# Patient Record
Sex: MALE | Race: WHITE | NOT HISPANIC OR LATINO | Employment: OTHER | URBAN - METROPOLITAN AREA
[De-identification: names, ages, dates, MRNs, and addresses within clinical notes are randomized per-mention and may not be internally consistent; named-entity substitution may affect disease eponyms.]

---

## 2018-05-22 LAB
CREAT ?TM UR-SCNC: 80.3 UMOL/L
HBA1C MFR BLD HPLC: 6.4 %

## 2018-08-15 ENCOUNTER — TRANSCRIBE ORDERS (OUTPATIENT)
Dept: NEUROLOGY | Facility: CLINIC | Age: 68
End: 2018-08-15

## 2018-08-15 DIAGNOSIS — M79.2 NEURALGIA AND NEURITIS, UNSPECIFIED: Primary | ICD-10-CM

## 2018-08-17 RX ORDER — ATORVASTATIN CALCIUM 10 MG/1
10 TABLET, FILM COATED ORAL DAILY
COMMUNITY
Start: 2018-04-17 | End: 2020-08-10 | Stop reason: SDDI

## 2018-08-17 RX ORDER — HYDROCHLOROTHIAZIDE 25 MG/1
1 TABLET ORAL DAILY
COMMUNITY
End: 2022-05-09 | Stop reason: ALTCHOICE

## 2018-08-17 RX ORDER — GABAPENTIN 600 MG/1
600 TABLET ORAL 4 TIMES DAILY
COMMUNITY
Start: 2018-05-23 | End: 2018-10-25 | Stop reason: SDUPTHER

## 2018-08-17 RX ORDER — METOPROLOL SUCCINATE 100 MG/1
100 TABLET, EXTENDED RELEASE ORAL DAILY
COMMUNITY
End: 2022-05-09 | Stop reason: ALTCHOICE

## 2018-08-17 RX ORDER — CLOTRIMAZOLE AND BETAMETHASONE DIPROPIONATE 10; .64 MG/G; MG/G
CREAM TOPICAL DAILY PRN
COMMUNITY
Start: 2015-11-07 | End: 2020-08-10 | Stop reason: ALTCHOICE

## 2018-08-17 RX ORDER — OXCARBAZEPINE 150 MG/1
150 TABLET, FILM COATED ORAL 2 TIMES DAILY
COMMUNITY
Start: 2018-05-23 | End: 2018-10-11 | Stop reason: SDUPTHER

## 2018-08-17 RX ORDER — GLIPIZIDE 10 MG/1
10 TABLET ORAL 2 TIMES DAILY
COMMUNITY

## 2018-08-17 RX ORDER — CYANOCOBALAMIN (VITAMIN B-12) 250 MCG
250 TABLET ORAL DAILY PRN
COMMUNITY
Start: 2018-05-31 | End: 2019-08-19 | Stop reason: ALTCHOICE

## 2018-08-17 RX ORDER — FUROSEMIDE 20 MG/1
1 TABLET ORAL DAILY PRN
COMMUNITY
End: 2022-05-09 | Stop reason: ALTCHOICE

## 2018-08-17 RX ORDER — CELECOXIB 200 MG/1
200 CAPSULE ORAL DAILY
COMMUNITY
End: 2018-08-21

## 2018-08-17 RX ORDER — ALPHA LIPOIC ACID 300 MG
1 CAPSULE ORAL 2 TIMES DAILY
COMMUNITY
Start: 2018-05-31 | End: 2018-08-21

## 2018-08-17 RX ORDER — IRBESARTAN 300 MG/1
300 TABLET ORAL DAILY
COMMUNITY
End: 2022-05-09 | Stop reason: SDUPTHER

## 2018-08-21 ENCOUNTER — OFFICE VISIT (OUTPATIENT)
Dept: NEUROLOGY | Facility: CLINIC | Age: 68
End: 2018-08-21
Payer: COMMERCIAL

## 2018-08-21 ENCOUNTER — APPOINTMENT (OUTPATIENT)
Dept: LAB | Facility: CLINIC | Age: 68
End: 2018-08-21
Payer: COMMERCIAL

## 2018-08-21 VITALS
HEART RATE: 73 BPM | DIASTOLIC BLOOD PRESSURE: 76 MMHG | HEIGHT: 70 IN | SYSTOLIC BLOOD PRESSURE: 136 MMHG | WEIGHT: 240 LBS | BODY MASS INDEX: 34.36 KG/M2

## 2018-08-21 DIAGNOSIS — G56.22 ULNAR NEUROPATHY OF LEFT UPPER EXTREMITY: ICD-10-CM

## 2018-08-21 DIAGNOSIS — E11.44 DIABETIC AMYOTROPHY ASSOCIATED WITH TYPE 2 DIABETES MELLITUS (HCC): ICD-10-CM

## 2018-08-21 DIAGNOSIS — E13.42 POLYNEUROPATHY DUE TO SECONDARY DIABETES (HCC): Primary | ICD-10-CM

## 2018-08-21 LAB
BUN SERPL-MCNC: 14 MG/DL (ref 5–25)
CREAT SERPL-MCNC: 0.89 MG/DL (ref 0.6–1.3)
ERYTHROCYTE [SEDIMENTATION RATE] IN BLOOD: 12 MM/HOUR (ref 0–10)
GFR SERPL CREATININE-BSD FRML MDRD: 88 ML/MIN/1.73SQ M

## 2018-08-21 PROCEDURE — 86618 LYME DISEASE ANTIBODY: CPT

## 2018-08-21 PROCEDURE — 36415 COLL VENOUS BLD VENIPUNCTURE: CPT

## 2018-08-21 PROCEDURE — 82565 ASSAY OF CREATININE: CPT

## 2018-08-21 PROCEDURE — 84520 ASSAY OF UREA NITROGEN: CPT

## 2018-08-21 PROCEDURE — 99204 OFFICE O/P NEW MOD 45 MIN: CPT | Performed by: PSYCHIATRY & NEUROLOGY

## 2018-08-21 PROCEDURE — 85652 RBC SED RATE AUTOMATED: CPT

## 2018-08-21 NOTE — PROGRESS NOTES
Lisandra Manriquez is a 76 y o  male who presents with complaints of numbness and pain in the left leg in bilateral distal lower extremities    Assessment:  1  Polyne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 ([address] )    2  Diabetic amyotrophy associated with type 2 diabetes mellitus ([address] )    3  Ulnar neuropathy of left upper extremity        Plan:  MRI left lumbar plexus  EMG left upper lower extremity  Blood work  Initiate physical therapy  Increase gabapentin up to a 600 mg 1-1/2 t i d  and may further increase to 2 t i d  if needed  Follow-up 6 weeks    Discussion:  Mykel Marino has findings consistent with diabetic peripheral neuropathy as well as suspected diabetic amyotrophy, rule out lumbar plexopathy  In addition has findings consistent with left ulnar neuropathy  Have recommended EMG left upper and lower extremity as well as MRI of the lumbar plexus and additional blood work  Will have him modify his medication taking gabapentin 600 mg 1 and half tablets 3 times daily and if still not enough relief after 4 days may increase to 2 pills 3 times daily  If pain symptoms persist to consider trial of Cymbalta  He will need straight physical therapy and I will see him back in follow-up when these are completed      Subjective:    HPI  Mykel Marino presents today with the above complaints  He states that for the past few years he has noticed numbness and tingling in the distal lower extremities associated with occasional stabbing and burning type pain symptoms  He states that over the years the distribution of the numbness has increased to about the level of the knees and the degree of numbness and dysesthesias has increased  He has also noticed some issues with balance during this time frame  He states he was seen by another neurologist who did blood work and an EMG of his extremities and diagnosed him with diabetic peripheral neuropathy    He was initially started on gabapentin in the dose was titrated up to 600 mg 4 times daily  As he was not getting adequate pain relief Trileptal was added and the dose was increased up to 600 mg 3 times daily and he became very lethargic and confused so this was changed back to 300 mg at bedtime  He states that over the last 3-4 months he has noticed a new type of pain symptoms that seems to began around the buttocks area on the left side and radiates into the anterior lateral left thigh and down into the left for leg associated with numbness and hypersensitivity  He states that there is a lancinating shooting type pain associated with this  In addition he has noticed progressive weakness in the whole left leg  He denied any injury or change in activity which may have precipitated this  He was referred to orthopedics and an MRI of his lumbar spine was performed and he was referred to Pain Management  He states he received if you epidural injections and did not notice significant improvement in his symptoms of pain but did note progressive weakness in the leg  Report of the MRI describes a previous right laminectomy at L4-5 with multilevel disc and facet changes contributing to lateral recess foraminal stenosis at L4-5 and L5-S1 with lesser changes at L2-3 and L3-4  He states that he now has to use a cane to ambulate and has a difficult time getting in and out of the car because of the weakness    He reports that he has had diabetes for 10 years and more recently his control has improved      Past Medical History:   Diagnosis Date    Arthritis     Hyperlipidemia     Hypertension     Poor circulation     Sleep apnea     Type 2 diabetes mellitus ([facility] [address] )        Family History:  Family History   Problem Relation Age of Onset    Diabetes type II Mother     Hypertension Mother     Breast cancer Sister     Lung cancer Sister     Multiple endocrine neoplasia Brother     Breast cancer Sister        Past Surgical History:  Past Surgical History:   Procedure Laterality Date    BACK SURGERY  CORONARY ARTERY BYPASS GRAFT  07/22/2017    HEMORROIDECTOMY      TONSILLECTOMY      WRIST SURGERY         Social History:   reports that he has never smoked  He has never used smokeless tobacco  He reports that he drinks alcohol  He reports that he does not use drugs  Allergies:  Penicillins      Current Outpatient Prescriptions:     aspirin 81 MG tablet, Take 1 tablet by mouth daily, Disp: , Rfl:     atorvastatin (LIPITOR) 10 mg tablet, Take 10 mg by mouth daily, Disp: , Rfl:     Blood Glucose Monitoring Suppl (ACURA BLOOD GLUCOSE METER) w/Device KIT, by Other route   Use as instructed, Disp: , Rfl:     clotrimazole-betamethasone (LOTRISONE) 1-0 05 % cream, Apply topically 2 (two) times a day, Disp: , Rfl:     Empagliflozin 10 MG TABS, Take 10 mg by mouth daily, Disp: , Rfl:     furosemide (LASIX) 20 mg tablet, Take 1 tablet by mouth daily as needed, Disp: , Rfl:     gabapentin (NEURONTIN) 600 MG tablet, Take 600 mg by mouth 4 (four) times a day  , Disp: , Rfl:     glipiZIDE (GLUCOTROL) 10 mg tablet, Take 10 mg by mouth 2 (two) times a day, Disp: , Rfl:     hydrochlorothiazide (HYDRODIURIL) 25 mg tablet, Take 1 tablet by mouth daily, Disp: , Rfl:     insulin glargine (LANTUS SOLOSTAR) 100 units/mL injection pen, Lantus Solostar U-100 Insulin 100 unit/mL (3 mL) subcutaneous pen, Disp: , Rfl:     IRBESARTAN PO, Take 450 mg by mouth daily, Disp: , Rfl:     metoprolol succinate (TOPROL-XL) 100 mg 24 hr tablet, Take 100 mg by mouth daily, Disp: , Rfl:     OXcarbazepine (TRILEPTAL) 150 mg tablet, Take 150 mg by mouth 2 (two) times a day, Disp: , Rfl:     vitamin B-12 (CYANOCOBALAMIN) 250 MCG TABS, Take 250 mcg by mouth daily, Disp: , Rfl:     I have reviewed the past medical, social and family history, current medications, allergies, vitals, review of systems and updated this information as appropriate today     Objective:    Vitals:  Blood pressure 136/76, pulse 73, height 5' 10" (1 778 m), weight 109 kg (240 lb)  Physical Exam    Neurological Exam    GENERAL:  Cooperative in no acute distress  Well-developed and well-nourished    HEAD and NECK   Head is atraumatic normocephalic with no lesions or masses  Neck is supple with full range of motion    CARDIOVASCULAR  Carotid Arteries-no carotid bruits  MUSCULOSKELETAL:  Back is straight and nontender  Straight leg raising is negative  Tenderness is noted over the left trochanteric bursa region    NEUROLOGIC:  Mental Status-the patient is awake alert and oriented without aphasia or apraxia  Cranial Nerves: Visual fields are full to confrontation  Discs are flat  Extraocular movements are full without nystagmus  Pupils are 2-1/2 mm and reactive  Face is symmetrical to light touch  Movements of facial expression reveal a mild left facial asymmetry  Hearing is normal to finger rub bilaterally  Soft palate lifts symmetrically  Shoulder shrug is symmetrical  Tongue is midline without atrophy  Motor: No drift is noted on arm extension  Strength is full in the upper extremities with exception of 4+ strength in the intrinsic hand muscles on the left with atrophy at [facility]  Strength is full in the right lower extremity however in the left lower extremity hip flexor strength is 4/5, knee extensor strength is 4+/5, knee flexor strength is 5-/5, foot dorsiflexion strength is 3+/5 with 3+ inversion and 3+ EHL  He is unable to heel walk on the left but is able to toe-walk bilaterally  Bulk and tone are otherwise normal   Sensory:  Diminished temperature and vibratory sensation in the distal lower extremities bilaterally symmetrically  Cortical function is intact  Coordination: Finger to nose testing is performed accurately  Romberg is positive  Gait reveals an increased base with a steppage gait pattern on the left  Tandem walk not able to be performed    Reflexes:  0/4 in the biceps brachioradialis regions, trace at the triceps bilaterally, 0/4 at the left knee jerk with 1/4 at the right, 0/4 at the right ankle jerk and 1/4 on the left  Toes are downgoing            ROS:    Review of Systems   Constitutional: Negative  HENT: Negative  Eyes: Negative  Respiratory: Negative  Cardiovascular: Positive for leg swelling  Gastrointestinal: Negative  Endocrine: Negative  Genitourinary: Negative  Musculoskeletal: Positive for joint swelling  Skin: Negative  Allergic/Immunologic: Negative  Neurological: Positive for dizziness, tremors and numbness  Hematological: Negative  Psychiatric/Behavioral: Positive for confusion and sleep disturbance

## 2018-08-22 LAB
B BURGDOR IGG SER IA-ACNC: 0.24
B BURGDOR IGM SER IA-ACNC: 0.11

## 2018-09-04 ENCOUNTER — TELEPHONE (OUTPATIENT)
Dept: NEUROLOGY | Facility: CLINIC | Age: 68
End: 2018-09-04

## 2018-09-04 DIAGNOSIS — F40.240 CLAUSTROPHOBIA: Primary | ICD-10-CM

## 2018-09-04 RX ORDER — LORAZEPAM 1 MG/1
TABLET ORAL
Qty: 3 TABLET | Refills: 0 | Status: SHIPPED | OUTPATIENT
Start: 2018-09-04 | End: 2018-10-24 | Stop reason: ALTCHOICE

## 2018-09-04 NOTE — TELEPHONE ENCOUNTER
Script sent to incorrect pharmacy  Cancelled script with [facility] and called in to [address]  Called pt to make aware script was called in

## 2018-09-04 NOTE — TELEPHONE ENCOUNTER
Pt is asking if he could get something to take before his MRI scheduled on 9/6/18  Please send to [place] that is on file

## 2018-09-07 ENCOUNTER — TELEPHONE (OUTPATIENT)
Dept: NEUROLOGY | Facility: CLINIC | Age: 68
End: 2018-09-07

## 2018-09-07 ENCOUNTER — PROCEDURE VISIT (OUTPATIENT)
Dept: NEUROLOGY | Facility: CLINIC | Age: 68
End: 2018-09-07
Payer: COMMERCIAL

## 2018-09-07 DIAGNOSIS — E11.44 DIABETIC AMYOTROPHY ASSOCIATED WITH TYPE 2 DIABETES MELLITUS (HCC): ICD-10-CM

## 2018-09-07 DIAGNOSIS — G56.22 ULNAR NEUROPATHY OF LEFT UPPER EXTREMITY: ICD-10-CM

## 2018-09-07 PROCEDURE — 95911 NRV CNDJ TEST 9-10 STUDIES: CPT | Performed by: PSYCHIATRY & NEUROLOGY

## 2018-09-07 PROCEDURE — 95886 MUSC TEST DONE W/N TEST COMP: CPT | Performed by: PSYCHIATRY & NEUROLOGY

## 2018-09-07 NOTE — PROGRESS NOTES
EMG 1 Upper/1 Lower Neuropathy     Date/Time 9/7/2018 9:21 AM     Performed by  Benchling Rung     Authorized by Benchling Rung             EMG LEFT UPPER/LOWER EXTREMITY    Motor and sensory conduction studies were performed on the left median, ulnar, peroneal, tibial and sural nerves  The distal motor latencies of the median and ulnar nerve were prolonged at 4 8 and 3 7 milliseconds respectively while the tibial and peroneal latencies were normal  The motor action potential amplitudes of the peroneal nerve was reduced at 0 1 mV the tibial nerve reduced at 1 6 mV and the ulnar reduced at 2 mV while the median potentials were normal  Motor conduction velocities of the peroneal nerve were slow below the fibular head at 30 m/sec, tibial nerve slow at 32 m/sec, me nerve slow at 42 m/sec while the ulnar conduction velocities were normal including conduction velocity of the ulnar nerve across the elbow and peroneal nerve across the fibular head  F waves were significantly prolonged with exception of the peroneal nerve which was not obtainable  The left sural distal sensory latency was not obtainable  The median latency was prolonged at 3 8 milliseconds with a delay and palmar stimulation at 2 1 milliseconds with reduced sensory action potential amplitudes  The ulnar distal sensory latency was prolonged with severely low sensory action potential amplitude  H  reflexes were not obtainable l  Concentric needle EMG was performed in the left APB, FDI, EDC, brachial radialis, biceps, triceps, EDB, tibialis anterior, gastrocnemius medius, vastus lateralis, biceps femoralis short head in the  low cervical and lumbar paraspinal regions  There were 3+ positive waves in 3 +fibrillation potentials in the tibialis anterior region and quadriceps regions  No other evidence of spontaneous activities were seen    A discrete interference pattern with large amplitude potentials was noted the EDB with rapid firing large amplitude potentials and reduced interference noted at tibialis anterior and quadriceps regions as well as at the FDI region  The other compound motor unit potentials were of normal configuration and interference patterns were full were full for effort  IMPRESSION: This is an abnormal EMG of the left upper and lower extremity due to changes consistent with a mixed motor sensory polyneuropathy with evidence of both demyelinative as well as acute and chronic axonal change more localized neuropathic process involving the ulnar nerve at the wrist consistent with 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

## 2018-09-13 ENCOUNTER — HOSPITAL ENCOUNTER (OUTPATIENT)
Dept: MRI IMAGING | Facility: HOSPITAL | Age: 68
Discharge: HOME | End: 2018-09-13
Attending: PSYCHIATRY & NEUROLOGY
Payer: COMMERCIAL

## 2018-09-13 DIAGNOSIS — E11.44 DIABETIC AMYOTROPHY ASSOCIATED WITH TYPE 2 DIABETES MELLITUS (HCC): ICD-10-CM

## 2018-09-13 PROCEDURE — 72197 MRI PELVIS W/O & W/DYE: CPT

## 2018-09-13 PROCEDURE — A9585 GADOBUTROL INJECTION: HCPCS | Performed by: RADIOLOGY

## 2018-09-13 RX ADMIN — GADOBUTROL 10 ML: 604.72 INJECTION INTRAVENOUS at 14:12

## 2018-09-20 ENCOUNTER — TELEPHONE (OUTPATIENT)
Dept: NEUROLOGY | Facility: CLINIC | Age: 68
End: 2018-09-20

## 2018-09-20 NOTE — TELEPHONE ENCOUNTER
Pt is requesting that you give him a call regarding his MRI results  Pt also states that he is having increased pain in his legs and hips      [phone]

## 2018-09-20 NOTE — TELEPHONE ENCOUNTER
Discussed MRI results with patient  No significant abnormalities noted other than degenerative changes of the lumbar spine which may be contributing to some of his pain symptoms but would not explain the weakness and pain in the whole leg  He is currently taking Trileptal 300 mg twice daily and gabapentin 600 mg 4 times daily  Have gradually recommended increasing his Trileptal to 450 mg twice daily and if still in pain increase his gabapentin to 1/2 pills at bedtime    Have recommended the start physical therapy and if pain symptoms persist to consider pain management referral

## 2018-10-01 ENCOUNTER — EVALUATION (OUTPATIENT)
Dept: PHYSICAL THERAPY | Facility: CLINIC | Age: 68
End: 2018-10-01
Payer: COMMERCIAL

## 2018-10-01 VITALS — SYSTOLIC BLOOD PRESSURE: 142 MMHG | HEART RATE: 71 BPM | DIASTOLIC BLOOD PRESSURE: 72 MMHG

## 2018-10-01 DIAGNOSIS — M79.605 LEFT LEG PAIN: Primary | ICD-10-CM

## 2018-10-01 PROCEDURE — G8979 MOBILITY GOAL STATUS: HCPCS | Performed by: PHYSICAL THERAPIST

## 2018-10-01 PROCEDURE — 97162 PT EVAL MOD COMPLEX 30 MIN: CPT | Performed by: PHYSICAL THERAPIST

## 2018-10-01 PROCEDURE — G8978 MOBILITY CURRENT STATUS: HCPCS | Performed by: PHYSICAL THERAPIST

## 2018-10-01 PROCEDURE — 97113 AQUATIC THERAPY/EXERCISES: CPT | Performed by: PHYSICAL THERAPIST

## 2018-10-01 NOTE — PROGRESS NOTES
PT Evaluation     Today's date: 10/1/2018  Patient name: Tatiana Tompkins  : 1950  MRN: [id number]  Referring provider: Fátima Culver MD  Dx:   Encounter Diagnosis     ICD-10-CM    1  Left leg pain M79 605 PT plan of care cert/re-cert       Start Time: 915  Stop Time: 1030  Total time in clinic (min): 75 minutes    Assessment  Impairments: abnormal gait, abnormal muscle firing, abnormal or restricted ROM, activity intolerance, impaired balance, impaired physical strength, lacks appropriate home exercise program, pain with function, poor posture  and poor body mechanics    Assessment details: Tatiana Tompkins is a 76 y o  male who presents with pain, decreased strength, decreased ROM, decreased joint mobility, decreased sensation, joint effusion, impaired sensation and ambulatory dysfunction  Due to these impairments, Patient has difficulty performing a/iadls, recreational activities, work-related activities and engaging in social activities  Patient's clinical presentation is consistent with their referring diagnosis of left LE pain and polyneuropathy  Patient would benefit from skilled physical therapy to address their aforementioned impairments, improve their level of function and to improve their overall quality of life  He will begin with aquatic PT and progress to land PT as tolerated  Understanding of Dx/Px/POC: excellent   Prognosis: good    Goals  ST-3 WEEKS  1  Decrease pain by 2 points on VAS in Left LE  2   Increase ROM by > 5 deg in all deficients planes  3   Increase Left LE strength by 1/2 MMT grade  4   Increase Balance for safe gait with sp cane  LT-6 WEEKS  1  Patient to be independent with a/iadls  2  Increase functional activities for leisure and home activities to previous LOF    3  Independent with HEP and/or fitness program     Plan  Patient would benefit from: skilled physical therapy  Planned modality interventions: unattended electrical stimulation  Planned therapy interventions: activity modification, behavior modification, body mechanics training, aquatic therapy, flexibility, functional ROM exercises, home exercise program, IADL retraining, joint mobilization, manual therapy, neuromuscular re-education, patient education, postural training, strengthening, stretching, therapeutic activities and therapeutic exercise  Frequency: 2x week (2-3x week)  Duration in weeks: 12  Treatment plan discussed with: patient  Plan details: Will begin with Aquatic PT to lessen the stress on the spinal stenosis and left sciatica pain  Will continue to increase deep water traction and progress to land PT as he can tolerate  Subjective Evaluation    History of Present Illness  Mechanism of injury: Patient reports symptoms began approx  6 months ago and has become progressively worse  Has had pain management injections without relief and also chiropractic treatments which increased the symptoms  He continues to have severe pain in the posterior left LE (sciatica) and in both feet from the neuropathy      Recurrent probem    Quality of life: poor    Pain  Current pain ratin  At best pain ratin  At worst pain rating: 10  Quality: burning, radiating and knife-like  Relieving factors: change in position, rest and medications  Aggravating factors: standing, sitting, stair climbing, walking and lifting  Progression: worsening    Treatments  Previous treatment: chiropractic and injection treatment  Current treatment: physical therapy  Patient Goals  Patient goals for therapy: decreased pain, improved balance, increased motion, increased strength, independence with ADLs/IADLs, return to sport/leisure activities and return to work          Objective     Neurological Testing     Sensation     Lumbar   Left   Diminished: light touch and pin prick    Right   Intact: light touch and pin prick    Active Range of Motion     Lumbar   Flexion: 80 degrees   Extension: 5 degrees Strength/Myotome Testing     Left Hip   Planes of Motion   Flexion: 3-  Extension: 3-  Abduction: 3-    Left Knee   Flexion: 3-  Extension: 3-    Left Ankle/Foot   Dorsiflexion: 2-  Plantar flexion: 3-      Flowsheet Rows      Most Recent Value   PT/OT G-Codes   Current Score  38   Projected Score  55   Assessment Type  Evaluation   G code set  Mobility: Walking & Moving Around   Mobility: Walking and Moving Around Current Status ()  CL   Mobility: Walking and Moving Around Goal Status ()  CK          Precautions: Neuropathy, DM, Depression    Daily Treatment Diary     Manual  10/1                                                                                 Exercise Diary  10/1            Water walking 15            Postural training 2            Gait training 3            Home exercise pgm/patient education             Wall: t/h raises             Hip abd/add             Marching             squats             Knee flex/ext             Step-ups (fwd/bkwd/ss)             SLS (eyes open/closed)             SLS w UE mvmt  AROM/ball toss             Weight shifting             UE Noodle work x 4              UE AROM             Resistive UE work (paddles, bells, TB)             Core work on noodle (sitting/stdg)             Sit on noodle with movement             Seated on pool bench w proper posture             Ankle df/pf             marching             Hip Ab/add             Knee flex/ext             Deep water mvmt 5            Deep water tx/stretching 10            Specific self - stretches wall/steps                 Modalities  10/1            whirlpool 10

## 2018-10-04 ENCOUNTER — OFFICE VISIT (OUTPATIENT)
Dept: PHYSICAL THERAPY | Facility: CLINIC | Age: 68
End: 2018-10-04
Payer: COMMERCIAL

## 2018-10-04 DIAGNOSIS — M79.605 LEFT LEG PAIN: Primary | ICD-10-CM

## 2018-10-04 PROCEDURE — 97113 AQUATIC THERAPY/EXERCISES: CPT

## 2018-10-04 NOTE — PROGRESS NOTES
Daily Note     Today's date: 10/4/2018  Patient name: Christine Hernández  : 1950  MRN: [id number]  Referring provider: Jazmine Alba MD  Dx:   Encounter Diagnosis     ICD-10-CM    1  Left leg pain M79 605        Start Time: 1045  Stop Time: 1130  Total time in clinic (min): 45 minutes    Subjective:  Patient reports good relief of pain symptoms after first aquatic PT visit, pain in L LE returned during the night and hasn't subsided since then  Patient reports to this PTA that he's also been recently diagnosed with early onset parkinson's disease and Dr  Has recommended a brain MRI  Patient to Dr Nikki Blackmon within the next few weeks  Patient concerned about frequent bouts with loss of balance      Objective: See treatment diary below  Precautions: Neuropathy, DM, Depression     Daily Treatment Diary      Manual  10/1                                                                                                                                                   Exercise Diary  10/1  10/4                   Water walking 15  20'                   Postural training 2                     Gait training 3                     Home exercise pgm/patient education    5'                   Wall: t/h raises                       Hip abd/add                       Marching    1'                   squats                       Knee flex/ext                       Step-ups (fwd/bkwd/ss)                       SLS (eyes open/closed)                       SLS w UE mvmt  AROM/ball toss                       Weight shifting                       UE Noodle work x 4    p/p x2'  Rot x 2'                   UE AROM                       Resistive UE work (paddles, bells, TB)                       Core work on noodle (sitting/stdg)                       Sit on noodle with movement                       Seated on pool bench w proper posture                       Ankle df/pf                       marching                       Hip Ab/add                       Knee flex/ext                       Deep water mvmt 5                     Deep water tx/stretching 10  10'                   Specific self - stretches wall/steps                             Modalities  10/1  10/4                   whirlpool 10  10'                         Assessment: Tolerated treatment well  Patient demonstrated fatigue post treatment  Patient uses SPC and WBOS on land, gait is cautious and slow  Patient pleasant and cooperative  Plan: Continue per plan of care  Progress as tolerated

## 2018-10-05 ENCOUNTER — APPOINTMENT (OUTPATIENT)
Dept: PHYSICAL THERAPY | Facility: CLINIC | Age: 68
End: 2018-10-05
Payer: COMMERCIAL

## 2018-10-08 ENCOUNTER — OFFICE VISIT (OUTPATIENT)
Dept: PHYSICAL THERAPY | Facility: CLINIC | Age: 68
End: 2018-10-08
Payer: COMMERCIAL

## 2018-10-08 DIAGNOSIS — M79.605 LEFT LEG PAIN: Primary | ICD-10-CM

## 2018-10-08 PROCEDURE — 97113 AQUATIC THERAPY/EXERCISES: CPT

## 2018-10-08 NOTE — PROGRESS NOTES
Daily Note     Today's date: 10/8/2018  Patient name: Candance Condon  : 1950  MRN: [id number]  Referring provider: Yuliet Fox MD  Dx:   Encounter Diagnosis     ICD-10-CM    1  Left leg pain M79 605        Start Time: 1015  Stop Time: 1105  Total time in clinic (min): 50 minutes    Subjective:  Patient c/o 6-7/10 L LB and down L LE  Second aquatic visit not as beneficial as first PT visit  Discussed PT options moving forward          Objective: See treatment diary below  Precautions: Neuropathy, DM, Depression     Daily Treatment Diary      Manual  10/1                                                                                                                                                   Exercise Diary  10/1  10/4  10/8                 Water walking 13  20'  20'                 Postural training 2                     Gait training 3                     Home exercise pgm/patient education    5'  5'                 Wall: t/h raises                       Hip abd/add                       Marching    1'                   squats                       Knee flex/ext                       Step-ups (fwd/bkwd/ss)                       SLS (eyes open/closed)                       SLS w UE mvmt  AROM/ball toss                       Weight shifting                       UE Noodle work x 4    p/p x2'  Rot x 2'  p/p w gentle wt shift 5'                 UE AROM                       Resistive UE work (paddles, bells, TB)                       Core work on noodle (sitting/stdg)                       Sit on noodle with movement                       Seated on pool bench w proper posture                       Ankle df/pf                       marching                       Hip Ab/add                       Knee flex/ext                       Deep water mvmt 5                     Deep water tx/stretching 10  10'  15'                 Specific self - stretches wall/steps                             Modalities 10/1  10/4  10/8                 whirkameron 10  10'  5'                       Assessment: Tolerated low level aquatic treatment fair, cut initial walking time short to move to deep end for gentle traction  Attempts at alternating between walking with proper posture with  deep breathing focus and deep end gentle traction and movement  Discussed importance of deep breathing and opening up from painful forward posture  Patient demonstrated fatigue post treatment  Patient uses SPC and WBOS on land, gait is cautious and slow  Plan: Continue per plan of care  Progress as tolerated

## 2018-10-10 ENCOUNTER — APPOINTMENT (OUTPATIENT)
Dept: PHYSICAL THERAPY | Facility: CLINIC | Age: 68
End: 2018-10-10
Payer: COMMERCIAL

## 2018-10-11 DIAGNOSIS — E11.44 DIABETIC AMYOTROPHY ASSOCIATED WITH TYPE 2 DIABETES MELLITUS (HCC): Primary | ICD-10-CM

## 2018-10-11 RX ORDER — OXCARBAZEPINE 150 MG/1
450 TABLET, FILM COATED ORAL 2 TIMES DAILY
Qty: 180 TABLET | Refills: 5 | Status: SHIPPED | OUTPATIENT
Start: 2018-10-11 | End: 2018-11-13

## 2018-10-12 ENCOUNTER — OFFICE VISIT (OUTPATIENT)
Dept: PHYSICAL THERAPY | Facility: CLINIC | Age: 68
End: 2018-10-12
Payer: COMMERCIAL

## 2018-10-12 DIAGNOSIS — M79.605 LEFT LEG PAIN: Primary | ICD-10-CM

## 2018-10-12 PROCEDURE — 97113 AQUATIC THERAPY/EXERCISES: CPT | Performed by: PHYSICAL THERAPIST

## 2018-10-12 NOTE — PROGRESS NOTES
Daily Note     Today's date: 10/12/2018  Patient name: Tatiana Tompkins  : 1950  MRN: [id number]  Referring provider: Fátima Culver MD  Dx:   Encounter Diagnosis     ICD-10-CM    1  Left leg pain M79 605        Start Time: 1045  Stop Time: 1145  Total time in clinic (min): 60 minutes    Subjective: Better today  Pain level is 5/10 in left LE but is more mobile  Deep Water exercises are definitely helping  Objective: See treatment diary below    Lumber ROM is better  Flexibility in hamstrings has improved      Precautions: Neuropathy, DM, Depression     Daily Treatment Diary      Manual  10/1                                                                                                                                                   Exercise Diary  10/1  10/4  10/8 10/12               Water walking 13  20'  20'  20               Postural training 2      2               Gait training 3      1               Home exercise pgm/patient education    5'  5'                 Wall: t/h raises                      Hip abd/add                       Marching    1'    1               squats                       Knee flex/ext                       Step-ups (fwd/bkwd/ss)                       SLS (eyes open/closed)                       SLS w UE mvmt  AROM/ball toss                       Weight shifting                       UE Noodle work x 4    p/p x2'  Rot x 2'  p/p w gentle wt shift 5'  5               UE AROM                       Resistive UE work (paddles, bells, TB)                       Core work on noodle (sitting/stdg)                       Sit on noodle with movement                       Seated on pool bench w proper posture                       Ankle df/pf                       marching                       Hip Ab/add                       Knee flex/ext                       Deep water mvmt 5      5               Deep water tx/stretching 10  10'  15'  15               Specific self - stretches wall/steps                             Modalities  10/1  10/4  10/8  10/12               whirlpool 10  10'  5'  10                     Assessment: Tolerated treatment well  Patient demonstrated fatigue post treatment and would benefit from continued PT      Plan: Continue per plan of care  Progress treatment as tolerated

## 2018-10-15 ENCOUNTER — APPOINTMENT (OUTPATIENT)
Dept: PHYSICAL THERAPY | Facility: CLINIC | Age: 68
End: 2018-10-15
Payer: COMMERCIAL

## 2018-10-17 ENCOUNTER — APPOINTMENT (OUTPATIENT)
Dept: PHYSICAL THERAPY | Facility: CLINIC | Age: 68
End: 2018-10-17
Payer: COMMERCIAL

## 2018-10-19 ENCOUNTER — OFFICE VISIT (OUTPATIENT)
Dept: PHYSICAL THERAPY | Facility: CLINIC | Age: 68
End: 2018-10-19
Payer: COMMERCIAL

## 2018-10-19 DIAGNOSIS — M79.605 LEFT LEG PAIN: Primary | ICD-10-CM

## 2018-10-19 PROCEDURE — 97113 AQUATIC THERAPY/EXERCISES: CPT

## 2018-10-19 NOTE — PROGRESS NOTES
Daily Note     Today's date: 10/19/2018  Patient name: Brandie Dyer  : 1950  MRN: [id number]  Referring provider: Clyde Hull MD  Dx:   Encounter Diagnosis     ICD-10-CM    1  Left leg pain M79 605        Start Time: 1030  Stop Time: 1130  Total time in clinic (min): 60 minutes    Subjective: pt notes increased pain in his LB  Pt states he feels good while in the water but he states he is having more LBP after  Pt notes he saw MD regarding BP and he changed his meds         Objective: See treatment diary below  /77     Precautions: Neuropathy, DM, Depression     Daily Treatment Diary      Manual  10/1                                                                                                                                                   Exercise Diary  10/1  10/4  10/8 10/12  10/19             Water walking 15  20'  20'  20  20             Postural training 2      2  1             Gait training 3      1               Home exercise pgm/patient education    5'  5'                 Wall: t/h raises                      Hip abd/add                       Marching    1'    1               squats                       Knee flex/ext                       Step-ups (fwd/bkwd/ss)                       SLS (eyes open/closed)                       SLS w UE mvmt  AROM/ball toss                       Weight shifting                       UE Noodle work x 4    p/p x2'  Rot x 2'  p/p w gentle wt shift 5'  5  5             UE AROM                       Resistive UE work (paddles, bells, TB)                       Core work on noodle (sitting/stdg)                       Sit on noodle with movement                       Seated on pool bench w proper posture                       Ankle df/pf                       marching                       Hip Ab/add                       Knee flex/ext                       Deep water mvmt 5      5  5             Deep water tx/stretching 10  10'  15'  15  15           Specific self - stretches wall/steps                             Modalities  10/1  10/4  10/8  10/12  10/17             whirlpool 10  10'  5'  10  10                     Assessment: Tolerated treatment fair  Pt is guarded with movements in the water  Slow gait with forward posture  Pain noted with walking in the shallow water today  Pt to try land PT next week for stretching  Patient would benefit from continued PT      Plan: Continue per plan of care

## 2018-10-22 ENCOUNTER — OFFICE VISIT (OUTPATIENT)
Dept: PHYSICAL THERAPY | Facility: CLINIC | Age: 68
End: 2018-10-22
Payer: COMMERCIAL

## 2018-10-22 DIAGNOSIS — M79.605 LEFT LEG PAIN: Primary | ICD-10-CM

## 2018-10-22 PROCEDURE — 97113 AQUATIC THERAPY/EXERCISES: CPT | Performed by: PHYSICAL THERAPIST

## 2018-10-22 PROCEDURE — 97140 MANUAL THERAPY 1/> REGIONS: CPT | Performed by: PHYSICAL THERAPIST

## 2018-10-22 NOTE — PROGRESS NOTES
Daily Note     Today's date: 10/22/2018  Patient name: Otilia Peres  : 1950  MRN: [id number]  Referring provider: Anshu Watson MD  Dx:   Encounter Diagnosis     ICD-10-CM    1  Left leg pain M79 605        Start Time: 1005  Stop Time: 1115  Total time in clinic (min): 70 minutes    Subjective: Still complains of 8/10 pain in the left sciatic notch in left buttock  Reports he can not get comfortable during the day  Aquatic PT helps temporarily          Objective: See treatment diary below    Precautions: Neuropathy, DM, Depression     Daily Treatment Diary      Manual  10/1          10/22            LE/LB stretching            10            Mobs            5                                                                                         Exercise Diary  10/1  10/4  10/8 10/12  10/19  10/22           Water walking 15  20'  20'  20  20  20           Postural training 2      2  1             Gait training 3      1               Home exercise pgm/patient education    5'  5'                 Wall: t/h raises                      Hip abd/add                       Marching    1'    1    1           squats                       Knee flex/ext                       Step-ups (fwd/bkwd/ss)                       SLS (eyes open/closed)                       SLS w UE mvmt  AROM/ball toss                       Weight shifting                       UE Noodle work x 4    p/p x2'  Rot x 2'  p/p w gentle wt shift 5'  5  5  5           UE AROM                       Resistive UE work (paddles, bells, TB)                       Core work on noodle (sitting/stdg)                       Sit on noodle with movement                       Seated on pool bench w proper posture                       Ankle df/pf                       marching                       Hip Ab/add                       Knee flex/ext                       Deep water mvmt 5      5  5  5           Deep water tx/stretching 10  10'  15'  15  15  15           Specific self - stretches wall/steps                             Modalities  10/1  10/4  10/8  10/12  10/19  10/22           whirlpool 10  10'  5'  10  10  10                   Assessment: Tolerated treatment fair  Patient demonstrated fatigue post treatment and would benefit from continued PT      Plan: Continue per plan of care  Progress treatment as tolerated

## 2018-10-24 ENCOUNTER — OFFICE VISIT (OUTPATIENT)
Dept: NEUROLOGY | Facility: CLINIC | Age: 68
End: 2018-10-24
Payer: COMMERCIAL

## 2018-10-24 VITALS
WEIGHT: 244 LBS | HEART RATE: 62 BPM | DIASTOLIC BLOOD PRESSURE: 72 MMHG | BODY MASS INDEX: 36.14 KG/M2 | HEIGHT: 69 IN | SYSTOLIC BLOOD PRESSURE: 138 MMHG

## 2018-10-24 DIAGNOSIS — K11.9 PAROTID DISCOMFORT: ICD-10-CM

## 2018-10-24 DIAGNOSIS — R25.1 TREMOR: ICD-10-CM

## 2018-10-24 DIAGNOSIS — E11.44 DIABETIC AMYOTROPHY ASSOCIATED WITH TYPE 2 DIABETES MELLITUS (HCC): ICD-10-CM

## 2018-10-24 DIAGNOSIS — E13.42 POLYNEUROPATHY DUE TO SECONDARY DIABETES (HCC): Primary | ICD-10-CM

## 2018-10-24 DIAGNOSIS — G56.22 ULNAR NEUROPATHY OF LEFT UPPER EXTREMITY: ICD-10-CM

## 2018-10-24 PROCEDURE — 99215 OFFICE O/P EST HI 40 MIN: CPT | Performed by: PSYCHIATRY & NEUROLOGY

## 2018-10-24 RX ORDER — AMLODIPINE BESYLATE 5 MG/1
10 TABLET ORAL DAILY
COMMUNITY
Start: 2018-10-18 | End: 2019-05-17 | Stop reason: DRUGHIGH

## 2018-10-24 NOTE — PROGRESS NOTES
María Coppola is a 76 y o  male who returns in follow-up today with a history of diabetic neuropathy    Assessment:  1  Polyne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 ([address] )    2  Diabetic amyotrophy associated with type 2 diabetes mellitus ([facility] [address] )    3  Ulnar neuropathy of left upper extremity    4  Tremor    5  Parotid discomfort        Plan:  Increase Trileptal gradually 450 mg twice daily  Continue gabapentin 600 mg 4 times daily  ENT evaluation  Follow-up 2 months    Discussion:  Aleida Salinas has diabetic polyneuropathy with diabetic amyotrophy on the left  He was not able to tolerate higher doses of gabapentin due to side effects but is able to tolerate Trileptal currently at 300 mg at night and 150 mg in the morning  He still has neuropathic pain and have recommended increasing the gabapentin slowly up to 450 mg twice daily  If he continues to have pain he will notify me  He does have findings on EMG consistent with 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   He understands that surgical intervention may help this but he is not interested in pursuing at the present time  He also has findings consistent with Parkinson's disease with a rest tremor  He does not want to take medication for this at this point we did discuss types of medication and rationale  He has what sounds like an issue with his left parotid gland with swelling and pain over the last month  Have recommended ENT evaluation and I will see him back in follow-up in 2 months      Subjective:    HPI  Aleida Salinas returns in follow-up today  He continues to have shooting pain down the left leg  He also continues to have weakness  He has been in physical therapy  He did try increasing the dose of gabapentin but did not tolerate doses higher than 2400 mg due to dizziness  We did initiate Trileptal and he has been tolerating this well but still has pain  His EMG demonstrated severe mixed motor sensory polyneuropathy with axonal and demyelinative change  Chronic lumbar polyradiculopathy versus plexopathy were noted  His MRI of the lumbar plexus demonstrated no abnormalities  His EMG of the left upper extremity demonstrated changes consistent with 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 and possible left carpal tunnel syndrome  He has had symptoms of a rest tremor for some time  He states his primary doctor 1 point gave him Sinemet but he stop taking it because the tremor was not bothering him much  He estimates this was about a year and half ago  Over the last month he has had pain in his left cheek radiating toward his ear especially when he eats  He has noted some swelling there  His primary doctor did not see any abnormalities in his ears and his dentist did not find any abnormalities that would explain this      Past Medical History:   Diagnosis Date    Arthritis     Hyperlipidemia     Hypertension     Poor circulation     Sleep apnea     Type 2 diabetes mellitus ([address] )        Family History:  Family History   Problem Relation Age of Onset    Diabetes type II Mother     Hypertension Mother     Breast cancer Sister     Lung cancer Sister     Multiple endocrine neoplasia Brother     Breast cancer Sister        Past Surgical History:  Past Surgical History:   Procedure Laterality Date    BACK SURGERY      CORONARY ARTERY BYPASS GRAFT  07/22/2017    HEMORROIDECTOMY      TONSILLECTOMY      WRIST SURGERY         Social History:   reports that he has never smoked  He has never used smokeless tobacco  He reports that he drinks alcohol  He reports that he does not use drugs      Allergies:  Penicillins      Current Outpatient Prescriptions:     amLODIPine (NORVASC) 5 mg tablet, Take 5 mg by mouth daily, Disp: , Rfl:     aspirin 81 MG tablet, Take 1 tablet by mouth daily, Disp: , Rfl:     atorvastatin (LIPITOR) 10 mg tablet, Take 10 mg by mouth daily, Disp: , Rfl:     Blood Glucose Monitoring Suppl (ACURA BLOOD GLUCOSE METER) w/Device KIT, by Other route  Use as instructed, Disp: , Rfl:     clotrimazole-betamethasone (LOTRISONE) 1-0 05 % cream, Apply topically daily as needed  , Disp: , Rfl:     Empagliflozin 10 MG TABS, Take 10 mg by mouth daily, Disp: , Rfl:     furosemide (LASIX) 20 mg tablet, Take 1 tablet by mouth daily as needed, Disp: , Rfl:     gabapentin (NEURONTIN) 600 MG tablet, Take 600 mg by mouth 4 (four) times a day  , Disp: , Rfl:     glipiZIDE (GLUCOTROL) 10 mg tablet, Take 10 mg by mouth 2 (two) times a day, Disp: , Rfl:     hydrochlorothiazide (HYDRODIURIL) 25 mg tablet, Take 1 tablet by mouth daily, Disp: , Rfl:     insulin glargine (LANTUS SOLOSTAR) 100 units/mL injection pen, Lantus Solostar U-100 Insulin 30unit/mL at bedtime (3 mL) subcutaneous pen, Disp: , Rfl:     irbesartan (AVAPRO) 300 mg tablet, Take 300 mg by mouth daily  , Disp: , Rfl:     metoprolol succinate (TOPROL-XL) 100 mg 24 hr tablet, Take 100 mg by mouth daily, Disp: , Rfl:     OXcarbazepine (TRILEPTAL) 150 mg tablet, Take 3 tablets (450 mg total) by mouth 2 (two) times a day, Disp: 180 tablet, Rfl: 5    vitamin B-12 (CYANOCOBALAMIN) 250 MCG TABS, Take 250 mcg by mouth daily, Disp: , Rfl:     I have reviewed the past medical, social and family history, current medications, allergies, vitals, review of systems and updated this information as appropriate today     Objective:    Vitals:  Blood pressure 138/72, pulse 62, height 5' 9 25" (1 759 m), weight 111 kg (244 lb)  Physical Exam    Neurological Exam  GENERAL:  Well-developed well-nourished man in no acute distress  HEENT/NECK: Head is atraumatic normocephalic, neck is supple fullness and tenderness is noted over the left parotid gland  NEUROLOGIC:  Mental Status: Awake and alert without aphasia  Cranial Nerves: Extraocular movements are full  Face is symmetrical, a mild masked faces noted  Motor:  No drift is noted on arm extension    A 4-6 hertz rest tremors noted in the right greater than left upper extremity  Mild cogwheeling rigidity is noted  Mild bradykinesia is noted  Coordination:   Finger-to-nose testing is performed accurately  He ambulates with a straight cane and a stable fashion              ROS:    Review of Systems   Constitutional: Positive for chills and fatigue  Negative for appetite change and fever  HENT: Positive for ear pain (left)  Negative for hearing loss, tinnitus, trouble swallowing and voice change  Left jaw pain   Eyes: Negative  Negative for photophobia and pain  Respiratory: Negative  Negative for shortness of breath  Cardiovascular: Negative  Negative for palpitations  Gastrointestinal: Negative  Negative for nausea and vomiting  Endocrine: Positive for cold intolerance  Negative for heat intolerance  Genitourinary: Negative  Negative for dysuria, frequency and urgency  Musculoskeletal: Positive for gait problem  Negative for myalgias and neck pain  Skin: Negative  Negative for rash  Neurological: Positive for tremors, weakness, light-headedness and numbness  Negative for dizziness, seizures, syncope, facial asymmetry, speech difficulty and headaches  Hematological: Negative  Does not bruise/bleed easily  Psychiatric/Behavioral: Positive for confusion and sleep disturbance  Negative for hallucinations  All other systems reviewed and are negative

## 2018-10-25 DIAGNOSIS — E11.42 DIABETIC POLYNEUROPATHY ASSOCIATED WITH TYPE 2 DIABETES MELLITUS (HCC): Primary | ICD-10-CM

## 2018-10-25 RX ORDER — GABAPENTIN 600 MG/1
600 TABLET ORAL 4 TIMES DAILY
Qty: 120 TABLET | Refills: 0 | Status: SHIPPED | OUTPATIENT
Start: 2018-10-25 | End: 2018-11-29 | Stop reason: SDUPTHER

## 2018-10-25 RX ORDER — GABAPENTIN 600 MG/1
600 TABLET ORAL 4 TIMES DAILY
Qty: 360 TABLET | Refills: 1 | Status: SHIPPED | OUTPATIENT
Start: 2018-10-25 | End: 2018-11-29 | Stop reason: SDUPTHER

## 2018-10-25 NOTE — TELEPHONE ENCOUNTER
pt called requesting refill on gabapentin 600mg 1 tab qid  he states that dr owens last prescribed  are you willing to prescribe?  he is requesting a script be sent to local [facility] and [facility] day [facility]   both scripts entered for auth

## 2018-10-26 ENCOUNTER — OFFICE VISIT (OUTPATIENT)
Dept: PHYSICAL THERAPY | Facility: CLINIC | Age: 68
End: 2018-10-26
Payer: COMMERCIAL

## 2018-10-26 DIAGNOSIS — M79.605 LEFT LEG PAIN: Primary | ICD-10-CM

## 2018-10-26 PROCEDURE — 97140 MANUAL THERAPY 1/> REGIONS: CPT | Performed by: PHYSICAL THERAPIST

## 2018-10-26 PROCEDURE — 97113 AQUATIC THERAPY/EXERCISES: CPT | Performed by: PHYSICAL THERAPIST

## 2018-10-26 NOTE — PROGRESS NOTES
Daily Note     Today's date: 10/26/2018  Patient name: Candance Condon  : 1950  MRN: [id number]  Referring provider: Yuliet Fox MD  Dx:   Encounter Diagnosis     ICD-10-CM    1  Left leg pain M79 605        Start Time: 1015  Stop Time: 1130  Total time in clinic (min): 75 minutes    Subjective: Seen by physician on Wednesday and will continue PT with the combo of Aquatic and Land since there has been a slight improvement  Pain level today is 6/10          Objective: See treatment diary below    Precautions: Neuropathy, DM, Depression     Daily Treatment Diary      Manual  10/1          10/22  10/26          LE/LB stretching            10  10          Mobs            5  5                                                                                       Exercise Diary  10/1  10/4  10/8 10/12  10/19  10/22  10/26         Water walking 15  20'  20'  20  20  20  20         Postural training 2      2  1             Gait training 3      1      1         Home exercise pgm/patient education    5'  5'                 Wall: t/h raises                      Hip abd/add                       Marching    1'    1    1  1         squats                       Knee flex/ext                       Step-ups (fwd/bkwd/ss)                       SLS (eyes open/closed)                       SLS w UE mvmt  AROM/ball toss                       Weight shifting                       UE Noodle work x 4    p/p x2'  Rot x 2'  p/p w gentle wt shift 5'  5  5  5  5         UE AROM                       Resistive UE work (paddles, bells, TB)                       Core work on noodle (sitting/stdg)                       Sit on noodle with movement                       Seated on pool bench w proper posture                       Ankle df/pf                       marching                       Hip Ab/add                       Knee flex/ext                       Deep water mvmt 5      5  5  5  5         Deep water tx/stretching 10  10'  15'  15  15  15  15         Specific self - stretches wall/steps                             Modalities  10/1  10/4  10/8  10/12  10/19  10/22  10/26         whirlpool 10  10'  5'  10  10  10  10               Assessment: Tolerated treatment well  Patient demonstrated fatigue post treatment and would benefit from continued PT      Plan: Continue per plan of care  Progress treatment as tolerated  Will continue with the manual stretching/mobs after aquatic program to help decrease radicular symptoms

## 2018-10-29 ENCOUNTER — OFFICE VISIT (OUTPATIENT)
Dept: PHYSICAL THERAPY | Facility: CLINIC | Age: 68
End: 2018-10-29
Payer: COMMERCIAL

## 2018-10-29 DIAGNOSIS — M79.605 LEFT LEG PAIN: Primary | ICD-10-CM

## 2018-10-29 PROCEDURE — 97113 AQUATIC THERAPY/EXERCISES: CPT

## 2018-10-29 NOTE — PROGRESS NOTES
Daily Note     Today's date: 10/29/2018  Patient name: Mckenna Cowan  : 1950  MRN: [id number]  Referring provider: Earle Oneill MD  Dx:   Encounter Diagnosis     ICD-10-CM    1  Left leg pain M79 605        Start Time: 1300  Stop Time: 1410  Total time in clinic (min): 70 minutes    Subjective: Patient reports a slight improvement with current PT program, slight decrease in pain and slight increase in mobility  SPC use on land, uses noodle for support in water with some verbal cueing      BP:  136/78  Pulse:  72    Objective: See treatment diary below    Precautions: Neuropathy, DM, Depression     Daily Treatment Diary      Manual  10/1          10/22  10/26  10/29        LE/LB stretching            10  10  10'        Mobs            5  5  NT                                                                                     Exercise Diary  10/1  10/4  10/8 10/12  10/19  10/22  10/26  10/29       Water walking 15  20'  20'  20  20  20  20  20' w noodle support       Postural training 2      2  1             Gait training 3      1      1  2'       Home exercise pgm/patient education    5'  5'                 Wall: t/h raises                      Hip abd/add                       Marching    1'    1    1  1  2'       squats                       Knee flex/ext                       Step-ups (fwd/bkwd/ss)                       SLS (eyes open/closed)                       SLS w UE mvmt  AROM/ball toss                       Weight shifting                       UE Noodle work x 4    p/p x2'  Rot x 2'  p/p w gentle wt shift 5'  5  5  5  5         UE AROM                       Resistive UE work (paddles, bells, TB)                       Core work on noodle (sitting/stdg)                       Sit on noodle with movement                       Seated on pool bench w proper posture                       Ankle df/pf                       marching                       Hip Ab/add                       Knee flex/ext                       Deep water mvmt 5      5  5  5  5  5'       Deep water tx/stretching 10  10'  15'  15  15  15  15  15'       Specific self - stretches wall/steps                             Modalities  10/1  10/4  10/8  10/12  10/19  10/22  10/26  10/29       whirlpool 10  10'  5'  10  10  10  10  10'             Assessment: Tolerated aquatic treatment well  Patient demonstrated fatigue post treatment and would benefit from continued PT  Movements more fluid but remain slow and verbal cueing for postural awareness  Plan: Continue per plan of care  Progress treatment as tolerated

## 2018-10-30 ENCOUNTER — TELEPHONE (OUTPATIENT)
Dept: NEUROLOGY | Facility: CLINIC | Age: 68
End: 2018-10-30

## 2018-10-30 NOTE — TELEPHONE ENCOUNTER
Patient states he has not heard from Dr Blevins Gamma office (ENT)  Transferred patient to their office to sched

## 2018-11-02 ENCOUNTER — OFFICE VISIT (OUTPATIENT)
Dept: PHYSICAL THERAPY | Facility: CLINIC | Age: 68
End: 2018-11-02
Payer: COMMERCIAL

## 2018-11-02 DIAGNOSIS — E13.42 POLYNEUROPATHY DUE TO SECONDARY DIABETES (HCC): ICD-10-CM

## 2018-11-02 DIAGNOSIS — E11.44 DIABETIC AMYOTROPHY ASSOCIATED WITH TYPE 2 DIABETES MELLITUS (HCC): ICD-10-CM

## 2018-11-02 PROCEDURE — 97113 AQUATIC THERAPY/EXERCISES: CPT

## 2018-11-02 NOTE — PROGRESS NOTES
Daily Note     Today's date: 2018  Patient name: Christine Hernández  : 1950  MRN: [id number]  Referring provider: Jazmine Alba MD  Dx:   Encounter Diagnosis     ICD-10-CM    1  Polyne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 [facility]) E13 42 Ambulatory referral to Physical Therapy   2  Diabetic amyotrophy associated with type 2 diabetes mellitus ([address] ) E11 44 Ambulatory referral to Physical Therapy       Start Time: 1000  Stop Time: 1100  Total time in clinic (min): 60 minutes    Subjective: pt states pain is better today in LB, 7/10  He states B feet are numb still        Objective: See treatment diary below  BP taken prior to session 156/79: BP taken after pool session 144/75  Precautions: Neuropathy, DM, Depression     Daily Treatment Diary      Manual  10/1          10/22  10/26  10/29        LE/LB stretching            10  10  10'        Mobs            5  5  NT                                                                                     Exercise Diary  10/1  10/4  10/8 10/12  10/19  10/22  10/26  10/29  11/2     Water walking 15  20'  20'  20  20  20  20  20' w noodle support  20     Postural training 2      2  1             Gait training 3      1      1  2'  2     Home exercise pgm/patient education    5'  5'                 Wall: t/h raises                      Hip abd/add                       Marching    1'    1    1  1  2'  2     squats                       Knee flex/ext                       Step-ups (fwd/bkwd/ss)                       SLS (eyes open/closed)                       SLS w UE mvmt  AROM/ball toss                       Weight shifting                       UE Noodle work x 4    p/p x2'  Rot x 2'  p/p w gentle wt shift 5'  5  5  5  5    5     UE AROM                       Resistive UE work (paddles, bells, TB)                       Core work on noodle (sitting/stdg)                       Sit on noodle with movement                       Seated on pool bench w proper posture                       Ankle df/pf                       marching                       Hip Ab/add                       Knee flex/ext                       Deep water mvmt 5      5  5  5  5  5'  5     Deep water tx/stretching 10  10'  15'  15  15  15  15 Ca Span'  15     Specific self - stretches wall/steps                             Modalities  10/1  10/4  10/8  10/12  10/19  10/22  10/26  10/29  11/2     whirlpool 10  10'  5'  10  10  10  10  10'  10         Assessment: Tolerated treatment fair  Pt feels better after pool session  Slow guarded movements with initial ex's  Pt ambulating with an increased hip flexion and knee flexion due to numbness in B feet  Patient demonstrated fatigue post treatment and would benefit from continued PT      Plan: Continue per plan of care

## 2018-11-07 ENCOUNTER — APPOINTMENT (OUTPATIENT)
Dept: PHYSICAL THERAPY | Facility: CLINIC | Age: 68
End: 2018-11-07
Payer: COMMERCIAL

## 2018-11-09 ENCOUNTER — EVALUATION (OUTPATIENT)
Dept: PHYSICAL THERAPY | Facility: CLINIC | Age: 68
End: 2018-11-09
Payer: COMMERCIAL

## 2018-11-09 VITALS — DIASTOLIC BLOOD PRESSURE: 74 MMHG | HEART RATE: 76 BPM | SYSTOLIC BLOOD PRESSURE: 151 MMHG

## 2018-11-09 DIAGNOSIS — M79.605 LEFT LEG PAIN: Primary | ICD-10-CM

## 2018-11-09 PROCEDURE — G8979 MOBILITY GOAL STATUS: HCPCS | Performed by: PHYSICAL THERAPIST

## 2018-11-09 PROCEDURE — 97140 MANUAL THERAPY 1/> REGIONS: CPT | Performed by: PHYSICAL THERAPIST

## 2018-11-09 PROCEDURE — 97113 AQUATIC THERAPY/EXERCISES: CPT | Performed by: PHYSICAL THERAPIST

## 2018-11-09 PROCEDURE — 97164 PT RE-EVAL EST PLAN CARE: CPT | Performed by: PHYSICAL THERAPIST

## 2018-11-09 PROCEDURE — G8978 MOBILITY CURRENT STATUS: HCPCS | Performed by: PHYSICAL THERAPIST

## 2018-11-09 NOTE — PROGRESS NOTES
PT Re-Evaluation     Today's date: 2018  Patient name: Tracey Lopez  : 1950  MRN: [id number]  Referring provider: Halie Jason MD  Dx:   Encounter Diagnosis     ICD-10-CM    1  Left leg pain M79 605        Start Time: 101  Stop Time: 113  Total time in clinic (min): 80 minutes    Assessment    Assessment details: Assessment: Tracey Lopez is a 76 y o  male who presents with pain, decreased strength, decreased ROM, decreased joint mobility, decreased sensation, joint effusion, impaired sensation and ambulatory dysfunction  Due to these impairments, Patient has difficulty performing a/iadls, recreational activities, work-related activities and engaging in social activities  Patient's clinical presentation is consistent with their referring diagnosis of left LE pain and polyneuropathy  Patient will continue to benefit from skilled physical therapy to address their aforementioned impairments, improve their level of function and to improve their overall quality of life  He will continue with aquatic PT with manual stretching/mobs afterwards as tolerated  Understanding of Dx/Px/POC: excellent   Prognosis: good      Plan  Planned therapy interventions: aquatic therapy, joint mobilization, manual therapy, stretching, strengthening, therapeutic activities and therapeutic exercise  Frequency: 2x week  Duration in weeks: 12  Plan details:  He will continue with aquatic PT with manual stretching/mobs afterwards as tolerated  Subjective Evaluation    History of Present Illness  Mechanism of injury: Mechanism of injury: Patient reports symptoms began approx  7 5 months ago and had become progressively worse  Has had pain management injections without relief and also chiropractic treatments which increased the symptoms  He continues to have severe pain in the posterior left LE (sciatica) and in both feet from the neuropathy  Aquatic program appears to be decreasing symptoms slowly  Recurrent probem    Pain  Current pain ratin  At worst pain rating: 3  Quality: dull ache, discomfort and radiating  Relieving factors: change in position, rest and medications  Aggravating factors: sitting, stair climbing, walking and lifting  Progression: improved    Treatments  Previous treatment: physical therapy  Current treatment: physical therapy  Patient Goals  Patient goals for therapy: decreased pain, improved balance, increased motion, increased strength, independence with ADLs/IADLs, return to sport/leisure activities and return to work          Objective     Palpation   Left   Tenderness of the piriformis       Neurological Testing     Sensation     Lumbar   Left   Diminished: light touch, pin prick and sharp/dull discrimination    Right   Intact: light touch, pin prick and sharp/dull discrimination    Active Range of Motion     Lumbar   Flexion: 50 degrees   Extension: 5 degrees       Flowsheet Rows      Most Recent Value   PT/OT G-Codes   Current Score  45   Projected Score  55   Assessment Type  Re-evaluation   G code set  Mobility: Walking & Moving Around   Mobility: Walking and Moving Around Current Status ()  CK   Mobility: Walking and Moving Around Goal Status ()  CK          Precautions: Neuropathy, DM, Depression     Daily Treatment Diary      Manual  10/1          10/22  10/26  10/29    11/9    LE/LB stretching            10  10  10'    10    Mobs            5  5  NT    5                                                                                 Exercise Diary  10/1  10/4  10/8 10/12  10/19  10/22  10/26  10/29  11/2  11/9   Water walking 15  20'  20'  20  20  20  20  20' w noodle support  20  20   Postural training 2      2  1             Gait training 3      1      1  2'  2  3   Home exercise pgm/patient education    5'  5'                 Wall: t/h raises                      Hip abd/add                       Marching    1'    1    1  1  2'  2  2   squats                       Knee flex/ext                       Step-ups (fwd/bkwd/ss)                       SLS (eyes open/closed)                       SLS w UE mvmt  AROM/ball toss                       Weight shifting                       UE Noodle work x 4    p/p x2'  Rot x 2'  p/p w gentle wt shift 5'  5  5  5  5    5  5   UE AROM                       Resistive UE work (paddles, bells, TB)                       Core work on noodle (sitting/stdg)                       Sit on noodle with movement                       Seated on pool bench w proper posture                       Ankle df/pf                       marching                       Hip Ab/add                       Knee flex/ext                       Deep water mvmt 5      5  5  5  5  5'  5  5   Deep water tx/stretching 10  10'  15'  15  15  15  15  15'  15  15   Specific self - stretches wall/steps                             Modalities  10/1  10/4  10/8  10/12  10/19  10/22  10/26  10/29  11/2  11/9   whirlpool 10  10'  5'  10  10  10  10  10'  10  10

## 2018-11-13 ENCOUNTER — TELEPHONE (OUTPATIENT)
Dept: NEUROLOGY | Facility: CLINIC | Age: 68
End: 2018-11-13

## 2018-11-13 DIAGNOSIS — E11.42 DIABETIC POLYNEUROPATHY ASSOCIATED WITH TYPE 2 DIABETES MELLITUS (HCC): Primary | ICD-10-CM

## 2018-11-13 DIAGNOSIS — M21.372 LEFT FOOT DROP: ICD-10-CM

## 2018-11-13 RX ORDER — OXCARBAZEPINE 600 MG/1
600 TABLET, FILM COATED ORAL EVERY 12 HOURS SCHEDULED
Qty: 60 TABLET | Refills: 5 | Status: SHIPPED | OUTPATIENT
Start: 2018-11-13 | End: 2019-05-17 | Stop reason: CLARIF

## 2018-11-13 NOTE — TELEPHONE ENCOUNTER
Patient states he's continuing to have shooting pain from his neuropathy in his feet and legs and was told by Dr Kwesi Arias to call if sxs not improved with med dose increases  He states he's been going to therapy and it helps momentarily and then pain is back,  "shooting pain" left side, radiating into hip  Also is questioning a foot brace for his left foot, PT also recommended a brace  Requesting to speak to Dr Kwesi Arias      [phone]

## 2018-11-13 NOTE — TELEPHONE ENCOUNTER
Spoke with Tali Day    He will increase his Trileptal to 600 milligrams twice daily and a prescription for ankle-foot orthotic has been printed

## 2018-11-14 NOTE — TELEPHONE ENCOUNTER
Called patient today, and informed him of an referral order, from Dr Vera Zamora  Patient will be in today to  order,  Thank you

## 2018-11-16 ENCOUNTER — APPOINTMENT (OUTPATIENT)
Dept: PHYSICAL THERAPY | Facility: CLINIC | Age: 68
End: 2018-11-16
Payer: COMMERCIAL

## 2018-11-19 ENCOUNTER — OFFICE VISIT (OUTPATIENT)
Dept: PHYSICAL THERAPY | Facility: CLINIC | Age: 68
End: 2018-11-19
Payer: COMMERCIAL

## 2018-11-19 DIAGNOSIS — E13.42 POLYNEUROPATHY DUE TO SECONDARY DIABETES (HCC): ICD-10-CM

## 2018-11-19 DIAGNOSIS — M79.605 LEFT LEG PAIN: Primary | ICD-10-CM

## 2018-11-19 DIAGNOSIS — E11.44 DIABETIC AMYOTROPHY ASSOCIATED WITH TYPE 2 DIABETES MELLITUS (HCC): ICD-10-CM

## 2018-11-19 PROCEDURE — 97113 AQUATIC THERAPY/EXERCISES: CPT | Performed by: PHYSICAL THERAPIST

## 2018-11-19 PROCEDURE — 97140 MANUAL THERAPY 1/> REGIONS: CPT | Performed by: PHYSICAL THERAPIST

## 2018-11-19 NOTE — PROGRESS NOTES
Daily Note     Today's date: 2018  Patient name: Rudy Wiley  : 1950  MRN: [id number]  Referring provider: Tiffany Lima MD  Dx:   Encounter Diagnosis     ICD-10-CM    1  Left leg pain M79 605    2  Polyne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 (HCC) E13 42    3  Diabetic amyotrophy associated with type 2 diabetes mellitus ([address] ) E11 44        Start Time: 1015  Stop Time: 1130  Total time in clinic (min): 75 minutes    Subjective:  Patient reports a bad few days but better today, decreased pain and increased freedom of movement   - 5/10 pain level      BP:  166/80       Pulse: 66 pre - pool    Objective: See treatment diary below   Precautions: Neuropathy, DM, Depression     Daily Treatment Diary      Manual  11/19          10/22  10/26  10/29    11/9    LE/LB stretching  10'          10  10  10'    10    Mobs  5'          5  5  NT    5                                                                                 Exercise Diary  11/19  10/4  10/8 10/12  10/19  10/22  10/26  10/29  11/2  11/9   Water walking 15  20'  20'  20  20  20  20  20' w noodle support  20  20   Postural training 2      2  1             Gait training 3      1      1  2'  2  3   Home exercise pgm/patient education  5'  5'  5'                 Wall: t/h raises                       Hip abd/add                       Marching    1'    1    1  1  2'  2  2   squats                       Knee flex/ext                       Step-ups (fwd/bkwd/ss)                       SLS (eyes open/closed)                       SLS w UE mvmt  AROM/ball toss                       Weight shifting  5'                     UE Noodle work x 4     p/p x2'  Rot x 2'  p/p w gentle wt shift 5'  5  5  5  5    5  5   UE AROM                       Resistive UE work (paddles, bells, TB)                       Core work on noodle (sitting/stdg)                       Sit on noodle with movement                       Seated on pool bench w proper posture                       Ankle df/pf                       marching                       Hip Ab/add                       Knee flex/ext                       Deep water mvmt 5      5  5  5  5  5'  5  5   Deep water tx/stretching 10  10'  15'  15  15  15  15  15'  15  15   Specific self - stretches wall/steps                             Modalities  11/19  10/4  10/8  10/12  10/19  10/22  10/26  10/29  11/2  11/9   whirlpool 10'  10'  5'  10  10  10  10  10'  10  10             Assessment: Tolerated treatment well  Patient demonstrated fatigue post treatment, guarded and cautious with movements, nervous about flare - ups and pain  Plan: Continue per plan of care

## 2018-11-29 DIAGNOSIS — E11.42 DIABETIC POLYNEUROPATHY ASSOCIATED WITH TYPE 2 DIABETES MELLITUS (HCC): ICD-10-CM

## 2018-11-29 RX ORDER — GABAPENTIN 600 MG/1
600 TABLET ORAL 4 TIMES DAILY
Qty: 360 TABLET | Refills: 3 | Status: SHIPPED | OUTPATIENT
Start: 2018-11-29 | End: 2020-01-31 | Stop reason: SDUPTHER

## 2018-11-29 RX ORDER — GABAPENTIN 600 MG/1
600 TABLET ORAL 4 TIMES DAILY
Qty: 120 TABLET | Refills: 2 | Status: SHIPPED | OUTPATIENT
Start: 2018-11-29 | End: 2019-02-19 | Stop reason: SDUPTHER

## 2018-11-29 NOTE — TELEPHONE ENCOUNTER
Patient is requesting rx sent to [facility] to [facility] because he never received the mail order from [facility] and is now almost out of medication again  Requesting a 30 day supply be sent to [facility] and [facility] day supply to [facility]

## 2018-11-30 ENCOUNTER — OFFICE VISIT (OUTPATIENT)
Dept: PHYSICAL THERAPY | Facility: CLINIC | Age: 68
End: 2018-11-30
Payer: COMMERCIAL

## 2018-11-30 DIAGNOSIS — E11.44 DIABETIC AMYOTROPHY ASSOCIATED WITH TYPE 2 DIABETES MELLITUS (HCC): ICD-10-CM

## 2018-11-30 DIAGNOSIS — M79.605 LEFT LEG PAIN: Primary | ICD-10-CM

## 2018-11-30 DIAGNOSIS — E13.42 POLYNEUROPATHY DUE TO SECONDARY DIABETES (HCC): ICD-10-CM

## 2018-11-30 PROCEDURE — 97113 AQUATIC THERAPY/EXERCISES: CPT

## 2018-11-30 NOTE — PROGRESS NOTES
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) E11 44                   Subjective: pt notes pain 3/10 in L LE  Pt states he fell last night and notes his L thigh is very sore today  Painful to walk today in L LE  Objective: See treatment diary below  /80 after 5' rest 180/88   Taken manually      Assessment: Tolerated treatment fairly  well  BP was high today and pt did note he took BP meds this morning but only 45 min before he came to PT  Limited hot tub use today to just standing in it to loosen up L Leg  DWTX is helpful to pt but water walking was painful to L LE  Pt instructed to call MD regarding high blood pressure today, pt agreed but wants to wait and see if it comes down at all  Patient would benefit from continued PT      Plan: Continue per plan of care       Precautions: Neuropathy, DM, Depression     Daily Treatment Diary      Manual  11/19  11/30        10/22  10/26  10/29    11/9    LE/LB stretching  10'  NT        10  10  10'    10    Mobs  5'          5  5  NT    5                                                                                 Exercise Diary  11/19  11/30   10/12  10/19  10/22  10/26  10/29  11/2  11/9   Water walking 15  15  20'  20  20  20  20  20' w noodle support  20  20   Postural training 2  2    2  1             Gait training 3  3    1      1  2'  2  3   Home exercise pgm/patient education  5'  2  5'                 Wall: t/h raises                       Hip abd/add                       Marching        1    1  1  2'  2  2   squats                       Knee flex/ext                       Step-ups (fwd/bkwd/ss)                       SLS (eyes open/closed)                       SLS w UE mvmt  AROM/ball toss                       Weight shifting  5'  5                   UE Noodle work x 4      p/p w gentle wt shift 5'  5  5  5  5    5  5   UE AROM                       Resistive UE work (paddles, bells, TB)                       Core work on noodle (sitting/stdg)                       Sit on noodle with movement                       Seated on pool bench w proper posture                       Ankle df/pf                       marching                       Hip Ab/add                       Knee flex/ext                       Deep water mvmt 5  5    5  5  5  5  5'  5  5   Deep water tx/stretching 10  15  15'  15  15  15  15  15'  15  15   Specific self - stretches wall/steps   3                         Modalities  11/19  11/30   10/12  10/19  10/22  10/26  10/29  11/2  11/9   whirlpool 10'  5  5'  10  10  10  10  10'  10  10

## 2018-12-03 ENCOUNTER — APPOINTMENT (OUTPATIENT)
Dept: PHYSICAL THERAPY | Facility: CLINIC | Age: 68
End: 2018-12-03
Payer: COMMERCIAL

## 2018-12-07 ENCOUNTER — OFFICE VISIT (OUTPATIENT)
Dept: PHYSICAL THERAPY | Facility: CLINIC | Age: 68
End: 2018-12-07
Payer: COMMERCIAL

## 2018-12-07 DIAGNOSIS — E13.42 POLYNEUROPATHY DUE TO SECONDARY DIABETES (HCC): ICD-10-CM

## 2018-12-07 DIAGNOSIS — M79.605 LEFT LEG PAIN: Primary | ICD-10-CM

## 2018-12-07 DIAGNOSIS — E11.44 DIABETIC AMYOTROPHY ASSOCIATED WITH TYPE 2 DIABETES MELLITUS (HCC): ICD-10-CM

## 2018-12-07 PROCEDURE — 97140 MANUAL THERAPY 1/> REGIONS: CPT

## 2018-12-07 PROCEDURE — 97113 AQUATIC THERAPY/EXERCISES: CPT

## 2018-12-07 NOTE — PROGRESS NOTES
Daily Note     Today's date: 2018  Patient name: Rianna Lundberg  : 1950  MRN: [id number]  Referring provider: Luigi Almanza MD  Dx:   Encounter Diagnosis     ICD-10-CM    1  Left leg pain M79 605    2  Polyne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 (HCC) E13 42    3  Diabetic amyotrophy associated with type 2 diabetes mellitus ([address] ) E11 44        Start Time: 0940  Stop Time: 1050  Total time in clinic (min): 70 minutes    Subjective: pt notes pain 4/10 in L buttock today  He notes decreased pain in the water and after session 3/10  He states the mornings are the worse for him, high pain levels and sig tightness  Objective: See treatment diary below  BP prior to pool 156/81  HR 88 after pool 155/75      Assessment: Tolerated treatment fairly  well  Some relief after pool therapy  Manual stretches to LE's  Performed by PT  Patient would benefit from continued PT      Plan: Continue per plan of care       Precautions: Neuropathy, DM, Depression     Daily Treatment Diary      Manual  11/19  11/30  12/7      10/22  10/26  10/29    11/9    LE/LB stretching  10'  NT  10      10  10  10'    10    Mobs  5'    5      5  5  NT    5                                                                                 Exercise Diary  11/19  11/30  12/7   10/19  10/22  10/26  10/29  11/2  11/9   Water walking 15  15  15  20  20  20  20  20' w noodle support  20  20   Postural training 2  2  2  2  1             Gait training 3  3  3  1      1  2'  2  3   Home exercise pgm/patient education  5'  2                   Wall: t/h raises                       Hip abd/add                       Marching        1    1  1  2'  2  2   squats                       Knee flex/ext                       Step-ups (fwd/bkwd/ss)                       SLS (eyes open/closed)                       SLS w UE mvmt  AROM/ball toss                       Weight shifting  5'  5  5                 UE Noodle work x 4        5  5  5  5    5  5   UE AROM                       Resistive UE work (paddles, bells, TB)                       Core work on noodle (sitting/stdg)                       Sit on noodle with movement                       Seated on pool bench w proper posture                       Ankle df/pf                       marching                       Hip Ab/add                       Knee flex/ext                       Deep water mvmt 5  5  5  5  5  5  5  5'  5  5   Deep water tx/stretching 10  15  15'  15  15  15  15  15'  15  15   Specific self - stretches wall/steps   3  3                       Modalities  11/19  11/30 12/7    10/19  10/22  10/26  10/29  11/2  11/9   whirlpool 10'  5  5'  10  10  10  10  10'  10  10

## 2018-12-14 ENCOUNTER — APPOINTMENT (OUTPATIENT)
Dept: PHYSICAL THERAPY | Facility: CLINIC | Age: 68
End: 2018-12-14
Payer: COMMERCIAL

## 2018-12-19 ENCOUNTER — OFFICE VISIT (OUTPATIENT)
Dept: PHYSICAL THERAPY | Facility: CLINIC | Age: 68
End: 2018-12-19
Payer: COMMERCIAL

## 2018-12-19 VITALS — SYSTOLIC BLOOD PRESSURE: 158 MMHG | DIASTOLIC BLOOD PRESSURE: 82 MMHG | HEART RATE: 75 BPM

## 2018-12-19 DIAGNOSIS — M79.605 LEFT LEG PAIN: Primary | ICD-10-CM

## 2018-12-19 PROCEDURE — G8978 MOBILITY CURRENT STATUS: HCPCS | Performed by: PHYSICAL THERAPIST

## 2018-12-19 PROCEDURE — 97164 PT RE-EVAL EST PLAN CARE: CPT | Performed by: PHYSICAL THERAPIST

## 2018-12-19 PROCEDURE — G8980 MOBILITY D/C STATUS: HCPCS | Performed by: PHYSICAL THERAPIST

## 2018-12-19 PROCEDURE — 97113 AQUATIC THERAPY/EXERCISES: CPT | Performed by: PHYSICAL THERAPIST

## 2018-12-19 PROCEDURE — G8979 MOBILITY GOAL STATUS: HCPCS | Performed by: PHYSICAL THERAPIST

## 2018-12-19 PROCEDURE — 97140 MANUAL THERAPY 1/> REGIONS: CPT | Performed by: PHYSICAL THERAPIST

## 2018-12-19 NOTE — PROGRESS NOTES
Daily Note     Today's date: 2018  Patient name: Lukas Art  : 1950  MRN: [id number]  Referring provider: Jazzy Wallis MD  Dx:   Encounter Diagnosis     ICD-10-CM    1   Left leg pain M79 605

## 2018-12-21 ENCOUNTER — APPOINTMENT (OUTPATIENT)
Dept: PHYSICAL THERAPY | Facility: CLINIC | Age: 68
End: 2018-12-21
Payer: COMMERCIAL

## 2019-01-03 ENCOUNTER — TELEPHONE (OUTPATIENT)
Dept: NEUROLOGY | Facility: CLINIC | Age: 69
End: 2019-01-03

## 2019-01-03 NOTE — TELEPHONE ENCOUNTER
blu from [facility] called requesting a script for AFO be faxed to [phone]    order faxed

## 2019-01-08 ENCOUNTER — TELEPHONE (OUTPATIENT)
Dept: NEUROLOGY | Facility: CLINIC | Age: 69
End: 2019-01-08

## 2019-02-19 ENCOUNTER — OFFICE VISIT (OUTPATIENT)
Dept: NEUROLOGY | Facility: CLINIC | Age: 69
End: 2019-02-19
Payer: COMMERCIAL

## 2019-02-19 VITALS
WEIGHT: 256.2 LBS | HEART RATE: 72 BPM | SYSTOLIC BLOOD PRESSURE: 138 MMHG | BODY MASS INDEX: 36.68 KG/M2 | DIASTOLIC BLOOD PRESSURE: 82 MMHG | HEIGHT: 70 IN

## 2019-02-19 DIAGNOSIS — E11.44 DIABETIC AMYOTROPHY ASSOCIATED WITH TYPE 2 DIABETES MELLITUS (HCC): ICD-10-CM

## 2019-02-19 DIAGNOSIS — E13.42 POLYNEUROPATHY DUE TO SECONDARY DIABETES (HCC): Primary | ICD-10-CM

## 2019-02-19 DIAGNOSIS — R25.1 TREMOR: ICD-10-CM

## 2019-02-19 DIAGNOSIS — G56.22 ULNAR NEUROPATHY OF LEFT UPPER EXTREMITY: ICD-10-CM

## 2019-02-19 DIAGNOSIS — M21.372 LEFT FOOT DROP: ICD-10-CM

## 2019-02-19 PROCEDURE — 99214 OFFICE O/P EST MOD 30 MIN: CPT | Performed by: PSYCHIATRY & NEUROLOGY

## 2019-02-19 RX ORDER — OXCARBAZEPINE 300 MG/1
TABLET, FILM COATED ORAL
Qty: 30 TABLET | Refills: 5 | Status: SHIPPED | OUTPATIENT
Start: 2019-02-19 | End: 2019-08-23 | Stop reason: SDUPTHER

## 2019-02-19 RX ORDER — PEN NEEDLE, DIABETIC 32GX 5/32"
NEEDLE, DISPOSABLE MISCELLANEOUS
COMMUNITY
Start: 2018-12-04

## 2019-02-19 NOTE — PROGRESS NOTES
Will Fitch is a 76 y o  male who returns in follow-up today with history of diabetic peripheral neuropathy and tremor    Assessment:  1  Polyne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 ([facility] [address] )    2  Diabetic amyotrophy associated with type 2 diabetes mellitus ([facility] [address] )    3  Ulnar neuropathy of left upper extremity    4  Tremor        Plan:  Increase Trileptal to 150 mg twice daily  Trial of neuro gin  Physical therapy  Follow-up 3 months    Discussion:  L would is still having symptoms of neuropathic pain  Have recommended increasing his Trileptal by 150 mg twice daily  He had blood work done recently and was not told that there was a problem with his sodium level  He will get me these results  He did find that the splint for his foot drop was helpful with his walking, however states he developed left hip pain when wearing it so he has not been using it as much  He may try neurogen for his neuropathic pain as well  He will start Sinemet for his Parkinson's tremor 1/2 to 1 3 times daily  He will reinitiate physical therapy and I will see him back in follow-up in 3 months      Subjective:    OSWALDO  Juan A Arango reports that his symptoms of neuropathic pain are better but still present  He reports stabbing pains in his feet as well as burning dysesthesia in his knee with soreness in the muscles in the proximal left lower extremity  He continues to notice issues with his balance  He is currently tolerating the gabapentin at present dose as well as his Trileptal   He continues to note intermittent tremors of the left upper extremity and is open to trying medication at this point for it  He did get the splint for his foot drop and found that it was helpful with respect to his gait however he started developed some discomfort in his left hip and thigh region so he stopped using it as much  He found that physical therapy seem to aggravate his pain symptoms and stopped going  He is open to trying again  Floresita Mcrae   He continues to note numbness in the medial 2 fingers of the left hand associated with weakness but is still not open to considering surgery for his cubital tunnel syndrome      Past Medical History:   Diagnosis Date    Arthritis     Hyperlipidemia     Hypertension     Poor circulation     Sleep apnea     Type 2 diabetes mellitus ([address] )        Family History:  Family History   Problem Relation Age of Onset    Diabetes type II Mother     Hypertension Mother     Breast cancer Sister     Lung cancer Sister     Multiple endocrine neoplasia Brother     Breast cancer Sister        Past Surgical History:  Past Surgical History:   Procedure Laterality Date    BACK SURGERY      CORONARY ARTERY BYPASS GRAFT  07/22/2017    HEMORROIDECTOMY      TONSILLECTOMY      WRIST SURGERY         Social History:   reports that he has never smoked  He has never used smokeless tobacco  He reports that he drinks alcohol  He reports that he does not use drugs  Allergies:  Penicillins      Current Outpatient Medications:     amLODIPine (NORVASC) 5 mg tablet, Take 10 mg by mouth daily , Disp: , Rfl:     aspirin 81 MG tablet, Take 1 tablet by mouth daily, Disp: , Rfl:     atorvastatin (LIPITOR) 10 mg tablet, Take 10 mg by mouth daily, Disp: , Rfl:     Blood Glucose Monitoring Suppl (ACURA BLOOD GLUCOSE METER) w/Device KIT, by Other route   Use as instructed, Disp: , Rfl:     clotrimazole-betamethasone (LOTRISONE) 1-0 05 % cream, Apply topically daily as needed  , Disp: , Rfl:     Dulaglutide 0 75 MG/0 5ML SOPN, Inject 0 75 mg under the skin Once a week, Disp: , Rfl:     Empagliflozin 10 MG TABS, Take 10 mg by mouth daily, Disp: , Rfl:     furosemide (LASIX) 20 mg tablet, Take 1 tablet by mouth daily as needed, Disp: , Rfl:     gabapentin (NEURONTIN) 600 MG tablet, Take 1 tablet (600 mg total) by mouth 4 (four) times a day, Disp: 360 tablet, Rfl: 3    glipiZIDE (GLUCOTROL) 10 mg tablet, Take 10 mg by mouth 2 (two) times a day, Disp: , Rfl:     hydrochlorothiazide (HYDRODIURIL) 25 mg tablet, Take 1 tablet by mouth daily, Disp: , Rfl:     insulin glargine (LANTUS SOLOSTAR) 100 units/mL injection pen, Lantus Solostar U-100 Insulin 30unit/mL at bedtime (3 mL) subcutaneous pen, Disp: , Rfl:     irbesartan (AVAPRO) 300 mg tablet, Take 300 mg by mouth daily  , Disp: , Rfl:     metoprolol succinate (TOPROL-XL) 100 mg 24 hr tablet, Take 100 mg by mouth daily, Disp: , Rfl:     OXcarbazepine (TRILEPTAL) 600 mg tablet, Take 1 tablet (600 mg total) by mouth every 12 (twelve) hours, Disp: 60 tablet, Rfl: 5    vitamin B-12 (CYANOCOBALAMIN) 250 MCG TABS, Take 250 mcg by mouth daily, Disp: , Rfl:     TRUEPLUS PEN NEEDLES 32G X 4 MM MISC, , Disp: , Rfl:     I have reviewed the past medical, social and family history, current medications, allergies, vitals, review of systems and updated this information as appropriate today     Objective:    Vitals:  Blood pressure 138/82, pulse 72, height 5' 10" (1 778 m), weight 116 kg (256 lb 3 2 oz)  Physical Exam    Neurological Exam  GENERAL:  Well-developed well-nourished man in no acute distress  HEENT/NECK: Head is atraumatic normocephalic, neck is supple  NEUROLOGIC:  Mental Status: Awake and alert without aphasia  Cranial Nerves: Extraocular movements are full  Face is symmetrical  Motor:  4-6 hertz rest tremors noted on left upper extremity  Mild cogwheeling rigidity is noted on the left  4+ strength with atrophy is noted in the intrinsic hand muscles of the left hand  4+/5 strength is noted in hip flexors on the left with 0/5 strength with dorsiflexion of the left foot  Coordination:  He ambulates with a steppage gait pattern with an increased base            ROS:    Review of Systems   Constitutional: Positive for fatigue  HENT: Negative  Eyes: Positive for visual disturbance (blurry vision)  Respiratory: Negative  Cardiovascular: Negative  Gastrointestinal: Negative  Endocrine: Negative  Genitourinary: Negative  Musculoskeletal: Positive for arthralgias (knees, ankles, fingers), gait problem and myalgias  Skin: Negative  Allergic/Immunologic: Negative  Neurological: Positive for tremors, weakness (hands) and numbness  Hematological: Negative  Psychiatric/Behavioral: Negative

## 2019-03-13 ENCOUNTER — TELEPHONE (OUTPATIENT)
Dept: NEUROLOGY | Facility: CLINIC | Age: 69
End: 2019-03-13

## 2019-03-13 DIAGNOSIS — E11.44 DIABETIC AMYOTROPHY ASSOCIATED WITH TYPE 2 DIABETES MELLITUS (HCC): Primary | ICD-10-CM

## 2019-03-13 DIAGNOSIS — E13.42 POLYNEUROPATHY DUE TO SECONDARY DIABETES (HCC): ICD-10-CM

## 2019-03-13 NOTE — TELEPHONE ENCOUNTER
Pt called and states that he still having some sensitivity of the knee and pain from knee to his hip  Meds not working, it just makes him tired   Requesting PM referral        [phone]

## 2019-04-19 ENCOUNTER — OFFICE VISIT (OUTPATIENT)
Dept: PAIN MEDICINE | Facility: CLINIC | Age: 69
End: 2019-04-19
Payer: COMMERCIAL

## 2019-04-19 VITALS
HEIGHT: 70 IN | WEIGHT: 252 LBS | DIASTOLIC BLOOD PRESSURE: 62 MMHG | HEART RATE: 74 BPM | SYSTOLIC BLOOD PRESSURE: 132 MMHG | BODY MASS INDEX: 36.08 KG/M2

## 2019-04-19 DIAGNOSIS — E13.42 POLYNEUROPATHY DUE TO SECONDARY DIABETES (HCC): ICD-10-CM

## 2019-04-19 DIAGNOSIS — M54.16 LUMBAR RADICULOPATHY: Primary | ICD-10-CM

## 2019-04-19 DIAGNOSIS — E11.44 DIABETIC AMYOTROPHY ASSOCIATED WITH TYPE 2 DIABETES MELLITUS (HCC): ICD-10-CM

## 2019-04-19 PROCEDURE — 99204 OFFICE O/P NEW MOD 45 MIN: CPT | Performed by: ANESTHESIOLOGY

## 2019-04-30 ENCOUNTER — HOSPITAL ENCOUNTER (OUTPATIENT)
Dept: RADIOLOGY | Facility: CLINIC | Age: 69
Discharge: HOME | End: 2019-04-30
Attending: ANESTHESIOLOGY
Payer: COMMERCIAL

## 2019-04-30 VITALS
OXYGEN SATURATION: 96 % | TEMPERATURE: 97 F | DIASTOLIC BLOOD PRESSURE: 79 MMHG | HEART RATE: 74 BPM | SYSTOLIC BLOOD PRESSURE: 156 MMHG | RESPIRATION RATE: 20 BRPM

## 2019-04-30 DIAGNOSIS — M54.16 LUMBAR RADICULOPATHY: ICD-10-CM

## 2019-04-30 PROCEDURE — 64484 NJX AA&/STRD TFRM EPI L/S EA: CPT | Performed by: ANESTHESIOLOGY

## 2019-04-30 PROCEDURE — 64483 NJX AA&/STRD TFRM EPI L/S 1: CPT | Performed by: ANESTHESIOLOGY

## 2019-04-30 RX ORDER — METHYLPREDNISOLONE ACETATE 80 MG/ML
80 INJECTION, SUSPENSION INTRA-ARTICULAR; INTRALESIONAL; INTRAMUSCULAR; PARENTERAL; SOFT TISSUE ONCE
Status: COMPLETED | OUTPATIENT
Start: 2019-04-30 | End: 2019-04-30

## 2019-04-30 RX ORDER — LIDOCAINE HYDROCHLORIDE 10 MG/ML
5 INJECTION, SOLUTION EPIDURAL; INFILTRATION; INTRACAUDAL; PERINEURAL ONCE
Status: COMPLETED | OUTPATIENT
Start: 2019-04-30 | End: 2019-04-30

## 2019-04-30 RX ORDER — 0.9 % SODIUM CHLORIDE 0.9 %
5 VIAL (ML) INJECTION ONCE
Status: COMPLETED | OUTPATIENT
Start: 2019-04-30 | End: 2019-04-30

## 2019-04-30 RX ADMIN — Medication 5 ML: at 09:03

## 2019-04-30 RX ADMIN — LIDOCAINE HYDROCHLORIDE 5 ML: 10 INJECTION, SOLUTION EPIDURAL; INFILTRATION; INTRACAUDAL; PERINEURAL at 09:03

## 2019-04-30 RX ADMIN — IOHEXOL 2 ML: 300 INJECTION, SOLUTION INTRAVENOUS at 09:04

## 2019-04-30 RX ADMIN — METHYLPREDNISOLONE ACETATE 80 MG: 80 INJECTION, SUSPENSION INTRA-ARTICULAR; INTRALESIONAL; INTRAMUSCULAR; PARENTERAL; SOFT TISSUE at 09:04

## 2019-05-07 ENCOUNTER — TELEPHONE (OUTPATIENT)
Dept: PAIN MEDICINE | Facility: CLINIC | Age: 69
End: 2019-05-07

## 2019-05-17 ENCOUNTER — OFFICE VISIT (OUTPATIENT)
Dept: NEUROLOGY | Facility: CLINIC | Age: 69
End: 2019-05-17
Payer: COMMERCIAL

## 2019-05-17 VITALS
SYSTOLIC BLOOD PRESSURE: 142 MMHG | HEART RATE: 72 BPM | WEIGHT: 251.8 LBS | DIASTOLIC BLOOD PRESSURE: 62 MMHG | BODY MASS INDEX: 36.05 KG/M2 | HEIGHT: 70 IN

## 2019-05-17 DIAGNOSIS — E11.44 DIABETIC AMYOTROPHY ASSOCIATED WITH TYPE 2 DIABETES MELLITUS (HCC): ICD-10-CM

## 2019-05-17 DIAGNOSIS — M21.372 LEFT FOOT DROP: ICD-10-CM

## 2019-05-17 DIAGNOSIS — M25.541 ARTHRALGIA OF BOTH HANDS: ICD-10-CM

## 2019-05-17 DIAGNOSIS — G56.22 ULNAR NEUROPATHY OF LEFT UPPER EXTREMITY: ICD-10-CM

## 2019-05-17 DIAGNOSIS — G20 PARKINSON'S DISEASE (HCC): ICD-10-CM

## 2019-05-17 DIAGNOSIS — E13.42 POLYNEUROPATHY DUE TO SECONDARY DIABETES (HCC): Primary | ICD-10-CM

## 2019-05-17 DIAGNOSIS — M25.542 ARTHRALGIA OF BOTH HANDS: ICD-10-CM

## 2019-05-17 PROCEDURE — 99214 OFFICE O/P EST MOD 30 MIN: CPT | Performed by: PSYCHIATRY & NEUROLOGY

## 2019-05-17 RX ORDER — OXCARBAZEPINE 600 MG/1
600 TABLET, FILM COATED ORAL
COMMUNITY
End: 2019-08-09 | Stop reason: SDUPTHER

## 2019-05-17 RX ORDER — AMLODIPINE BESYLATE 10 MG/1
1 TABLET ORAL DAILY
COMMUNITY
Start: 2019-05-10 | End: 2022-05-09 | Stop reason: ALTCHOICE

## 2019-05-29 ENCOUNTER — EVALUATION (OUTPATIENT)
Dept: PHYSICAL THERAPY | Age: 69
End: 2019-05-29
Payer: COMMERCIAL

## 2019-05-29 DIAGNOSIS — R53.81 DEBILITY: ICD-10-CM

## 2019-05-29 DIAGNOSIS — E08.41 DIABETIC MONONEUROPATHY ASSOCIATED WITH DIABETES MELLITUS DUE TO UNDERLYING CONDITION (HCC): Primary | ICD-10-CM

## 2019-05-29 PROCEDURE — 97162 PT EVAL MOD COMPLEX 30 MIN: CPT | Performed by: PHYSICAL THERAPIST

## 2019-05-29 PROCEDURE — G8978 MOBILITY CURRENT STATUS: HCPCS | Performed by: PHYSICAL THERAPIST

## 2019-05-29 PROCEDURE — 97113 AQUATIC THERAPY/EXERCISES: CPT | Performed by: PHYSICAL THERAPIST

## 2019-05-29 PROCEDURE — G8979 MOBILITY GOAL STATUS: HCPCS | Performed by: PHYSICAL THERAPIST

## 2019-07-19 NOTE — PROGRESS NOTES
Assessment and Plan: This is a 71year old male with PMH of CAD status triple bypass and 6 stents placement prior to this, polyneuropathy secondary to DM, Parkinson's disease, and lumbar radiculopathy presenting today for initial rheumatology consultation for arthralgia of both hands referred by his neurologist, Dr Rodriguez Expose  Patient reports a longstanding history of joint pain however notes that he developed pain in his hands about a year and a half ago and has progressively gotten worse  He states that his pain is generally worse with certain activities such as driving and also is exacerbated with weather changes  The patient does describe some features of inflammatory arthritis such as morning stiffness lasting at least an hour with episodes of swelling primarily affecting the knees  Upon further questioning, he does describe a history of psoriasis diagnosed by a dermatologist several years ago however recent evaluation with Dermatology last week did not reveal any active rashes  His exam today does not reveal any obvious synovitis or joint swelling although he does have very thick, bulky  hands therefore assessment for swelling is difficult  He does have tenderness of several joints on exam today with increased warmth of his knees  He has pitting  edema and vascular skin changes of both lower extremities  There is evidence of osteoarthritis of both hands with crepitus of the knees  Patient's history and physical examination today is not fully consistent with an inflammatory arthritis  Although he does have some features consistent with an autoimmune condition, his exam does not reveal any obvious joint swelling  Given his history of psoriasis and pattern of pain, I would like him to complete further work up  I did ask him to complete serologies and imaging to further assess his pain  Given his history of CAD, patient should limit his NSAID use    He is currently on gabapentin for treatment of his neuropathy with improvement  I will not add any medications today however did suggest the patient call me after he has lab work completed for further treatment options  Patient would benefit from further evaluation with pain management and continued treatment with aqua therapy  Patient was advised to return to the office for further evaluation 2 months time with Dr Matthew Pathak but contact the office in the interim if he has any further questions or concerns  Plan:  Diagnoses and all orders for this visit:    Polyarthralgia  -     CBC and differential  -     Chronic Hepatitis Panel  -     Comprehensive metabolic panel  -     C-reactive protein  -     Cyclic citrul peptide antibody, IgG  -     HIV 1/2 Antigen/Antibody,Fourth Generation W/Rfl (Historical)  -     HLA-B27 antigen  -     RHEUMATOID FACTOR (HISTORICAL)  -     Sedimentation rate, automated  -     Sjogren's Antibodies  -     TSH, 3rd generation with Free T4 reflex  -     Vitamin D 25 hydroxy  -     XR hand 3+ vw left; Future  -     XR hand 3+ vw right; Future  -     XR foot 3+ vw right; Future  -     XR foot 3+ vw left; Future  -     XR sacroiliac joints 3+ views; Future    Arthralgia of both hands  -     Ambulatory referral to Rheumatology  -     CBC and differential  -     Chronic Hepatitis Panel  -     Comprehensive metabolic panel  -     C-reactive protein  -     Cyclic citrul peptide antibody, IgG  -     HIV 1/2 Antigen/Antibody,Fourth Generation W/Rfl (Historical)  -     HLA-B27 antigen  -     RHEUMATOID FACTOR (HISTORICAL)  -     Sedimentation rate, automated  -     Sjogren's Antibodies  -     TSH, 3rd generation with Free T4 reflex  -     Vitamin D 25 hydroxy  -     XR hand 3+ vw left; Future  -     XR hand 3+ vw right; Future  -     XR foot 3+ vw right; Future  -     XR foot 3+ vw left; Future  -     XR sacroiliac joints 3+ views;  Future    Malaise and fatigue  -     CBC and differential  -     Chronic Hepatitis Panel  - Comprehensive metabolic panel  -     C-reactive protein  -     Cyclic citrul peptide antibody, IgG  -     HIV 1/2 Antigen/Antibody,Fourth Generation W/Rfl (Historical)  -     HLA-B27 antigen  -     RHEUMATOID FACTOR (HISTORICAL)  -     Sedimentation rate, automated  -     Sjogren's Antibodies  -     TSH, 3rd generation with Free T4 reflex  -     Vitamin D 25 hydroxy  -     XR hand 3+ vw left; Future  -     XR hand 3+ vw right; Future  -     XR foot 3+ vw right; Future  -     XR foot 3+ vw left; Future  -     XR sacroiliac joints 3+ views; Future    History of psoriasis  -     CBC and differential  -     Chronic Hepatitis Panel  -     Comprehensive metabolic panel  -     C-reactive protein  -     Cyclic citrul peptide antibody, IgG  -     HIV 1/2 Antigen/Antibody,Fourth Generation W/Rfl (Historical)  -     HLA-B27 antigen  -     RHEUMATOID FACTOR (HISTORICAL)  -     Sedimentation rate, automated  -     Sjogren's Antibodies  -     TSH, 3rd generation with Free T4 reflex  -     Vitamin D 25 hydroxy  -     XR hand 3+ vw left; Future  -     XR hand 3+ vw right; Future  -     XR foot 3+ vw right; Future  -     XR foot 3+ vw left; Future  -     XR sacroiliac joints 3+ views; Future        Follow-up plan: F/U in 2 months with Dr Guille Yu is a 71 y o   male who presents today for initial rheumatology consultation for arthralgia of both hands  Sent for evaluation of hand pain x 1 5 years and inability to close right hand with locking, right side more than left  Progressing getting worse  Pain is not constant, certain activities make things worse  Worse with weather changes  Pain in the low back(long standing), wrists, elbows, and the knees  Some foot pain, not sure if it is related to the neuropathy  Paresthesias of the hands since cardiac cath performed at the left UE   +Some swelling in the knees, left side, completing aqua therapy with some improvement      +Am stiffness x 1 hour   +Difficulty with sleep due to joint pain, sleeping in a chair  Improvement with gabapentin and motrin  +Fatigue  Improvement with prednisone, but not on it for years due to elevated BS  Following with neurology for these pains  No ortho or PM    Saw Dr Sachin Rodrigues for injection Marlendana Gissell with [facility]- without improvement  Saw dermatologist last week and sees routinely, Dr Tulio Rivera? Passed history of PsO per patient, derm did not see active lesions now  No IBD  No family hx of autoimmune disease  Review of Systems  Review of Systems   Constitutional: Positive for fatigue  Negative for appetite change, chills, fever and unexpected weight change  HENT: Negative for congestion, mouth sores and sore throat  No recent infxn    Eyes: Negative for pain, redness and visual disturbance  No sicca sx   Respiratory: Negative for cough, chest tightness and shortness of breath  Cardiovascular: Negative for chest pain and leg swelling  Gastrointestinal: Negative for abdominal pain, blood in stool, constipation, diarrhea, nausea and vomiting  Endocrine: Negative for polydipsia and polyuria  Genitourinary: Negative for frequency and hematuria  Skin: Negative for color change and rash  No rashes, no nail changes, no hair loss  No raynauds  Neurological: Negative for weakness, light-headedness, numbness and headaches  Hematological: Negative for adenopathy  Psychiatric/Behavioral: Negative for behavioral problems  The patient is not nervous/anxious          Allergies  Allergies   Allergen Reactions    Penicillins        Home Medications    Current Outpatient Medications:     amLODIPine (NORVASC) 10 mg tablet, Take 1 tablet by mouth daily, Disp: , Rfl:     aspirin 81 MG tablet, Take 1 tablet by mouth daily, Disp: , Rfl:     atorvastatin (LIPITOR) 10 mg tablet, Take 10 mg by mouth daily, Disp: , Rfl:     Blood Glucose Monitoring Suppl (ACURA BLOOD GLUCOSE METER) w/Device KIT, by Other route   Use as instructed, Disp: , Rfl:     carbidopa-levodopa (SINEMET)  mg per tablet, 1/2 to 1 3 times daily (Patient taking differently: as needed 1/2 to 1 3 times daily), Disp: 90 tablet, Rfl: 5    clotrimazole-betamethasone (LOTRISONE) 1-0 05 % cream, Apply topically daily as needed  , Disp: , Rfl:     Dulaglutide 0 75 MG/0 5ML SOPN, Inject 0 75 mg under the skin Once a week , Disp: , Rfl:     furosemide (LASIX) 20 mg tablet, Take 1 tablet by mouth daily as needed, Disp: , Rfl:     gabapentin (NEURONTIN) 600 MG tablet, Take 1 tablet (600 mg total) by mouth 4 (four) times a day (Patient taking differently: Take 600 mg by mouth 3 (three) times a day ), Disp: 360 tablet, Rfl: 3    glipiZIDE (GLUCOTROL) 10 mg tablet, Take 10 mg by mouth 2 (two) times a day, Disp: , Rfl:     hydrochlorothiazide (HYDRODIURIL) 25 mg tablet, Take 1 tablet by mouth daily, Disp: , Rfl:     insulin glargine (LANTUS SOLOSTAR) 100 units/mL injection pen, Lantus Solostar U-100 Insulin 30unit/mL at bedtime (3 mL) subcutaneous pen, Disp: , Rfl:     irbesartan (AVAPRO) 300 mg tablet, Take 300 mg by mouth daily  , Disp: , Rfl:     metoprolol succinate (TOPROL-XL) 100 mg 24 hr tablet, Take 100 mg by mouth daily, Disp: , Rfl:     OXcarbazepine (TRILEPTAL) 300 mg tablet, 1/2 p o  Q 12 hours with 600 mg dose (Patient taking differently: Take 150 mg by mouth as needed 1/2 p o  Q 12 hours with 600 mg dose), Disp: 30 tablet, Rfl: 5    OXcarbazepine (TRILEPTAL) 600 mg tablet, Take 600 mg by mouth daily at bedtime, Disp: , Rfl:     TRUEPLUS PEN NEEDLES 32G X 4 MM MISC, , Disp: , Rfl:     vitamin B-12 (CYANOCOBALAMIN) 250 MCG TABS, Take 250 mcg by mouth daily as needed , Disp: , Rfl:     Past Medical History  Past Medical History:   Diagnosis Date    Arthritis     Hyperlipidemia     Hypertension     Poor circulation     Sleep apnea     Type 2 diabetes mellitus ([address] )        Past Surgical History   Past Surgical History:   Procedure Laterality Date    BACK SURGERY      CORONARY ARTERY BYPASS GRAFT  07/22/2017    HEMORROIDECTOMY      TONSILLECTOMY      WRIST SURGERY         Family History  No known family history of autoimmune or inflammatory diseases  Family History   Problem Relation Age of Onset    Diabetes type II Mother     Hypertension Mother     Breast cancer Sister     Lung cancer Sister     Multiple endocrine neoplasia Brother     Breast cancer Sister        Social History  Occupation: Retired   Social History     Substance and Sexual Activity   Alcohol Use Yes     Social History     Substance and Sexual Activity   Drug Use No     Social History     Tobacco Use   Smoking Status Never Smoker   Smokeless Tobacco Never Used       Objective:    Vitals:    07/23/19 1329   Pulse: 80   Weight: 114 kg (251 lb 5 2 oz)   Height: 5' 10" (1 778 m)       Physical Exam   Constitutional: He is oriented to person, place, and time  He appears well-developed and well-nourished  HENT:   Head: Normocephalic  Mouth/Throat: Oropharynx is clear and moist    Eyes: Pupils are equal, round, and reactive to light  Conjunctivae are normal    Neck: Normal range of motion  Neck supple  Cardiovascular: Normal rate, regular rhythm and normal heart sounds  Pulmonary/Chest: Effort normal and breath sounds normal    Musculoskeletal:   No synovitis or joint swelling  +Thick, bulky hands with OA changes  Tenderness of several joints including the elbows and right shoulder with restricted ROM  Tenderness of the lumbar spine with restricted ROM  +Warmth of both knee and crepitus  +Strength 5/5 BUE and BLE   +Pitting Edema, BLE, left > right  No evidence of PsO or rash  Neurological: He is alert and oriented to person, place, and time  Skin: Skin is warm and dry  Psychiatric: He has a normal mood and affect   His behavior is normal        Imaging:   MRI L/S 9/13/2018  IMPRESSION:  Unremarkable MRI of the lumbosacral plexus  L4-5 degenerative disc disease with loss of disc height, diffuse annular bulging and mild endplate hypertrophic change resulting in mild canal stenosis and foraminal narrowing

## 2019-07-23 ENCOUNTER — OFFICE VISIT (OUTPATIENT)
Dept: RHEUMATOLOGY | Facility: CLINIC | Age: 69
End: 2019-07-23
Payer: COMMERCIAL

## 2019-07-23 VITALS — BODY MASS INDEX: 35.98 KG/M2 | WEIGHT: 251.32 LBS | HEIGHT: 70 IN | HEART RATE: 80 BPM

## 2019-07-23 DIAGNOSIS — R53.83 MALAISE AND FATIGUE: ICD-10-CM

## 2019-07-23 DIAGNOSIS — M25.50 POLYARTHRALGIA: Primary | ICD-10-CM

## 2019-07-23 DIAGNOSIS — Z87.2 HISTORY OF PSORIASIS: ICD-10-CM

## 2019-07-23 DIAGNOSIS — R53.81 MALAISE AND FATIGUE: ICD-10-CM

## 2019-07-23 DIAGNOSIS — M25.541 ARTHRALGIA OF BOTH HANDS: ICD-10-CM

## 2019-07-23 DIAGNOSIS — M25.542 ARTHRALGIA OF BOTH HANDS: ICD-10-CM

## 2019-07-23 PROCEDURE — 99204 OFFICE O/P NEW MOD 45 MIN: CPT | Performed by: PHYSICIAN ASSISTANT

## 2019-08-09 DIAGNOSIS — E13.42 POLYNEUROPATHY DUE TO SECONDARY DIABETES (HCC): Primary | ICD-10-CM

## 2019-08-09 NOTE — TELEPHONE ENCOUNTER
Medication refill check list    Correct patient? yes   Correct medication name, dose, and pill size? yes   Correct provider? yes   Last and Next appt  scheduled? Yes, last date 5/17/19 & next date 8/19/19   Right pharmacy listed? yes   Correct quantity for 30 or 90 days? yes   Is the patient out of refills? When was it last prescribed? Yes, last date 5/17/19   Directions match what the patient says they are taking?  yes   Enough refills? (none for controlled substances, 1 year for routine medications) yes

## 2019-08-12 RX ORDER — OXCARBAZEPINE 600 MG/1
600 TABLET, FILM COATED ORAL
Qty: 90 TABLET | Refills: 5 | Status: SHIPPED | OUTPATIENT
Start: 2019-08-12 | End: 2020-09-30

## 2019-08-15 ENCOUNTER — HOSPITAL ENCOUNTER (OUTPATIENT)
Dept: RADIOLOGY | Facility: HOSPITAL | Age: 69
Discharge: HOME | End: 2019-08-15
Payer: COMMERCIAL

## 2019-08-15 ENCOUNTER — APPOINTMENT (OUTPATIENT)
Dept: LAB | Facility: HOSPITAL | Age: 69
End: 2019-08-15
Payer: COMMERCIAL

## 2019-08-15 ENCOUNTER — TELEPHONE (OUTPATIENT)
Dept: OBGYN CLINIC | Facility: HOSPITAL | Age: 69
End: 2019-08-15

## 2019-08-15 DIAGNOSIS — M25.50 POLYARTHRALGIA: ICD-10-CM

## 2019-08-15 DIAGNOSIS — M25.50 PAIN IN JOINT, MULTIPLE SITES: Primary | ICD-10-CM

## 2019-08-15 DIAGNOSIS — M25.542 ARTHRALGIA OF BOTH HANDS: ICD-10-CM

## 2019-08-15 DIAGNOSIS — Z87.2 HISTORY OF PSORIASIS: ICD-10-CM

## 2019-08-15 DIAGNOSIS — R53.83 MALAISE AND FATIGUE: ICD-10-CM

## 2019-08-15 DIAGNOSIS — R53.81 MALAISE AND FATIGUE: ICD-10-CM

## 2019-08-15 DIAGNOSIS — M25.541 ARTHRALGIA OF BOTH HANDS: ICD-10-CM

## 2019-08-15 LAB
25(OH)D3 SERPL-MCNC: 27.8 NG/ML (ref 30–100)
ALBUMIN SERPL BCP-MCNC: 3.9 G/DL (ref 3.5–5)
ALP SERPL-CCNC: 79 U/L (ref 46–116)
ALT SERPL W P-5'-P-CCNC: 30 U/L (ref 12–78)
ANION GAP SERPL CALCULATED.3IONS-SCNC: 7 MMOL/L (ref 4–13)
AST SERPL W P-5'-P-CCNC: 19 U/L (ref 5–45)
BASOPHILS # BLD AUTO: 0.02 THOUSANDS/ΜL (ref 0–0.1)
BASOPHILS NFR BLD AUTO: 0 % (ref 0–1)
BILIRUB SERPL-MCNC: 0.8 MG/DL (ref 0.2–1)
BUN SERPL-MCNC: 17 MG/DL (ref 5–25)
CALCIUM SERPL-MCNC: 9.7 MG/DL (ref 8.3–10.1)
CHLORIDE SERPL-SCNC: 105 MMOL/L (ref 100–108)
CO2 SERPL-SCNC: 29 MMOL/L (ref 21–32)
CREAT SERPL-MCNC: 0.88 MG/DL (ref 0.6–1.3)
CRP SERPL QL: <3 MG/L
EOSINOPHIL # BLD AUTO: 0.06 THOUSAND/ΜL (ref 0–0.61)
EOSINOPHIL NFR BLD AUTO: 1 % (ref 0–6)
ERYTHROCYTE [DISTWIDTH] IN BLOOD BY AUTOMATED COUNT: 12.8 % (ref 11.6–15.1)
ERYTHROCYTE [SEDIMENTATION RATE] IN BLOOD: 5 MM/HOUR (ref 0–10)
GFR SERPL CREATININE-BSD FRML MDRD: 88 ML/MIN/1.73SQ M
GLUCOSE P FAST SERPL-MCNC: 155 MG/DL (ref 65–99)
HCT VFR BLD AUTO: 36.2 % (ref 36.5–49.3)
HGB BLD-MCNC: 12.8 G/DL (ref 12–17)
IMM GRANULOCYTES # BLD AUTO: 0.02 THOUSAND/UL (ref 0–0.2)
IMM GRANULOCYTES NFR BLD AUTO: 0 % (ref 0–2)
LYMPHOCYTES # BLD AUTO: 1.58 THOUSANDS/ΜL (ref 0.6–4.47)
LYMPHOCYTES NFR BLD AUTO: 33 % (ref 14–44)
MCH RBC QN AUTO: 35.6 PG (ref 26.8–34.3)
MCHC RBC AUTO-ENTMCNC: 35.4 G/DL (ref 31.4–37.4)
MCV RBC AUTO: 101 FL (ref 82–98)
MONOCYTES # BLD AUTO: 0.31 THOUSAND/ΜL (ref 0.17–1.22)
MONOCYTES NFR BLD AUTO: 7 % (ref 4–12)
NEUTROPHILS # BLD AUTO: 2.8 THOUSANDS/ΜL (ref 1.85–7.62)
NEUTS SEG NFR BLD AUTO: 59 % (ref 43–75)
NRBC BLD AUTO-RTO: 0 /100 WBCS
PLATELET # BLD AUTO: 117 THOUSANDS/UL (ref 149–390)
PMV BLD AUTO: 9.7 FL (ref 8.9–12.7)
POTASSIUM SERPL-SCNC: 3.9 MMOL/L (ref 3.5–5.3)
PROT SERPL-MCNC: 7.1 G/DL (ref 6.4–8.2)
RBC # BLD AUTO: 3.6 MILLION/UL (ref 3.88–5.62)
SODIUM SERPL-SCNC: 141 MMOL/L (ref 136–145)
TSH SERPL DL<=0.05 MIU/L-ACNC: 2.43 UIU/ML (ref 0.36–3.74)
WBC # BLD AUTO: 4.79 THOUSAND/UL (ref 4.31–10.16)

## 2019-08-15 PROCEDURE — 86704 HEP B CORE ANTIBODY TOTAL: CPT | Performed by: PHYSICIAN ASSISTANT

## 2019-08-15 PROCEDURE — 87389 HIV-1 AG W/HIV-1&-2 AB AG IA: CPT

## 2019-08-15 PROCEDURE — 87340 HEPATITIS B SURFACE AG IA: CPT | Performed by: PHYSICIAN ASSISTANT

## 2019-08-15 PROCEDURE — 85025 COMPLETE CBC W/AUTO DIFF WBC: CPT | Performed by: PHYSICIAN ASSISTANT

## 2019-08-15 PROCEDURE — 86803 HEPATITIS C AB TEST: CPT | Performed by: PHYSICIAN ASSISTANT

## 2019-08-15 PROCEDURE — 86235 NUCLEAR ANTIGEN ANTIBODY: CPT | Performed by: PHYSICIAN ASSISTANT

## 2019-08-15 PROCEDURE — 72202 X-RAY EXAM SI JOINTS 3/> VWS: CPT

## 2019-08-15 PROCEDURE — 73130 X-RAY EXAM OF HAND: CPT

## 2019-08-15 PROCEDURE — 86200 CCP ANTIBODY: CPT | Performed by: PHYSICIAN ASSISTANT

## 2019-08-15 PROCEDURE — 85652 RBC SED RATE AUTOMATED: CPT | Performed by: PHYSICIAN ASSISTANT

## 2019-08-15 PROCEDURE — 86140 C-REACTIVE PROTEIN: CPT | Performed by: PHYSICIAN ASSISTANT

## 2019-08-15 PROCEDURE — 86705 HEP B CORE ANTIBODY IGM: CPT | Performed by: PHYSICIAN ASSISTANT

## 2019-08-15 PROCEDURE — 80053 COMPREHEN METABOLIC PANEL: CPT | Performed by: PHYSICIAN ASSISTANT

## 2019-08-15 PROCEDURE — 84443 ASSAY THYROID STIM HORMONE: CPT | Performed by: PHYSICIAN ASSISTANT

## 2019-08-15 PROCEDURE — 73630 X-RAY EXAM OF FOOT: CPT

## 2019-08-15 PROCEDURE — 82306 VITAMIN D 25 HYDROXY: CPT | Performed by: PHYSICIAN ASSISTANT

## 2019-08-15 PROCEDURE — 86430 RHEUMATOID FACTOR TEST QUAL: CPT

## 2019-08-15 PROCEDURE — 36415 COLL VENOUS BLD VENIPUNCTURE: CPT | Performed by: PHYSICIAN ASSISTANT

## 2019-08-15 PROCEDURE — 81374 HLA I TYPING 1 ANTIGEN LR: CPT | Performed by: PHYSICIAN ASSISTANT

## 2019-08-15 NOTE — TELEPHONE ENCOUNTER
Patient called stating he had x-rays done this morning  He just wanted you to know so you can watch for it

## 2019-08-16 LAB
ENA SS-A AB SER-ACNC: <0.2 AI (ref 0–0.9)
ENA SS-B AB SER-ACNC: <0.2 AI (ref 0–0.9)
HBV CORE AB SER QL: NORMAL
HBV CORE IGM SER QL: NORMAL
HBV SURFACE AG SER QL: NORMAL
HCV AB SER QL: NORMAL
HIV 1+2 AB+HIV1 P24 AG SERPL QL IA: NORMAL
RHEUMATOID FACT SER QL LA: NEGATIVE

## 2019-08-17 LAB — CCP IGA+IGG SERPL IA-ACNC: 6 UNITS (ref 0–19)

## 2019-08-19 ENCOUNTER — OFFICE VISIT (OUTPATIENT)
Dept: NEUROLOGY | Facility: CLINIC | Age: 69
End: 2019-08-19
Payer: COMMERCIAL

## 2019-08-19 VITALS
HEIGHT: 70 IN | DIASTOLIC BLOOD PRESSURE: 76 MMHG | SYSTOLIC BLOOD PRESSURE: 130 MMHG | WEIGHT: 251 LBS | HEART RATE: 68 BPM | BODY MASS INDEX: 35.93 KG/M2

## 2019-08-19 DIAGNOSIS — G56.22 ULNAR NEUROPATHY OF LEFT UPPER EXTREMITY: ICD-10-CM

## 2019-08-19 DIAGNOSIS — M21.372 LEFT FOOT DROP: ICD-10-CM

## 2019-08-19 DIAGNOSIS — E11.44 DIABETIC AMYOTROPHY ASSOCIATED WITH TYPE 2 DIABETES MELLITUS (HCC): ICD-10-CM

## 2019-08-19 DIAGNOSIS — G20 PARKINSON'S DISEASE (HCC): ICD-10-CM

## 2019-08-19 DIAGNOSIS — E13.42 POLYNEUROPATHY DUE TO SECONDARY DIABETES (HCC): Primary | ICD-10-CM

## 2019-08-19 PROCEDURE — 99213 OFFICE O/P EST LOW 20 MIN: CPT | Performed by: PSYCHIATRY & NEUROLOGY

## 2019-08-19 NOTE — PROGRESS NOTES
Tor Joseph is a 71 y o  male who returns in follow-up today with history of peripheral neuropathy and Parkinson's disease    Assessment:  1  Polyne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 ([facility] [address] )    2  Diabetic amyotrophy associated with type 2 diabetes mellitus ([facility] [address] )    3  Parkinson's disease ([facility] [address] )    4  Left foot drop    5  Ulnar neuropathy of left upper extremity        Plan:  Continue physical therapy  Continue gabapentin and Trileptal  Follow-up 6 months    Discussion:  Beatrice Mills reports that his neuropathic pain symptoms remain under reasonable control with current doses of gabapentin and Trileptal   He feels that his legs are stronger with therapy that he does 3 times a week independently  He will continue with this and anticipates follow up with rheumatologist regarding various joint pains  I will see him back in follow-up in 6 months      Subjective:    HPI  Beatrice Mills reports that his symptoms of neuropathic pain remain under reasonable control with current dosages of gabapentin and Trileptal   He tolerates these well  He is also using CBD oil at night and finds this relaxes him  He did see the rheumatologist regarding his arthralgias and recently had x-rays and blood work done  He anticipates follow-up next month  He continues to report numbness and tingling in the left ulnar distribution but is not interested in any type of surgical intervention at present time  He is aware of his rest tremor but states it does not bother him  He did not find that Sinemet was helpful and did not want to take another pill    Continues to complain of pain in his coccyx region      Past Medical History:   Diagnosis Date    Arthritis     Hyperlipidemia     Hypertension     Poor circulation     Sleep apnea     Type 2 diabetes mellitus ([facility] [address] )        Family History:  Family History   Problem Relation Age of Onset    Diabetes type II Mother     Hypertension Mother     Breast cancer Sister     Lung cancer Sister    Soumya Curran Multiple endocrine neoplasia Brother     Breast cancer Sister        Past Surgical History:  Past Surgical History:   Procedure Laterality Date    BACK SURGERY      CORONARY ARTERY BYPASS GRAFT  07/22/2017    HEMORROIDECTOMY      TONSILLECTOMY      WRIST SURGERY         Social History:   reports that he has never smoked  He has never used smokeless tobacco  He reports that he drinks alcohol  He reports that he does not use drugs  Allergies:  Penicillins      Current Outpatient Medications:     amLODIPine (NORVASC) 10 mg tablet, Take 1 tablet by mouth daily, Disp: , Rfl:     aspirin 81 MG tablet, Take 1 tablet by mouth daily, Disp: , Rfl:     atorvastatin (LIPITOR) 10 mg tablet, Take 10 mg by mouth daily, Disp: , Rfl:     Blood Glucose Monitoring Suppl (ACURA BLOOD GLUCOSE METER) w/Device KIT, by Other route   Use as instructed, Disp: , Rfl:     clotrimazole-betamethasone (LOTRISONE) 1-0 05 % cream, Apply topically daily as needed  , Disp: , Rfl:     Dulaglutide 0 75 MG/0 5ML SOPN, Inject 0 75 mg under the skin Once a week , Disp: , Rfl:     furosemide (LASIX) 20 mg tablet, Take 1 tablet by mouth daily as needed, Disp: , Rfl:     gabapentin (NEURONTIN) 600 MG tablet, Take 1 tablet (600 mg total) by mouth 4 (four) times a day (Patient taking differently: Take 600 mg by mouth 3 (three) times a day ), Disp: 360 tablet, Rfl: 3    glipiZIDE (GLUCOTROL) 10 mg tablet, Take 10 mg by mouth 2 (two) times a day, Disp: , Rfl:     hydrochlorothiazide (HYDRODIURIL) 25 mg tablet, Take 1 tablet by mouth daily, Disp: , Rfl:     insulin glargine (LANTUS SOLOSTAR) 100 units/mL injection pen, Lantus Solostar U-100 Insulin 30unit/mL at bedtime (3 mL) subcutaneous pen, Disp: , Rfl:     irbesartan (AVAPRO) 300 mg tablet, Take 300 mg by mouth daily  , Disp: , Rfl:     metoprolol succinate (TOPROL-XL) 100 mg 24 hr tablet, Take 100 mg by mouth daily, Disp: , Rfl:     OXcarbazepine (TRILEPTAL) 300 mg tablet, 1/2 p o  Q 12 hours with 600 mg dose (Patient taking differently: Take 150 mg by mouth as needed 1/2 p o  Q 12 hours with 600 mg dose), Disp: 30 tablet, Rfl: 5    OXcarbazepine (TRILEPTAL) 600 mg tablet, Take 1 tablet (600 mg total) by mouth daily at bedtime for 90 days, Disp: 90 tablet, Rfl: 5    TRUEPLUS PEN NEEDLES 32G X 4 MM MISC, , Disp: , Rfl:     carbidopa-levodopa (SINEMET)  mg per tablet, 1/2 to 1 3 times daily (Patient taking differently: Take 1 tablet by mouth as needed 1/2 to 1 3 times daily), Disp: 90 tablet, Rfl: 5    I have reviewed the past medical, social and family history, current medications, allergies, vitals, review of systems and updated this information as appropriate today     Objective:    Vitals:  Blood pressure 130/76, pulse 68, height 5' 10" (1 778 m), weight 114 kg (251 lb)  Physical Exam    Neurological Exam  GENERAL:  Well-developed well-nourished man in no acute distress  HEENT/NECK: Head is atraumatic normocephalic, neck is supple  CARDIOVASCULAR:  No  Carotid bruit  NEUROLOGIC:  Mental Status: Awake and alert without aphasia  Cranial Nerves: Extraocular movements are full  Face is symmetrical  Motor:  No drift is noted on arm extension  Left foot drop is noted  Weakness is also noted in the intrinsic hand muscles the left hand  No cogwheeling rigidity is noted  Coordination:  Finger-to-nose testing is performed accurately  He ambulates with a steppage gait pattern on the left, utilizing a straight cane            ROS:    Review of Systems   Constitutional: Positive for fatigue  Negative for appetite change and fever  HENT: Negative  Negative for hearing loss, tinnitus, trouble swallowing and voice change  Eyes: Negative  Negative for photophobia and pain  Respiratory: Negative  Negative for shortness of breath and wheezing  Cardiovascular: Negative  Negative for chest pain and palpitations  Gastrointestinal: Positive for abdominal pain   Negative for nausea and vomiting  Endocrine: Negative  Negative for cold intolerance and heat intolerance  Genitourinary: Negative  Negative for dysuria, frequency and urgency  Musculoskeletal: Positive for arthralgias, back pain, gait problem and joint swelling (ankles)  Negative for myalgias, neck pain and neck stiffness  Skin: Negative  Negative for rash  Allergic/Immunologic: Negative  Neurological: Positive for tremors, weakness and numbness (legs, feet)  Negative for dizziness, seizures, syncope, facial asymmetry, speech difficulty, light-headedness and headaches  Hematological: Negative  Does not bruise/bleed easily  Psychiatric/Behavioral: Positive for sleep disturbance  Negative for confusion and hallucinations

## 2019-08-20 LAB — HLA-B27 QL NAA+PROBE: NEGATIVE

## 2019-08-23 DIAGNOSIS — E13.42 POLYNEUROPATHY DUE TO SECONDARY DIABETES (HCC): ICD-10-CM

## 2019-08-23 RX ORDER — OXCARBAZEPINE 300 MG/1
TABLET, FILM COATED ORAL
Qty: 30 TABLET | Refills: 5 | Status: SHIPPED | OUTPATIENT
Start: 2019-08-23 | End: 2022-05-09 | Stop reason: DRUGHIGH

## 2019-08-30 ENCOUNTER — TELEPHONE (OUTPATIENT)
Dept: NEUROLOGY | Facility: CLINIC | Age: 69
End: 2019-08-30

## 2019-08-30 NOTE — TELEPHONE ENCOUNTER
Spoke to [facility] [employer] pharmacist SIG has been clarified via fax medications will be shipped to pt home address on 9/9/19

## 2019-08-30 NOTE — TELEPHONE ENCOUNTER
Spoke with patient reports he is taking oxcarbazepine 600 mg at bedtime and uses the 300 mg dose half a pill in the morning and sometimes a full pill in the morning depending upon his pain level  He reports some issues with memory which will be addressed when he returns back in [id number] sound more like mild cognitive impairment

## 2019-08-30 NOTE — TELEPHONE ENCOUNTER
Patient is taking 600 mg of oxcarbazepine at HS and uses the 300 mg dose 1/2 b i d   Any further questions please notify me

## 2019-08-30 NOTE — TELEPHONE ENCOUNTER
Pt mail order pharmacy [facility] called RX line requesting SIG clarification for pt Oxcarbazepine 300 mg tab  Please contact [facility] at [phone]   Please advise

## 2019-09-25 ENCOUNTER — OFFICE VISIT (OUTPATIENT)
Dept: RHEUMATOLOGY | Facility: CLINIC | Age: 69
End: 2019-09-25
Payer: COMMERCIAL

## 2019-09-25 VITALS
WEIGHT: 251 LBS | HEIGHT: 70 IN | BODY MASS INDEX: 35.93 KG/M2 | DIASTOLIC BLOOD PRESSURE: 76 MMHG | SYSTOLIC BLOOD PRESSURE: 130 MMHG

## 2019-09-25 DIAGNOSIS — M25.541 ARTHRALGIA OF BOTH HANDS: Primary | ICD-10-CM

## 2019-09-25 DIAGNOSIS — M25.50 POLYARTHRALGIA: ICD-10-CM

## 2019-09-25 DIAGNOSIS — E11.44 DIABETIC AMYOTROPHY ASSOCIATED WITH TYPE 2 DIABETES MELLITUS (HCC): ICD-10-CM

## 2019-09-25 DIAGNOSIS — M15.0 PRIMARY GENERALIZED (OSTEO)ARTHRITIS: ICD-10-CM

## 2019-09-25 DIAGNOSIS — E13.42 POLYNEUROPATHY DUE TO SECONDARY DIABETES (HCC): ICD-10-CM

## 2019-09-25 DIAGNOSIS — M25.542 ARTHRALGIA OF BOTH HANDS: Primary | ICD-10-CM

## 2019-09-25 PROCEDURE — 99214 OFFICE O/P EST MOD 30 MIN: CPT | Performed by: INTERNAL MEDICINE

## 2019-09-25 NOTE — PROGRESS NOTES
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, who presents for rheumatology follow-up regarding workup for his hand arthralgias and stiffness  So far his workup does not show definitive evidence of an inflammatory arthritis as he is seronegative in the blood work, has normal markers of inflammation, and on his x-rays has signs of osteoarthritis but no definite changes of inflammatory arthropathy  His exam shows bulky hands with waxy skin on his fingers and generalized puffiness, however there is no overt evidence of synovitis  On his exam he was unable to fully flatten his fingers and hands against each other ("prayer sign") and I discussed with him that if we definitively rule out rheumatoid arthritis, I have suspicion he may be suffering from additional complications related to his diabetes such as diabetic stiff hand syndrome (diabetic cheiroarthropathy)  This is a musculoskeletal complication in diabetics that we can see in longstanding disease and the patient's also have typical complications from the diabetes already, including the neuropathy and this patient particularly has the amyotrophy  I also discussed with him the diabetes can also cause additional hand issues such as Dupuytren's contractures, flexor tenosynovitis, and trigger fingers, and some of these issues may also be contributing to his hand symptoms, which can be reversible/treatable  To more definitively rule out RA which I do think is appropriate just based off the appearance of his hands and his complaints, we will do an ultrasound of his hands to rule out joint synovitis  If we rule this out then I do feel his hand issues may be a result of complications from diabetes    On ultrasound we can see tendon thickening in this setting suggestive of these diabetic complications but it is not always appreciated  For treatment of the [facility] issues related to his diabetes, the mainstay is to keep the diabetes under control, occupational therapy, and also an evaluation by Hand Orthopedics may be beneficial as other issues including the flexor tenosynovitis and trigger fingers are treatable with injections and he may benefit from an evaluation for this  He was understanding and agreeable with this plan and will have the ultrasound done  He will follow-up in about 4-6 weeks with Audrey to review these results and determine next steps in treatment/diagnostics  Plan:  Diagnoses and all orders for this visit:    Arthralgia of both hands    Polyarthralgia        Follow-up plan: 6 weeks with audrey        Rheumatic Disease Summary  1  Hand arthralgias   -Rheum eval 7/23/19 for progressive hand arthralgias x1 year and reported B/L knee swelling, prior h/o PsO (none currently), no obvious IA on exam  -XR hands/feet/SI joints: OA in DIPs/PIPs, no inflammatory erosive changes   -Labs: negative HLA-B27, SSA, SSB, RF, CCP, hep panel, HIV, normal ESR/CRP, low platelets 362,887, normal TSH  2  Comorbidities: CAD s/p triple bypass (6 stents placement prior to this), HTN, HLD, polyneuropathy secondary to DM type 2, Parkinson's disease, lumbar radiculopathy     HPI  Abby Erickson is a 71 y o   male who presents for rheumatology follow-up regarding his workup for bilateral hand arthralgias and stiffness  He reports he is doing about the same, but is concerned as he feels his hands progressively become more swollen and puffy  He feels that there range of motion is limited and reduced and he cannot fully make a fist   He reports that 1 of his fingers is triggering  He also reports chronic numbness in his 4th and 5th fingers on the left hand which he relates to a prior cardiac catheterization  He states he is stiff in the morning in his hands for several hours but reports it really never goes away throughout the day    His hands are his main concern but also complains of left knee pain and also has his chronic lower back pain with radiculopathy  We discussed his workup which was grossly unremarkable to suggest obvious evidence of an inflammatory arthritis like RA  We also discussed his longstanding insulin-dependent diabetes and he has had other complications from this including polyneuropathy and amyotrophy  The following portions of the patient's history were reviewed and updated as appropriate: allergies, current medications, past family history, past medical history, past social history, past surgical history and problem list     Review of Systems:   Review of Systems   Constitutional: Negative for chills, fatigue, fever and unexpected weight change  HENT: Negative for mouth sores and trouble swallowing  Eyes: Negative for pain and visual disturbance  Respiratory: Negative for cough and shortness of breath  Cardiovascular: Negative for chest pain and leg swelling  Gastrointestinal: Negative for abdominal pain, blood in stool, constipation, diarrhea and nausea  Genitourinary: Negative for hematuria  Musculoskeletal: Positive for arthralgias and joint swelling (hands)  Negative for back pain and myalgias  Skin: Negative for rash  Neurological: Negative for weakness and numbness  Hematological: Negative for adenopathy  Psychiatric/Behavioral: Negative for sleep disturbance  Home Medications:    Current Outpatient Medications:     amLODIPine (NORVASC) 10 mg tablet, Take 1 tablet by mouth daily, Disp: , Rfl:     aspirin 81 MG tablet, Take 1 tablet by mouth daily, Disp: , Rfl:     Blood Glucose Monitoring Suppl (ACURA BLOOD GLUCOSE METER) w/Device KIT, by Other route   Use as instructed, Disp: , Rfl:     carbidopa-levodopa (SINEMET)  mg per tablet, 1/2 to 1 3 times daily (Patient taking differently: Take 1 tablet by mouth as needed 1/2 to 1 3 times daily), Disp: 90 tablet, Rfl: 5   clotrimazole-betamethasone (LOTRISONE) 1-0 05 % cream, Apply topically daily as needed  , Disp: , Rfl:     Dulaglutide 0 75 MG/0 5ML SOPN, Inject 0 75 mg under the skin Once a week , Disp: , Rfl:     furosemide (LASIX) 20 mg tablet, Take 1 tablet by mouth daily as needed, Disp: , Rfl:     gabapentin (NEURONTIN) 600 MG tablet, Take 1 tablet (600 mg total) by mouth 4 (four) times a day (Patient taking differently: Take 600 mg by mouth 3 (three) times a day ), Disp: 360 tablet, Rfl: 3    glipiZIDE (GLUCOTROL) 10 mg tablet, Take 10 mg by mouth 2 (two) times a day, Disp: , Rfl:     hydrochlorothiazide (HYDRODIURIL) 25 mg tablet, Take 1 tablet by mouth daily, Disp: , Rfl:     insulin glargine (LANTUS SOLOSTAR) 100 units/mL injection pen, Lantus Solostar U-100 Insulin 30unit/mL at bedtime (3 mL) subcutaneous pen, Disp: , Rfl:     irbesartan (AVAPRO) 300 mg tablet, Take 300 mg by mouth daily  , Disp: , Rfl:     metoprolol succinate (TOPROL-XL) 100 mg 24 hr tablet, Take 100 mg by mouth daily, Disp: , Rfl:     OXcarbazepine (TRILEPTAL) 300 mg tablet, 1/2 p o  Q 12 hours with 600 mg dose, Disp: 30 tablet, Rfl: 5    OXcarbazepine (TRILEPTAL) 600 mg tablet, Take 1 tablet (600 mg total) by mouth daily at bedtime for 90 days, Disp: 90 tablet, Rfl: 5    TRUEPLUS PEN NEEDLES 32G X 4 MM MISC, , Disp: , Rfl:     atorvastatin (LIPITOR) 10 mg tablet, Take 10 mg by mouth daily, Disp: , Rfl:     Objective:    Vitals:    09/25/19 1115   BP: 130/76   Weight: 114 kg (251 lb)   Height: 5' 10" (1 778 m)       Physical Exam   Constitutional: He appears well-developed and well-nourished  He is cooperative  HENT:   Head: Normocephalic and atraumatic  Eyes: Conjunctivae and EOM are normal  No scleral icterus  Neck: No spinous process tenderness and no muscular tenderness present  No thyromegaly present     Musculoskeletal:   Large bulky hands with no overt synovitis in MCPs or PIPs, but with general puffiness in the fingers and waxy appearance to the skin  Mild "prayer sign" with inability to fully flatten the hands against each other  Flexion contractures at PIPs 3-4  Other joints without abnormality, swelling or tenderness  Bilateral lower extremity edema and periankle edema symmetrically  Lymphadenopathy:     He has no cervical adenopathy  Neurological: He is alert  No sensory deficit  Skin: Skin is warm and dry  No rash noted  Nails show no clubbing  Psychiatric: He has a normal mood and affect  Imaging:   XR hands/feet Images personally reviewed in PACS and my impression is:  Osteoarthritis in the DIP and PIP joints, no erosive changes seen suggestive of rheumatoid arthritis or other inflammatory arthritis      Labs:   Component      Latest Ref Rng & Units 8/15/2019   WBC      4 31 - 10 16 Thousand/uL 4 79   Red Blood Cell Count      3 88 - 5 62 Million/uL 3 60 (L)   Hemoglobin      12 0 - 17 0 g/dL 12 8   HCT      36 5 - 49 3 % 36 2 (L)   MCV      82 - 98 fL 101 (H)   MCH      26 8 - 34 3 pg 35 6 (H)   MCHC      31 4 - 37 4 g/dL 35 4   RDW      11 6 - 15 1 % 12 8   MPV      8 9 - 12 7 fL 9 7   Platelet Count      055 - 390 Thousands/uL 117 (L)   nRBC      /100 WBCs 0   Neutrophils %      43 - 75 % 59   Immat GRANS %      0 - 2 % 0   Lymphocytes Relative      14 - 44 % 33   Monocytes Relative      4 - 12 % 7   Eosinophils      0 - 6 % 1   Basophils Relative      0 - 1 % 0   Absolute Neutrophils      1 85 - 7 62 Thousands/µL 2 80   Immature Grans Absolute      0 00 - 0 20 Thousand/uL 0 02   Lymphocytes Absolute      0 60 - 4 47 Thousands/µL 1 58   Absolute Monocytes      0 17 - 1 22 Thousand/µL 0 31   Absolute Eosinophils      0 00 - 0 61 Thousand/µL 0 06   Basophils Absolute      0 00 - 0 10 Thousands/µL 0 02   Sodium      136 - 145 mmol/L 141   Potassium      3 5 - 5 3 mmol/L 3 9   Chloride      100 - 108 mmol/L 105   CO2      21 - 32 mmol/L 29   Anion Gap      4 - 13 mmol/L 7   BUN      5 - 25 mg/dL 17 Creatinine      0 60 - 1 30 mg/dL 0 88   GLUCOSE FASTING      65 - 99 mg/dL 155 (H)   Calcium      8 3 - 10 1 mg/dL 9 7   AST      5 - 45 U/L 19   ALT      12 - 78 U/L 30   Alkaline Phosphatase      46 - 116 U/L 79   Total Protein      6 4 - 8 2 g/dL 7 1   Albumin      3 5 - 5 0 g/dL 3 9   TOTAL BILIRUBIN      0 20 - 1 00 mg/dL 0 80   eGFR      ml/min/1 73sq m 88   HEPATITIS B SURFACE ANTIGEN      Non-reactive, NonReactive - Confirmed Non-reactive   HEPATITIS C ANTIBODY      Non-reactive Non-reactive   HEPATITIS B CORE IGM ANTIBODY      Non-reactive Non-reactive   HEPATITIS B CORE TOTAL ANTIBODY      Non-reactive Non-reactive   SSA (RO) ANTIBODY      0 0 - 0 9 AI <0 2   SSB (LA) ANTIBODY      0 0 - 0 9 AI <0 2   C-REACTIVE PROTEIN      <2 8 mg/L <4 1   CYCLIC CITRULLINATED PEPTIDE ANTIBODY      0 - 19 units 6   HLA B27       Negative   Sed Rate      0 - 10 mm/hour 5   TSH 3RD GENERATON      0 358 - 3 740 uIU/mL 2 429   Vit D, 25-Hydroxy      30 0 - 100 0 ng/mL 27 8 (L)   HIV-1/2 AB-AG      Non-Reactive Non-Reactive   RHEUMATOID FACTOR      Negative Negative

## 2019-10-11 ENCOUNTER — HOSPITAL ENCOUNTER (OUTPATIENT)
Dept: RADIOLOGY | Facility: HOSPITAL | Age: 69
Discharge: HOME | End: 2019-10-11
Payer: COMMERCIAL

## 2019-10-11 DIAGNOSIS — M25.542 ARTHRALGIA OF BOTH HANDS: ICD-10-CM

## 2019-10-11 DIAGNOSIS — M25.541 ARTHRALGIA OF BOTH HANDS: ICD-10-CM

## 2019-10-11 PROCEDURE — 76881 US COMPL JOINT R-T W/IMG: CPT

## 2019-10-17 NOTE — PROGRESS NOTES
Assessment and Plan: This is a 63-year-old gentleman presenting today for follow-up for polyarthralgia primarily affecting his hands and knees  He does note occasional swelling of his hands intermittently with increased warmth of both knees  He also notes occasional foot pain with increased activity  He has minimal morning stiffness  Since his last appointment, he did proceed with imaging studies including [facility] ultrasound of both hands, which did reveal evidence of active inflammatory arthritis in 2 MCP joints  His exam today continues to reveal generalized puffiness along several MCPs bilaterally with tenderness of the 2nd and 3rd MCP bilaterally  The patient does have underlying osteoarthritis however given the result of his recent ultrasound, there is a component of inflammatory arthritis present  Given this new finding, I do believe further treatment would be warranted  I did discuss hydroxychloroquine with patient at length and provided him with an ACR handout regarding the medication  The goal with this medication is a control some of the joint pain and swelling he is experiencing in the hands  I did discuss with the patient that this will not alleviate all of his joint pain as there is a large component of osteoarthritis contributing  I did make him aware that he will need an annual eye exam for drug toxicity monitoring and he does have an eye exam scheduled in the coming weeks  He will return to the office for re-evaluation in 3 months time however contact the office in the interim if he has any further questions or concerns  I did make this patient aware that it will take least 3-6 months for this medication to become effective           Plan:  Diagnoses and all orders for this visit:    Rheumatoid arthritis involving multiple sites, unspecified rheumatoid factor presence (HCC)  -     hydroxychloroquine (PLAQUENIL) 200 mg tablet; 2 tablets po daily    Inflammatory arthritis    Primary generalized (osteo)arthritis        Follow-up plan: F/U in 3 months with Dr Nabila Peres       Rheumatic Disease Summary:  1  Hand arthralgias   -Rheum eval 7/23/19 for progressive hand arthralgias x1 year and reported B/L knee swelling, prior h/o PsO (none currently), no obvious IA on exam  -XR hands/feet/SI joints: OA in DIPs/PIPs, no inflammatory erosive changes   -Labs: negative HLA-B27, SSA, SSB, RF, CCP, hep panel, HIV, normal ESR/CRP, low platelets 101,474, normal TSH  2  Comorbidities: CAD s/p triple bypass (6 stents placement prior to this), HTN, HLD, polyneuropathy secondary to DM type 2, Parkinson's disease, lumbar radiculopathy          HPI  Lizz Nguyen is a 71 y o   male who presents today for follow up for polyarthritis  Bilateral hand pain with puffiness/tightness  He notes intermittent swelling of his hands  Some foot pain intermittently with shooting pain due to neuropathy  No obvious joint swelling  +AM stiffness x 1 hour  Longer with prolonged sitting or inactivity  +Difficulty with sleep due to pain/unknown reasons  +Fatigue  Currently using CBD oil for pain control with improvement  The following portions of the patient's history were reviewed and updated as appropriate: allergies, current medications, past family history, past medical history, past social history, past surgical history and problem list     Review of Systems:   Review of Systems   Constitutional: Positive for fatigue  Negative for appetite change, chills, fever and unexpected weight change  HENT: Negative for congestion, mouth sores and sore throat  No recent infxn   Eyes: Negative for pain, redness and visual disturbance  Respiratory: Negative for cough, chest tightness and shortness of breath  Cardiovascular: Negative for chest pain and leg swelling  Gastrointestinal: Negative for abdominal pain, blood in stool, constipation, diarrhea, nausea and vomiting     Endocrine: Negative for polydipsia and polyuria  Genitourinary: Negative for frequency and hematuria  Skin: Negative for color change and rash  No hair loss, nail changes, or rashes  No raynauds  Neurological: Negative for weakness, light-headedness, numbness and headaches  Hematological: Negative for adenopathy  Psychiatric/Behavioral: Negative for behavioral problems  The patient is not nervous/anxious  Home Medications:     Current Outpatient Medications:     amLODIPine (NORVASC) 10 mg tablet, Take 1 tablet by mouth daily, Disp: , Rfl:     aspirin 81 MG tablet, Take 1 tablet by mouth daily, Disp: , Rfl:     atorvastatin (LIPITOR) 10 mg tablet, Take 10 mg by mouth daily, Disp: , Rfl:     Blood Glucose Monitoring Suppl (ACURA BLOOD GLUCOSE METER) w/Device KIT, by Other route   Use as instructed, Disp: , Rfl:     carbidopa-levodopa (SINEMET)  mg per tablet, 1/2 to 1 3 times daily (Patient taking differently: Take 1 tablet by mouth as needed 1/2 to 1 3 times daily), Disp: 90 tablet, Rfl: 5    clotrimazole-betamethasone (LOTRISONE) 1-0 05 % cream, Apply topically daily as needed  , Disp: , Rfl:     Dulaglutide 0 75 MG/0 5ML SOPN, Inject 0 75 mg under the skin Once a week , Disp: , Rfl:     furosemide (LASIX) 20 mg tablet, Take 1 tablet by mouth daily as needed, Disp: , Rfl:     gabapentin (NEURONTIN) 600 MG tablet, Take 1 tablet (600 mg total) by mouth 4 (four) times a day (Patient taking differently: Take 600 mg by mouth 3 (three) times a day ), Disp: 360 tablet, Rfl: 3    glipiZIDE (GLUCOTROL) 10 mg tablet, Take 10 mg by mouth 2 (two) times a day, Disp: , Rfl:     hydrochlorothiazide (HYDRODIURIL) 25 mg tablet, Take 1 tablet by mouth daily, Disp: , Rfl:     insulin glargine (LANTUS SOLOSTAR) 100 units/mL injection pen, Lantus Solostar U-100 Insulin 30unit/mL at bedtime (3 mL) subcutaneous pen, Disp: , Rfl:     irbesartan (AVAPRO) 300 mg tablet, Take 300 mg by mouth daily  , Disp: , Rfl:    metoprolol succinate (TOPROL-XL) 100 mg 24 hr tablet, Take 100 mg by mouth daily, Disp: , Rfl:     OXcarbazepine (TRILEPTAL) 300 mg tablet, 1/2 p o  Q 12 hours with 600 mg dose, Disp: 30 tablet, Rfl: 5    OXcarbazepine (TRILEPTAL) 600 mg tablet, Take 1 tablet (600 mg total) by mouth daily at bedtime for 90 days, Disp: 90 tablet, Rfl: 5    TRUEPLUS PEN NEEDLES 32G X 4 MM MISC, , Disp: , Rfl:     Objective:    Vitals:    10/21/19 1056   BP: 162/80   BP Location: Left arm   Patient Position: Sitting   Cuff Size: Adult   Pulse: 89   Weight: 114 kg (251 lb)   Height: 5' 10" (1 778 m)       Physical Exam   Constitutional: He is oriented to person, place, and time  He appears well-developed and well-nourished  HENT:   Head: Normocephalic  Mouth/Throat: Oropharynx is clear and moist    Eyes: Pupils are equal, round, and reactive to light  Conjunctivae are normal    Neck: Normal range of motion  Neck supple  Cardiovascular: Normal rate, regular rhythm and normal heart sounds  Pulmonary/Chest: Effort normal and breath sounds normal    Musculoskeletal:   +Generalized puffiness across several MCPs B/L with tenderness of 2nd and 3rd MCPs B/L and right 3rd PIP  No other evidence of joint swelling or synovitis  +Increased warmth of both knees with crepitus  Neurological: He is alert and oriented to person, place, and time  Skin: Skin is warm and dry  Psychiatric: He has a normal mood and affect   His behavior is normal      Musculoskeletal--Peripheral Joint Exam:  Physical Exam        Imaging:   [facility] US 10/11/2019  IMPRESSION:  Evidence of active inflammatory arthropathy at the right 3rd MCP and the left 2nd MCP    Labs:   Component      Latest Ref Rng & Units 8/15/2019   WBC      4 31 - 10 16 Thousand/uL 4 79   Red Blood Cell Count      3 88 - 5 62 Million/uL 3 60 (L)   Hemoglobin      12 0 - 17 0 g/dL 12 8   HCT      36 5 - 49 3 % 36 2 (L)   MCV      82 - 98 fL 101 (H)   MCH      26 8 - 34 3 pg 35 6 (H) MCHC      31 4 - 37 4 g/dL 35 4   RDW      11 6 - 15 1 % 12 8   MPV      8 9 - 12 7 fL 9 7   Platelet Count      607 - 390 Thousands/uL 117 (L)   nRBC      /100 WBCs 0   Neutrophils %      43 - 75 % 59   Immat GRANS %      0 - 2 % 0   Lymphocytes Relative      14 - 44 % 33   Monocytes Relative      4 - 12 % 7   Eosinophils      0 - 6 % 1   Basophils Relative      0 - 1 % 0   Absolute Neutrophils      1 85 - 7 62 Thousands/µL 2 80   Immature Grans Absolute      0 00 - 0 20 Thousand/uL 0 02   Lymphocytes Absolute      0 60 - 4 47 Thousands/µL 1 58   Absolute Monocytes      0 17 - 1 22 Thousand/µL 0 31   Absolute Eosinophils      0 00 - 0 61 Thousand/µL 0 06   Basophils Absolute      0 00 - 0 10 Thousands/µL 0 02   Sodium      136 - 145 mmol/L 141   Potassium      3 5 - 5 3 mmol/L 3 9   Chloride      100 - 108 mmol/L 105   CO2      21 - 32 mmol/L 29   Anion Gap      4 - 13 mmol/L 7   BUN      5 - 25 mg/dL 17   Creatinine      0 60 - 1 30 mg/dL 0 88   GLUCOSE FASTING      65 - 99 mg/dL 155 (H)   Calcium      8 3 - 10 1 mg/dL 9 7   AST      5 - 45 U/L 19   ALT      12 - 78 U/L 30   Alkaline Phosphatase      46 - 116 U/L 79   Total Protein      6 4 - 8 2 g/dL 7 1   Albumin      3 5 - 5 0 g/dL 3 9   TOTAL BILIRUBIN      0 20 - 1 00 mg/dL 0 80   eGFR      ml/min/1 73sq m 88   HEPATITIS B SURFACE ANTIGEN      Non-reactive, NonReactive - Confirmed Non-reactive   HEPATITIS C ANTIBODY      Non-reactive Non-reactive   HEPATITIS B CORE IGM ANTIBODY      Non-reactive Non-reactive   HEPATITIS B CORE TOTAL ANTIBODY      Non-reactive Non-reactive   SSA (RO) ANTIBODY      0 0 - 0 9 AI <0 2   SSB (LA) ANTIBODY      0 0 - 0 9 AI <0 2   C-REACTIVE PROTEIN      <6 8 mg/L <1 3   CYCLIC CITRULLINATED PEPTIDE ANTIBODY      0 - 19 units 6   HLA B27       Negative   Sed Rate      0 - 10 mm/hour 5   TSH 3RD GENERATON      0 358 - 3 740 uIU/mL 2 429   Vit D, 25-Hydroxy      30 0 - 100 0 ng/mL 27 8 (L)   HIV-1/2 AB-AG      Non-Reactive Non-Reactive   RHEUMATOID FACTOR      Negative Negative

## 2019-10-21 ENCOUNTER — OFFICE VISIT (OUTPATIENT)
Dept: RHEUMATOLOGY | Facility: CLINIC | Age: 69
End: 2019-10-21
Payer: COMMERCIAL

## 2019-10-21 VITALS
WEIGHT: 251 LBS | HEART RATE: 89 BPM | SYSTOLIC BLOOD PRESSURE: 162 MMHG | BODY MASS INDEX: 35.93 KG/M2 | DIASTOLIC BLOOD PRESSURE: 80 MMHG | HEIGHT: 70 IN

## 2019-10-21 DIAGNOSIS — M19.90 INFLAMMATORY ARTHRITIS: ICD-10-CM

## 2019-10-21 DIAGNOSIS — M06.9 RHEUMATOID ARTHRITIS INVOLVING MULTIPLE SITES, UNSPECIFIED RHEUMATOID FACTOR PRESENCE: Primary | ICD-10-CM

## 2019-10-21 DIAGNOSIS — M15.0 PRIMARY GENERALIZED (OSTEO)ARTHRITIS: ICD-10-CM

## 2019-10-21 PROCEDURE — 99214 OFFICE O/P EST MOD 30 MIN: CPT | Performed by: PHYSICIAN ASSISTANT

## 2019-10-21 RX ORDER — HYDROXYCHLOROQUINE SULFATE 200 MG/1
TABLET, FILM COATED ORAL
Qty: 180 TABLET | Refills: 1 | Status: SHIPPED | OUTPATIENT
Start: 2019-10-21 | End: 2020-01-21 | Stop reason: SDUPTHER

## 2020-01-21 ENCOUNTER — OFFICE VISIT (OUTPATIENT)
Dept: RHEUMATOLOGY | Facility: CLINIC | Age: 70
End: 2020-01-21
Payer: COMMERCIAL

## 2020-01-21 VITALS
BODY MASS INDEX: 37.31 KG/M2 | HEIGHT: 70 IN | DIASTOLIC BLOOD PRESSURE: 78 MMHG | HEART RATE: 84 BPM | SYSTOLIC BLOOD PRESSURE: 134 MMHG | WEIGHT: 260.6 LBS

## 2020-01-21 DIAGNOSIS — M06.9 RHEUMATOID ARTHRITIS INVOLVING MULTIPLE SITES, UNSPECIFIED RHEUMATOID FACTOR PRESENCE: Primary | ICD-10-CM

## 2020-01-21 DIAGNOSIS — M25.542 ARTHRALGIA OF BOTH HANDS: ICD-10-CM

## 2020-01-21 DIAGNOSIS — E13.42 POLYNEUROPATHY DUE TO SECONDARY DIABETES (HCC): ICD-10-CM

## 2020-01-21 DIAGNOSIS — M25.541 ARTHRALGIA OF BOTH HANDS: ICD-10-CM

## 2020-01-21 PROCEDURE — 99214 OFFICE O/P EST MOD 30 MIN: CPT | Performed by: INTERNAL MEDICINE

## 2020-01-21 RX ORDER — HYDROXYCHLOROQUINE SULFATE 200 MG/1
TABLET, FILM COATED ORAL
Qty: 180 TABLET | Refills: 1 | Status: SHIPPED | OUTPATIENT
Start: 2020-01-21 | End: 2020-05-12 | Stop reason: SDUPTHER

## 2020-01-21 NOTE — PROGRESS NOTES
Assessment and Plan:   Patient is a 14-year-old male with type 2 diabetes with polyneuropathy who presents for rheumatology follow-up regarding arthralgias and findings of mild inflammatory arthropathy on his hand ultrasound  We initially evaluated him for his hand arthralgias and workup was grossly negative  Has very low large bulky hands and so we pursued ultrasound to look for synovitis  There was only 2 joints with mild hyperemia on the ultrasound noted, the 3rd right MCP and the left 2nd MCP  The ultrasound otherwise did not identify significant synovitis and did identify generalized osteoarthritis  I discussed with him that although he has the mild inflammation on the ultrasound and I do think Plaquenil is a good option, I do not think that the level of pain he is describing in his hands is explained by these ultrasound findings  The findings were very minimal and so based off these along with his exam today which does not show obvious increased swelling or synovitis, I would not make changes and I would continue with Plaquenil  We discussed that his hand pain does seem multifactorial given the mild inflammatory arthropathy but also his significant polyneuropathy from his diabetes and he did have prior EMG study on the upper extremities confirming this  He also has osteoarthritis, and I also wonder if he could have soft tissue complications from his diabetes that contribute to the stiffness in his hands  We discussed that while Plaquenil has helped him to some degree with the joint pain it likely will not help all of his pain issues since a lot of issues are related to his neuropathy  He will follow-up with his neurologist regarding this and any other medications he may be able to try since this seems to be his most debilitating issue and symptom  I otherwise did encourage him to get his baseline eye exam   We will follow-up in about 4 months      Plan:  Diagnoses and all orders for this visit:    Rheumatoid arthritis involving multiple sites, unspecified rheumatoid factor presence (HCC)  -     hydroxychloroquine (PLAQUENIL) 200 mg tablet; 2 tablets po daily    Arthralgia of both hands    Polyne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 (HCC)        Follow-up plan: 4 months       Rheumatic Disease Summary:  1  Seronegative RA   -Rheum eval 7/23/19 for progressive hand arthralgias x1 year and reported B/L knee swelling, prior h/o PsO (none currently), no obvious IA on exam but generally puffy fingers  -XR hands/feet/SI joints: OA in DIPs/PIPs, no inflammatory erosive changes   -Labs: negative HLA-B27, SSA, SSB, RF, CCP, hep panel, HIV, normal ESR/CRP, low platelets 503,297, normal TSH  -US hands 10/11/19: active inflammatory arthropathy with mild synovial hypertrophy and hyperemia in right 3rd MCP and left 2nd MCP  -Visit 10/21/19: started plaquenil for mild IA noted on hand US   -Visit 1/21/20: some improvement in hand stiffness on plaquenil, residual pain related to neuropathy and OA, cont plaquenil   2  Comorbidities: CAD s/p triple bypass (6 stents placement prior to this), HTN, HLD, polyneuropathy secondary to DM type 2, Parkinson's disease, lumbar radiculopathy     HPI  Kiki Lowe is a 71 y o   male who presents for rheumatology follow-up regarding workup for hand arthralgias  Additional workup with a hand ultrasound in October showed mild evidence of hyperemia at his right 3rd MCP joint and left 2nd MCP joint suggestive of a mild inflammatory arthropathy  Because of this Plaquenil was started for possible seronegative RA  He reports he has been taking the plaquenil and has noted some improvement in his hand pain and stiffness, but not 100% better  Still having pain in the hands on a daily basis and at night  He states as he uses his hands in the daytime and causes pain and then at night he gets burning pain in his hands    He does think the Plaquenil helped somewhat but again still has residual pain  He has morning stiffness in the hands and is unable to determine how long it is lasting now since he continues to have pain throughout the day  He is tolerating the Plaquenil without issues  He admits he was unable to get a baseline eye exam and he has to do this along with his regular diabetic eye exam   No new joint issues or swelling  He continues to go to aqua therapy 3 times per week which he likes and it does help him  He does currently have increased lower extremity edema  He continues to have numbness in his distal legs from his toes up to his knees  He also has numbness at times throughout his fingers  He still follows with Neurology for his polyneuropathy related to his diabetes and has an upcoming follow-up to discuss other treatment options  He is still on the gabapentin and Trileptal but is wondering if there are additional options since he still has severe pain on a daily basis  He is hesitant to increase gabapentin since it already makes him drowsy at the current dose  The following portions of the patient's history were reviewed and updated as appropriate: allergies, current medications, past family history, past medical history, past social history, past surgical history and problem list     Review of Systems:   Review of Systems   Constitutional: Negative for fatigue and unexpected weight change  HENT: Negative for mouth sores  Respiratory: Negative for cough and shortness of breath  Cardiovascular: Positive for leg swelling (bilateral)  Gastrointestinal: Negative for constipation and diarrhea  Musculoskeletal: Positive for arthralgias and joint swelling (hands)  Negative for back pain and myalgias  Skin: Negative for color change and rash  Neurological: Positive for numbness (distal legs)  Negative for weakness  Psychiatric/Behavioral: Negative for sleep disturbance         Home Medications:     Current Outpatient Medications:     amLODIPine (NORVASC) 10 mg tablet, Take 1 tablet by mouth daily, Disp: , Rfl:     aspirin 81 MG tablet, Take 1 tablet by mouth daily, Disp: , Rfl:     Blood Glucose Monitoring Suppl (ACURA BLOOD GLUCOSE METER) w/Device KIT, by Other route   Use as instructed, Disp: , Rfl:     carbidopa-levodopa (SINEMET)  mg per tablet, 1/2 to 1 3 times daily (Patient taking differently: Take 1 tablet by mouth as needed 1/2 to 1 3 times daily), Disp: 90 tablet, Rfl: 5    clotrimazole-betamethasone (LOTRISONE) 1-0 05 % cream, Apply topically daily as needed  , Disp: , Rfl:     Dulaglutide 0 75 MG/0 5ML SOPN, Inject 0 75 mg under the skin Once a week , Disp: , Rfl:     furosemide (LASIX) 20 mg tablet, Take 1 tablet by mouth daily as needed, Disp: , Rfl:     gabapentin (NEURONTIN) 600 MG tablet, Take 1 tablet (600 mg total) by mouth 4 (four) times a day (Patient taking differently: Take 600 mg by mouth 3 (three) times a day ), Disp: 360 tablet, Rfl: 3    glipiZIDE (GLUCOTROL) 10 mg tablet, Take 10 mg by mouth 2 (two) times a day, Disp: , Rfl:     hydrochlorothiazide (HYDRODIURIL) 25 mg tablet, Take 1 tablet by mouth daily, Disp: , Rfl:     hydroxychloroquine (PLAQUENIL) 200 mg tablet, 2 tablets po daily, Disp: 180 tablet, Rfl: 1    insulin glargine (LANTUS SOLOSTAR) 100 units/mL injection pen, Lantus Solostar U-100 Insulin 30unit/mL at bedtime (3 mL) subcutaneous pen, Disp: , Rfl:     irbesartan (AVAPRO) 300 mg tablet, Take 300 mg by mouth daily  , Disp: , Rfl:     metoprolol succinate (TOPROL-XL) 100 mg 24 hr tablet, Take 100 mg by mouth daily, Disp: , Rfl:     OXcarbazepine (TRILEPTAL) 300 mg tablet, 1/2 p o  Q 12 hours with 600 mg dose, Disp: 30 tablet, Rfl: 5    TRUEPLUS PEN NEEDLES 32G X 4 MM MISC, , Disp: , Rfl:     atorvastatin (LIPITOR) 10 mg tablet, Take 10 mg by mouth daily, Disp: , Rfl:     OXcarbazepine (TRILEPTAL) 600 mg tablet, Take 1 tablet (600 mg total) by mouth daily at bedtime for 90 days, Disp: 90 tablet, Rfl: 5    Objective:    Vitals:    01/21/20 1127   BP: 134/78   BP Location: Right arm   Patient Position: Sitting   Cuff Size: Standard   Pulse: 84   Weight: 118 kg (260 lb 9 6 oz)   Height: 5' 10" (1 778 m)       Physical Exam   Constitutional: He appears well-developed and well-nourished  He is cooperative  No distress  HENT:   Head: Normocephalic and atraumatic  Eyes: Conjunctivae, EOM and lids are normal  No scleral icterus  Neck: Neck supple  Pulmonary/Chest: Effort normal  No accessory muscle usage  No tachypnea  No respiratory distress  Musculoskeletal:   He exam again reveals thick bulky fingers with prominent PIP joints with osteoarthritic changes  Puffiness in the MCP joint looks improved  No significant reproducible tenderness  No other joint swelling on exam    Neurological: He is alert  Skin: Skin is dry and intact  No rash noted  Psychiatric: He has a normal mood and affect  His behavior is normal      Musculoskeletal--Peripheral Joint Exam:  Physical Exam     BEAR-28 tender joint count: 0  BEAR-28 swollen joint count: 0      Imaging:   US hands 10/11/19:   IMPRESSION:   Evidence of active inflammatory arthropathy at the right 3rd MCP and the left 2nd MCP    Labs:   No new labs for review

## 2020-01-31 DIAGNOSIS — E11.42 DIABETIC POLYNEUROPATHY ASSOCIATED WITH TYPE 2 DIABETES MELLITUS (HCC): ICD-10-CM

## 2020-01-31 RX ORDER — GABAPENTIN 600 MG/1
600 TABLET ORAL 4 TIMES DAILY
Qty: 360 TABLET | Refills: 3 | Status: SHIPPED | OUTPATIENT
Start: 2020-01-31

## 2020-01-31 NOTE — TELEPHONE ENCOUNTER
Medication refill check list    Correct patient? yes   Correct medication name, dose, and pill size? Yes    gabapentin (NEURONTIN) 600 MG tablet        Correct provider? Yes    Dr Mitzy Gustafson     Last and Next appt  scheduled? Last seen on 8-19-19    F/U scheduled for 2-     Right pharmacy listed? Yes   [facility]     Correct quantity for 30 or 90 days? yes   Is the patient out of refills? When was it last prescribed? Yes, date last written 11-29-18     Directions match what the patient says they are taking?  No - unable to reach patient Left message for call back   Enough refills? (none for controlled substances, 1 year for routine medications) n/a

## 2020-01-31 NOTE — TELEPHONE ENCOUNTER
Patient called returning [address] call  Best call back number for the patient is [phone]

## 2020-02-21 ENCOUNTER — OFFICE VISIT (OUTPATIENT)
Dept: NEUROLOGY | Facility: CLINIC | Age: 70
End: 2020-02-21
Payer: COMMERCIAL

## 2020-02-21 VITALS
BODY MASS INDEX: 36.36 KG/M2 | SYSTOLIC BLOOD PRESSURE: 146 MMHG | DIASTOLIC BLOOD PRESSURE: 70 MMHG | HEIGHT: 70 IN | HEART RATE: 62 BPM | WEIGHT: 254 LBS

## 2020-02-21 DIAGNOSIS — R41.3 MEMORY DIFFICULTY: ICD-10-CM

## 2020-02-21 DIAGNOSIS — E11.44 DIABETIC AMYOTROPHY ASSOCIATED WITH TYPE 2 DIABETES MELLITUS (HCC): ICD-10-CM

## 2020-02-21 DIAGNOSIS — E13.42 POLYNEUROPATHY DUE TO SECONDARY DIABETES (HCC): Primary | ICD-10-CM

## 2020-02-21 DIAGNOSIS — G56.22 ULNAR NEUROPATHY OF LEFT UPPER EXTREMITY: ICD-10-CM

## 2020-02-21 DIAGNOSIS — F40.240 CLAUSTROPHOBIA: ICD-10-CM

## 2020-02-21 DIAGNOSIS — G20 PARKINSON'S DISEASE (HCC): ICD-10-CM

## 2020-02-21 PROCEDURE — 99215 OFFICE O/P EST HI 40 MIN: CPT | Performed by: PSYCHIATRY & NEUROLOGY

## 2020-02-21 RX ORDER — OXCARBAZEPINE 600 MG/1
1 TABLET, FILM COATED ORAL
COMMUNITY
Start: 2020-01-26 | End: 2021-03-18 | Stop reason: HOSPADM

## 2020-02-21 RX ORDER — LORAZEPAM 1 MG/1
TABLET ORAL
Qty: 3 TABLET | Refills: 0 | Status: SHIPPED | OUTPATIENT
Start: 2020-02-21 | End: 2022-05-09 | Stop reason: ALTCHOICE

## 2020-02-21 NOTE — PROGRESS NOTES
Mauricio Nyhan is a 71 y o  male returns in follow-up today with history of diabetic peripheral neuropathy Parkinson's disease and memory difficulty    Assessment:  1  Polyne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 ([address] )    2  Ulnar neuropathy of left upper extremity    3  Parkinson's disease ([facility] [address] )    4  Diabetic amyotrophy associated with type 2 diabetes mellitus ([facility] [address] )    5  Memory difficulty    6  Claustrophobia        Plan:  MRI brain neuroquant  Blood work  Follow-up 2 months    Discussion:  Alex's symptoms of neuropathic pain remain under good control with current management  He will continue with the Trileptal and gabapentin  He does have a resting tremor consistent with Parkinson's disease but at present is not interested in treating it  His new complaint is problems with memory  He scored in the lower normal range on [place] testing  Have recommended MRI brain neuroquant as well as blood work and I will re-evaluate him when these are completed      Subjective:    HPI  I would returns in follow-up today  He reports that his symptoms of neuropathic pain remain under reasonable control with present management  He is going to physical therapy 3 times a week exercising and stretching but remains tight  He notes some increased numbness in the right leg  He continues to note a rest tremor but states it does not bother him enough that he wants to take medication for it  He does report numbness and tingling in the medial 2 fingers of the left hand secondary to his compression of the ulnar nerve at the elbow  He remains not interested in considering surgical intervention and understands the longer he goes the less likely surgery will benefit him  His main complaint today is memory difficulty  He states that whenever he tries to go somewhere he has to sit down and think about how he is going to get there  He states words or names do not come to him as quickly as they used to    He continues to handle his finances as well as his siblings finances who has dementia  He does admit that there is a lot of stress that he is under right now  Past Medical History:   Diagnosis Date    Arthritis     Hyperlipidemia     Hypertension     Poor circulation     Sleep apnea     Type 2 diabetes mellitus ([address] )        Family History:  Family History   Problem Relation Age of Onset    Diabetes type II Mother     Hypertension Mother     Breast cancer Sister     Lung cancer Sister     Multiple endocrine neoplasia Brother     Breast cancer Sister        Past Surgical History:  Past Surgical History:   Procedure Laterality Date    BACK SURGERY      CORONARY ARTERY BYPASS GRAFT  07/22/2017    HEMORROIDECTOMY      TONSILLECTOMY      WRIST SURGERY         Social History:   reports that he has never smoked  He has never used smokeless tobacco  He reports that he drinks alcohol  He reports that he does not use drugs  Allergies:  Penicillins      Current Outpatient Medications:     amLODIPine (NORVASC) 10 mg tablet, Take 1 tablet by mouth daily, Disp: , Rfl:     aspirin 81 MG tablet, Take 1 tablet by mouth daily, Disp: , Rfl:     Blood Glucose Monitoring Suppl (ACURA BLOOD GLUCOSE METER) w/Device KIT, by Other route   Use as instructed, Disp: , Rfl:     clotrimazole-betamethasone (LOTRISONE) 1-0 05 % cream, Apply topically daily as needed  , Disp: , Rfl:     furosemide (LASIX) 20 mg tablet, Take 1 tablet by mouth daily as needed, Disp: , Rfl:     glipiZIDE (GLUCOTROL) 10 mg tablet, Take 10 mg by mouth 2 (two) times a day, Disp: , Rfl:     hydrochlorothiazide (HYDRODIURIL) 25 mg tablet, Take 1 tablet by mouth daily, Disp: , Rfl:     hydroxychloroquine (PLAQUENIL) 200 mg tablet, 2 tablets po daily, Disp: 180 tablet, Rfl: 1    insulin glargine (LANTUS SOLOSTAR) 100 units/mL injection pen, Lantus Solostar U-100 Insulin 30unit/mL at bedtime (3 mL) subcutaneous pen, Disp: , Rfl:     irbesartan (AVAPRO) 300 mg tablet, Take 300 mg by mouth daily  , Disp: , Rfl:     metoprolol succinate (TOPROL-XL) 100 mg 24 hr tablet, Take 100 mg by mouth daily, Disp: , Rfl:     OXcarbazepine (TRILEPTAL) 300 mg tablet, 1/2 p o  Q 12 hours with 600 mg dose, Disp: 30 tablet, Rfl: 5    OXcarbazepine (TRILEPTAL) 600 mg tablet, Take 1 tablet by mouth daily at bedtime, Disp: , Rfl:     TRUEPLUS PEN NEEDLES 32G X 4 MM MISC, , Disp: , Rfl:     atorvastatin (LIPITOR) 10 mg tablet, Take 10 mg by mouth daily, Disp: , Rfl:     carbidopa-levodopa (SINEMET)  mg per tablet, 1/2 to 1 3 times daily (Patient not taking: Reported on 2/21/2020), Disp: 90 tablet, Rfl: 5    gabapentin (NEURONTIN) 600 MG tablet, Take 1 tablet (600 mg total) by mouth 4 (four) times a day (Patient taking differently: Take 600 mg by mouth 3 (three) times a day ), Disp: 360 tablet, Rfl: 3    LORazepam (ATIVAN) 1 mg tablet, Two p o  1 hour before MRI, may repeat 1 after 1 hour p r n , Disp: 3 tablet, Rfl: 0    OXcarbazepine (TRILEPTAL) 600 mg tablet, Take 1 tablet (600 mg total) by mouth daily at bedtime for 90 days, Disp: 90 tablet, Rfl: 5    I have reviewed the past medical, social and family history, current medications, allergies, vitals, review of systems and updated this information as appropriate today     Objective:    Vitals:  Blood pressure 146/70, pulse 62, height 5' 10" (1 778 m), weight 115 kg (254 lb)  Physical Exam    Neurological Exam  GENERAL:  Well-developed well-nourished man in no acute distress  HEENT/NECK: Head is atraumatic normocephalic, neck is supple  NEUROLOGIC:  Mental Status: Awake and alert without aphasia  He scored a 25+ 1/30 on [place]  Cranial Nerves: Extraocular movements are full  Face is symmetrical  Coordination:  Ambulates with a slight steppage pattern on the left            ROS:    Review of Systems   Constitutional: Positive for fatigue  Negative for appetite change and fever  HENT: Positive for hearing loss and tinnitus   Negative for drooling, trouble swallowing and voice change  Eyes: Negative  Negative for photophobia, pain and visual disturbance  Respiratory: Negative  Negative for shortness of breath  Cardiovascular: Positive for leg swelling  Negative for palpitations  Gastrointestinal: Positive for constipation  Negative for diarrhea, nausea and vomiting  Endocrine: Negative  Negative for cold intolerance and heat intolerance  Genitourinary: Positive for urgency  Negative for dysuria and frequency  Musculoskeletal: Positive for back pain, gait problem and myalgias  Negative for neck pain  Skin: Negative  Negative for rash  Neurological: Positive for tremors, weakness and numbness  Negative for dizziness, seizures, syncope, facial asymmetry, speech difficulty, light-headedness and headaches  Hematological: Negative  Does not bruise/bleed easily  Psychiatric/Behavioral: Positive for confusion and decreased concentration  Negative for hallucinations and sleep disturbance  The patient is not nervous/anxious

## 2020-05-06 ENCOUNTER — TELEPHONE (OUTPATIENT)
Dept: RHEUMATOLOGY | Facility: CLINIC | Age: 70
End: 2020-05-06

## 2020-05-06 ENCOUNTER — TELEPHONE (OUTPATIENT)
Dept: OBGYN CLINIC | Facility: HOSPITAL | Age: 70
End: 2020-05-06

## 2020-05-12 ENCOUNTER — TELEMEDICINE (OUTPATIENT)
Dept: RHEUMATOLOGY | Facility: CLINIC | Age: 70
End: 2020-05-12
Payer: COMMERCIAL

## 2020-05-12 DIAGNOSIS — M25.541 ARTHRALGIA OF BOTH HANDS: ICD-10-CM

## 2020-05-12 DIAGNOSIS — M15.0 PRIMARY GENERALIZED (OSTEO)ARTHRITIS: ICD-10-CM

## 2020-05-12 DIAGNOSIS — M06.9 RHEUMATOID ARTHRITIS INVOLVING MULTIPLE SITES, UNSPECIFIED RHEUMATOID FACTOR PRESENCE: Primary | ICD-10-CM

## 2020-05-12 DIAGNOSIS — E13.42 POLYNEUROPATHY DUE TO SECONDARY DIABETES (HCC): ICD-10-CM

## 2020-05-12 DIAGNOSIS — M25.542 ARTHRALGIA OF BOTH HANDS: ICD-10-CM

## 2020-05-12 PROCEDURE — 99214 OFFICE O/P EST MOD 30 MIN: CPT | Performed by: INTERNAL MEDICINE

## 2020-05-12 RX ORDER — HYDROXYCHLOROQUINE SULFATE 200 MG/1
TABLET, FILM COATED ORAL
Qty: 180 TABLET | Refills: 1 | Status: SHIPPED | OUTPATIENT
Start: 2020-05-12 | End: 2020-05-30

## 2020-05-14 ENCOUNTER — TELEPHONE (OUTPATIENT)
Dept: NEUROLOGY | Facility: CLINIC | Age: 70
End: 2020-05-14

## 2020-05-15 DIAGNOSIS — E13.42 POLYNEUROPATHY DUE TO SECONDARY DIABETES (HCC): Primary | ICD-10-CM

## 2020-05-22 ENCOUNTER — EVALUATION (OUTPATIENT)
Dept: PHYSICAL THERAPY | Age: 70
End: 2020-05-22
Payer: COMMERCIAL

## 2020-05-22 DIAGNOSIS — E13.42 POLYNEUROPATHY DUE TO SECONDARY DIABETES (HCC): ICD-10-CM

## 2020-05-22 PROCEDURE — 97113 AQUATIC THERAPY/EXERCISES: CPT | Performed by: PHYSICAL THERAPIST

## 2020-05-22 PROCEDURE — 97162 PT EVAL MOD COMPLEX 30 MIN: CPT | Performed by: PHYSICAL THERAPIST

## 2020-05-28 ENCOUNTER — APPOINTMENT (OUTPATIENT)
Dept: PHYSICAL THERAPY | Age: 70
End: 2020-05-28
Payer: COMMERCIAL

## 2020-05-29 ENCOUNTER — OFFICE VISIT (OUTPATIENT)
Dept: PHYSICAL THERAPY | Age: 70
End: 2020-05-29
Payer: COMMERCIAL

## 2020-05-29 DIAGNOSIS — E13.42 POLYNEUROPATHY DUE TO SECONDARY DIABETES (HCC): Primary | ICD-10-CM

## 2020-05-29 PROCEDURE — 97113 AQUATIC THERAPY/EXERCISES: CPT

## 2020-05-30 DIAGNOSIS — M06.9 RHEUMATOID ARTHRITIS INVOLVING MULTIPLE SITES, UNSPECIFIED RHEUMATOID FACTOR PRESENCE: ICD-10-CM

## 2020-05-30 RX ORDER — HYDROXYCHLOROQUINE SULFATE 200 MG/1
TABLET, FILM COATED ORAL
Qty: 180 TABLET | Refills: 1 | Status: SHIPPED | OUTPATIENT
Start: 2020-05-30 | End: 2020-10-20

## 2020-06-03 ENCOUNTER — OFFICE VISIT (OUTPATIENT)
Dept: PHYSICAL THERAPY | Age: 70
End: 2020-06-03
Payer: COMMERCIAL

## 2020-06-03 DIAGNOSIS — E13.42 POLYNEUROPATHY DUE TO SECONDARY DIABETES (HCC): Primary | ICD-10-CM

## 2020-06-03 PROCEDURE — 97113 AQUATIC THERAPY/EXERCISES: CPT | Performed by: PHYSICAL THERAPIST

## 2020-06-05 ENCOUNTER — OFFICE VISIT (OUTPATIENT)
Dept: PHYSICAL THERAPY | Age: 70
End: 2020-06-05
Payer: COMMERCIAL

## 2020-06-05 DIAGNOSIS — E13.42 POLYNEUROPATHY DUE TO SECONDARY DIABETES (HCC): Primary | ICD-10-CM

## 2020-06-05 PROCEDURE — 97113 AQUATIC THERAPY/EXERCISES: CPT

## 2020-06-09 ENCOUNTER — OFFICE VISIT (OUTPATIENT)
Dept: PHYSICAL THERAPY | Age: 70
End: 2020-06-09
Payer: COMMERCIAL

## 2020-06-09 DIAGNOSIS — E13.42 POLYNEUROPATHY DUE TO SECONDARY DIABETES (HCC): Primary | ICD-10-CM

## 2020-06-09 PROCEDURE — 97113 AQUATIC THERAPY/EXERCISES: CPT | Performed by: PHYSICAL THERAPIST

## 2020-06-12 ENCOUNTER — APPOINTMENT (OUTPATIENT)
Dept: PHYSICAL THERAPY | Age: 70
End: 2020-06-12
Payer: COMMERCIAL

## 2020-06-17 ENCOUNTER — OFFICE VISIT (OUTPATIENT)
Dept: PHYSICAL THERAPY | Age: 70
End: 2020-06-17
Payer: COMMERCIAL

## 2020-06-17 DIAGNOSIS — E13.42 POLYNEUROPATHY DUE TO SECONDARY DIABETES (HCC): Primary | ICD-10-CM

## 2020-06-17 PROCEDURE — 97113 AQUATIC THERAPY/EXERCISES: CPT

## 2020-06-19 ENCOUNTER — APPOINTMENT (OUTPATIENT)
Dept: PHYSICAL THERAPY | Age: 70
End: 2020-06-19
Payer: COMMERCIAL

## 2020-06-24 ENCOUNTER — OFFICE VISIT (OUTPATIENT)
Dept: PHYSICAL THERAPY | Age: 70
End: 2020-06-24
Payer: COMMERCIAL

## 2020-06-24 DIAGNOSIS — E13.42 POLYNEUROPATHY DUE TO SECONDARY DIABETES (HCC): Primary | ICD-10-CM

## 2020-06-24 PROCEDURE — 97113 AQUATIC THERAPY/EXERCISES: CPT

## 2020-06-26 ENCOUNTER — OFFICE VISIT (OUTPATIENT)
Dept: PHYSICAL THERAPY | Age: 70
End: 2020-06-26
Payer: COMMERCIAL

## 2020-06-26 DIAGNOSIS — E13.42 POLYNEUROPATHY DUE TO SECONDARY DIABETES (HCC): Primary | ICD-10-CM

## 2020-06-26 PROCEDURE — 97113 AQUATIC THERAPY/EXERCISES: CPT | Performed by: PHYSICAL THERAPIST

## 2020-06-30 ENCOUNTER — APPOINTMENT (OUTPATIENT)
Dept: PHYSICAL THERAPY | Age: 70
End: 2020-06-30
Payer: COMMERCIAL

## 2020-07-28 ENCOUNTER — TELEPHONE (OUTPATIENT)
Dept: NEUROLOGY | Facility: CLINIC | Age: 70
End: 2020-07-28

## 2020-07-30 ENCOUNTER — APPOINTMENT (OUTPATIENT)
Dept: LAB | Facility: CLINIC | Age: 70
End: 2020-07-30
Payer: COMMERCIAL

## 2020-07-30 DIAGNOSIS — R41.3 MEMORY DIFFICULTY: ICD-10-CM

## 2020-07-30 LAB
FOLATE SERPL-MCNC: 19.8 NG/ML (ref 3.1–17.5)
VIT B12 SERPL-MCNC: 489 PG/ML (ref 100–900)

## 2020-07-30 PROCEDURE — 86592 SYPHILIS TEST NON-TREP QUAL: CPT

## 2020-07-30 PROCEDURE — 86618 LYME DISEASE ANTIBODY: CPT

## 2020-07-30 PROCEDURE — 82746 ASSAY OF FOLIC ACID SERUM: CPT

## 2020-07-30 PROCEDURE — 36415 COLL VENOUS BLD VENIPUNCTURE: CPT

## 2020-07-30 PROCEDURE — 82607 VITAMIN B-12: CPT

## 2020-07-31 LAB
B BURGDOR IGG+IGM SER-ACNC: <0.91 ISR (ref 0–0.9)
RPR SER QL: NORMAL

## 2020-08-10 ENCOUNTER — OFFICE VISIT (OUTPATIENT)
Dept: NEUROLOGY | Facility: CLINIC | Age: 70
End: 2020-08-10
Payer: COMMERCIAL

## 2020-08-10 VITALS
BODY MASS INDEX: 36.79 KG/M2 | DIASTOLIC BLOOD PRESSURE: 80 MMHG | SYSTOLIC BLOOD PRESSURE: 178 MMHG | HEART RATE: 62 BPM | WEIGHT: 257 LBS | HEIGHT: 70 IN

## 2020-08-10 DIAGNOSIS — E11.44 DIABETIC AMYOTROPHY ASSOCIATED WITH TYPE 2 DIABETES MELLITUS (HCC): ICD-10-CM

## 2020-08-10 DIAGNOSIS — G20 PARKINSON'S DISEASE (HCC): ICD-10-CM

## 2020-08-10 DIAGNOSIS — R25.1 TREMOR: ICD-10-CM

## 2020-08-10 DIAGNOSIS — R41.3 MEMORY DIFFICULTY: ICD-10-CM

## 2020-08-10 DIAGNOSIS — E13.42 POLYNEUROPATHY DUE TO SECONDARY DIABETES (HCC): Primary | ICD-10-CM

## 2020-08-10 PROCEDURE — 99214 OFFICE O/P EST MOD 30 MIN: CPT | Performed by: PSYCHIATRY & NEUROLOGY

## 2020-08-10 RX ORDER — DONEPEZIL HYDROCHLORIDE 10 MG/1
TABLET, FILM COATED ORAL
Qty: 30 TABLET | Refills: 5 | Status: SHIPPED | OUTPATIENT
Start: 2020-08-10 | End: 2021-09-27 | Stop reason: SDUPTHER

## 2020-08-10 RX ORDER — DONEPEZIL HYDROCHLORIDE 5 MG/1
1 TABLET, FILM COATED ORAL DAILY
COMMUNITY
Start: 2020-07-06 | End: 2020-08-10 | Stop reason: SDUPTHER

## 2020-08-10 NOTE — PROGRESS NOTES
Cachorro Valencia is a 79 y o  male returns in follow-up today with history of neuropathy, Parkinson's disease and memory difficulty    Assessment:  1  Polyne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 ([facility] [address] )    2  Parkinson's disease ([facility] )    3  Diabetic amyotrophy associated with type 2 diabetes mellitus ([facility] )    4  Memory difficulty        Plan:  Continue gabapentin and Trileptal  Increase Sinemet to q i d  Increase Aricept to 10 mg at bedtime  Follow-up 6 months    Discussion:  Alfredo Began reports that his symptoms of neuropathic pain remain under good control with combination of gabapentin and Trileptal   He notes no adverse effects from medication  He has been taking the Sinemet and continues to note tremor  He will try increasing the dose to 4 times daily  He continues to complain of memory difficulty  His blood work was normal   He does not want to get the MRI done  His primary physician started him on Aricept at 5 mg daily and he has been tolerating this well  Will increase to 10 mg and I will see him back in follow-up in 6 months      Subjective:    OSWALDO Fuentes Began returns in follow-up today  He reports that since here last his neuropathic pain symptoms remain under good control  He is taking the gabapentin 3 times daily and typically takes the Trileptal 600 mg at bedtime  On occasion he takes 300 mg in the morning  He has been taking his Sinemet 3 times daily but still notes tremor  He continues to report issues with memory  His blood work was normal   He did not have the MRI done and does not want to do it  His primary physician is started him on Aricept 5 mg daily any tolerates this well    He recently had cellulitis in his left leg      Past Medical History:   Diagnosis Date    Arthritis     Hyperlipidemia     Hypertension     Poor circulation     Sleep apnea     Type 2 diabetes mellitus ([facility] )        Family History:  Family History   Problem Relation Age of Onset    Diabetes type II Mother    Cara Hypertension Mother     Breast cancer Sister     Lung cancer Sister     Multiple endocrine neoplasia Brother     Breast cancer Sister        Past Surgical History:  Past Surgical History:   Procedure Laterality Date    BACK SURGERY      CORONARY ARTERY BYPASS GRAFT  07/22/2017    HEMORROIDECTOMY      TONSILLECTOMY      WRIST SURGERY         Social History:   reports that he has never smoked  He has never used smokeless tobacco  He reports current alcohol use  He reports that he does not use drugs  Allergies:  Penicillins      Current Outpatient Medications:     amLODIPine (NORVASC) 10 mg tablet, Take 1 tablet by mouth daily, Disp: , Rfl:     aspirin 81 MG tablet, Take 1 tablet by mouth daily, Disp: , Rfl:     Blood Glucose Monitoring Suppl (ACURA BLOOD GLUCOSE METER) w/Device KIT, by Other route   Use as instructed, Disp: , Rfl:     carbidopa-levodopa (SINEMET)  mg per tablet, 1/2 to 1 3 times daily, Disp: 90 tablet, Rfl: 5    donepezil (ARICEPT) 5 mg tablet, Take 1 tablet by mouth daily, Disp: , Rfl:     furosemide (LASIX) 20 mg tablet, Take 1 tablet by mouth daily as needed, Disp: , Rfl:     gabapentin (NEURONTIN) 600 MG tablet, Take 1 tablet (600 mg total) by mouth 4 (four) times a day (Patient taking differently: Take 600 mg by mouth 3 (three) times a day ), Disp: 360 tablet, Rfl: 3    glipiZIDE (GLUCOTROL) 10 mg tablet, Take 10 mg by mouth 2 (two) times a day, Disp: , Rfl:     hydrochlorothiazide (HYDRODIURIL) 25 mg tablet, Take 1 tablet by mouth daily, Disp: , Rfl:     hydroxychloroquine (PLAQUENIL) 200 mg tablet, TWO TABLETS BY MOUTH DAILY, Disp: 180 tablet, Rfl: 1    insulin glargine (LANTUS SOLOSTAR) 100 units/mL injection pen, Inject 40 Units under the skin daily at bedtime , Disp: , Rfl:     irbesartan (AVAPRO) 300 mg tablet, Take 300 mg by mouth daily  , Disp: , Rfl:     metoprolol succinate (TOPROL-XL) 100 mg 24 hr tablet, Take 100 mg by mouth daily, Disp: , Rfl:    OXcarbazepine (TRILEPTAL) 300 mg tablet, 1/2 p o  Q 12 hours with 600 mg dose (Patient taking differently: Take 300 mg by mouth daily as needed ), Disp: 30 tablet, Rfl: 5    OXcarbazepine (TRILEPTAL) 600 mg tablet, Take 1 tablet by mouth daily at bedtime, Disp: , Rfl:     TRUEPLUS PEN NEEDLES 32G X 4 MM MISC, , Disp: , Rfl:     LORazepam (ATIVAN) 1 mg tablet, Two p o  1 hour before MRI, may repeat 1 after 1 hour p r n  (Patient not taking: Reported on 8/10/2020), Disp: 3 tablet, Rfl: 0    OXcarbazepine (TRILEPTAL) 600 mg tablet, Take 1 tablet (600 mg total) by mouth daily at bedtime for 90 days, Disp: 90 tablet, Rfl: 5    [  ]     Objective:    Vitals:  Blood pressure (!) 178/80, pulse 62, height 5' 10" (1 778 m), weight 117 kg (257 lb)  Physical Exam    Neurological Exam  GENERAL:  Well-developed well-nourished man in no acute distress  HEENT/NECK: Head is atraumatic normocephalic, neck is supple  NEUROLOGIC:  Mental Status: Awake and alert without aphasia  He scored a 24+ 1/30 on [place]  Cranial Nerves: Extraocular movements are full  Face is symmetrical  Motor:  4-6 hertz rest tremors noted  Coordination:  Gait is stable with a straight cane            ROS:    Review of Systems   Constitutional: Positive for fatigue  Negative for appetite change and fever  HENT: Positive for hearing loss and tinnitus  Negative for trouble swallowing and voice change  Eyes: Negative  Negative for photophobia and pain  Respiratory: Negative  Negative for shortness of breath  Cardiovascular: Negative  Negative for palpitations  Gastrointestinal: Negative  Negative for nausea and vomiting  Endocrine: Negative  Negative for cold intolerance  Genitourinary: Positive for urgency  Negative for dysuria and frequency  Musculoskeletal: Positive for arthralgias, back pain and gait problem  Negative for myalgias and neck pain  Skin: Negative  Negative for rash     Neurological: Positive for tremors, weakness and numbness (fingers, lower legs and feet)  Negative for dizziness, seizures, syncope, facial asymmetry, speech difficulty, light-headedness and headaches  Hematological: Negative  Does not bruise/bleed easily  Psychiatric/Behavioral: Positive for confusion  Negative for hallucinations and sleep disturbance

## 2020-09-30 DIAGNOSIS — E13.42 POLYNEUROPATHY DUE TO SECONDARY DIABETES (HCC): ICD-10-CM

## 2020-09-30 RX ORDER — OXCARBAZEPINE 600 MG/1
TABLET, FILM COATED ORAL
Qty: 90 TABLET | Refills: 3 | Status: SHIPPED | OUTPATIENT
Start: 2020-09-30 | End: 2021-03-18 | Stop reason: HOSPADM

## 2020-10-20 DIAGNOSIS — M06.9 RHEUMATOID ARTHRITIS INVOLVING MULTIPLE SITES (HCC): ICD-10-CM

## 2020-10-20 RX ORDER — HYDROXYCHLOROQUINE SULFATE 200 MG/1
TABLET, FILM COATED ORAL
Qty: 180 TABLET | Refills: 4 | Status: SHIPPED | OUTPATIENT
Start: 2020-10-20 | End: 2020-12-20

## 2020-12-20 DIAGNOSIS — M06.9 RHEUMATOID ARTHRITIS INVOLVING MULTIPLE SITES (HCC): ICD-10-CM

## 2020-12-20 RX ORDER — HYDROXYCHLOROQUINE SULFATE 200 MG/1
TABLET, FILM COATED ORAL
Qty: 180 TABLET | Refills: 1 | Status: SHIPPED | OUTPATIENT
Start: 2020-12-20 | End: 2022-08-03

## 2021-01-27 ENCOUNTER — IMMUNIZATIONS (OUTPATIENT)
Dept: FAMILY MEDICINE CLINIC | Facility: HOSPITAL | Age: 71
End: 2021-01-27

## 2021-01-27 DIAGNOSIS — Z23 ENCOUNTER FOR IMMUNIZATION: Primary | ICD-10-CM

## 2021-01-27 PROCEDURE — 91301 SARS-COV-2 / COVID-19 MRNA VACCINE (MODERNA) 100 MCG: CPT

## 2021-01-27 PROCEDURE — 0011A SARS-COV-2 / COVID-19 MRNA VACCINE (MODERNA) 100 MCG: CPT

## 2021-02-24 ENCOUNTER — IMMUNIZATIONS (OUTPATIENT)
Dept: FAMILY MEDICINE CLINIC | Facility: HOSPITAL | Age: 71
End: 2021-02-24

## 2021-02-24 DIAGNOSIS — Z23 ENCOUNTER FOR IMMUNIZATION: Primary | ICD-10-CM

## 2021-02-24 PROCEDURE — 0012A SARS-COV-2 / COVID-19 MRNA VACCINE (MODERNA) 100 MCG: CPT

## 2021-02-24 PROCEDURE — 91301 SARS-COV-2 / COVID-19 MRNA VACCINE (MODERNA) 100 MCG: CPT

## 2021-03-15 ENCOUNTER — APPOINTMENT (EMERGENCY)
Dept: CT IMAGING | Facility: HOSPITAL | Age: 71
DRG: 872 | End: 2021-03-15
Payer: COMMERCIAL

## 2021-03-15 ENCOUNTER — APPOINTMENT (EMERGENCY)
Dept: RADIOLOGY | Facility: HOSPITAL | Age: 71
DRG: 872 | End: 2021-03-15
Payer: COMMERCIAL

## 2021-03-15 ENCOUNTER — HOSPITAL ENCOUNTER (INPATIENT)
Facility: HOSPITAL | Age: 71
LOS: 3 days | Discharge: HOME | DRG: 872 | End: 2021-03-18
Attending: EMERGENCY MEDICINE | Admitting: STUDENT IN AN ORGANIZED HEALTH CARE EDUCATION/TRAINING PROGRAM
Payer: COMMERCIAL

## 2021-03-15 DIAGNOSIS — N30.90 CYSTITIS: ICD-10-CM

## 2021-03-15 DIAGNOSIS — A41.9 SEPSIS (HCC): Primary | ICD-10-CM

## 2021-03-15 PROBLEM — I50.9 CHF (CONGESTIVE HEART FAILURE) (HCC): Status: ACTIVE | Noted: 2021-03-15

## 2021-03-15 PROBLEM — N17.9 ACUTE KIDNEY INJURY SUPERIMPOSED ON CHRONIC KIDNEY DISEASE (HCC): Status: ACTIVE | Noted: 2021-03-15

## 2021-03-15 PROBLEM — I10 HYPERTENSION: Status: ACTIVE | Noted: 2021-03-15

## 2021-03-15 PROBLEM — N18.9 ACUTE KIDNEY INJURY SUPERIMPOSED ON CHRONIC KIDNEY DISEASE (HCC): Status: ACTIVE | Noted: 2021-03-15

## 2021-03-15 PROBLEM — R65.20 SEVERE SEPSIS (HCC): Status: ACTIVE | Noted: 2021-03-15

## 2021-03-15 PROBLEM — R10.9 ABDOMINAL PAIN: Status: ACTIVE | Noted: 2021-03-15

## 2021-03-15 PROBLEM — E11.9 DIABETES (HCC): Status: ACTIVE | Noted: 2021-03-15

## 2021-03-15 PROBLEM — R93.5 ABNORMAL CT OF THE ABDOMEN: Status: ACTIVE | Noted: 2021-03-15

## 2021-03-15 LAB
ALBUMIN SERPL BCP-MCNC: 4 G/DL (ref 3.5–5)
ALP SERPL-CCNC: 93 U/L (ref 46–116)
ALT SERPL W P-5'-P-CCNC: 42 U/L (ref 12–78)
ANION GAP SERPL CALCULATED.3IONS-SCNC: 11 MMOL/L (ref 4–13)
APTT PPP: 23 SECONDS (ref 23–37)
AST SERPL W P-5'-P-CCNC: 24 U/L (ref 5–45)
ATRIAL RATE: 100 BPM
BACTERIA UR QL AUTO: NORMAL /HPF
BASE EX.OXY STD BLDV CALC-SCNC: 76.3 % (ref 60–80)
BASE EXCESS BLDV CALC-SCNC: -2.5 MMOL/L
BASOPHILS # BLD AUTO: 0.03 THOUSANDS/ΜL (ref 0–0.1)
BASOPHILS NFR BLD AUTO: 0 % (ref 0–1)
BILIRUB SERPL-MCNC: 0.94 MG/DL (ref 0.2–1)
BILIRUB UR QL STRIP: NEGATIVE
BUN SERPL-MCNC: 23 MG/DL (ref 5–25)
CALCIUM SERPL-MCNC: 9.7 MG/DL (ref 8.3–10.1)
CHLORIDE SERPL-SCNC: 102 MMOL/L (ref 100–108)
CLARITY UR: CLEAR
CO2 SERPL-SCNC: 26 MMOL/L (ref 21–32)
COLOR UR: YELLOW
CREAT SERPL-MCNC: 1.3 MG/DL (ref 0.6–1.3)
EOSINOPHIL # BLD AUTO: 0.01 THOUSAND/ΜL (ref 0–0.61)
EOSINOPHIL NFR BLD AUTO: 0 % (ref 0–6)
ERYTHROCYTE [DISTWIDTH] IN BLOOD BY AUTOMATED COUNT: 13.2 % (ref 11.6–15.1)
EST. AVERAGE GLUCOSE BLD GHB EST-MCNC: 134 MG/DL
FLUAV RNA RESP QL NAA+PROBE: NEGATIVE
FLUBV RNA RESP QL NAA+PROBE: NEGATIVE
GFR SERPL CREATININE-BSD FRML MDRD: 55 ML/MIN/1.73SQ M
GLUCOSE SERPL-MCNC: 105 MG/DL (ref 65–140)
GLUCOSE SERPL-MCNC: 118 MG/DL (ref 65–140)
GLUCOSE SERPL-MCNC: 214 MG/DL (ref 65–140)
GLUCOSE SERPL-MCNC: 232 MG/DL (ref 65–140)
GLUCOSE UR STRIP-MCNC: ABNORMAL MG/DL
HBA1C MFR BLD: 6.3 %
HCO3 BLDV-SCNC: 22 MMOL/L (ref 24–30)
HCT VFR BLD AUTO: 37.2 % (ref 36.5–49.3)
HGB BLD-MCNC: 13.3 G/DL (ref 12–17)
HGB UR QL STRIP.AUTO: ABNORMAL
IMM GRANULOCYTES # BLD AUTO: 0.15 THOUSAND/UL (ref 0–0.2)
IMM GRANULOCYTES NFR BLD AUTO: 1 % (ref 0–2)
INR PPP: 1.09 (ref 0.84–1.19)
KETONES UR STRIP-MCNC: ABNORMAL MG/DL
LACTATE SERPL-SCNC: 2 MMOL/L (ref 0.5–2)
LACTATE SERPL-SCNC: 2 MMOL/L (ref 0.5–2)
LACTATE SERPL-SCNC: 2.3 MMOL/L (ref 0.5–2)
LEUKOCYTE ESTERASE UR QL STRIP: ABNORMAL
LIPASE SERPL-CCNC: 187 U/L (ref 73–393)
LYMPHOCYTES # BLD AUTO: 0.51 THOUSANDS/ΜL (ref 0.6–4.47)
LYMPHOCYTES NFR BLD AUTO: 4 % (ref 14–44)
MCH RBC QN AUTO: 35 PG (ref 26.8–34.3)
MCHC RBC AUTO-ENTMCNC: 35.8 G/DL (ref 31.4–37.4)
MCV RBC AUTO: 98 FL (ref 82–98)
MONOCYTES # BLD AUTO: 0.97 THOUSAND/ΜL (ref 0.17–1.22)
MONOCYTES NFR BLD AUTO: 7 % (ref 4–12)
NEUTROPHILS # BLD AUTO: 12.09 THOUSANDS/ΜL (ref 1.85–7.62)
NEUTS SEG NFR BLD AUTO: 88 % (ref 43–75)
NITRITE UR QL STRIP: NEGATIVE
NON-SQ EPI CELLS URNS QL MICRO: NORMAL /HPF
NRBC BLD AUTO-RTO: 0 /100 WBCS
O2 CT BLDV-SCNC: 15.3 ML/DL
P AXIS: 51 DEGREES
PCO2 BLDV: 37.5 MM HG (ref 42–50)
PH BLDV: 7.39 [PH] (ref 7.3–7.4)
PH UR STRIP.AUTO: 5.5 [PH]
PLATELET # BLD AUTO: 105 THOUSANDS/UL (ref 149–390)
PMV BLD AUTO: 9.4 FL (ref 8.9–12.7)
PO2 BLDV: 41.7 MM HG (ref 35–45)
POTASSIUM SERPL-SCNC: 4 MMOL/L (ref 3.5–5.3)
PR INTERVAL: 186 MS
PROCALCITONIN SERPL-MCNC: 0.12 NG/ML
PROT SERPL-MCNC: 7.4 G/DL (ref 6.4–8.2)
PROT UR STRIP-MCNC: ABNORMAL MG/DL
PROTHROMBIN TIME: 13.6 SECONDS (ref 11.6–14.5)
QRS AXIS: 237 DEGREES
QRSD INTERVAL: 142 MS
QT INTERVAL: 396 MS
QTC INTERVAL: 510 MS
RBC # BLD AUTO: 3.8 MILLION/UL (ref 3.88–5.62)
RBC #/AREA URNS AUTO: NORMAL /HPF
RSV RNA RESP QL NAA+PROBE: NEGATIVE
SARS-COV-2 RNA RESP QL NAA+PROBE: NEGATIVE
SODIUM SERPL-SCNC: 139 MMOL/L (ref 136–145)
SP GR UR STRIP.AUTO: 1.02 (ref 1–1.03)
T WAVE AXIS: 71 DEGREES
TROPONIN I SERPL-MCNC: <0.02 NG/ML
UROBILINOGEN UR QL STRIP.AUTO: 0.2 E.U./DL
VENTRICULAR RATE: 100 BPM
WBC # BLD AUTO: 13.76 THOUSAND/UL (ref 4.31–10.16)
WBC #/AREA URNS AUTO: NORMAL /HPF

## 2021-03-15 PROCEDURE — 71045 X-RAY EXAM CHEST 1 VIEW: CPT

## 2021-03-15 PROCEDURE — 87086 URINE CULTURE/COLONY COUNT: CPT | Performed by: EMERGENCY MEDICINE

## 2021-03-15 PROCEDURE — 85610 PROTHROMBIN TIME: CPT | Performed by: EMERGENCY MEDICINE

## 2021-03-15 PROCEDURE — 99285 EMERGENCY DEPT VISIT HI MDM: CPT | Performed by: EMERGENCY MEDICINE

## 2021-03-15 PROCEDURE — 80053 COMPREHEN METABOLIC PANEL: CPT | Performed by: EMERGENCY MEDICINE

## 2021-03-15 PROCEDURE — 81001 URINALYSIS AUTO W/SCOPE: CPT | Performed by: EMERGENCY MEDICINE

## 2021-03-15 PROCEDURE — 84484 ASSAY OF TROPONIN QUANT: CPT | Performed by: EMERGENCY MEDICINE

## 2021-03-15 PROCEDURE — 96361 HYDRATE IV INFUSION ADD-ON: CPT

## 2021-03-15 PROCEDURE — 84145 PROCALCITONIN (PCT): CPT | Performed by: EMERGENCY MEDICINE

## 2021-03-15 PROCEDURE — 93010 ELECTROCARDIOGRAM REPORT: CPT | Performed by: INTERNAL MEDICINE

## 2021-03-15 PROCEDURE — 82805 BLOOD GASES W/O2 SATURATION: CPT | Performed by: EMERGENCY MEDICINE

## 2021-03-15 PROCEDURE — 0241U HB NFCT DS VIR RESP RNA 4 TRGT: CPT | Performed by: EMERGENCY MEDICINE

## 2021-03-15 PROCEDURE — 74177 CT ABD & PELVIS W/CONTRAST: CPT

## 2021-03-15 PROCEDURE — 71260 CT THORAX DX C+: CPT

## 2021-03-15 PROCEDURE — 83036 HEMOGLOBIN GLYCOSYLATED A1C: CPT | Performed by: STUDENT IN AN ORGANIZED HEALTH CARE EDUCATION/TRAINING PROGRAM

## 2021-03-15 PROCEDURE — 96365 THER/PROPH/DIAG IV INF INIT: CPT

## 2021-03-15 PROCEDURE — 36415 COLL VENOUS BLD VENIPUNCTURE: CPT | Performed by: EMERGENCY MEDICINE

## 2021-03-15 PROCEDURE — 99223 1ST HOSP IP/OBS HIGH 75: CPT | Performed by: STUDENT IN AN ORGANIZED HEALTH CARE EDUCATION/TRAINING PROGRAM

## 2021-03-15 PROCEDURE — 85025 COMPLETE CBC W/AUTO DIFF WBC: CPT | Performed by: EMERGENCY MEDICINE

## 2021-03-15 PROCEDURE — 82948 REAGENT STRIP/BLOOD GLUCOSE: CPT

## 2021-03-15 PROCEDURE — 99285 EMERGENCY DEPT VISIT HI MDM: CPT

## 2021-03-15 PROCEDURE — 83605 ASSAY OF LACTIC ACID: CPT | Performed by: EMERGENCY MEDICINE

## 2021-03-15 PROCEDURE — 83605 ASSAY OF LACTIC ACID: CPT | Performed by: PHYSICIAN ASSISTANT

## 2021-03-15 PROCEDURE — 87040 BLOOD CULTURE FOR BACTERIA: CPT | Performed by: EMERGENCY MEDICINE

## 2021-03-15 PROCEDURE — 93005 ELECTROCARDIOGRAM TRACING: CPT

## 2021-03-15 PROCEDURE — 85730 THROMBOPLASTIN TIME PARTIAL: CPT | Performed by: EMERGENCY MEDICINE

## 2021-03-15 PROCEDURE — 83690 ASSAY OF LIPASE: CPT | Performed by: STUDENT IN AN ORGANIZED HEALTH CARE EDUCATION/TRAINING PROGRAM

## 2021-03-15 RX ORDER — ACETAMINOPHEN 325 MG/1
975 TABLET ORAL ONCE
Status: COMPLETED | OUTPATIENT
Start: 2021-03-15 | End: 2021-03-15

## 2021-03-15 RX ORDER — ONDANSETRON 2 MG/ML
4 INJECTION INTRAMUSCULAR; INTRAVENOUS EVERY 6 HOURS PRN
Status: DISCONTINUED | OUTPATIENT
Start: 2021-03-15 | End: 2021-03-18 | Stop reason: HOSPADM

## 2021-03-15 RX ORDER — METOPROLOL SUCCINATE 100 MG/1
100 TABLET, EXTENDED RELEASE ORAL DAILY
Status: DISCONTINUED | OUTPATIENT
Start: 2021-03-15 | End: 2021-03-18 | Stop reason: HOSPADM

## 2021-03-15 RX ORDER — CALCIUM CARBONATE 200(500)MG
1000 TABLET,CHEWABLE ORAL DAILY PRN
Status: DISCONTINUED | OUTPATIENT
Start: 2021-03-15 | End: 2021-03-18 | Stop reason: HOSPADM

## 2021-03-15 RX ORDER — DONEPEZIL HYDROCHLORIDE 5 MG/1
10 TABLET, FILM COATED ORAL
Status: DISCONTINUED | OUTPATIENT
Start: 2021-03-15 | End: 2021-03-18 | Stop reason: HOSPADM

## 2021-03-15 RX ORDER — SENNOSIDES 8.6 MG
1 TABLET ORAL DAILY
Status: DISCONTINUED | OUTPATIENT
Start: 2021-03-15 | End: 2021-03-18 | Stop reason: HOSPADM

## 2021-03-15 RX ORDER — GABAPENTIN 600 MG/1
600 TABLET ORAL 3 TIMES DAILY
COMMUNITY
End: 2021-08-24 | Stop reason: SDUPTHER

## 2021-03-15 RX ORDER — AMLODIPINE BESYLATE 5 MG/1
10 TABLET ORAL DAILY
Status: DISCONTINUED | OUTPATIENT
Start: 2021-03-15 | End: 2021-03-15

## 2021-03-15 RX ORDER — GABAPENTIN 300 MG/1
600 CAPSULE ORAL 3 TIMES DAILY
Status: DISCONTINUED | OUTPATIENT
Start: 2021-03-15 | End: 2021-03-18 | Stop reason: HOSPADM

## 2021-03-15 RX ORDER — SODIUM CHLORIDE 9 MG/ML
50 INJECTION, SOLUTION INTRAVENOUS CONTINUOUS
Status: DISCONTINUED | OUTPATIENT
Start: 2021-03-15 | End: 2021-03-15

## 2021-03-15 RX ORDER — INSULIN GLARGINE 100 [IU]/ML
15 INJECTION, SOLUTION SUBCUTANEOUS
Status: DISCONTINUED | OUTPATIENT
Start: 2021-03-15 | End: 2021-03-17

## 2021-03-15 RX ORDER — ASPIRIN 81 MG/1
81 TABLET, CHEWABLE ORAL DAILY
Status: DISCONTINUED | OUTPATIENT
Start: 2021-03-15 | End: 2021-03-18 | Stop reason: HOSPADM

## 2021-03-15 RX ORDER — DOCUSATE SODIUM 100 MG/1
100 CAPSULE, LIQUID FILLED ORAL 2 TIMES DAILY
Status: DISCONTINUED | OUTPATIENT
Start: 2021-03-15 | End: 2021-03-18 | Stop reason: HOSPADM

## 2021-03-15 RX ORDER — HEPARIN SODIUM 5000 [USP'U]/ML
5000 INJECTION, SOLUTION INTRAVENOUS; SUBCUTANEOUS EVERY 8 HOURS SCHEDULED
Status: DISCONTINUED | OUTPATIENT
Start: 2021-03-15 | End: 2021-03-18 | Stop reason: HOSPADM

## 2021-03-15 RX ORDER — ACETAMINOPHEN 325 MG/1
650 TABLET ORAL EVERY 6 HOURS PRN
Status: DISCONTINUED | OUTPATIENT
Start: 2021-03-15 | End: 2021-03-18 | Stop reason: HOSPADM

## 2021-03-15 RX ORDER — ACETAMINOPHEN 325 MG/1
325 TABLET ORAL ONCE
Status: COMPLETED | OUTPATIENT
Start: 2021-03-15 | End: 2021-03-15

## 2021-03-15 RX ADMIN — DOCUSATE SODIUM 100 MG: 100 CAPSULE, LIQUID FILLED ORAL at 08:04

## 2021-03-15 RX ADMIN — CEFEPIME HYDROCHLORIDE 2000 MG: 2 INJECTION, POWDER, FOR SOLUTION INTRAVENOUS at 10:50

## 2021-03-15 RX ADMIN — GABAPENTIN 600 MG: 300 CAPSULE ORAL at 15:09

## 2021-03-15 RX ADMIN — DOCUSATE SODIUM 100 MG: 100 CAPSULE, LIQUID FILLED ORAL at 17:18

## 2021-03-15 RX ADMIN — SENNOSIDES 8.6 MG: 8.6 TABLET, FILM COATED ORAL at 08:04

## 2021-03-15 RX ADMIN — CEFTRIAXONE 2000 MG: 1 INJECTION, POWDER, FOR SOLUTION INTRAMUSCULAR; INTRAVENOUS at 00:43

## 2021-03-15 RX ADMIN — DONEPEZIL HYDROCHLORIDE 10 MG: 5 TABLET, FILM COATED ORAL at 22:09

## 2021-03-15 RX ADMIN — METOPROLOL SUCCINATE 100 MG: 100 TABLET, EXTENDED RELEASE ORAL at 08:04

## 2021-03-15 RX ADMIN — SODIUM CHLORIDE 1000 ML: 0.9 INJECTION, SOLUTION INTRAVENOUS at 01:10

## 2021-03-15 RX ADMIN — CEFEPIME HYDROCHLORIDE 2000 MG: 2 INJECTION, POWDER, FOR SOLUTION INTRAVENOUS at 22:10

## 2021-03-15 RX ADMIN — ACETAMINOPHEN 975 MG: 325 TABLET, COATED ORAL at 00:55

## 2021-03-15 RX ADMIN — GABAPENTIN 600 MG: 300 CAPSULE ORAL at 22:09

## 2021-03-15 RX ADMIN — ASPIRIN 81 MG: 81 TABLET, CHEWABLE ORAL at 08:04

## 2021-03-15 RX ADMIN — INSULIN LISPRO 1 UNITS: 100 INJECTION, SOLUTION INTRAVENOUS; SUBCUTANEOUS at 12:30

## 2021-03-15 RX ADMIN — HEPARIN SODIUM 5000 UNITS: 5000 INJECTION INTRAVENOUS; SUBCUTANEOUS at 22:09

## 2021-03-15 RX ADMIN — IOHEXOL 100 ML: 350 INJECTION, SOLUTION INTRAVENOUS at 01:35

## 2021-03-15 RX ADMIN — GABAPENTIN 600 MG: 300 CAPSULE ORAL at 08:04

## 2021-03-15 RX ADMIN — ACETAMINOPHEN 325 MG: 325 TABLET, COATED ORAL at 06:11

## 2021-03-15 RX ADMIN — INSULIN GLARGINE 15 UNITS: 100 INJECTION, SOLUTION SUBCUTANEOUS at 22:10

## 2021-03-15 RX ADMIN — SODIUM CHLORIDE 50 ML/HR: 0.9 INJECTION, SOLUTION INTRAVENOUS at 06:12

## 2021-03-15 RX ADMIN — HEPARIN SODIUM 5000 UNITS: 5000 INJECTION INTRAVENOUS; SUBCUTANEOUS at 08:04

## 2021-03-15 RX ADMIN — HEPARIN SODIUM 5000 UNITS: 5000 INJECTION INTRAVENOUS; SUBCUTANEOUS at 14:45

## 2021-03-15 NOTE — ASSESSMENT & PLAN NOTE
Lab Results   Component Value Date    HGBA1C 7 3 (H) 05/20/2020       Recent Labs     03/15/21  0809   POCGLU 105       Blood Sugar Average: Last 72 hrs:  (P) 105   · Start insulin sliding scale, monitor glucose with meals and bedtime  · Recheck A1c, Patient is on 40 units lantus nightly, glucose levels are not severely elevated, start at 15 and titrate up as needed

## 2021-03-15 NOTE — ASSESSMENT & PLAN NOTE
· Thyroid nodule noted  · Pancreatic atrophy with calcifications which could suggest chronic pancreatitis  · Fatty liver    · Follow up with PCP for further management

## 2021-03-15 NOTE — ASSESSMENT & PLAN NOTE
· Lactic acidosis, leukocytosis, febrile with suspected source of infection being cystitis  · Continue with plan outlined in cystitis

## 2021-03-15 NOTE — ED NOTES
Pieter edwards md aware of pt headache  Pt medicated as ordered  Pt also assisted with urinal again and another 200ml urine output  Pt stretcher replaced with hospital bed for comfort until pt has room assignment upstairs        Naomie Shannon RN  03/15/21 6706

## 2021-03-15 NOTE — ASSESSMENT & PLAN NOTE
· CT A/P showing bladder wall thickening concerning for cystitis  · No other active source of infection noted, does have findings listed in abnormal CT A/P plan, but unlikely sources of sepsis  · Continue with IV antibiotics, adjust ceftriaxone to cefepime  · Follow up infectious work up

## 2021-03-15 NOTE — ASSESSMENT & PLAN NOTE
Wt Readings from Last 3 Encounters:   03/15/21 113 kg (250 lb)   08/10/20 117 kg (257 lb)   02/21/20 115 kg (254 lb)     · Does have lower extremity edema which is chronic  · No recent echo on file on epic or care everywhere  · Holding lasix in setting of JANIS suspected to be pre renal  · Monitor weights and intake/output

## 2021-03-15 NOTE — ED NOTES
Pt assisted with urinal  200ml output for a total of 600ml output at this time   Pending dispo     Flex Wilson, TOAN  03/15/21 [id number]

## 2021-03-15 NOTE — ASSESSMENT & PLAN NOTE
Lab Results   Component Value Date    HGBA1C 7 3 (H) 05/20/2020       Recent Labs     03/15/21  0809   POCGLU 105       Blood Sugar Average: Last 72 hrs:  (P) 105   · Follows with neurology as an outpatient

## 2021-03-15 NOTE — ASSESSMENT & PLAN NOTE
· Likely from dyspepsia, patient reports bloating and constipation  · Does drink alcohol 2-3 mixed drinks daily   But does not have epigastric pain or tenderness  · Nausea and vomiting could be in setting of cystitis but given chronic abdominal pain, pancreatitis is still a possibility   · Check lipase  · Continue with pain control   · Consider GI outpatient evaluation

## 2021-03-15 NOTE — ED PROVIDER NOTES
History  Chief Complaint   Patient presents with    Shaking     pt states he started feeling shaky today  also c/o diffuse abd pain and nausea  72-year-old male presents with multiple symptoms  Patient notes a chief complaint feeling shaky today this started a few hours ago  Patient notes progressive fatigue throughout the day to the point that he has had difficulty ambulating  Patient states he has had waxing and waning diffuse abdominal pain for the past few months without etiology identified though it abruptly worsened today  Patient affirms nausea without vomiting  Patient notes frequent urination without hematuria or dysuria  Patient denies any known exposure to others with coronavirus  Patient denies any cough, rhinorrhea, or congestion  Patient denies any rashes  Impression and plan: multiple symptoms with a broad differential   Patient's tremors likely secondary to rigors as he is febrile, highly concerning for sepsis  Patient is on donepezil for early Parkinson's but denies typical tremor  Concerns for mainly intra-abdominal infection the patient does note occasional cough, this is chronic in nature and is not changed today  Patient with urinary frequency and small volume of urination, possibly secondary to prostatomegaly and potential urinary source  Patient does have his coronavirus vaccination, no known exposures  Will obtain septic evaluation, evaluate for potential sources  Treat empirically with broad-spectrum antibiotics  No recent hospitalizations that would warrant extended coverage  Will monitor, reassess, and re-evaluate  Admission for continued monitoring and treatment        History provided by:  Patient  Fatigue  Severity:  Severe  Onset quality:  Gradual  Timing:  Constant  Progression:  Worsening  Relieved by:  Nothing  Worsened by:  Nothing  Associated symptoms: abdominal pain, cough, frequency, nausea and shortness of breath    Associated symptoms: no aphasia, no arthralgias, no chest pain, no diarrhea, no difficulty walking, no drooling, no dysphagia, no dysuria, no falls, no fever, no foul-smelling urine, no headaches, no hematochezia, no loss of consciousness, no melena, no myalgias, no near-syncope, no seizures, no syncope, no urgency, no vision change and no vomiting        Prior to Admission Medications   Prescriptions Last Dose Informant Patient Reported? Taking? Blood Glucose Monitoring Suppl ([employer] BLOOD GLUCOSE METER) w/Device KIT   Yes No   Sig: by Other route  Use as instructed   LORazepam (ATIVAN) 1 mg tablet   No No   Sig: Two p o  1 hour before MRI, may repeat 1 after 1 hour p r n     Patient not taking: Reported on 8/10/2020   OXcarbazepine (TRILEPTAL) 300 mg tablet Not Taking at Unknown time Self No No   Si/2 p o  Q 12 hours with 600 mg dose   Patient not taking: Reported on 3/15/2021   OXcarbazepine (TRILEPTAL) 600 mg tablet 3/14/2021 at Unknown time  Yes Yes   Sig: Take 1 tablet by mouth daily at bedtime   OXcarbazepine (TRILEPTAL) 600 mg tablet Not Taking at Unknown time  No No   Sig: TAKE 1 TABLET DAILY AT BEDTIME   Patient not taking: Reported on 3/15/2021   TRUEPLUS PEN NEEDLES 32G X 4 MM MISC   Yes No   amLODIPine (NORVASC) 10 mg tablet 3/15/2021 at Unknown time  Yes Yes   Sig: Take 1 tablet by mouth daily   aspirin 81 MG tablet 3/14/2021 at Unknown time  Yes Yes   Sig: Take 1 tablet by mouth daily   carbidopa-levodopa (SINEMET)  mg per tablet 3/14/2021 at Unknown time  No Yes   Sig: One p o  Q i d    donepezil (ARICEPT) 10 mg tablet   No No   Sig: One p o  Q h s    furosemide (LASIX) 20 mg tablet Unknown at Unknown time Self Yes No   Sig: Take 1 tablet by mouth daily as needed   gabapentin (NEURONTIN) 600 MG tablet Not Taking at Unknown time  No No   Sig: Take 1 tablet (600 mg total) by mouth 4 (four) times a day   Patient not taking: Reported on 3/15/2021   gabapentin (NEURONTIN) 600 MG tablet 3/14/2021 at Unknown time  Yes Yes   Sig: Take 600 mg by mouth 3 (three) times a day   glipiZIDE (GLUCOTROL) 10 mg tablet 3/14/2021 at Unknown time  Yes Yes   Sig: Take 10 mg by mouth 2 (two) times a day   hydrochlorothiazide (HYDRODIURIL) 25 mg tablet 3/14/2021 at Unknown time  Yes Yes   Sig: Take 1 tablet by mouth daily   hydroxychloroquine (PLAQUENIL) 200 mg tablet 3/14/2021 at Unknown time  No Yes   Sig: TAKE 2 TABLETS DAILY   insulin glargine (LANTUS SOLOSTAR) 100 units/mL injection pen  Self Yes No   Sig: Inject 40 Units under the skin daily at bedtime    irbesartan (AVAPRO) 300 mg tablet 3/14/2021 at Unknown time  Yes Yes   Sig: Take 300 mg by mouth daily     metoprolol succinate (TOPROL-XL) 100 mg 24 hr tablet 3/14/2021 at Unknown time  Yes Yes   Sig: Take 100 mg by mouth daily      Facility-Administered Medications: None       Past Medical History:   Diagnosis Date    Arthritis     Hyperlipidemia     Hypertension     Poor circulation     Sleep apnea     Type 2 diabetes mellitus ([facility] [address] )        Past Surgical History:   Procedure Laterality Date    BACK SURGERY      CORONARY ARTERY BYPASS GRAFT  07/22/2017    HEMORROIDECTOMY      TONSILLECTOMY      WRIST SURGERY         Family History   Problem Relation Age of Onset    Diabetes type II Mother     Hypertension Mother     Breast cancer Sister     Lung cancer Sister     Multiple endocrine neoplasia Brother     Breast cancer Sister      I have reviewed and agree with the history as documented  E-Cigarette/Vaping    E-Cigarette Use Never User      E-Cigarette/Vaping Substances     Social History     Tobacco Use    Smoking status: Never Smoker    Smokeless tobacco: Never Used   Substance Use Topics    Alcohol use: Yes     Frequency: 4 or more times a week     Drinks per session: 1 or 2     Comment: 2 drinks daily    Drug use: No       Review of Systems   Constitutional: Positive for fatigue  Negative for fever  HENT: Negative for drooling      Respiratory: Positive for cough and shortness of breath  Cardiovascular: Negative for chest pain, syncope and near-syncope  Gastrointestinal: Positive for abdominal pain and nausea  Negative for diarrhea, dysphagia, hematochezia, melena and vomiting  Genitourinary: Positive for frequency  Negative for dysuria and urgency  Musculoskeletal: Negative for arthralgias, falls and myalgias  Neurological: Negative for seizures, loss of consciousness and headaches  All other systems reviewed and are negative  Physical Exam  Physical Exam  Vitals signs reviewed  Constitutional:       Appearance: He is obese  HENT:      Head: Normocephalic and atraumatic  Mouth/Throat:      Mouth: Mucous membranes are moist    Eyes:      General: No scleral icterus  Pupils: Pupils are equal, round, and reactive to light  Neck:      Musculoskeletal: Neck supple  Cardiovascular:      Rate and Rhythm: Regular rhythm  Tachycardia present  Pulses: Normal pulses  Pulmonary:      Effort: Pulmonary effort is normal       Breath sounds: Normal breath sounds  Abdominal:      General: There is no distension  Palpations: Abdomen is soft  Tenderness: There is abdominal tenderness  There is no guarding or rebound  Comments: Mainly right sided abdominal pain but patient notes diffuse pain   Musculoskeletal:         General: No deformity  Right lower leg: Edema present  Left lower leg: Edema present  Comments: Trace     Skin:     General: Skin is warm and dry  Neurological:      General: No focal deficit present  Mental Status: He is alert     Psychiatric:         Mood and Affect: Mood normal          Vital Signs  ED Triage Vitals   Temperature Pulse Respirations Blood Pressure SpO2   03/15/21 0018 03/15/21 0018 03/15/21 0018 03/15/21 0018 03/15/21 0018   (!) 101 7 °F (38 7 °C) (!) 108 22 (!) 201/84 96 %      Temp Source Heart Rate Source Patient Position - Orthostatic VS BP Location FiO2 (%)   03/15/21 0018 -- 03/15/21 0018 03/15/21 0018 --   Oral  Lying Right arm       Pain Score       03/15/21 1030       No Pain           Vitals:    03/16/21 1113 03/16/21 1115 03/16/21 1625 03/16/21 2335   BP: 162/87 162/87 164/85 (!) 174/84   Pulse:   (!) 52    Patient Position - Orthostatic VS:             Visual Acuity      ED Medications  Medications   acetaminophen (TYLENOL) tablet 650 mg (650 mg Oral Given 3/16/21 0218)   calcium carbonate (TUMS) chewable tablet 1,000 mg (has no administration in time range)   docusate sodium (COLACE) capsule 100 mg (100 mg Oral Refused 3/16/21 1810)   senna (SENOKOT) tablet 8 6 mg (8 6 mg Oral Given 3/16/21 0903)   ondansetron (ZOFRAN) injection 4 mg (has no administration in time range)   heparin (porcine) subcutaneous injection 5,000 Units (5,000 Units Subcutaneous Given 3/16/21 2203)   aspirin chewable tablet 81 mg (81 mg Oral Given 3/16/21 0904)   donepezil (ARICEPT) tablet 10 mg (10 mg Oral Given 3/16/21 2202)   metoprolol succinate (TOPROL-XL) 24 hr tablet 100 mg (100 mg Oral Given 3/16/21 0904)   gabapentin (NEURONTIN) capsule 600 mg (600 mg Oral Given 3/16/21 2202)   insulin lispro (HumaLOG) 100 units/mL subcutaneous injection 1-5 Units (1 Units Subcutaneous Given 3/16/21 1800)   insulin lispro (HumaLOG) 100 units/mL subcutaneous injection 1-5 Units (1 Units Subcutaneous Given 3/16/21 2201)   cefepime (MAXIPIME) 2,000 mg in dextrose 5 % 50 mL IVPB (2,000 mg Intravenous New Bag 3/16/21 2213)   insulin glargine (LANTUS) subcutaneous injection 15 Units 0 15 mL (15 Units Subcutaneous Given 3/16/21 2201)   amLODIPine (NORVASC) tablet 10 mg (10 mg Oral Given 3/16/21 1113)   losartan (COZAAR) tablet 50 mg (50 mg Oral Given 3/16/21 1114)   cefTRIAXone (ROCEPHIN) 2,000 mg in dextrose 5 % 50 mL IVPB (0 mg Intravenous Stopped 3/15/21 0110)   acetaminophen (TYLENOL) tablet 975 mg (975 mg Oral Given 3/15/21 0055)   sodium chloride 0 9 % bolus 1,000 mL (0 mL Intravenous Stopped 3/15/21 0452) iohexol (OMNIPAQUE) 350 MG/ML injection (MULTI-DOSE) 100 mL (100 mL Intravenous Given 3/15/21 0135)   acetaminophen (TYLENOL) tablet 325 mg (325 mg Oral Given 3/15/21 0611)       Diagnostic Studies  Results Reviewed     Procedure Component Value Units Date/Time    Procalcitonin Reflex [[id number]]  (Abnormal) Collected: 03/16/21 1023    Lab Status: Final result Specimen: Blood from Arm, Right Updated: 03/16/21 2028     Procalcitonin 1 99 ng/ml     CBC and differential [[id number]]  (Abnormal) Collected: 03/16/21 1022    Lab Status: Final result Specimen: Blood from Arm, Right Updated: 03/16/21 1325     WBC 5 12 Thousand/uL      RBC 3 60 Million/uL      Hemoglobin 12 5 g/dL      Hematocrit 35 5 %      MCV 99 fL      MCH 34 7 pg      MCHC 35 2 g/dL      RDW 13 2 %      MPV 9 7 fL      Platelets 92 Thousands/uL      nRBC 0 /100 WBCs      Neutrophils Relative 69 %      Immat GRANS % 0 %      Lymphocytes Relative 19 %      Monocytes Relative 12 %      Eosinophils Relative 0 %      Basophils Relative 0 %      Neutrophils Absolute 3 49 Thousands/µL      Immature Grans Absolute 0 02 Thousand/uL      Lymphocytes Absolute 0 95 Thousands/µL      Monocytes Absolute 0 62 Thousand/µL      Eosinophils Absolute 0 02 Thousand/µL      Basophils Absolute 0 02 Thousands/µL     Blood culture #2 [[id number]]  (Abnormal) Collected: 03/15/21 0032    Lab Status: Preliminary result Specimen: Blood from Arm, Left Updated: 03/16/21 1131     Gram Stain Result Gram positive cocci in clusters    Comprehensive metabolic panel [[id number]]  (Abnormal) Collected: 03/16/21 1022    Lab Status: Final result Specimen: Blood from Arm, Right Updated: 03/16/21 1045     Sodium 138 mmol/L      Potassium 3 8 mmol/L      Chloride 103 mmol/L      CO2 27 mmol/L      ANION GAP 8 mmol/L      BUN 14 mg/dL      Creatinine 0 94 mg/dL      Glucose 215 mg/dL      Calcium 9 3 mg/dL      Corrected Calcium 9 9 mg/dL      AST 23 U/L      ALT 39 U/L      Alkaline Phosphatase 78 U/L      Total Protein 6 7 g/dL      Albumin 3 2 g/dL      Total Bilirubin 0 75 mg/dL      eGFR 82 ml/min/1 73sq m     Narrative:      [place] guidelines for Chronic Kidney Disease (CKD):     Stage 1 with normal or high GFR (GFR > 90 mL/min/1 73 square meters)    Stage 2 Mild CKD (GFR = 60-89 mL/min/1 73 square meters)    Stage 3A Moderate CKD (GFR = 45-59 mL/min/1 73 square meters)    Stage 3B Moderate CKD (GFR = 30-44 mL/min/1 73 square meters)    Stage 4 Severe CKD (GFR = 15-29 mL/min/1 73 square meters)    Stage 5 End Stage CKD (GFR <15 mL/min/1 73 square meters)  Note: GFR calculation is accurate only with a steady state creatinine    Magnesium [[id number]]  (Normal) Collected: 03/16/21 1022    Lab Status: Final result Specimen: Blood from Arm, Right Updated: 03/16/21 1045     Magnesium 1 7 mg/dL     Urine culture [[id number]]  (Abnormal) Collected: 03/15/21 0042    Lab Status: Final result Specimen: Urine, Clean Catch Updated: 03/16/21 0824     Urine Culture <10,000 cfu/ml Streptococcus species    Blood culture #1 [[id number]] Collected: 03/15/21 0042    Lab Status: Preliminary result Specimen: Blood from Hand, Left Updated: 03/16/21 0802     Blood Culture No Growth at 24 hrs      Hemoglobin A1C [[id number]]  (Abnormal) Collected: 03/15/21 0032    Lab Status: Final result Specimen: Blood from Arm, Left Updated: 03/15/21 1238     Hemoglobin A1C 6 3 %       mg/dl     Lipase [[id number]]  (Normal) Collected: 03/15/21 0032    Lab Status: Final result Specimen: Blood from Arm, Left Updated: 03/15/21 0946     Lipase 187 u/L     Procalcitonin with AM Reflex [[id number]]  (Normal) Collected: 03/15/21 0032    Lab Status: Final result Specimen: Blood from Arm, Left Updated: 03/15/21 0914     Procalcitonin 0 12 ng/ml     Fingerstick Glucose (POCT) [[id number]]  (Normal) Collected: 03/15/21 0809    Lab Status: Final result Updated: 03/15/21 0815     POC Glucose 105 mg/dl     Lactic acid 2 Hours [[id number]]  (Normal) Collected: 03/15/21 0752    Lab Status: Final result Specimen: Blood Updated: 03/15/21 0809     LACTIC ACID 2 0 mmol/L     Narrative:      Result may be elevated if tourniquet was used during collection  Lactic acid, plasma [[id number]]  (Abnormal) Collected: 03/15/21 0451    Lab Status: Final result Specimen: Blood from Arm, Right Updated: 03/15/21 0523     LACTIC ACID 2 3 mmol/L     Narrative:      Result may be elevated if tourniquet was used during collection  COVID19, Influenza A/B, RSV PCR, [facility] [[id number]]  (Normal) Collected: 03/15/21 0043    Lab Status: Final result Specimen: Nares from Nasopharyngeal Swab Updated: 03/15/21 0128     SARS-CoV-2 Negative     INFLUENZA A PCR Negative     INFLUENZA B PCR Negative     RSV PCR Negative    Narrative: This test has been authorized by FDA under an EUA (Emergency Use Assay) for use by authorized laboratories  Clinical caution and judgement should be used with the interpretation of these results with consideration of the clinical impression and other laboratory testing  Testing reported as "Positive" or "Negative" has been proven to be accurate according to standard laboratory validation requirements  All testing is performed with control materials showing appropriate reactivity at standard intervals  Lactic acid [[id number]]  (Normal) Collected: 03/15/21 0032    Lab Status: Final result Specimen: Blood from Arm, Left Updated: 03/15/21 0117     LACTIC ACID 2 0 mmol/L     Narrative:      Result may be elevated if tourniquet was used during collection      Troponin I [[id number]]  (Normal) Collected: 03/15/21 0032    Lab Status: Final result Specimen: Blood from Arm, Left Updated: 03/15/21 0058     Troponin I <0 02 ng/mL     Urine Microscopic [[id number]]  (Normal) Collected: 03/15/21 0042    Lab Status: Final result Specimen: Urine, Clean Catch Updated: 03/15/21 0057     RBC, UA 0-1 /hpf      WBC, UA None Seen /hpf Epithelial Cells Occasional /hpf      Bacteria, UA None Seen /hpf     Comprehensive metabolic panel [[id number]]  (Abnormal) Collected: 03/15/21 0032    Lab Status: Final result Specimen: Blood from Arm, Left Updated: 03/15/21 0056     Sodium 139 mmol/L      Potassium 4 0 mmol/L      Chloride 102 mmol/L      CO2 26 mmol/L      ANION GAP 11 mmol/L      BUN 23 mg/dL      Creatinine 1 30 mg/dL      Glucose 232 mg/dL      Calcium 9 7 mg/dL      AST 24 U/L      ALT 42 U/L      Alkaline Phosphatase 93 U/L      Total Protein 7 4 g/dL      Albumin 4 0 g/dL      Total Bilirubin 0 94 mg/dL      eGFR 55 ml/min/1 73sq m     Narrative:      [place] guidelines for Chronic Kidney Disease (CKD):     Stage 1 with normal or high GFR (GFR > 90 mL/min/1 73 square meters)    Stage 2 Mild CKD (GFR = 60-89 mL/min/1 73 square meters)    Stage 3A Moderate CKD (GFR = 45-59 mL/min/1 73 square meters)    Stage 3B Moderate CKD (GFR = 30-44 mL/min/1 73 square meters)    Stage 4 Severe CKD (GFR = 15-29 mL/min/1 73 square meters)    Stage 5 End Stage CKD (GFR <15 mL/min/1 73 square meters)  Note: GFR calculation is accurate only with a steady state creatinine    Protime-INR [[id number]]  (Normal) Collected: 03/15/21 0032    Lab Status: Final result Specimen: Blood from Arm, Left Updated: 03/15/21 0051     Protime 13 6 seconds      INR 1 09    APTT [[id number]]  (Normal) Collected: 03/15/21 0032    Lab Status: Final result Specimen: Blood from Arm, Left Updated: 03/15/21 0051     PTT 23 seconds     UA w Reflex to Microscopic w Reflex to Culture [[id number]]  (Abnormal) Collected: 03/15/21 0042    Lab Status: Final result Specimen: Urine, Clean Catch Updated: 03/15/21 0050     Color, UA Yellow     Clarity, UA Clear     Specific Gravity, UA 1 025     pH, UA 5 5     Leukocytes, UA Elevated glucose may cause decreased leukocyte values   See urine microscopic for [facility] result/     Nitrite, UA Negative     Protein, UA Trace mg/dl      Glucose, UA >=1000 (1%) mg/dl      Ketones, UA Trace mg/dl      Urobilinogen, UA 0 2 E U /dl      Bilirubin, UA Negative     Blood, UA Trace-Intact    CBC and differential [[id number]]  (Abnormal) Collected: 03/15/21 0032    Lab Status: Final result Specimen: Blood from Arm, Left Updated: 03/15/21 0050     WBC 13 76 Thousand/uL      RBC 3 80 Million/uL      Hemoglobin 13 3 g/dL      Hematocrit 37 2 %      MCV 98 fL      MCH 35 0 pg      MCHC 35 8 g/dL      RDW 13 2 %      MPV 9 4 fL      Platelets 802 Thousands/uL      nRBC 0 /100 WBCs      Neutrophils Relative 88 %      Immat GRANS % 1 %      Lymphocytes Relative 4 %      Monocytes Relative 7 %      Eosinophils Relative 0 %      Basophils Relative 0 %      Neutrophils Absolute 12 09 Thousands/µL      Immature Grans Absolute 0 15 Thousand/uL      Lymphocytes Absolute 0 51 Thousands/µL      Monocytes Absolute 0 97 Thousand/µL      Eosinophils Absolute 0 01 Thousand/µL      Basophils Absolute 0 03 Thousands/µL     Blood gas, Venous [[id number]]  (Abnormal) Collected: 03/15/21 0031    Lab Status: Final result Specimen: Blood from Arm, Left Updated: 03/15/21 0044     pH, [place] 7 387     pCO2, [place] 37 5 mm Hg      pO2, [place] 41 7 mm Hg      HCO3, [place] 22 0 mmol/L      Base Excess, [place] -2 5 mmol/L      O2 Content, [place] 15 3 ml/dL      O2 HGB, VENOUS 76 3 %                  CT chest abdomen pelvis w contrast   Final Result by Irma Castillo DO (03/15 7613)   No acute process seen in the chest    Fatty infiltration of the liver is suspected  In the setting of abdominal pain and/or elevated liver function tests, consider steatohepatitis  Partially distended bladder  Mild circumferential bladder wall thickening noted, probably exaggerated by underdistention  A cystitis is considered in the appropriate clinical setting  Status post CABG  Other findings as above     Workstation performed: [id number]      XR chest portable - 1 view   ED Interpretation by Adarsh Chavez Charlee Stahl MD (03/15 0033)   LLL effusion, ? PNA      Final Result by Ankita Leblanc MD (03/15 4826)   Minimal blunting left costophrenic angle which could be some pleural thickening or minimal effusion  or artifact of positioning   Workstation performed: [id number]                 Procedures  Procedures         ED Course  ED Course as of Mar 17 0333   Mon Mar 15, 2021   0034 EKG demonstrates normal sinus rhythm with a right bundle branch block  There is ST depression consistent with this, no ST elevation  No prior EKG to compare  Patient does not have any chest pain  4853 Patient notes improvement with symptomatic management  Patient denies any abdominal pain at present  Awaiting CT imaging results  0430 Patient's CT without clear acute findings that would explain his symptoms  Patient's symptoms most concerning for intra-abdominal etiology though explained cannot rule out other etiologies with the patient have covered empirically with antibiotics  Will send urine culture despite negative microscopy considering patient's higher risk and associated urinary symptoms  Patient's initial lactate is negative, patient's vital signs improved with treatment  Will admit for continued monitoring and evaluation, sepsis of unclear etiology, possibly intra-abdominal though no clear source on initial evaluation  Initial Sepsis Screening     Row Name 03/15/21 0327                Is the patient's history suggestive of a new or worsening infection?  --        Suspected source of infection  suspect infection, source unknown  -ML        Are two or more of the following signs & symptoms of infection both present and new to the patient?  --        Indicate SIRS criteria  Tachypnea > 20 resp per min; Hyperthemia > 38 3C (100 9F)  -ML        If the answer is yes to both questions, suspicion of sepsis is present  --        If severe sepsis is present AND tissue hypoperfusion perists in the hour after fluid resuscitation or lactate > 4, the patient meets criteria for SEPTIC SHOCK  --        Are any of the following organ dysfunction criteria present within 6 hours of suspected infection and SIRS criteria that are NOT considered to be chronic conditions? No  -ML        Organ dysfunction  --        Date of presentation of severe sepsis  --        Time of presentation of severe sepsis  --        Tissue hypoperfusion persists in the hour after crystalloid fluid administration, evidenced, by either:  --        Was hypotension present within one hour of the conclusion of crystalloid fluid administration?  --        Date of presentation of septic shock  --        Time of presentation of septic shock  --          User Key  (r) = Recorded By, (t) = Taken By, (c) = Cosigned By    [address] Name Provider Type    Lizet Vieyra MD Physician                        MDM    Disposition  Final diagnoses:   Sepsis [facility])     Time reflects when diagnosis was documented in both MDM as applicable and the Disposition within this note     Time User Action Codes Description Comment    3/15/2021  4:45 AM Christ Forman [[id number]] Sepsis [facility])       ED Disposition     ED Disposition Condition Date/Time Comment    Admit Stable Mon Mar 15, 2021  4:45 AM Case was discussed with JERRY and the patient's admission status was agreed to be Admission Status: observation status to the service of Dr Carmelo Oliveira           Follow-up Information     Follow up With Specialties Details Why R [facility] Internal Medicine Call in 1 day(s)  [address] [address]  [phone]            Current Discharge Medication List      CONTINUE these medications which have NOT CHANGED    Details   amLODIPine (NORVASC) 10 mg tablet Take 1 tablet by mouth daily      aspirin 81 MG tablet Take 1 tablet by mouth daily      carbidopa-levodopa (SINEMET)  mg per tablet One p o  Q i d   Trygve Bud: 120 tablet, Refills: 5    Associated Diagnoses: Tremor      !! gabapentin (NEURONTIN) 600 MG tablet Take 600 mg by mouth 3 (three) times a day      glipiZIDE (GLUCOTROL) 10 mg tablet Take 10 mg by mouth 2 (two) times a day      hydrochlorothiazide (HYDRODIURIL) 25 mg tablet Take 1 tablet by mouth daily      hydroxychloroquine (PLAQUENIL) 200 mg tablet TAKE 2 TABLETS DAILY  Qty: 180 tablet, Refills: 1    Associated Diagnoses: Rheumatoid arthritis involving multiple sites (HCC)      irbesartan (AVAPRO) 300 mg tablet Take 300 mg by mouth daily        metoprolol succinate (TOPROL-XL) 100 mg 24 hr tablet Take 100 mg by mouth daily      !! OXcarbazepine (TRILEPTAL) 600 mg tablet Take 1 tablet by mouth daily at bedtime      Blood Glucose Monitoring Suppl ([facility] BLOOD GLUCOSE METER) w/Device KIT by Other route  Use as instructed      donepezil (ARICEPT) 10 mg tablet One p o  Q h s   Qty: 30 tablet, Refills: 5    Associated Diagnoses: Memory difficulty      furosemide (LASIX) 20 mg tablet Take 1 tablet by mouth daily as needed      !! gabapentin (NEURONTIN) 600 MG tablet Take 1 tablet (600 mg total) by mouth 4 (four) times a day  Qty: 360 tablet, Refills: 3    Associated Diagnoses: Diabetic polyneuropathy associated with type 2 diabetes mellitus (HCC)      insulin glargine (LANTUS SOLOSTAR) 100 units/mL injection pen Inject 40 Units under the skin daily at bedtime       LORazepam (ATIVAN) 1 mg tablet Two p o  1 hour before MRI, may repeat 1 after 1 hour p r n  Qty: 3 tablet, Refills: 0    Associated Diagnoses: Claustrophobia      !!  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 - Potential duplicate medications found  Please discuss with provider  No discharge procedures on file      PDMP Review     None          ED Provider  Electronically Signed by           Aime López MD  03/17/21 9631

## 2021-03-15 NOTE — PLAN OF CARE
Problem: PAIN - ADULT  Goal: Verbalizes/displays adequate comfort level or baseline comfort level  Description: Interventions:  - Encourage patient to monitor pain and request assistance  - Assess pain using appropriate pain scale  - Administer analgesics based on type and severity of pain and evaluate response  - Implement non-pharmacological measures as appropriate and evaluate response  - Consider cultural and social influences on pain and pain management  - Notify physician/advanced practitioner if interventions unsuccessful or patient reports new pain  Outcome: Progressing     Problem: INFECTION - ADULT  Goal: Absence or prevention of progression during hospitalization  Description: INTERVENTIONS:  - Assess and monitor for signs and symptoms of infection  - Monitor lab/diagnostic results  - Monitor all insertion sites, i e  indwelling lines, tubes, and drains  - Monitor endotracheal if appropriate and nasal secretions for changes in amount and color  - [place] appropriate cooling/warming therapies per order  - Administer medications as ordered  - Instruct and encourage patient and family to use good hand hygiene technique  - Identify and instruct in appropriate isolation precautions for identified infection/condition  Outcome: Progressing     Problem: SAFETY ADULT  Goal: Patient will remain free of falls  Description: INTERVENTIONS:  - Assess patient frequently for physical needs  -  Identify cognitive and physical deficits and behaviors that affect risk of falls    -  [place] fall precautions as indicated by assessment   - Educate patient/family on patient safety including physical limitations  - Instruct patient to call for assistance with activity based on assessment  - Modify environment to reduce risk of injury  - Consider OT/PT consult to assist with strengthening/mobility  Outcome: Progressing  Goal: Maintain or return to baseline ADL function  Description: INTERVENTIONS:  -  Assess patient's ability to carry out ADLs; assess patient's baseline for ADL function and identify physical deficits which impact ability to perform ADLs (bathing, care of mouth/teeth, toileting, grooming, dressing, etc )  - Assess/evaluate cause of self-care deficits   - Assess range of motion  - Assess patient's mobility; develop plan if impaired  - Assess patient's need for assistive devices and provide as appropriate  - Encourage maximum independence but intervene and supervise when necessary  - Involve family in performance of ADLs  - Assess for home care needs following discharge   - Consider OT consult to assist with ADL evaluation and planning for discharge  - Provide patient education as appropriate  Outcome: Progressing  Goal: Maintain or return mobility status to optimal level  Description: INTERVENTIONS:  - Assess patient's baseline mobility status (ambulation, transfers, stairs, etc )    - Identify cognitive and physical deficits and behaviors that affect mobility  - Identify mobility aids required to assist with transfers and/or ambulation (gait belt, sit-to-stand, lift, walker, cane, etc )  - Gideon fall precautions as indicated by assessment  - Record patient progress and toleration of activity level on Mobility SBAR; progress patient to next Phase/Stage  - Instruct patient to call for assistance with activity based on assessment  - Consider rehabilitation consult to assist with strengthening/weightbearing, etc   Outcome: Progressing     Problem: DISCHARGE PLANNING  Goal: Discharge to home or other facility with appropriate resources  Description: INTERVENTIONS:  - Identify barriers to discharge w/patient and caregiver  - Arrange for needed discharge resources and transportation as appropriate  - Identify discharge learning needs (meds, wound care, etc )  - Arrange for interpretive services to assist at discharge as needed  - Refer to Case Management Department for coordinating discharge planning if the patient needs post-hospital services based on physician/advanced practitioner order or complex needs related to functional status, cognitive ability, or social support system  Outcome: Progressing     Problem: Knowledge Deficit  Goal: Patient/family/caregiver demonstrates understanding of disease process, treatment plan, medications, and discharge instructions  Description: Complete learning assessment and assess knowledge base    Interventions:  - Provide teaching at level of understanding  - Provide teaching via preferred learning methods  Outcome: Progressing

## 2021-03-15 NOTE — H&P
[address] [facility]  H&P- Staci Smith 1950, 79 y o  male MRN: [id number]  Unit/Bed#: -01 Encounter: [id number]  Primary Care Provider: Edwin Mendez   Date and time admitted to hospital: 3/15/2021 12:13 AM    Severe sepsis [facility])  Assessment & Plan  · Lactic acidosis, leukocytosis, febrile with suspected source of infection being cystitis  · Continue with plan outlined in cystitis     Acute kidney injury superimposed on chronic kidney disease [facility])  Assessment & Plan  Lab Results   Component Value Date    EGFR 55 03/15/2021    EGFR 88 08/15/2019    EGFR 88 08/21/2018    CREATININE 1 30 03/15/2021    CREATININE 0 88 08/15/2019    CREATININE 0 89 08/21/2018   · Likely prerenal in setting of sepsis  · Given IV fluids in the ED  · Monitor creatinine daily     * Cystitis  Assessment & Plan  · CT A/P showing bladder wall thickening concerning for cystitis  · No other active source of infection noted, does have findings listed in abnormal CT A/P plan, but unlikely sources of sepsis  · Continue with IV antibiotics, adjust ceftriaxone to cefepime  · Follow up infectious work up    Abnormal CT of the abdomen  Assessment & Plan  · Thyroid nodule noted  · Pancreatic atrophy with calcifications which could suggest chronic pancreatitis  · Fatty liver    · Follow up with PCP for further management    Diabetes [facility])  Assessment & Plan  Lab Results   Component Value Date    HGBA1C 7 3 (H) 05/20/2020       Recent Labs     03/15/21  0809   POCGLU 105       Blood Sugar Average: Last 72 hrs:  (P) 105   · Start insulin sliding scale, monitor glucose with meals and bedtime  · Recheck A1c, Patient is on 40 units lantus nightly, glucose levels are not severely elevated, start at 15 and titrate up as needed    Inflammatory arthritis  Assessment & Plan  · On plaquenil at home, hold for now in setting of sepsis    CHF (congestive heart failure) ([facility])  Assessment & Plan  Wt Readings from Last 3 Encounters: 03/15/21 113 kg (250 lb)   08/10/20 117 kg (257 lb)   02/21/20 115 kg (254 lb)     · Does have lower extremity edema which is chronic  · No recent echo on file on epic or care everywhere  · Holding lasix in setting of JANIS suspected to be pre renal  · Monitor weights and intake/output    Hypertension  Assessment & Plan  · Hold home hypertensive meds in setting of sepsis   · Restart as needed  · Hypertensive on initial ED arrival, now adequately controlled off meds    Abdominal pain  Assessment & Plan  · Likely from dyspepsia, patient reports bloating and constipation  · Does drink alcohol 2-3 mixed drinks daily  But does not have epigastric pain or tenderness  · Nausea and vomiting could be in setting of cystitis but given chronic abdominal pain, pancreatitis is still a possibility   · Check lipase  · Continue with pain control   · Consider GI outpatient evaluation    Tremor  Assessment & Plan  · Has suspected parkinson's disease  · Follows with neurology as an outpatient     Polyne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 [facility])  Assessment & Plan  Lab Results   Component Value Date    HGBA1C 7 3 (H) 05/20/2020       Recent Labs     03/15/21  0809   POCGLU 105       Blood Sugar Average: Last 72 hrs:  (P) 105   · Follows with neurology as an outpatient    VTE Prophylaxis: Heparin  / sequential compression device   Code Status: Full code   POLST: POLST form is not discussed and not completed at this time  Anticipated Length of Stay:  Patient will be admitted on an Inpatient basis with an anticipated length of stay of   2 midnights  Justification for Hospital Stay: sepsis     Total Time for Visit, including Counseling / Coordination of Care: 30 minutes  Greater than 50% of this total time spent on direct patient counseling and coordination of care      Chief Complaint:   Rigors     History of Present Illness:    Garret Carrel is a 79 y o  male who presents with rigors, abdominal pain, nausea that was first noted several hours prior to ED presentation  He reports abdominal pain has been on going for months  Abdominal pain was described as generalized lower quadrant abdominal pain, mainly distension with constipation  No epigastric tenderness  No nausea or vomiting  Not related to PO intake  Patient states that he has been experiencing increased frequency of urination but no dysuria or hematuria  No hesitancy  No fevers at home  Appetite is good  In the ED, patient was febrile with leukocytosis and concern for cystitis and admitted to medicine for further management  Review of Systems:    Review of Systems   Constitutional: Positive for chills  Negative for fever  HENT: Negative for ear pain and sore throat  Eyes: Negative for pain and visual disturbance  Respiratory: Negative for cough and shortness of breath  Cardiovascular: Negative for chest pain and palpitations  Gastrointestinal: Positive for abdominal pain and constipation  Negative for vomiting  Genitourinary: Positive for frequency  Negative for dysuria and hematuria  Musculoskeletal: Negative for arthralgias and back pain  Skin: Negative for color change and rash  Neurological: Negative for seizures and syncope  Psychiatric/Behavioral: Negative for agitation and behavioral problems  All other systems reviewed and are negative  Past Medical and Surgical History:     Past Medical History:   Diagnosis Date    Arthritis     Hyperlipidemia     Hypertension     Poor circulation     Sleep apnea     Type 2 diabetes mellitus (HCC)        Past Surgical History:   Procedure Laterality Date    BACK SURGERY      CORONARY ARTERY BYPASS GRAFT  07/22/2017    HEMORROIDECTOMY      TONSILLECTOMY      WRIST SURGERY         Meds/Allergies:    Prior to Admission medications    Medication Sig Start Date End Date Taking?  Authorizing Provider   amLODIPine (NORVASC) 10 mg tablet Take 1 tablet by mouth daily 5/10/19  Yes Historical Provider, MD aspirin 81 MG tablet Take 1 tablet by mouth daily   Yes Historical Provider, MD   carbidopa-levodopa (SINEMET)  mg per tablet One p o  Q i d  8/10/20  Yes Piter Marcelino MD   gabapentin (NEURONTIN) 600 MG tablet Take 600 mg by mouth 3 (three) times a day   Yes Historical Provider, MD   glipiZIDE (GLUCOTROL) 10 mg tablet Take 10 mg by mouth 2 (two) times a day   Yes Historical Provider, MD   hydrochlorothiazide (HYDRODIURIL) 25 mg tablet Take 1 tablet by mouth daily   Yes Historical Provider, MD   hydroxychloroquine (PLAQUENIL) 200 mg tablet TAKE 2 TABLETS DAILY 12/20/20 12/20/21 Yes Tez Beverly DO   irbesartan (AVAPRO) 300 mg tablet Take 300 mg by mouth daily     Yes Historical Provider, MD   metoprolol succinate (TOPROL-XL) 100 mg 24 hr tablet Take 100 mg by mouth daily   Yes Historical Provider, MD   OXcarbazepine (TRILEPTAL) 600 mg tablet Take 1 tablet by mouth daily at bedtime 1/26/20  Yes Historical Provider, MD   Blood Glucose Monitoring Suppl ([facility] BLOOD GLUCOSE METER) w/Device KIT by Other route  Use as instructed    Historical Provider, MD   donepezil (ARICEPT) 10 mg tablet One p o  Q h s  8/10/20   Piter Marcelino MD   furosemide (LASIX) 20 mg tablet Take 1 tablet by mouth daily as needed    Historical Provider, MD   gabapentin (NEURONTIN) 600 MG tablet Take 1 tablet (600 mg total) by mouth 4 (four) times a day  Patient not taking: Reported on 3/15/2021 1/31/20   Piter Marcelino MD   insulin glargine (LANTUS SOLOSTAR) 100 units/mL injection pen Inject 40 Units under the skin daily at bedtime     Historical Provider, MD   LORazepam (ATIVAN) 1 mg tablet Two p o  1 hour before MRI, may repeat 1 after 1 hour p r n    Patient not taking: Reported on 8/10/2020 2/21/20   Piter Marcelino MD   OXcarbazepine (TRILEPTAL) 300 mg tablet 1/2 p o  Q 12 hours with 600 mg dose  Patient not taking: Reported on 3/15/2021 8/23/19   Piter Marcelino MD   OXcarbazepine (TRILEPTAL) 600 mg tablet TAKE 1 TABLET DAILY AT BEDTIME  Patient not taking: Reported on 3/15/2021 9/30/20   Jesús Srinivasan MD   TRUEPLUS PEN NEEDLES 32G X 4 MM MISC  12/4/18   Historical Provider, MD     I have reviewed home medications with patient personally  Allergies: Allergies   Allergen Reactions    Penicillins      Other reaction(s): Other, Unknown       Social History:     Marital Status: Single   Occupation: na  Patient Pre-hospital Living Situation: home  Patient Pre-hospital Level of Mobility: fully ambulatory without assistance  Uses cane  Patient Pre-hospital Diet Restrictions: diabetic diet   Substance Use History:   Social History     Substance and Sexual Activity   Alcohol Use Yes    Frequency: 4 or more times a week    Drinks per session: 1 or 2    Comment: 2 drinks daily     Social History     Tobacco Use   Smoking Status Never Smoker   Smokeless Tobacco Never Used     Social History     Substance and Sexual Activity   Drug Use No       Family History:    Family History   Problem Relation Age of Onset    Diabetes type II Mother     Hypertension Mother     Breast cancer Sister     Lung cancer Sister     Multiple endocrine neoplasia Brother     Breast cancer Sister        Physical Exam:     Vitals:   Blood Pressure: 145/70 (03/15/21 0929)  Pulse: 79 (03/15/21 0700)  Temperature: 99 3 °F (37 4 °C) (03/15/21 0929)  Temp Source: Oral (03/15/21 0300)  Respirations: 17 (03/15/21 0929)  Height: 5' 10" (177 8 cm) (03/15/21 0018)  Weight - Scale: 113 kg (250 lb) (03/15/21 0018)  SpO2: 93 % (03/15/21 0700)    Physical Exam  Vitals signs and nursing note reviewed  Constitutional:       Appearance: Normal appearance  HENT:      Head: Normocephalic and atraumatic  Nose: Nose normal  No congestion  Mouth/Throat:      Mouth: Mucous membranes are dry  Pharynx: Oropharynx is clear  Eyes:      General:         Right eye: No discharge  Left eye: No discharge     Neck:      Musculoskeletal: Normal range of motion and neck supple  Cardiovascular:      Rate and Rhythm: Normal rate and regular rhythm  Pulmonary:      Effort: Pulmonary effort is normal  No respiratory distress  Abdominal:      General: Abdomen is flat  There is distension  Palpations: Abdomen is soft  Musculoskeletal:      Right lower leg: Edema present  Left lower leg: Edema present  Skin:     General: Skin is warm and dry  Neurological:      General: No focal deficit present  Mental Status: He is alert  Mental status is at baseline  Psychiatric:         Mood and Affect: Mood normal          Behavior: Behavior normal          Additional Data:     Lab Results: I have personally reviewed pertinent reports  Results from last 7 days   Lab Units 03/15/21  0032   WBC Thousand/uL 13 76*   HEMOGLOBIN g/dL 13 3   HEMATOCRIT % 37 2   PLATELETS Thousands/uL 105*   NEUTROS PCT % 88*   LYMPHS PCT % 4*   MONOS PCT % 7   EOS PCT % 0     Results from last 7 days   Lab Units 03/15/21  0032   SODIUM mmol/L 139   POTASSIUM mmol/L 4 0   CHLORIDE mmol/L 102   CO2 mmol/L 26   BUN mg/dL 23   CREATININE mg/dL 1 30   ANION GAP mmol/L 11   CALCIUM mg/dL 9 7   ALBUMIN g/dL 4 0   TOTAL BILIRUBIN mg/dL 0 94   ALK PHOS U/L 93   ALT U/L 42   AST U/L 24   GLUCOSE RANDOM mg/dL 232*     Results from last 7 days   Lab Units 03/15/21  0032   INR  1 09     Results from last 7 days   Lab Units 03/15/21  1130 03/15/21  0809   POC GLUCOSE mg/dl 214* 105     Results from last 7 days   Lab Units 03/15/21  0032   HEMOGLOBIN A1C % 6 3*     Results from last 7 days   Lab Units 03/15/21  0752 03/15/21  0451 03/15/21  0032   LACTIC ACID mmol/L 2 0 2 3* 2 0   PROCALCITONIN ng/ml  --   --  0 12       Imaging: I have personally reviewed pertinent reports  CT chest abdomen pelvis w contrast   Final Result by Marvin Trevino DO (03/15 9664)   No acute process seen in the chest    Fatty infiltration of the liver is suspected    In the setting of abdominal pain and/or elevated liver function tests, consider steatohepatitis  Partially distended bladder  Mild circumferential bladder wall thickening noted, probably exaggerated by underdistention  A cystitis is considered in the appropriate clinical setting  Status post CABG  Other findings as above  Workstation performed: [id number]      XR chest portable - 1 view   ED Interpretation by Berny Engle MD (03/15 0051)   LLL effusion, ? PNA      Final Result by Jonna Hull MD (03/15 5968)   Minimal blunting left costophrenic angle which could be some pleural thickening or minimal effusion  or artifact of positioning   Workstation performed: [id number]          EKG, Pathology, and Other Studies Reviewed on Admission:   · EKG: na    Allscripts / Epic Records Reviewed: Yes     ** Please Note: This note has been constructed using a voice recognition system   **

## 2021-03-15 NOTE — PLAN OF CARE
Problem: HEMATOLOGIC - ADULT  Goal: Maintains hematologic stability  Description: INTERVENTIONS  - Assess for signs and symptoms of bleeding or hemorrhage  - Monitor labs  - Administer supportive blood products/factors as ordered and appropriate  Outcome: Progressing     Problem: METABOLIC, FLUID AND ELECTROLYTES - ADULT  Goal: Electrolytes maintained within normal limits  Description: INTERVENTIONS:  - Monitor labs and assess patient for signs and symptoms of electrolyte imbalances  - Administer electrolyte replacement as ordered  - Monitor response to electrolyte replacements, including repeat lab results as appropriate  - Instruct patient on fluid and nutrition as appropriate  Outcome: Progressing  Goal: Fluid balance maintained  Description: INTERVENTIONS:  - Monitor labs   - Monitor I/O and WT  - Instruct patient on fluid and nutrition as appropriate  - Assess for signs & symptoms of volume excess or deficit  Outcome: Progressing  Goal: Glucose maintained within target range  Description: INTERVENTIONS:  - Monitor Blood Glucose as ordered  - Assess for signs and symptoms of hyperglycemia and hypoglycemia  - Administer ordered medications to maintain glucose within target range  - Assess nutritional intake and initiate nutrition service referral as needed  Outcome: Progressing     Problem: GENITOURINARY - ADULT  Goal: Maintains or returns to baseline urinary function  Description: INTERVENTIONS:  - Assess urinary function  - Encourage oral fluids to ensure adequate hydration if ordered  - Administer IV fluids as ordered to ensure adequate hydration  - Administer ordered medications as needed  - Offer frequent toileting  - Follow urinary retention protocol if ordered  Outcome: Progressing  Goal: Absence of urinary retention  Description: INTERVENTIONS:  - Assess patients ability to void and empty bladder  - Monitor I/O  - Bladder scan as needed  - Discuss with physician/AP medications to alleviate retention as needed  - Discuss catheterization for long term situations as appropriate  Outcome: Progressing  Goal: Urinary catheter remains patent  Description: INTERVENTIONS:  - Assess patency of urinary catheter  - If patient has a chronic devries, consider changing catheter if non-functioning  - Follow guidelines for intermittent irrigation of non-functioning urinary catheter  Outcome: Progressing     Problem: CARDIOVASCULAR - ADULT  Goal: Maintains optimal cardiac output and hemodynamic stability  Description: INTERVENTIONS:  - Monitor I/O, vital signs and rhythm  - Monitor for S/S and trends of decreased cardiac output  - Administer and titrate ordered vasoactive medications to optimize hemodynamic stability  - Assess quality of pulses, skin color and temperature  - Assess for signs of decreased coronary artery perfusion  - Instruct patient to report change in severity of symptoms  Outcome: Progressing  Goal: Absence of cardiac dysrhythmias or at baseline rhythm  Description: INTERVENTIONS:  - Continuous cardiac monitoring, vital signs, obtain 12 lead EKG if ordered  - Administer antiarrhythmic and heart rate control medications as ordered  - Monitor electrolytes and administer replacement therapy as ordered  Outcome: Progressing

## 2021-03-15 NOTE — ASSESSMENT & PLAN NOTE
Telemetry admit from ER

JEOVANY FELIX admitted to Telemetry unit after SBAR received.  Patient oriented to Dneise Layton, primary RN, unit, room, bed, and unit policies regarding patient care and visiting 
hours. Patient now on continuous telemetry monitoring, tele box # 11 and telemetry reading 
on arrival to unit is sinus rhythm 64. Patient placed on bedside oxygen, weighed by bedscale 
and encouraged to call if they need something. All questions and concerns addressed, patient 
verbalized understanding. Rt chest tube site draining blood, reinforce pressure dressing, 
chest tube chamber connected to low continuos suction, no leak noticed, bloody drainage at 
chest tube to 410 ml. · On plaquenil at home, hold for now in setting of sepsis

## 2021-03-15 NOTE — CASE MANAGEMENT
LOS 6 HOURS  PATIENT CLASS IS OBV  PATIENT IS NOT A READMISSION  PATIENT IS NOT A  BUNDLE  CHART REVIEWED  CM met with patient at bedside to complete CM open and discuss discharge plan  Patient states that he lives with his partner of 28 years Wells Hair  in [facility] in a 1 floor home with 2-3 steps to enter  Patient states that he is independent for ADLs and Latonya for ambulation using a SPC  They have a standard walker and rolling walker at home that belongs to his partner  She has back problems, but is also independent  Patient does not have to help with her care giving  Patient goes to [facility] 2xs a week for aqua therapy to help with joint pain and flexablity and strengthing  He uses the [facility] in [facility] ([address]) for rx  He also uses [facility] for meds  His PCP is Dr Stevie Rapp  He uses alcohol daily and does not smoke or use drugs  He denies MH dx or treatment  His POA is his son Jason Morris [phone]  He is retired, he drives and his partner Wells Hair can provide transportation home  CM reviewed discharge planning process including the following: identifying caregivers at home, preference for d/c planning needs, availability of treatment team to discuss questions or concerns patient and/or family may have regarding diagnosis, plan of care, old or new medications and discharge planning   CM will continue to follow for care coordination and update assessment as appropriate  Patient is a 79 you male admitted with sepsis IVABx x2 , pain control, labs, PLOF: independent with spc  DCP: home with OP followup TRANSPORTATION: family will transport home

## 2021-03-15 NOTE — ASSESSMENT & PLAN NOTE
· Hold home hypertensive meds in setting of sepsis   · Restart as needed  · Hypertensive on initial ED arrival, now adequately controlled off meds

## 2021-03-15 NOTE — ASSESSMENT & PLAN NOTE
Lab Results   Component Value Date    EGFR 55 03/15/2021    EGFR 88 08/15/2019    EGFR 88 08/21/2018    CREATININE 1 30 03/15/2021    CREATININE 0 88 08/15/2019    CREATININE 0 89 08/21/2018   · Likely prerenal in setting of sepsis  · Given IV fluids in the ED  · Monitor creatinine daily

## 2021-03-15 NOTE — SEPSIS NOTE
Sepsis Note   Particia Jordan 79 y o  male MRN: [id number]  Unit/Bed#: ED 09 Encounter: [id number]      qSOFA     Row Name 03/15/21 0115 03/15/21 0018             Altered mental status GCS < 15  --  --       Respiratory Rate > / =22  1  1       Systolic BP < / =682  0  0       Q Sofa Score  1  1           Initial Sepsis Screening     Row Name 03/15/21 0327                Is the patient's history suggestive of a new or worsening infection?  --        Suspected source of infection  suspect infection, source unknown  -ML        Are two or more of the following signs & symptoms of infection both present and new to the patient?  --        Indicate SIRS criteria  Tachypnea > 20 resp per min; Hyperthemia > 38 3C (100 9F)  -ML        If the answer is yes to both questions, suspicion of sepsis is present  --        If severe sepsis is present AND tissue hypoperfusion perists in the hour after fluid resuscitation or lactate > 4, the patient meets criteria for SEPTIC SHOCK  --        Are any of the following organ dysfunction criteria present within 6 hours of suspected infection and SIRS criteria that are NOT considered to be chronic conditions?   No  -ML        Organ dysfunction  --        Date of presentation of severe sepsis  --        Time of presentation of severe sepsis  --        Tissue hypoperfusion persists in the hour after crystalloid fluid administration, evidenced, by either:  --        Was hypotension present within one hour of the conclusion of crystalloid fluid administration?  --        Date of presentation of septic shock  --        Time of presentation of septic shock  --          User Key  (r) = Recorded By, (t) = Taken By, (c) = Cosigned By    [address] Name Provider Sapna Muñoz MD Physician

## 2021-03-16 LAB
ALBUMIN SERPL BCP-MCNC: 3.2 G/DL (ref 3.5–5)
ALP SERPL-CCNC: 78 U/L (ref 46–116)
ALT SERPL W P-5'-P-CCNC: 39 U/L (ref 12–78)
ANION GAP SERPL CALCULATED.3IONS-SCNC: 8 MMOL/L (ref 4–13)
AST SERPL W P-5'-P-CCNC: 23 U/L (ref 5–45)
BACTERIA UR CULT: ABNORMAL
BASOPHILS # BLD AUTO: 0.02 THOUSANDS/ΜL (ref 0–0.1)
BASOPHILS NFR BLD AUTO: 0 % (ref 0–1)
BILIRUB SERPL-MCNC: 0.75 MG/DL (ref 0.2–1)
BUN SERPL-MCNC: 14 MG/DL (ref 5–25)
CALCIUM ALBUM COR SERPL-MCNC: 9.9 MG/DL (ref 8.3–10.1)
CALCIUM SERPL-MCNC: 9.3 MG/DL (ref 8.3–10.1)
CHLORIDE SERPL-SCNC: 103 MMOL/L (ref 100–108)
CO2 SERPL-SCNC: 27 MMOL/L (ref 21–32)
CREAT SERPL-MCNC: 0.94 MG/DL (ref 0.6–1.3)
EOSINOPHIL # BLD AUTO: 0.02 THOUSAND/ΜL (ref 0–0.61)
EOSINOPHIL NFR BLD AUTO: 0 % (ref 0–6)
ERYTHROCYTE [DISTWIDTH] IN BLOOD BY AUTOMATED COUNT: 13.2 % (ref 11.6–15.1)
GFR SERPL CREATININE-BSD FRML MDRD: 82 ML/MIN/1.73SQ M
GLUCOSE SERPL-MCNC: 131 MG/DL (ref 65–140)
GLUCOSE SERPL-MCNC: 181 MG/DL (ref 65–140)
GLUCOSE SERPL-MCNC: 215 MG/DL (ref 65–140)
GLUCOSE SERPL-MCNC: 218 MG/DL (ref 65–140)
HCT VFR BLD AUTO: 35.5 % (ref 36.5–49.3)
HGB BLD-MCNC: 12.5 G/DL (ref 12–17)
IMM GRANULOCYTES # BLD AUTO: 0.02 THOUSAND/UL (ref 0–0.2)
IMM GRANULOCYTES NFR BLD AUTO: 0 % (ref 0–2)
LYMPHOCYTES # BLD AUTO: 0.95 THOUSANDS/ΜL (ref 0.6–4.47)
LYMPHOCYTES NFR BLD AUTO: 19 % (ref 14–44)
MAGNESIUM SERPL-MCNC: 1.7 MG/DL (ref 1.6–2.6)
MCH RBC QN AUTO: 34.7 PG (ref 26.8–34.3)
MCHC RBC AUTO-ENTMCNC: 35.2 G/DL (ref 31.4–37.4)
MCV RBC AUTO: 99 FL (ref 82–98)
MONOCYTES # BLD AUTO: 0.62 THOUSAND/ΜL (ref 0.17–1.22)
MONOCYTES NFR BLD AUTO: 12 % (ref 4–12)
NEUTROPHILS # BLD AUTO: 3.49 THOUSANDS/ΜL (ref 1.85–7.62)
NEUTS SEG NFR BLD AUTO: 69 % (ref 43–75)
NRBC BLD AUTO-RTO: 0 /100 WBCS
PLATELET # BLD AUTO: 92 THOUSANDS/UL (ref 149–390)
PMV BLD AUTO: 9.7 FL (ref 8.9–12.7)
POTASSIUM SERPL-SCNC: 3.8 MMOL/L (ref 3.5–5.3)
PROCALCITONIN SERPL-MCNC: 1.99 NG/ML
PROT SERPL-MCNC: 6.7 G/DL (ref 6.4–8.2)
RBC # BLD AUTO: 3.6 MILLION/UL (ref 3.88–5.62)
SODIUM SERPL-SCNC: 138 MMOL/L (ref 136–145)
WBC # BLD AUTO: 5.12 THOUSAND/UL (ref 4.31–10.16)

## 2021-03-16 PROCEDURE — 83735 ASSAY OF MAGNESIUM: CPT | Performed by: STUDENT IN AN ORGANIZED HEALTH CARE EDUCATION/TRAINING PROGRAM

## 2021-03-16 PROCEDURE — 82948 REAGENT STRIP/BLOOD GLUCOSE: CPT

## 2021-03-16 PROCEDURE — 80053 COMPREHEN METABOLIC PANEL: CPT | Performed by: STUDENT IN AN ORGANIZED HEALTH CARE EDUCATION/TRAINING PROGRAM

## 2021-03-16 PROCEDURE — 85025 COMPLETE CBC W/AUTO DIFF WBC: CPT | Performed by: STUDENT IN AN ORGANIZED HEALTH CARE EDUCATION/TRAINING PROGRAM

## 2021-03-16 PROCEDURE — 99232 SBSQ HOSP IP/OBS MODERATE 35: CPT | Performed by: PHYSICIAN ASSISTANT

## 2021-03-16 PROCEDURE — 84145 PROCALCITONIN (PCT): CPT | Performed by: EMERGENCY MEDICINE

## 2021-03-16 RX ORDER — LOSARTAN POTASSIUM 50 MG/1
50 TABLET ORAL DAILY
Status: DISCONTINUED | OUTPATIENT
Start: 2021-03-16 | End: 2021-03-17

## 2021-03-16 RX ORDER — AMLODIPINE BESYLATE 10 MG/1
10 TABLET ORAL DAILY
Status: DISCONTINUED | OUTPATIENT
Start: 2021-03-16 | End: 2021-03-18 | Stop reason: HOSPADM

## 2021-03-16 RX ADMIN — DOCUSATE SODIUM 100 MG: 100 CAPSULE, LIQUID FILLED ORAL at 09:04

## 2021-03-16 RX ADMIN — HEPARIN SODIUM 5000 UNITS: 5000 INJECTION INTRAVENOUS; SUBCUTANEOUS at 05:41

## 2021-03-16 RX ADMIN — AMLODIPINE BESYLATE 10 MG: 10 TABLET ORAL at 11:13

## 2021-03-16 RX ADMIN — CEFEPIME HYDROCHLORIDE 2000 MG: 2 INJECTION, POWDER, FOR SOLUTION INTRAVENOUS at 09:03

## 2021-03-16 RX ADMIN — SENNOSIDES 8.6 MG: 8.6 TABLET, FILM COATED ORAL at 09:03

## 2021-03-16 RX ADMIN — GABAPENTIN 600 MG: 300 CAPSULE ORAL at 09:04

## 2021-03-16 RX ADMIN — INSULIN LISPRO 1 UNITS: 100 INJECTION, SOLUTION INTRAVENOUS; SUBCUTANEOUS at 22:01

## 2021-03-16 RX ADMIN — LOSARTAN POTASSIUM 50 MG: 50 TABLET, FILM COATED ORAL at 11:14

## 2021-03-16 RX ADMIN — INSULIN LISPRO 1 UNITS: 100 INJECTION, SOLUTION INTRAVENOUS; SUBCUTANEOUS at 12:00

## 2021-03-16 RX ADMIN — CEFEPIME HYDROCHLORIDE 2000 MG: 2 INJECTION, POWDER, FOR SOLUTION INTRAVENOUS at 22:13

## 2021-03-16 RX ADMIN — DONEPEZIL HYDROCHLORIDE 10 MG: 5 TABLET, FILM COATED ORAL at 22:02

## 2021-03-16 RX ADMIN — ACETAMINOPHEN 650 MG: 325 TABLET, FILM COATED ORAL at 02:18

## 2021-03-16 RX ADMIN — ASPIRIN 81 MG: 81 TABLET, CHEWABLE ORAL at 09:04

## 2021-03-16 RX ADMIN — HEPARIN SODIUM 5000 UNITS: 5000 INJECTION INTRAVENOUS; SUBCUTANEOUS at 15:33

## 2021-03-16 RX ADMIN — INSULIN LISPRO 1 UNITS: 100 INJECTION, SOLUTION INTRAVENOUS; SUBCUTANEOUS at 18:00

## 2021-03-16 RX ADMIN — INSULIN GLARGINE 15 UNITS: 100 INJECTION, SOLUTION SUBCUTANEOUS at 22:01

## 2021-03-16 RX ADMIN — METOPROLOL SUCCINATE 100 MG: 100 TABLET, EXTENDED RELEASE ORAL at 09:04

## 2021-03-16 RX ADMIN — GABAPENTIN 600 MG: 300 CAPSULE ORAL at 15:34

## 2021-03-16 RX ADMIN — GABAPENTIN 600 MG: 300 CAPSULE ORAL at 22:02

## 2021-03-16 RX ADMIN — HEPARIN SODIUM 5000 UNITS: 5000 INJECTION INTRAVENOUS; SUBCUTANEOUS at 22:03

## 2021-03-16 NOTE — ASSESSMENT & PLAN NOTE
Lab Results   Component Value Date    EGFR 55 03/15/2021    EGFR 88 08/15/2019    EGFR 88 08/21/2018    CREATININE 1 30 03/15/2021    CREATININE 0 88 08/15/2019    CREATININE 0 89 08/21/2018     · Likely prerenal in setting of sepsis - repeat labs pending   · Given IV fluids in the ED

## 2021-03-16 NOTE — ASSESSMENT & PLAN NOTE
Lab Results   Component Value Date    HGBA1C 6 3 (H) 03/15/2021       Recent Labs     03/15/21  0809 03/15/21  1130 03/15/21  1717 03/16/21  0719   POCGLU 105 214* 118 131       Blood Sugar Average: Last 72 hrs:  (P) 142   · Controlled based on A1c  · Patient is on 40 units lantus nightly, glucose levels are not severely elevated, started on 15 and titrate up as needed  · Continue CCO diet, SSI #1  · ACHS blood glucose checks

## 2021-03-16 NOTE — UTILIZATION REVIEW
Initial Clinical Review  OBSERVATION 3/15/21 @0446 CONVERTED TO INPATIENT ADMISSION 3/15/21 @0734 DUE TO TREATMENT REQUIRED FOR SEVERE SEPSIS WITH JANIS AND CYSTITIS  Admission: Date/Time/Statement:   Admission Orders (From admission, onward)     Ordered        03/15/21 0734  Inpatient Admission  Once         03/15/21 0446  Place in Observation  Once                   Orders Placed This Encounter   Procedures    Inpatient Admission     Standing Status:   Standing     Number of Occurrences:   1     Order Specific Question:   Level of Care     Answer:   Med Surg [16]     Order Specific Question:   Estimated length of stay     Answer:   More than 2 Midnights     Order Specific Question:   Certification     Answer:   I certify that inpatient services are medically necessary for this patient for a duration of greater than two midnights  See H&P and MD Progress Notes for additional information about the patient's course of treatment  ED Arrival Information     Expected Arrival Acuity Means of Arrival Escorted By Service Admission Type    - 3/15/2021 00:13 Urgent Ambulance [facility]) Hospitalist Urgent    Arrival Complaint    shaking        Chief Complaint   Patient presents with    Shaking     pt states he started feeling shaky today  also c/o diffuse abd pain and nausea  Assessment/Plan: 80 yo obese m w/hx htn, dm, ckd, chf to ED by EMS from home admitted initially under observation, then converted to inpatient admission due to severe sepsis with janis and cystitis  Presented with several hours of rigors, nausea, and generalized lower abdominal pain with distension and constipation  Previously had increased urinary frequency  Good appetite  Exam reveals febrile, dry mm, distended abd, BLE edema  Workup shows fatty liver, LLL effusion, possible pneumonia, and cystitis  Holding nephrotoxins, IV antbx and infectious workup in progress with IVF and serial labs       3/16: afebrile, HR james, hypertensive, dyspneic w/exertion  Has fine hand tremor  GCS 15  BLE edema has improved from yesterday, abd feels crampy and bloated w/hyperactive bowel sounds  Passing clear yellow urine w/increased frequency  urine c&s growing strep;  1 of 2 blood cultures gm positive cocci in clusters so far  Checking cbc, mg, cmp in am  JANIS is likely prerenal in setting of sepsis  Continue IV antbx       Intake/Output Summary (Last 24 hours) at 3/16/2021 0931  Last data filed at 3/16/2021 0601      Gross per 24 hour   Intake 327 5 ml   Output 1920 ml   Net -1592 5 ml         ED Triage Vitals   Temperature Pulse Respirations Blood Pressure SpO2   03/15/21 0018 03/15/21 0018 03/15/21 0018 03/15/21 0018 03/15/21 0018   (!) 101 7 °F (38 7 °C) (!) 108 22 (!) 201/84 96 %      Temp Source Heart Rate Source Patient Position - Orthostatic VS BP Location FiO2 (%)   03/15/21 0018 -- 03/15/21 0018 03/15/21 0018 --   Oral  Lying Right arm       Pain Score       03/15/21 1030       No Pain          Wt Readings from Last 1 Encounters:   03/15/21 113 kg (250 lb)     Additional Vital Signs:   Date/Time  Temp  Pulse  Resp  BP  MAP (mmHg)  SpO2  O2 Device    03/16/21 11:15:39  --  --  --  162/87  112  --  --    03/16/21 1113  --  --  --  162/87  --  --  --    03/16/21 07:22:50  97 6 °F (36 4 °C)  53Abnormal   18  171/78Abnormal   109  96 %  --    03/16/21 06:09:01  98 1 °F (36 7 °C)  52Abnormal   --  --  --  95 %  --    03/16/21 02:15:41  99 3 °F (37 4 °C)  63  --  168/74  105  92 %  --    03/16/21 02:11:58  99 3 °F (37 4 °C)  60  --  --  --  95 %  --    03/16/21 00:15:14  99 °F (37 2 °C)  68  17  175/72Abnormal   106  95 %  --    03/15/21 15:48:26  98 6 °F (37 °C)  62  --  174/73Abnormal   107  96 %  --    03/15/21 09:29:34  99 3 °F (37 4 °C)  --  17  145/70  95  --  --    03/15/21 0700  98 7 °F (37 1 °C)  79  16  164/72  --  93 %  None (Room air)    03/15/21 0630  --  84  21  152/67  96  96 %  --    03/15/21 0430  98 7 °F (37 1 °C)  92  24Abnormal   166/76 109  97 %  --    03/15/21 0300  99 7 °F (37 6 °C)  88  21  134/60  86  92 %  --    03/15/21 0115  --  95  23Abnormal   151/67  96  93 %  --        Pertinent Labs/Diagnostic Test Results:   3/15 PCXR: Minimal blunting left costophrenic angle which could be some pleural thickening or minimal effusion  or artifact of positioning    3/15 CT chest a&p: No acute process seen in the chest   Fatty infiltration of the liver is suspected  In the setting of abdominal pain and/or elevated liver function tests, consider steatohepatitis  Partially distended bladder  Mild circumferential bladder wall thickening noted, probably exaggerated by underdistention  A cystitis is considered in the appropriate clinical setting  Status post CABG      3/15 EKG Normal sinus rhythm  Right bundle branch block    Results from last 7 days   Lab Units 03/15/21  0043   SARS-COV-2  Negative     Results from last 7 days   Lab Units 03/15/21  0032   WBC Thousand/uL 13 76*   HEMOGLOBIN g/dL 13 3   HEMATOCRIT % 37 2   PLATELETS Thousands/uL 105*   NEUTROS ABS Thousands/µL 12 09*         Results from last 7 days   Lab Units 03/16/21  1022 03/15/21  0032   SODIUM mmol/L 138 139   POTASSIUM mmol/L 3 8 4 0   CHLORIDE mmol/L 103 102   CO2 mmol/L 27 26   ANION GAP mmol/L 8 11   BUN mg/dL 14 23   CREATININE mg/dL 0 94 1 30   EGFR ml/min/1 73sq m 82 55   CALCIUM mg/dL 9 3 9 7   MAGNESIUM mg/dL 1 7  --      Results from last 7 days   Lab Units 03/16/21  1022 03/15/21  0032   AST U/L 23 24   ALT U/L 39 42   ALK PHOS U/L 78 93   TOTAL PROTEIN g/dL 6 7 7 4   ALBUMIN g/dL 3 2* 4 0   TOTAL BILIRUBIN mg/dL 0 75 0 94     Results from last 7 days   Lab Units 03/16/21  1051 03/16/21  0719 03/15/21  1717 03/15/21  1130 03/15/21  0809   POC GLUCOSE mg/dl 181* 131 118 214* 105     Results from last 7 days   Lab Units 03/16/21  1022 03/15/21  0032   GLUCOSE RANDOM mg/dL 215* 232*         Results from last 7 days   Lab Units 03/15/21  0032   HEMOGLOBIN A1C % 6 3*   EAG mg/dl 134     Results from last 7 days   Lab Units 03/15/21  0031   PH TAWNYA  7 387   PCO2 TAWNYA mm Hg 37 5*   PO2 TAWNYA mm Hg 41 7   HCO3 TAWNYA mmol/L 22 0*   BASE EXC TAWNYA mmol/L -2 5   O2 CONTENT TAWNYA ml/dL 15 3   O2 HGB, VENOUS % 76 3     Results from last 7 days   Lab Units 03/15/21  0032   TROPONIN I ng/mL <0 02         Results from last 7 days   Lab Units 03/15/21  0032   PROTIME seconds 13 6   INR  1 09   PTT seconds 23         Results from last 7 days   Lab Units 03/15/21  0032   PROCALCITONIN ng/ml 0 12     Results from last 7 days   Lab Units 03/15/21  0752 03/15/21  0451 03/15/21  0032   LACTIC ACID mmol/L 2 0 2 3* 2 0     Results from last 7 days   Lab Units 03/15/21  0032   LIPASE u/L 187     Results from last 7 days   Lab Units 03/15/21  0042   CLARITY UA  Clear   COLOR UA  Yellow   SPEC GRAV UA  1 025   PH UA  5 5   GLUCOSE UA mg/dl >=1000 (1%)*   KETONES UA mg/dl Trace*   BLOOD UA  Trace-Intact*   PROTEIN UA mg/dl Trace*   NITRITE UA  Negative   BILIRUBIN UA  Negative   UROBILINOGEN UA E U /dl 0 2   LEUKOCYTES UA  Elevated glucose may cause decreased leukocyte values   See urine microscopic for [facility] result/*   WBC UA /hpf None Seen   RBC UA /hpf 0-1   BACTERIA UA /hpf None Seen   EPITHELIAL CELLS WET PREP /hpf Occasional     Results from last 7 days   Lab Units 03/15/21  0043   INFLUENZA A PCR  Negative   INFLUENZA B PCR  Negative   RSV PCR  Negative         Results from last 7 days   Lab Units 03/15/21  0042 03/15/21  0032   BLOOD CULTURE  No Growth at 24 hrs   --    GRAM STAIN RESULT   --  Gram positive cocci in clusters*   URINE CULTURE  <10,000 cfu/ml Streptococcus species*  --      ED Treatment:   Medication Administration from 03/15/2021 0013 to 03/15/2021 0610       Date/Time Order Dose Route Action     03/15/2021 0043 cefTRIAXone (ROCEPHIN) 2,000 mg in dextrose 5 % 50 mL IVPB 2,000 mg Intravenous New Bag     03/15/2021 0055 acetaminophen (TYLENOL) tablet 975 mg 975 mg Oral Given     03/15/2021 0110 sodium chloride 0 9 % bolus 1,000 mL 1,000 mL Intravenous New Bag     03/15/2021 0135 iohexol (OMNIPAQUE) 350 MG/ML injection (MULTI-DOSE) 100 mL 100 mL Intravenous Given     03/15/2021 0611 acetaminophen (TYLENOL) tablet 325 mg 325 mg Oral Given     03/15/2021 0612 sodium chloride 0 9 % infusion 50 mL/hr Intravenous New Bag     03/15/2021 0804 docusate sodium (COLACE) capsule 100 mg 100 mg Oral Given     03/15/2021 0804 senna (SENOKOT) tablet 8 6 mg 8 6 mg Oral Given     03/15/2021 0804 heparin (porcine) subcutaneous injection 5,000 Units 5,000 Units Subcutaneous Given     03/15/2021 0804 aspirin chewable tablet 81 mg 81 mg Oral Given     03/15/2021 0804 metoprolol succinate (TOPROL-XL) 24 hr tablet 100 mg 100 mg Oral Given     03/15/2021 0804 gabapentin (NEURONTIN) capsule 600 mg 600 mg Oral Given        Past Medical History:   Diagnosis Date    Arthritis     Hyperlipidemia     Hypertension     Poor circulation     Sleep apnea     Type 2 diabetes mellitus ([place] [address] )      Present on Admission:   Inflammatory arthritis   Acute kidney injury superimposed on chronic kidney disease ([facility])   Severe sepsis ([facility])   Diabetes ([place] [address] )   Cystitis   Abnormal CT of the abdomen   Polyne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 ([facility])   Abdominal pain   Tremor   Hypertension   CHF (congestive heart failure) ([facility])      Admitting Diagnosis: Shaking [R25 1]  Sepsis ([place] [address] ) [A41 9]  Age/Sex: 79 y o  male  Admission Orders:  Scheduled Medications:  amLODIPine, 10 mg, Oral, Daily  aspirin, 81 mg, Oral, Daily  cefepime, 2,000 mg, Intravenous, Q12H  docusate sodium, 100 mg, Oral, BID  donepezil, 10 mg, Oral, HS  gabapentin, 600 mg, Oral, TID  heparin (porcine), 5,000 Units, Subcutaneous, Q8H WILLARD  insulin glargine, 15 Units, Subcutaneous, HS  insulin lispro, 1-5 Units, Subcutaneous, TID AC  insulin lispro, 1-5 Units, Subcutaneous, HS  losartan, 50 mg, Oral, Daily  metoprolol succinate, 100 mg, Oral, Daily  senna, 1 tablet, Oral, Daily      Continuous IV Infusions:sodium chloride 0 9 % infusion   Rate: 50 mL/hr Dose: 50 mL/hr  Freq: Continuous Route: IV  Indications of Use: IV Hydration  Last Dose: Stopped (03/15/21 1455)  Start: 03/15/21 0600 End: 03/15/21 1111     PRN Meds:  acetaminophen, 650 mg, Oral, Q6H PRN X 1 3/16  calcium carbonate, 1,000 mg, Oral, Daily PRN  ondansetron, 4 mg, Intravenous, Q6H PRN      SCD's  Diabetic diet      Network Utilization Review Department  ATTENTION: Please call with any questions or concerns to [phone] and carefully listen to the prompts so that you are directed to the right person  All voicemails are confidential   Jarrod Castorena all requests for admission clinical reviews, approved or denied determinations and any other requests to dedicated fax number below belonging to the campus where the patient is receiving treatment   List of dedicated fax numbers for the Facilities:  [address] DENIALS (Administrative/Medical Necessity) [phone]   [address] (Maternity/NICU/Pediatrics) [phone]   [address] [place] [address] [address] [facility] [facility] [address] Bonita Foy [address] (Sandie Marina "Chelle" 103) [address] [place] [address] [address]uia [address] [place]) [address] [place] [address] [phone]

## 2021-03-16 NOTE — ASSESSMENT & PLAN NOTE
· CT A/P showing bladder wall thickening concerning for cystitis  · Urine culture prelim strap, 1 of 2 blood cultures on admission gram-(+)  · Continue with cefepime as initiated  · Follow-up blood culture, urine culture  · COVID/flu/RSV negative on admit

## 2021-03-16 NOTE — PROGRESS NOTES
[address] [facility]  SL Progress Note Clayton Fernandez 1950, 79 y o  male MRN: [id number]  Unit/Bed#: -Paty Encounter: [id number]  Primary Care Provider: Darlene Leung   Date and time admitted to hospital: 3/15/2021 12:13 AM    * Cystitis  Assessment & Plan  · CT A/P showing bladder wall thickening concerning for cystitis  · Urine culture prelim strap, 1 of 2 blood cultures on admission gram-(+)  · Continue with cefepime as initiated  · Follow-up blood culture, urine culture  · COVID/flu/RSV negative on admit    Severe sepsis [facility])  Assessment & Plan  · Lactic acidosis, leukocytosis, hyperthermia on admit, with suspected source of infection being cystitis/possibly bacteremia  · Continue with plan outlined in cystitis  · Will follow blood cultures and repeat in AM - consider ID consultation     Acute kidney injury superimposed on chronic kidney disease [facility])  Assessment & Plan  Lab Results   Component Value Date    EGFR 55 03/15/2021    EGFR 88 08/15/2019    EGFR 88 08/21/2018    CREATININE 1 30 03/15/2021    CREATININE 0 88 08/15/2019    CREATININE 0 89 08/21/2018     · Likely prerenal in setting of sepsis - repeat labs pending   · Given IV fluids in the ED    Diabetes [facility])  Assessment & Plan  Lab Results   Component Value Date    HGBA1C 6 3 (H) 03/15/2021       Recent Labs     03/15/21  0809 03/15/21  1130 03/15/21  1717 03/16/21  0719   POCGLU 105 214* 118 131       Blood Sugar Average: Last 72 hrs:  (P) 142   · Controlled based on A1c  · Patient is on 40 units lantus nightly, glucose levels are not severely elevated, started on 15 and titrate up as needed  · Continue CCO diet, SSI #1  · ACHS blood glucose checks    Polyne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 [facility])  Assessment & Plan  · Follows with neurology as an outpatient    Abnormal CT of the abdomen  Assessment & Plan  · Thyroid nodule noted, non-emergent thyroid ultrasound to follow up outpatient   · Pancreatic atrophy with calcifications which could suggest chronic pancreatitis; fatty liver  · Follow up with PCP for further management    CHF (congestive heart failure) ([facility] [address] )  Assessment & Plan  Wt Readings from Last 3 Encounters:   03/15/21 113 kg (250 lb)   08/10/20 117 kg (257 lb)   02/21/20 115 kg (254 lb)     · Does have lower extremity edema which is chronic  · No recent echo on file on epic or care everywhere  · Holding lasix in setting of JANIS suspected to be pre renal  · Monitor weights and intake/output    Hypertension  Assessment & Plan  · Resume home regime, BPs are elevated    Tremor  Assessment & Plan  · Has suspected parkinson's disease  · Follows with neurology as an outpatient     Inflammatory arthritis  Assessment & Plan  · On plaquenil at home, hold for now in setting of sepsis    Abdominal pain  Assessment & Plan  · Likely from dyspepsia as patient reports bloating and constipation but CT also showing cystitis, does drink alcohol 2-3 mixed drinks daily  · Nausea and vomiting could be in setting of cystitis but given chronic abdominal pain, chronic pancreatitis is still a possibility   · Lipase normal  · Continue with pain control   · Recommend outpatient GI evaluation        VTE Pharmacologic Prophylaxis:   Pharmacologic: Heparin  Mechanical VTE Prophylaxis in Place: No    Patient Centered Rounds: I have performed bedside rounds with nursing staff today  Discussions with Specialists or Other Care Team Provider: CM     Education and Discussions with Family / Patient: patient declined call    Time Spent for Care: 30 minutes  More than 50% of total time spent on counseling and coordination of care as described above      Current Length of Stay: 1 day(s)    Current Patient Status: Inpatient   Certification Statement: The patient will continue to require additional inpatient hospital stay due to pending clinical stability, treatment of above conditions    Discharge Plan: anticipate 48+ hrs     Code Status: Level 1 - Full Code      Subjective:   Patient seen this morning, we reviewed his current diagnosis and workup thus far  He denies CP, SOB, fever  He denies abdominal pain, dysuria, or flank pain  We discussed his possible parkinson disease diagnosis as he does not feel the sinemet helps  Objective:     Vitals:   Temp (24hrs), Av 7 °F (37 1 °C), Min:97 6 °F (36 4 °C), Max:99 3 °F (37 4 °C)    Temp:  [97 6 °F (36 4 °C)-99 3 °F (37 4 °C)] 97 6 °F (36 4 °C)  HR:  [52-68] 53  Resp:  [17-18] 18  BP: (168-175)/(72-78) 171/78  SpO2:  [92 %-96 %] 96 %  Body mass index is 35 87 kg/m²  Input and Output Summary (last 24 hours): Intake/Output Summary (Last 24 hours) at 3/16/2021 0931  Last data filed at 3/16/2021 0601  Gross per 24 hour   Intake 327 5 ml   Output 1920 ml   Net -1592 5 ml       Physical Exam:     Physical Exam  Vitals signs and nursing note reviewed  Constitutional:       General: He is not in acute distress  Appearance: Normal appearance  He is obese  He is not toxic-appearing  Cardiovascular:      Rate and Rhythm: Normal rate and regular rhythm  Heart sounds: No murmur  Pulmonary:      Effort: Pulmonary effort is normal  No respiratory distress  Breath sounds: Normal breath sounds  Abdominal:      General: Bowel sounds are normal  There is no distension  Palpations: Abdomen is soft  Tenderness: There is no abdominal tenderness  Musculoskeletal:      Right lower leg: No edema  Left lower leg: No edema  Skin:     Coloration: Skin is not pale  Neurological:      Mental Status: He is alert and oriented to person, place, and time     Psychiatric:         Behavior: Behavior normal          Additional Data:     Labs:    Results from last 7 days   Lab Units 03/15/21  0032   WBC Thousand/uL 13 76*   HEMOGLOBIN g/dL 13 3   HEMATOCRIT % 37 2   PLATELETS Thousands/uL 105*   NEUTROS PCT % 88*   LYMPHS PCT % 4*   MONOS PCT % 7   EOS PCT % 0     Results from last 7 days Lab Units 03/15/21  0032   SODIUM mmol/L 139   POTASSIUM mmol/L 4 0   CHLORIDE mmol/L 102   CO2 mmol/L 26   BUN mg/dL 23   CREATININE mg/dL 1 30   ANION GAP mmol/L 11   CALCIUM mg/dL 9 7   ALBUMIN g/dL 4 0   TOTAL BILIRUBIN mg/dL 0 94   ALK PHOS U/L 93   ALT U/L 42   AST U/L 24   GLUCOSE RANDOM mg/dL 232*     Results from last 7 days   Lab Units 03/15/21  0032   INR  1 09     Results from last 7 days   Lab Units 03/16/21  0719 03/15/21  1717 03/15/21  1130 03/15/21  0809   POC GLUCOSE mg/dl 131 118 214* 105     Results from last 7 days   Lab Units 03/15/21  0032   HEMOGLOBIN A1C % 6 3*     Results from last 7 days   Lab Units 03/15/21  0752 03/15/21  0451 03/15/21  0032   LACTIC ACID mmol/L 2 0 2 3* 2 0   PROCALCITONIN ng/ml  --   --  0 12           * I Have Reviewed All Lab Data Listed Above  * Additional Pertinent Lab Tests Reviewed:  [address] [facility] Admission Reviewed    Imaging:    Imaging Reports Reviewed Today Include: CT a/p       Recent Cultures (last 7 days):     Results from last 7 days   Lab Units 03/15/21  0042 03/15/21  0032   BLOOD CULTURE  No Growth at 24 hrs   --    GRAM STAIN RESULT   --  Gram positive cocci in clusters*   URINE CULTURE  <10,000 cfu/ml Streptococcus species*  --        Last 24 Hours Medication List:   Current Facility-Administered Medications   Medication Dose Route Frequency Provider Last Rate    acetaminophen  650 mg Oral Q6H PRN Joshua Veliz MD      amLODIPine  10 mg Oral Daily Kamala Espitia PA-C      aspirin  81 mg Oral Daily Joshua Veliz MD      calcium carbonate  1,000 mg Oral Daily PRN Joshua Veliz MD      cefepime  2,000 mg Intravenous Q12H Joshua Veliz MD 2,000 mg (03/16/21 7237)    docusate sodium  100 mg Oral BID Joshua Veliz MD      donepezil  10 mg Oral HS Joshua Veliz MD      gabapentin  600 mg Oral TID Joshua Veliz MD      heparin (porcine)  5,000 Units Subcutaneous [facility] Joshua Veliz MD      insulin glargine  15 Units Subcutaneous HS Giovani Chester MD      insulin lispro  1-5 Units Subcutaneous [facility] Giovani Chester MD      insulin lispro  1-5 Units Subcutaneous HS Giovani Chester MD      metoprolol succinate  100 mg Oral Daily Giovani Chester MD      ondansetron  4 mg Intravenous Q6H PRN Giovani Chester MD      senna  1 tablet Oral Daily Giovani Chester MD          Today, Patient Was Seen By: Edilma Martinez PA-C    ** Please Note: Dictation voice to text software may have been used in the creation of this document   **

## 2021-03-16 NOTE — ASSESSMENT & PLAN NOTE
· Lactic acidosis, leukocytosis, hyperthermia on admit, with suspected source of infection being cystitis/possibly bacteremia  · Continue with plan outlined in cystitis  · Will follow blood cultures and repeat in AM - consider ID consultation

## 2021-03-16 NOTE — ASSESSMENT & PLAN NOTE
· Likely from dyspepsia as patient reports bloating and constipation but CT also showing cystitis, does drink alcohol 2-3 mixed drinks daily    · Nausea and vomiting could be in setting of cystitis but given chronic abdominal pain, chronic pancreatitis is still a possibility   · Lipase normal  · Continue with pain control   · Recommend outpatient GI evaluation

## 2021-03-16 NOTE — ASSESSMENT & PLAN NOTE
· Thyroid nodule noted, non-emergent thyroid ultrasound to follow up outpatient   · Pancreatic atrophy with calcifications which could suggest chronic pancreatitis; fatty liver  · Follow up with PCP for further management

## 2021-03-17 LAB
ALBUMIN SERPL BCP-MCNC: 3.1 G/DL (ref 3.5–5)
ALP SERPL-CCNC: 68 U/L (ref 46–116)
ALT SERPL W P-5'-P-CCNC: 36 U/L (ref 12–78)
ANION GAP SERPL CALCULATED.3IONS-SCNC: 5 MMOL/L (ref 4–13)
AST SERPL W P-5'-P-CCNC: 22 U/L (ref 5–45)
BASOPHILS # BLD AUTO: 0.01 THOUSANDS/ΜL (ref 0–0.1)
BASOPHILS NFR BLD AUTO: 0 % (ref 0–1)
BILIRUB SERPL-MCNC: 0.62 MG/DL (ref 0.2–1)
BUN SERPL-MCNC: 13 MG/DL (ref 5–25)
CALCIUM ALBUM COR SERPL-MCNC: 10.1 MG/DL (ref 8.3–10.1)
CALCIUM SERPL-MCNC: 9.4 MG/DL (ref 8.3–10.1)
CHLORIDE SERPL-SCNC: 106 MMOL/L (ref 100–108)
CO2 SERPL-SCNC: 28 MMOL/L (ref 21–32)
CREAT SERPL-MCNC: 0.88 MG/DL (ref 0.6–1.3)
EOSINOPHIL # BLD AUTO: 0.03 THOUSAND/ΜL (ref 0–0.61)
EOSINOPHIL NFR BLD AUTO: 1 % (ref 0–6)
ERYTHROCYTE [DISTWIDTH] IN BLOOD BY AUTOMATED COUNT: 13.1 % (ref 11.6–15.1)
GFR SERPL CREATININE-BSD FRML MDRD: 87 ML/MIN/1.73SQ M
GLUCOSE SERPL-MCNC: 168 MG/DL (ref 65–140)
GLUCOSE SERPL-MCNC: 168 MG/DL (ref 65–140)
GLUCOSE SERPL-MCNC: 202 MG/DL (ref 65–140)
GLUCOSE SERPL-MCNC: 217 MG/DL (ref 65–140)
GLUCOSE SERPL-MCNC: 259 MG/DL (ref 65–140)
GLUCOSE SERPL-MCNC: 270 MG/DL (ref 65–140)
HCT VFR BLD AUTO: 34.9 % (ref 36.5–49.3)
HGB BLD-MCNC: 12.4 G/DL (ref 12–17)
IMM GRANULOCYTES # BLD AUTO: 0.01 THOUSAND/UL (ref 0–0.2)
IMM GRANULOCYTES NFR BLD AUTO: 0 % (ref 0–2)
LYMPHOCYTES # BLD AUTO: 1.36 THOUSANDS/ΜL (ref 0.6–4.47)
LYMPHOCYTES NFR BLD AUTO: 36 % (ref 14–44)
MAGNESIUM SERPL-MCNC: 1.9 MG/DL (ref 1.6–2.6)
MCH RBC QN AUTO: 34.9 PG (ref 26.8–34.3)
MCHC RBC AUTO-ENTMCNC: 35.5 G/DL (ref 31.4–37.4)
MCV RBC AUTO: 98 FL (ref 82–98)
MONOCYTES # BLD AUTO: 0.6 THOUSAND/ΜL (ref 0.17–1.22)
MONOCYTES NFR BLD AUTO: 16 % (ref 4–12)
NEUTROPHILS # BLD AUTO: 1.82 THOUSANDS/ΜL (ref 1.85–7.62)
NEUTS SEG NFR BLD AUTO: 47 % (ref 43–75)
NRBC BLD AUTO-RTO: 0 /100 WBCS
PLATELET # BLD AUTO: 89 THOUSANDS/UL (ref 149–390)
PMV BLD AUTO: 10.1 FL (ref 8.9–12.7)
POTASSIUM SERPL-SCNC: 3.9 MMOL/L (ref 3.5–5.3)
PROT SERPL-MCNC: 6.5 G/DL (ref 6.4–8.2)
RBC # BLD AUTO: 3.55 MILLION/UL (ref 3.88–5.62)
SODIUM SERPL-SCNC: 139 MMOL/L (ref 136–145)
WBC # BLD AUTO: 3.83 THOUSAND/UL (ref 4.31–10.16)

## 2021-03-17 PROCEDURE — 83735 ASSAY OF MAGNESIUM: CPT | Performed by: STUDENT IN AN ORGANIZED HEALTH CARE EDUCATION/TRAINING PROGRAM

## 2021-03-17 PROCEDURE — 99232 SBSQ HOSP IP/OBS MODERATE 35: CPT | Performed by: PHYSICIAN ASSISTANT

## 2021-03-17 PROCEDURE — 85025 COMPLETE CBC W/AUTO DIFF WBC: CPT | Performed by: STUDENT IN AN ORGANIZED HEALTH CARE EDUCATION/TRAINING PROGRAM

## 2021-03-17 PROCEDURE — 80053 COMPREHEN METABOLIC PANEL: CPT | Performed by: STUDENT IN AN ORGANIZED HEALTH CARE EDUCATION/TRAINING PROGRAM

## 2021-03-17 PROCEDURE — 82948 REAGENT STRIP/BLOOD GLUCOSE: CPT

## 2021-03-17 RX ORDER — LANOLIN ALCOHOL/MO/W.PET/CERES
6 CREAM (GRAM) TOPICAL
Status: DISCONTINUED | OUTPATIENT
Start: 2021-03-17 | End: 2021-03-18 | Stop reason: HOSPADM

## 2021-03-17 RX ORDER — LOSARTAN POTASSIUM 50 MG/1
100 TABLET ORAL DAILY
Status: DISCONTINUED | OUTPATIENT
Start: 2021-03-17 | End: 2021-03-18 | Stop reason: HOSPADM

## 2021-03-17 RX ORDER — INSULIN GLARGINE 100 [IU]/ML
25 INJECTION, SOLUTION SUBCUTANEOUS
Status: DISCONTINUED | OUTPATIENT
Start: 2021-03-17 | End: 2021-03-17

## 2021-03-17 RX ORDER — INSULIN GLARGINE 100 [IU]/ML
35 INJECTION, SOLUTION SUBCUTANEOUS
Status: DISCONTINUED | OUTPATIENT
Start: 2021-03-17 | End: 2021-03-18 | Stop reason: HOSPADM

## 2021-03-17 RX ADMIN — INSULIN LISPRO 1 UNITS: 100 INJECTION, SOLUTION INTRAVENOUS; SUBCUTANEOUS at 21:47

## 2021-03-17 RX ADMIN — MELATONIN 6 MG: 3 TAB ORAL at 21:45

## 2021-03-17 RX ADMIN — AMLODIPINE BESYLATE 10 MG: 10 TABLET ORAL at 10:00

## 2021-03-17 RX ADMIN — CEFEPIME HYDROCHLORIDE 2000 MG: 2 INJECTION, POWDER, FOR SOLUTION INTRAVENOUS at 21:49

## 2021-03-17 RX ADMIN — GABAPENTIN 600 MG: 300 CAPSULE ORAL at 17:45

## 2021-03-17 RX ADMIN — INSULIN LISPRO 1 UNITS: 100 INJECTION, SOLUTION INTRAVENOUS; SUBCUTANEOUS at 10:08

## 2021-03-17 RX ADMIN — ASPIRIN 81 MG: 81 TABLET, CHEWABLE ORAL at 10:00

## 2021-03-17 RX ADMIN — DOCUSATE SODIUM 100 MG: 100 CAPSULE, LIQUID FILLED ORAL at 17:45

## 2021-03-17 RX ADMIN — HEPARIN SODIUM 5000 UNITS: 5000 INJECTION INTRAVENOUS; SUBCUTANEOUS at 21:49

## 2021-03-17 RX ADMIN — CEFEPIME HYDROCHLORIDE 2000 MG: 2 INJECTION, POWDER, FOR SOLUTION INTRAVENOUS at 10:01

## 2021-03-17 RX ADMIN — LOSARTAN POTASSIUM 100 MG: 50 TABLET, FILM COATED ORAL at 10:00

## 2021-03-17 RX ADMIN — METOPROLOL SUCCINATE 100 MG: 100 TABLET, EXTENDED RELEASE ORAL at 10:00

## 2021-03-17 RX ADMIN — HEPARIN SODIUM 5000 UNITS: 5000 INJECTION INTRAVENOUS; SUBCUTANEOUS at 06:11

## 2021-03-17 RX ADMIN — DOCUSATE SODIUM 100 MG: 100 CAPSULE, LIQUID FILLED ORAL at 10:00

## 2021-03-17 RX ADMIN — GABAPENTIN 600 MG: 300 CAPSULE ORAL at 10:00

## 2021-03-17 RX ADMIN — INSULIN LISPRO 2 UNITS: 100 INJECTION, SOLUTION INTRAVENOUS; SUBCUTANEOUS at 17:44

## 2021-03-17 RX ADMIN — HEPARIN SODIUM 5000 UNITS: 5000 INJECTION INTRAVENOUS; SUBCUTANEOUS at 17:47

## 2021-03-17 RX ADMIN — GABAPENTIN 600 MG: 300 CAPSULE ORAL at 21:45

## 2021-03-17 RX ADMIN — DONEPEZIL HYDROCHLORIDE 10 MG: 5 TABLET, FILM COATED ORAL at 21:46

## 2021-03-17 RX ADMIN — INSULIN GLARGINE 35 UNITS: 100 INJECTION, SOLUTION SUBCUTANEOUS at 21:47

## 2021-03-17 NOTE — PROGRESS NOTES
[address] [facility]  SL Progress Note Esperanza Vera 1950, 79 y o  male MRN: [id number]  Unit/Bed#: -01 Encounter: [id number]  Primary Care Provider: Mattie Funk   Date and time admitted to hospital: 3/15/2021 12:13 AM    * Cystitis  Assessment & Plan  · CT A/P showing bladder wall thickening concerning for cystitis  · Urine culture prelim strep, 1 of 2 blood cultures on admission gram-(+) cocci in clusters (suspect contaminant)  · Continue with cefepime as initiated  · Follow-up blood culture, urine culture results/susceptibilities   · COVID/flu/RSV negative on admit    Severe sepsis [facility])  Assessment & Plan  · Lactic acidosis, leukocytosis, hyperthermia on admit, with suspected source of infection being cystitis  · Continue with plan outlined in cystitis  · Will follow blood cultures, consider repeat today    Acute kidney injury superimposed on chronic kidney disease [facility])  Assessment & Plan  Lab Results   Component Value Date    EGFR 87 03/17/2021    EGFR 82 03/16/2021    EGFR 55 03/15/2021    CREATININE 0 88 03/17/2021    CREATININE 0 94 03/16/2021    CREATININE 1 30 03/15/2021     · Likely prerenal in setting of sepsis - repeat labs improved to baseline  · Given IV fluids in the ED, no need to continue    Diabetes [facility])  Assessment & Plan  Lab Results   Component Value Date    HGBA1C 6 3 (H) 03/15/2021       Recent Labs     03/15/21  1717 03/16/21  0719 03/16/21  1051 03/16/21  1627   POCGLU 118 131 181* 218*       Blood Sugar Average: Last 72 hrs:  (P) 107 [id number]   · Controlled based on A1c  · Patient is on 40 units lantus nightly, glucose levels are not severely elevated, started on 15 and will titrate as intake good  · Continue CCO diet, SSI #1  · ACHS blood glucose checks    Polyne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 [facility])  Assessment & Plan  · Follows with neurology as an outpatient    Abnormal CT of the abdomen  Assessment & Plan  · Thyroid nodule noted, non-emergent thyroid ultrasound to follow up outpatient   · Pancreatic atrophy with calcifications which could suggest chronic pancreatitis; fatty liver  · Follow up with PCP for further management    CHF (congestive heart failure) ([facility] [address] )  Assessment & Plan  Wt Readings from Last 3 Encounters:   03/17/21 113 kg (249 lb 1 9 oz)   08/10/20 117 kg (257 lb)   02/21/20 115 kg (254 lb)     · Does have lower extremity edema which is chronic  · No recent echo on file on [facility] or care everywhere  · Monitor weights and intake/output  · Will consider restarting lasix today    Hypertension  Assessment & Plan  · Resumed home medications 3/16, BPs are elevated and improving   · Continue to monitor per unit  · Amlodipine 10 mg daily, losartan 100 mg daily (sub for irbesartan 300 mg)  · Will consider restarting HCTZ +/- lasix today    Tremor  Assessment & Plan  · Has suspected parkinson's disease  · Follows with neurology as an outpatient     Inflammatory arthritis  Assessment & Plan  · On plaquenil at home, hold for now in setting of sepsis    Abdominal pain  Assessment & Plan  · Likely from dyspepsia as patient reports bloating and constipation but CT also showing cystitis, does drink alcohol 2-3 mixed drinks daily  · Nausea and vomiting could be in setting of cystitis but given chronic abdominal pain, chronic pancreatitis is still a possibility   · Lipase normal  · Continue with pain control   · Recommend outpatient GI evaluation        VTE Pharmacologic Prophylaxis:   Pharmacologic: Heparin  Mechanical VTE Prophylaxis in Place: No    Patient Centered Rounds: I have performed bedside rounds with nursing staff today  Discussions with Specialists or Other Care Team Provider: SUKHJINDER     Education and Discussions with Family / Patient: patient declines call to s/o     Time Spent for Care: 20 minutes  More than 50% of total time spent on counseling and coordination of care as described above      Current Length of Stay: 2 day(s)    Current Patient Status: Inpatient   Certification Statement: The patient will continue to require additional inpatient hospital stay due to BP management, IV abx    Discharge Plan: anticipate d/c home tomorrow if remains stable     Code Status: Level 1 - Full Code      Subjective:   Significant other at the bedside, patient feeling better today  He is concerned about the blood pressure this morning which is better on a manual check  He is also concerned about his blood glucose values which are becoming more more elevated  We discussed his home regimen, will increased back to 35 units of Lantus  Patient feels better than when he came in, denies any subjective fevers overnight  No chest pain or shortness of breath  No further dysuria  No back or flank pain  Did have a bowel movement this morning  Objective:     Vitals:   Temp (24hrs), Av °F (36 7 °C), Min:97 8 °F (36 6 °C), Max:98 2 °F (36 8 °C)    Temp:  [97 8 °F (36 6 °C)-98 2 °F (36 8 °C)] 97 8 °F (36 6 °C)  HR:  [52] 52  Resp:  [19] 19  BP: (162-174)/(73-87) 171/73  SpO2:  [94 %] 94 %  Body mass index is 35 74 kg/m²  Input and Output Summary (last 24 hours): Intake/Output Summary (Last 24 hours) at 3/17/2021 0820  Last data filed at 3/16/2021 1300  Gross per 24 hour   Intake 720 ml   Output --   Net 720 ml       Physical Exam:     Physical Exam  Vitals signs and nursing note reviewed  Constitutional:       General: He is not in acute distress  Appearance: Normal appearance  He is obese  He is not toxic-appearing  Cardiovascular:      Rate and Rhythm: Normal rate and regular rhythm  Heart sounds: No murmur  Pulmonary:      Effort: Pulmonary effort is normal  No respiratory distress  Breath sounds: Normal breath sounds  Abdominal:      General: Bowel sounds are normal  There is no distension  Palpations: Abdomen is soft  Tenderness: There is no abdominal tenderness     Musculoskeletal:      Right lower leg: No edema  Left lower leg: No edema  Comments: Compression stockings in place   Skin:     Coloration: Skin is not pale  Neurological:      Mental Status: He is alert and oriented to person, place, and time  Psychiatric:         Behavior: Behavior normal          Additional Data:     Labs:    Results from last 7 days   Lab Units 03/17/21  0445   WBC Thousand/uL 3 83*   HEMOGLOBIN g/dL 12 4   HEMATOCRIT % 34 9*   PLATELETS Thousands/uL 89*   NEUTROS PCT % 47   LYMPHS PCT % 36   MONOS PCT % 16*   EOS PCT % 1     Results from last 7 days   Lab Units 03/17/21  0445   SODIUM mmol/L 139   POTASSIUM mmol/L 3 9   CHLORIDE mmol/L 106   CO2 mmol/L 28   BUN mg/dL 13   CREATININE mg/dL 0 88   ANION GAP mmol/L 5   CALCIUM mg/dL 9 4   ALBUMIN g/dL 3 1*   TOTAL BILIRUBIN mg/dL 0 62   ALK PHOS U/L 68   ALT U/L 36   AST U/L 22   GLUCOSE RANDOM mg/dL 168*     Results from last 7 days   Lab Units 03/15/21  0032   INR  1 09     Results from last 7 days   Lab Units 03/16/21  1627 03/16/21  1051 03/16/21  0719 03/15/21  1717 03/15/21  1130 03/15/21  0809   POC GLUCOSE mg/dl 218* 181* 131 118 214* 105     Results from last 7 days   Lab Units 03/15/21  0032   HEMOGLOBIN A1C % 6 3*     Results from last 7 days   Lab Units 03/16/21  1023 03/15/21  0752 03/15/21  0451 03/15/21  0032   LACTIC ACID mmol/L  --  2 0 2 3* 2 0   PROCALCITONIN ng/ml 1 99*  --   --  0 12           * I Have Reviewed All Lab Data Listed Above  * Additional Pertinent Lab Tests Reviewed:  All Labs Within Last 24 Hours Reviewed      Recent Cultures (last 7 days):     Results from last 7 days   Lab Units 03/15/21  0042 03/15/21  0032   BLOOD CULTURE  No Growth at 48 hrs   --    GRAM STAIN RESULT   --  Gram positive cocci in clusters*   URINE CULTURE  <10,000 cfu/ml Streptococcus species*  --        Last 24 Hours Medication List:   Current Facility-Administered Medications   Medication Dose Route Frequency Provider Last Rate    acetaminophen  650 mg Oral Q6H PRN Patricia Mcguire MD      amLODIPine  10 mg Oral Daily Rukhsana Conrad PA-C      aspirin  81 mg Oral Daily Patricia Mcguire MD      calcium carbonate  1,000 mg Oral Daily PRN Patricia Mcguire MD      cefepime  2,000 mg Intravenous Q12H Patricia Mcguire MD 2,000 mg (03/16/21 3128)    docusate sodium  100 mg Oral BID Patricia Mcguire MD      donepezil  10 mg Oral HS Patricia Mcguire MD      gabapentin  600 mg Oral TID Patricia Mcguire MD      heparin (porcine)  5,000 Units Subcutaneous [facility] Patricia Mcguire MD      insulin glargine  25 Units Subcutaneous HS Kamala Espitia PA-C      insulin lispro  1-5 Units Subcutaneous TID [facility] Patricia Mcguire MD      insulin lispro  1-5 Units Subcutaneous HS Patricia Mcguire MD      losartan  100 mg Oral Daily Kamala Espitia PA-C      metoprolol succinate  100 mg Oral Daily Patricia Mcguire MD      ondansetron  4 mg Intravenous Q6H PRN Patricia Mcguire MD      senna  1 tablet Oral Daily Patricia Mcguire MD          Today, Patient Was Seen By: Rukhsana Conrad PA-C    ** Please Note: Dictation voice to text software may have been used in the creation of this document   **

## 2021-03-17 NOTE — ASSESSMENT & PLAN NOTE
· Resumed home medications 3/16, BPs are elevated and improving   · Continue to monitor per unit  · Amlodipine 10 mg daily, losartan 100 mg daily (sub for irbesartan 300 mg)  · Will consider restarting HCTZ +/- lasix today

## 2021-03-17 NOTE — ASSESSMENT & PLAN NOTE
Wt Readings from Last 3 Encounters:   03/17/21 113 kg (249 lb 1 9 oz)   08/10/20 117 kg (257 lb)   02/21/20 115 kg (254 lb)     · Does have lower extremity edema which is chronic  · No recent echo on file on [facility] or care everywhere  · Monitor weights and intake/output  · Will consider restarting lasix today

## 2021-03-17 NOTE — ASSESSMENT & PLAN NOTE
Lab Results   Component Value Date    EGFR 87 03/17/2021    EGFR 82 03/16/2021    EGFR 55 03/15/2021    CREATININE 0 88 03/17/2021    CREATININE 0 94 03/16/2021    CREATININE 1 30 03/15/2021     · Likely prerenal in setting of sepsis - repeat labs improved to baseline  · Given IV fluids in the ED, no need to continue

## 2021-03-17 NOTE — ASSESSMENT & PLAN NOTE
· Lactic acidosis, leukocytosis, hyperthermia on admit, with suspected source of infection being cystitis  · Continue with plan outlined in cystitis  · Will follow blood cultures, consider repeat today

## 2021-03-17 NOTE — ASSESSMENT & PLAN NOTE
· CT A/P showing bladder wall thickening concerning for cystitis  · Urine culture prelim strep, 1 of 2 blood cultures on admission gram-(+) cocci in clusters (suspect contaminant)  · Continue with cefepime as initiated  · Follow-up blood culture, urine culture results/susceptibilities   · COVID/flu/RSV negative on admit

## 2021-03-18 VITALS
BODY MASS INDEX: 35.66 KG/M2 | HEIGHT: 70 IN | RESPIRATION RATE: 18 BRPM | TEMPERATURE: 97.8 F | HEART RATE: 56 BPM | OXYGEN SATURATION: 99 % | SYSTOLIC BLOOD PRESSURE: 185 MMHG | DIASTOLIC BLOOD PRESSURE: 80 MMHG | WEIGHT: 249.12 LBS

## 2021-03-18 LAB
ALBUMIN SERPL BCP-MCNC: 3.1 G/DL (ref 3.5–5)
ALP SERPL-CCNC: 72 U/L (ref 46–116)
ALT SERPL W P-5'-P-CCNC: 40 U/L (ref 12–78)
ANION GAP SERPL CALCULATED.3IONS-SCNC: 7 MMOL/L (ref 4–13)
AST SERPL W P-5'-P-CCNC: 27 U/L (ref 5–45)
BACTERIA BLD CULT: ABNORMAL
BASOPHILS # BLD AUTO: 0.01 THOUSANDS/ΜL (ref 0–0.1)
BASOPHILS NFR BLD AUTO: 0 % (ref 0–1)
BILIRUB SERPL-MCNC: 0.71 MG/DL (ref 0.2–1)
BUN SERPL-MCNC: 14 MG/DL (ref 5–25)
CALCIUM ALBUM COR SERPL-MCNC: 10 MG/DL (ref 8.3–10.1)
CALCIUM SERPL-MCNC: 9.3 MG/DL (ref 8.3–10.1)
CHLORIDE SERPL-SCNC: 105 MMOL/L (ref 100–108)
CO2 SERPL-SCNC: 27 MMOL/L (ref 21–32)
CREAT SERPL-MCNC: 0.85 MG/DL (ref 0.6–1.3)
EOSINOPHIL # BLD AUTO: 0.02 THOUSAND/ΜL (ref 0–0.61)
EOSINOPHIL NFR BLD AUTO: 1 % (ref 0–6)
ERYTHROCYTE [DISTWIDTH] IN BLOOD BY AUTOMATED COUNT: 13 % (ref 11.6–15.1)
GFR SERPL CREATININE-BSD FRML MDRD: 88 ML/MIN/1.73SQ M
GLUCOSE SERPL-MCNC: 149 MG/DL (ref 65–140)
GLUCOSE SERPL-MCNC: 151 MG/DL (ref 65–140)
GLUCOSE SERPL-MCNC: 224 MG/DL (ref 65–140)
GRAM STN SPEC: ABNORMAL
HCT VFR BLD AUTO: 33.8 % (ref 36.5–49.3)
HGB BLD-MCNC: 11.9 G/DL (ref 12–17)
IMM GRANULOCYTES # BLD AUTO: 0.03 THOUSAND/UL (ref 0–0.2)
IMM GRANULOCYTES NFR BLD AUTO: 1 % (ref 0–2)
LYMPHOCYTES # BLD AUTO: 1.33 THOUSANDS/ΜL (ref 0.6–4.47)
LYMPHOCYTES NFR BLD AUTO: 39 % (ref 14–44)
MAGNESIUM SERPL-MCNC: 1.9 MG/DL (ref 1.6–2.6)
MCH RBC QN AUTO: 34.5 PG (ref 26.8–34.3)
MCHC RBC AUTO-ENTMCNC: 35.2 G/DL (ref 31.4–37.4)
MCV RBC AUTO: 98 FL (ref 82–98)
MONOCYTES # BLD AUTO: 0.38 THOUSAND/ΜL (ref 0.17–1.22)
MONOCYTES NFR BLD AUTO: 11 % (ref 4–12)
NEUTROPHILS # BLD AUTO: 1.64 THOUSANDS/ΜL (ref 1.85–7.62)
NEUTS SEG NFR BLD AUTO: 48 % (ref 43–75)
NRBC BLD AUTO-RTO: 0 /100 WBCS
PLATELET # BLD AUTO: 99 THOUSANDS/UL (ref 149–390)
PMV BLD AUTO: 10.2 FL (ref 8.9–12.7)
POTASSIUM SERPL-SCNC: 3.8 MMOL/L (ref 3.5–5.3)
PROCALCITONIN SERPL-MCNC: 0.87 NG/ML
PROT SERPL-MCNC: 6.5 G/DL (ref 6.4–8.2)
RBC # BLD AUTO: 3.45 MILLION/UL (ref 3.88–5.62)
SODIUM SERPL-SCNC: 139 MMOL/L (ref 136–145)
WBC # BLD AUTO: 3.41 THOUSAND/UL (ref 4.31–10.16)

## 2021-03-18 PROCEDURE — 99239 HOSP IP/OBS DSCHRG MGMT >30: CPT | Performed by: PHYSICIAN ASSISTANT

## 2021-03-18 PROCEDURE — 83735 ASSAY OF MAGNESIUM: CPT | Performed by: STUDENT IN AN ORGANIZED HEALTH CARE EDUCATION/TRAINING PROGRAM

## 2021-03-18 PROCEDURE — 82948 REAGENT STRIP/BLOOD GLUCOSE: CPT

## 2021-03-18 PROCEDURE — 84145 PROCALCITONIN (PCT): CPT | Performed by: PHYSICIAN ASSISTANT

## 2021-03-18 PROCEDURE — 80053 COMPREHEN METABOLIC PANEL: CPT | Performed by: STUDENT IN AN ORGANIZED HEALTH CARE EDUCATION/TRAINING PROGRAM

## 2021-03-18 PROCEDURE — 85025 COMPLETE CBC W/AUTO DIFF WBC: CPT | Performed by: STUDENT IN AN ORGANIZED HEALTH CARE EDUCATION/TRAINING PROGRAM

## 2021-03-18 RX ORDER — CEFDINIR 300 MG/1
300 CAPSULE ORAL EVERY 12 HOURS SCHEDULED
Qty: 6 CAPSULE | Refills: 0 | Status: SHIPPED | OUTPATIENT
Start: 2021-03-18 | End: 2021-03-21

## 2021-03-18 RX ORDER — CEFDINIR 300 MG/1
300 CAPSULE ORAL EVERY 12 HOURS SCHEDULED
Qty: 6 CAPSULE | Refills: 0 | Status: SHIPPED | OUTPATIENT
Start: 2021-03-18 | End: 2021-03-18 | Stop reason: SDUPTHER

## 2021-03-18 RX ADMIN — INSULIN LISPRO 1 UNITS: 100 INJECTION, SOLUTION INTRAVENOUS; SUBCUTANEOUS at 08:34

## 2021-03-18 RX ADMIN — HEPARIN SODIUM 5000 UNITS: 5000 INJECTION INTRAVENOUS; SUBCUTANEOUS at 05:50

## 2021-03-18 RX ADMIN — CEFEPIME HYDROCHLORIDE 2000 MG: 2 INJECTION, POWDER, FOR SOLUTION INTRAVENOUS at 08:33

## 2021-03-18 RX ADMIN — ASPIRIN 81 MG: 81 TABLET, CHEWABLE ORAL at 09:39

## 2021-03-18 RX ADMIN — DOCUSATE SODIUM 100 MG: 100 CAPSULE, LIQUID FILLED ORAL at 09:39

## 2021-03-18 RX ADMIN — AMLODIPINE BESYLATE 10 MG: 10 TABLET ORAL at 09:38

## 2021-03-18 RX ADMIN — INSULIN LISPRO 1 UNITS: 100 INJECTION, SOLUTION INTRAVENOUS; SUBCUTANEOUS at 13:07

## 2021-03-18 RX ADMIN — GABAPENTIN 600 MG: 300 CAPSULE ORAL at 09:47

## 2021-03-18 RX ADMIN — METOPROLOL SUCCINATE 100 MG: 100 TABLET, EXTENDED RELEASE ORAL at 09:39

## 2021-03-18 RX ADMIN — LOSARTAN POTASSIUM 100 MG: 50 TABLET, FILM COATED ORAL at 10:04

## 2021-03-18 NOTE — DISCHARGE SUMMARY
[address]  SL[facility] Discharge- Diana Fox 1950, 79 y o  male MRN: [id number]  Unit/Bed#: [facility]-01 Encounter: [id number]  Primary Care Provider: Richardson Ball   Date and time admitted to hospital: 3/15/2021 12:13 AM    * Cystitis  Assessment & Plan  · CT A/P showing bladder wall thickening concerning for cystitis  · Urine culture strep <10k, however given symptoms at presentation and CT findings will complete treatment course for UTI with transition to cefdinir  · 1 of 2 blood cultures on admission coag negative staph (suspect contaminant)  · COVID/flu/RSV negative on admit  · Procalcitonin down-trending    Severe sepsis (HCC)  Assessment & Plan  · Lactic acidosis, leukocytosis, hyperthermia on admit, with suspected source of infection being cystitis  · Continue with plan outlined in cystitis    Acute kidney injury superimposed on chronic kidney disease [facility])  Assessment & Plan  Lab Results   Component Value Date    EGFR 88 03/18/2021    EGFR 87 03/17/2021    EGFR 82 03/16/2021    CREATININE 0 85 03/18/2021    CREATININE 0 88 03/17/2021    CREATININE 0 94 03/16/2021     · Likely prerenal in setting of sepsis - repeat labs improved to baseline  · Given IV fluids in the ED, no need to continue    Diabetes [facility])  Assessment & Plan  Lab Results   Component Value Date    HGBA1C 6 3 (H) 03/15/2021       Recent Labs     03/17/21  1048 03/17/21  1623 03/17/21  2133 03/18/21  0732   POCGLU 259* 270* 202* 151*       Blood Sugar Average: Last 72 hrs:  (P) 287 [id number]   · Controlled based on A1c  · Patient is on 40 units lantus nightly, titrate up to previous dose  · Continue CCO diet    Polyne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 [facility])  Assessment & Plan  · Follows with neurology as an outpatient    Abnormal CT of the abdomen  Assessment & Plan  · Thyroid nodule noted, non-emergent thyroid ultrasound to follow up outpatient   · Pancreatic atrophy with calcifications which could suggest chronic pancreatitis; fatty liver  · Follow up with PCP for further management    CHF (congestive heart failure) ([facility] [address] )  Assessment & Plan  Wt Readings from Last 3 Encounters:   03/17/21 113 kg (249 lb 1 9 oz)   08/10/20 117 kg (257 lb)   02/21/20 115 kg (254 lb)     · Does have lower extremity edema which is chronic - better with compression, will encourage at discharge  · No recent echo on file on epic or care everywhere  · Monitor weights at home    Hypertension  Assessment & Plan  · BPs read higher with machine, more reasonably controlled with manual checks  · Resume home regimen at discharge     Tremor  Assessment & Plan  · Has suspected parkinson's disease  · Follows with neurology as an outpatient     Inflammatory arthritis  Assessment & Plan  · On plaquenil at home, resume on discharge    Abdominal pain  Assessment & Plan  · Likely from dyspepsia as patient reports bloating and constipation but CT also showing cystitis, does drink alcohol 2-3 mixed drinks daily  · Chronic pancreatitis is a possibility, though lipase normal  · Recommend outpatient GI evaluation        Discharging Physician / Practitioner: Tulio Lamb PA-C  PCP: Rafia Oconnor  Admission Date:   Admission Orders (From admission, onward)     Ordered        03/15/21 0734  Inpatient Admission  Once         03/15/21 0446  Place in Observation  Once                   Discharge Date: 03/18/21    Resolved Problems  Date Reviewed: 3/18/2021    None          Consultations During Hospital Stay:  · None     Procedures Performed:   · None     Significant Findings / Test Results:   CT chest abdomen pelvis w contrast   Final Result by Yeimy Antoine DO (03/15 7352)   No acute process seen in the chest    Fatty infiltration of the liver is suspected  In the setting of abdominal pain and/or elevated liver function tests, consider steatohepatitis  Partially distended bladder    Mild circumferential bladder wall thickening noted, probably exaggerated by underdistention  A cystitis is considered in the appropriate clinical setting  Status post CABG  Other findings as above  Workstation performed: [id number]      XR chest portable - 1 view   ED Interpretation by Belen Avalos MD (03/15 0051)   LLL effusion, ? PNA      Final Result by Blas Alicia MD (03/15 3025)   Minimal blunting left costophrenic angle which could be some pleural thickening or minimal effusion  or artifact of positioning   Workstation performed: [id number]        Results from last 7 days   Lab Units 03/18/21  0526  03/16/21  1023  03/15/21  0042 03/15/21  0032   BUN mg/dL 14   < >  --    < >  --  23   CREATININE mg/dL 0 85   < >  --    < >  --  1 30   PROCALCITONIN ng/ml  --   --  1 99*  --   --  0 12   BLOOD CULTURE   --   --   --   --  No Growth at 72 hrs  Staphylococcus coagulase negative*    < > = values in this interval not displayed  Incidental Findings:   · See above      Test Results Pending at Discharge (will require follow up):   ·      Outpatient Tests Requested:  · GI referral for CT findings, abdominal pain   · Thyroid ultrasound for CT findings   · PCP for hospital discharge     Complications:  None     Reason for Admission: sepsis, UTI     Hospital Course:     Joceline Matthew is a 79 y o  male patient who originally presented to the hospital on 3/15/2021 due to rigors, abdominal pina, nausea  Symptoms ongoing for a few hours before coming to ER  PMHx obesity, HTN, HLD, CAD s/p CABG, CKD, T2DM, reported MONICA  Treated for acute UTI as evidenced by CT findings and symptoms  Resolved with treatment for sepsis  He is now hemodynamically stable for discharge, transition to cefdinir to complete course of treatment as UCx grew strep  Please see above list of diagnoses and related plan for additional information  Condition at Discharge: good     Discharge Day Visit / Exam:     Subjective:  Patient seen this morning, feeling well    No fevers or chills overnight  No chest pain or shortness of breath  No abdominal pain  Last bowel movement was yesterday  No difficulty with urinating, except for his baseline incomplete emptying which causes him to return to the bathroom shortly after urinating  Vitals: Blood Pressure: 132/70 (03/18/21 0739)  Pulse: (!) 50 (03/18/21 0736)  Temperature: 97 8 °F (36 6 °C) (03/18/21 0736)  Temp Source: Oral (03/15/21 0300)  Respirations: 18 (03/18/21 0736)  Height: 5' 10" (177 8 cm) (03/15/21 0018)  Weight - Scale: 113 kg (249 lb 1 9 oz) (03/17/21 0818)  SpO2: 97 % (03/18/21 0736)  Exam:   Physical Exam  Vitals signs and nursing note reviewed  Constitutional:       General: He is not in acute distress  Appearance: Normal appearance  He is obese  He is not ill-appearing or toxic-appearing  Cardiovascular:      Rate and Rhythm: Normal rate and regular rhythm  Heart sounds: No murmur  Pulmonary:      Effort: Pulmonary effort is normal  No respiratory distress  Breath sounds: Normal breath sounds  No wheezing  Abdominal:      General: Bowel sounds are normal       Palpations: Abdomen is soft  Comments: Slightly quiet bowel, however benign   Musculoskeletal:      Right lower leg: No edema  Left lower leg: No edema  Comments: Compression stockings in place   Skin:     Coloration: Skin is not pale  Neurological:      Mental Status: He is alert and oriented to person, place, and time  Psychiatric:         Mood and Affect: Mood normal          Behavior: Behavior normal        Discussion with Family: s/o at bedside yesterday regarding d/c plans today     Discharge instructions/Information to patient and family:   See after visit summary for information provided to patient and family  Provisions for Follow-Up Care:  See after visit summary for information related to follow-up care and any pertinent home health orders        Disposition:     Home    Planned Readmission: none     Discharge Statement:  I spent 40 minutes discharging the patient  This time was spent on the day of discharge  I had direct contact with the patient on the day of discharge  Greater than 50% of the total time was spent examining patient, answering all patient questions, arranging and discussing plan of care with patient as well as directly providing post-discharge instructions  Additional time then spent on discharge activities  Discharge Medications:  See after visit summary for reconciled discharge medications provided to patient and family        ** Please Note: This note has been constructed using a voice recognition system **

## 2021-03-18 NOTE — ASSESSMENT & PLAN NOTE
Lab Results   Component Value Date    HGBA1C 6 3 (H) 03/15/2021       Recent Labs     03/17/21  1048 03/17/21  1623 03/17/21  2133 03/18/21  0732   POCGLU 259* 270* 202* 151*       Blood Sugar Average: Last 72 hrs:  (P) 720 [id number]   · Controlled based on A1c  · Patient is on 40 units lantus nightly, titrate up to previous dose  · Continue CCO diet

## 2021-03-18 NOTE — ASSESSMENT & PLAN NOTE
Wt Readings from Last 3 Encounters:   03/17/21 113 kg (249 lb 1 9 oz)   08/10/20 117 kg (257 lb)   02/21/20 115 kg (254 lb)     · Does have lower extremity edema which is chronic - better with compression, will encourage at discharge  · No recent echo on file on epic or care everywhere  · Monitor weights at home

## 2021-03-18 NOTE — DISCHARGE INSTRUCTIONS
Urinary Tract Infection in Men   WHAT YOU NEED TO KNOW:   A urinary tract infection (UTI) is caused by bacteria that get inside your urinary tract  Most bacteria that enter your urinary tract come out when you urinate  If the bacteria stay in your urinary tract, you may get an infection  Your urinary tract includes your kidneys, ureters, bladder, and urethra  Urine is made in your kidneys, and it flows from the ureters to the bladder  Urine leaves the bladder through the urethra  A UTI is more common in your lower urinary tract, which includes your bladder and urethra  DISCHARGE INSTRUCTIONS:   Seek care immediately if:   · You are urinating very little or not at all  · You have a high fever with shaking chills  · You have side or back pain that gets worse  Contact your healthcare provider if:   · You have a mild fever  · You do not feel better after 2 days of taking antibiotics  · You are vomiting  · You have new symptoms, such as blood or pus in your urine  · You have questions or concerns about your condition or care  Medicines:   · Antibiotics  help fight a bacterial infection  · Medicines  may be given to decrease pain and burning when you urinate  They will also help decrease the feeling that you need to urinate often  These medicines will make your urine orange or red  · Take your medicine as directed  Contact your healthcare provider if you think your medicine is not helping or if you have side effects  Tell him or her if you are allergic to any medicine  Keep a list of the medicines, vitamins, and herbs you take  Include the amounts, and when and why you take them  Bring the list or the pill bottles to follow-up visits  Carry your medicine list with you in case of an emergency  Prevent another UTI:   · Empty your bladder often  Urinate and empty your bladder as soon as you feel the need  Do not hold your urine for long periods of time      · Drink liquids as directed  Ask how much liquid to drink each day and which liquids are best for you  You may need to drink more liquids than usual to help flush out the bacteria  Do not drink alcohol, caffeine, or citrus juices  These can irritate your bladder and increase your symptoms  Your healthcare provider may recommend cranberry juice to help prevent a UTI  · Urinate after you have sex  This can help flush out bacteria passed during sex  · Do pelvic muscle exercises often  Pelvic muscle exercises may help you start and stop urinating  Strong pelvic muscles may help you empty your bladder easier  Squeeze these muscles tightly for 5 seconds like you are trying to hold back urine  Then relax for 5 seconds  Gradually work up to squeezing for 10 seconds  Do 3 sets of 15 repetitions a day, or as directed  Follow up with your healthcare provider as directed:  Write down your questions so you remember to ask them during your visits  © Copyright [employer] Information is for End User's use only and may not be sold, redistributed or otherwise used for commercial purposes  All illustrations and images included in CareNotes® are the copyrighted property of A D A M , Inc  or [address] [place] [address]  The above information is an  only  It is not intended as medical advice for individual conditions or treatments  Talk to your doctor, nurse or pharmacist before following any medical regimen to see if it is safe and effective for you  Cefdinir (Omnicef) - (By mouth)   Why this medicine is used:   Treats bacterial infections  Contact a nurse or doctor right away if you have:  · Blistering, peeling, red skin rash  · Severe or bloody diarrhea     Common side effects:  · Mild diarrhea, nausea  © Copyright [employer] Information is for End User's use only and may not be sold, redistributed or otherwise used for commercial purposes  Sepsis   WHAT YOU NEED TO KNOW:   What is sepsis? Sepsis happens when an infection spreads and causes your body to react strongly to germs  Your body's defense system normally releases chemicals to fight off infection at the infected area  When infection spreads, chemicals are released throughout your body  The chemicals cause inflammation and clotting in small blood vessels that is difficult to control  Inflammation and clotting decrease blood flow and oxygen to your organs  This may cause your organs to stop working correctly  Sepsis is a life-threatening emergency  What increases my risk for sepsis? · An infection anywhere in your body, especially your blood or urinary tract    · Treatment in a hospital for a serious illness, or having an implanted catheter    · Age older than 72    · Chronic conditions such as COPD, heart failure, or diabetes    · A weak immune system caused by medicines or a condition such as cancer    · Severe injuries, such as large burns    · A recent surgery    What are the signs and symptoms of sepsis? · Fast breathing    · Confusion, loss of consciousness, or a seizure    · Fever or very low body temperature    · Low blood pressure    · Severe pain    · Chills or severe shaking    · Cold, pale, or clammy skin    · Extreme weakness    · Fast or irregular heartbeat    · Urinating very little or not at all    How is sepsis diagnosed? · Sequential/Sepsis-related Organ Failure Assessment (SOFA)  is used by healthcare providers to check respiratory, liver, and kidney function, blood clotting, blood levels, and consciousness  The SOFA os given as a score from 0 to 4     · Quick SOFA (qSOFA)  is used to check 3 items in an emergency  Sepsis treatment will begin if you have at least 2 of the following:    ? A systolic blood pressure of 100 mmHg or less    ? 22 breaths a minute or more    ? A change in the level of consciousness    How is sepsis treated?   Several treatments may be needed if sepsis causes one or more organs to stop working correctly  Treatments are often started in the emergency room and continued in an intensive care or critical care unit of a hospital  You may need any of the following:  · Medicines  will be given to treat the infection  Medicines may be given to increase your blood pressure and blood flow to your organs  Medicines may also be given to control your blood sugar level, or to prevent stomach ulcers or blood clots  · Oxygen  may be needed if your blood oxygen level is lower than it should be  · A ventilator  is a machine that gives you oxygen and breathes for you when you cannot breathe well on your own  An endotracheal (ET) tube is put into your mouth or nose and attached to the ventilator  · A blood transfusion  may be needed if bleeding occurs or platelet levels drop  This can happen in severe sepsis  · Removal or change of a catheter or drain  may be needed to get rid of the infection  · Dialysis  may be needed if your kidneys stop working correctly or are damaged during sepsis  Dialysis is a procedure to remove chemicals, wastes, and extra fluid from your blood  · Surgery or other procedures  may be needed to treat problems causing sepsis  This may include draining an abscess or removing infected tissue  What can I do to prevent sepsis? · Care for wounds and incisions as directed  Keep wounds and incisions clean and dry  Change your bandages when they get wet or dirty  Tell your healthcare provider immediately if you have signs of a wound infection  Signs include redness, warmth, swelling, or pus  · Care for your drain or IV catheter as directed  Ask your healthcare provider how to care for your tube or IV catheter  Correct care of these devices can help prevent infection  · Wash your hands often  This can help decrease your risk for infections  Wash your hands before you eat or prepare a meal  Also wash your hands after you use the bathroom   Use soap and water or an alcohol-based hand rub  · Ask your healthcare provider about vaccines  Vaccines can help prevent some infections that may lead to sepsis  Get a flu vaccine every year  CARE AGREEMENT:   You have the right to help plan your care  Learn about your health condition and how it may be treated  Discuss treatment options with your healthcare providers to decide what care you want to receive  You always have the right to refuse treatment  The above information is an  only  It is not intended as medical advice for individual conditions or treatments  Talk to your doctor, nurse or pharmacist before following any medical regimen to see if it is safe and effective for you  © Copyright [address] Information is for End User's use only and may not be sold, redistributed or otherwise used for commercial purposes   All illustrations and images included in CareNotes® are the copyrighted property of A D A M , Inc  or [address] [place] [address]

## 2021-03-18 NOTE — ASSESSMENT & PLAN NOTE
· Likely from dyspepsia as patient reports bloating and constipation but CT also showing cystitis, does drink alcohol 2-3 mixed drinks daily    · Chronic pancreatitis is a possibility, though lipase normal  · Recommend outpatient GI evaluation

## 2021-03-18 NOTE — ASSESSMENT & PLAN NOTE
· BPs read higher with machine, more reasonably controlled with manual checks  · Resume home regimen at discharge

## 2021-03-18 NOTE — ASSESSMENT & PLAN NOTE
· Lactic acidosis, leukocytosis, hyperthermia on admit, with suspected source of infection being cystitis  · Continue with plan outlined in cystitis

## 2021-03-18 NOTE — ASSESSMENT & PLAN NOTE
Lab Results   Component Value Date    EGFR 88 03/18/2021    EGFR 87 03/17/2021    EGFR 82 03/16/2021    CREATININE 0 85 03/18/2021    CREATININE 0 88 03/17/2021    CREATININE 0 94 03/16/2021     · Likely prerenal in setting of sepsis - repeat labs improved to baseline  · Given IV fluids in the ED, no need to continue

## 2021-03-18 NOTE — ASSESSMENT & PLAN NOTE
· CT A/P showing bladder wall thickening concerning for cystitis  · Urine culture strep <10k, however given symptoms at presentation and CT findings will complete treatment course for UTI with transition to cefdinir  · 1 of 2 blood cultures on admission coag negative staph (suspect contaminant)  · COVID/flu/RSV negative on admit  · Procalcitonin down-trending

## 2021-03-19 NOTE — UTILIZATION REVIEW
Notification of Discharge  This is a Notification of Discharge from our facility [address]  Please be advised that this patient has been discharge from our facility  Below you will find the admission and discharge date and time including the patients disposition  PRESENTATION DATE: 3/15/2021 12:13 AM  OBS ADMISSION DATE: 03/15/2021  IP ADMISSION DATE: 3/15/21 0734   DISCHARGE DATE: 3/18/2021  1:29 PM  DISPOSITION: Home/Self Care Home/Self Care   Admission Orders listed below:  Admission Orders (From admission, onward)     Ordered        03/15/21 0734  Inpatient Admission  Once         03/15/21 0446  Place in Observation  Once                   Please contact the UR Department if additional information is required to close this patient's authorization/case  [address] [facility] Utilization Review Department  Main: [phone] x carefully listen to the prompts  All voicemails are confidential   [email or web address]  Send all requests for admission clinical reviews, approved or denied determinations and any other requests to dedicated fax number below belonging to the campus where the patient is receiving treatment   List of dedicated fax numbers:  [address] DENIALS (Administrative/Medical Necessity) [phone]   [address] (Maternity/NICU/Pediatrics) [phone]   Shruti Comas [phone]   Lorena Davila [phone]   [facility] [phone]   [address] [phone]   [facility]  [phone]   [address] [facility] [address] [facility]  [address] [facility] 1000 [facility] [phone]

## 2021-03-20 LAB — BACTERIA BLD CULT: NORMAL

## 2021-03-30 ENCOUNTER — TRANSCRIBE ORDERS (OUTPATIENT)
Dept: ADMINISTRATIVE | Facility: HOSPITAL | Age: 71
End: 2021-03-30

## 2021-03-30 ENCOUNTER — APPOINTMENT (OUTPATIENT)
Dept: LAB | Facility: CLINIC | Age: 71
End: 2021-03-30
Payer: COMMERCIAL

## 2021-03-30 DIAGNOSIS — N39.0 URINARY TRACT INFECTION WITHOUT HEMATURIA, SITE UNSPECIFIED: ICD-10-CM

## 2021-03-30 DIAGNOSIS — N39.0 URINARY TRACT INFECTION WITHOUT HEMATURIA, SITE UNSPECIFIED: Primary | ICD-10-CM

## 2021-03-30 DIAGNOSIS — A41.9 SEPSIS (HCC): ICD-10-CM

## 2021-03-30 LAB
ERYTHROCYTE [DISTWIDTH] IN BLOOD BY AUTOMATED COUNT: 13.3 % (ref 11.6–15.1)
HCT VFR BLD AUTO: 37 % (ref 36.5–49.3)
HGB BLD-MCNC: 12.8 G/DL (ref 12–17)
MCH RBC QN AUTO: 35.6 PG (ref 26.8–34.3)
MCHC RBC AUTO-ENTMCNC: 34.6 G/DL (ref 31.4–37.4)
MCV RBC AUTO: 103 FL (ref 82–98)
PLATELET # BLD AUTO: 145 THOUSANDS/UL (ref 149–390)
PMV BLD AUTO: 10.6 FL (ref 8.9–12.7)
RBC # BLD AUTO: 3.6 MILLION/UL (ref 3.88–5.62)
WBC # BLD AUTO: 4.12 THOUSAND/UL (ref 4.31–10.16)

## 2021-03-30 PROCEDURE — 85027 COMPLETE CBC AUTOMATED: CPT

## 2021-03-30 PROCEDURE — 87086 URINE CULTURE/COLONY COUNT: CPT

## 2021-03-30 PROCEDURE — 36415 COLL VENOUS BLD VENIPUNCTURE: CPT

## 2021-03-31 LAB — BACTERIA UR CULT: NORMAL

## 2021-04-07 NOTE — PROGRESS NOTES
UROLOGY CONSULTATION NOTE     Patient Identifiers: Tammy Tran (MRN [id number])  Service Requesting Consultation: Claudell Quinones, PA-C  Service Providing Consultation:  Urology, Misael Monk MD    Date of Service: 4/8/2021  Consults    Reason for Consultation: Urinary frequency and urgency    History of Present Illness:     Tammy Tran is a 79 y o  old with  a history of DM2, HTN, MONICA who presents with a chief complaint of urinary frequency and urgency for the past approximately 1 year  In addition, he has intermittency, hesitancy, and weak stream, and will occasionally wear pads due to rare episodes of incontinence associated with urgency  He denies pain  Alcohol (especially beer) makes his symptoms worse worse  He eric history of kidney stones  He eric prior visit  He denies previously taking BPH meds  Nocturia 2 times per night  No results found for: PSA  He denies a history of abnormal DESMOND or prostate biopsy  He denies family history of  malignancies or stones  He has not seen a urologist before for this condition, and has not had medication for BPH  Past Medical, Past Surgical History:     Past Medical History:   Diagnosis Date    Arthritis     Hyperlipidemia     Hypertension     Poor circulation     Sleep apnea     Type 2 diabetes mellitus ([facility] [address] )    :    Past Surgical History:   Procedure Laterality Date    BACK SURGERY      CORONARY ARTERY BYPASS GRAFT  07/22/2017    HEMORROIDECTOMY      TONSILLECTOMY      WRIST SURGERY     :    Medications, Allergies:     No current facility-administered medications for this visit  Current Outpatient Medications:     amLODIPine (NORVASC) 10 mg tablet, Take 1 tablet by mouth daily, Disp: , Rfl:     aspirin 81 MG tablet, Take 1 tablet by mouth daily, Disp: , Rfl:     Blood Glucose Monitoring Suppl (ACURA BLOOD GLUCOSE METER) w/Device KIT, by Other route   Use as instructed, Disp: , Rfl:     carbidopa-levodopa (SINEMET)  mg per tablet, One p o  Q i d , Disp: 120 tablet, Rfl: 5    donepezil (ARICEPT) 10 mg tablet, One p o  Q h s , Disp: 30 tablet, Rfl: 5    furosemide (LASIX) 20 mg tablet, Take 1 tablet by mouth daily as needed, Disp: , Rfl:     gabapentin (NEURONTIN) 600 MG tablet, Take 1 tablet (600 mg total) by mouth 4 (four) times a day, Disp: 360 tablet, Rfl: 3    gabapentin (NEURONTIN) 600 MG tablet, Take 600 mg by mouth 3 (three) times a day, Disp: , Rfl:     glipiZIDE (GLUCOTROL) 10 mg tablet, Take 10 mg by mouth 2 (two) times a day, Disp: , Rfl:     hydrochlorothiazide (HYDRODIURIL) 25 mg tablet, Take 1 tablet by mouth daily, Disp: , Rfl:     hydroxychloroquine (PLAQUENIL) 200 mg tablet, TAKE 2 TABLETS DAILY, Disp: 180 tablet, Rfl: 1    insulin glargine (LANTUS SOLOSTAR) 100 units/mL injection pen, Inject 40 Units under the skin daily at bedtime , Disp: , Rfl:     irbesartan (AVAPRO) 300 mg tablet, Take 300 mg by mouth daily  , Disp: , Rfl:     LORazepam (ATIVAN) 1 mg tablet, Two p o  1 hour before MRI, may repeat 1 after 1 hour p r n , Disp: 3 tablet, Rfl: 0    metoprolol succinate (TOPROL-XL) 100 mg 24 hr tablet, Take 100 mg by mouth daily, Disp: , Rfl:     OXcarbazepine (TRILEPTAL) 300 mg tablet, 1/2 p o  Q 12 hours with 600 mg dose, Disp: 30 tablet, Rfl: 5    TRUEPLUS PEN NEEDLES 32G X 4 MM MISC, , Disp: , Rfl:     tamsulosin (FLOMAX) 0 4 mg, Take 1 capsule (0 4 mg total) by mouth daily with dinner, Disp: 30 capsule, Rfl: 3      Allergies: Allergies   Allergen Reactions    Penicillins      Other reaction(s):  Other, Unknown   :    Social and Family History:   Social History:   Social History     Tobacco Use    Smoking status: Never Smoker    Smokeless tobacco: Never Used   Substance Use Topics    Alcohol use: Yes     Frequency: 4 or more times a week     Drinks per session: 1 or 2     Comment: 2 drinks daily    Drug use: No        Social History     Tobacco Use   Smoking Status Never Smoker Smokeless Tobacco Never Used       Family History:  Family History   Problem Relation Age of Onset    Diabetes type II Mother     Hypertension Mother     Breast cancer Sister     Lung cancer Sister     Multiple endocrine neoplasia Brother     Breast cancer Sister    :     Review of Systems:     General: Fever, chills, or night sweats: negative  Cardiac: Negative for chest pain  Pulmonary: Negative for shortness of breath  Gastrointestinal: Abdominal pain negative  Nausea, vomiting, or diarrhea negative,  Genitourinary: See HPI above  Patient does not have hematuria  All other systems were queried and were negative except as listed above in HPI and here in the ROS  Physical Exam:     Vitals:    04/08/21 0937   BP: 162/92   Pulse: 71       General: Patient is pleasant and in NAD  Awake and alert    Cardiac: Peripheral edema: positive    Pulmonary: Non-labored breathing, speaking in full sentences, no wheezing, no coughing    Abdomen: Soft, non-tender, non-distended  No surgical scars  No masses, tenderness, hernias noted  Genitourinary: Negative CVA tenderness, negative suprapubic tenderness  (Male): Penis circumcised, phallus normal, meatus patent  Testicles descended into scrotum bilaterally without masses nor tenderness  No inguinal hernias bilaterally  DESMOND: Prostate is enlarged at 50 grams (examination limited by body habitus)  The prostate is not boggy  The prostate is not tender  No nodules noted  Neurological: Cranial nerves II-XII are intact, no sensory deficits, no neurological deficits    Psychiatric: The patient's train of thought is linear and logical, mood and affect are normal, the patient denies suicidal and homicidal ideations  Lymphatic: There is NOT adenopathy in the inguinal region      Skin: warm, dry      Labs:     Lab Results   Component Value Date    HGB 12 8 03/30/2021    HCT 37 0 03/30/2021    WBC 4 12 (L) 03/30/2021     (L) 03/30/2021   ]    Lab Results   Component Value Date    K 3 8 03/18/2021     03/18/2021    CO2 27 03/18/2021    BUN 14 03/18/2021    CREATININE 0 85 03/18/2021    CALCIUM 9 3 03/18/2021   ]  Component Name  4/18/2019     0 44   PSA, Total       Imaging:   I personally reviewed the images and report of the following studies, and reviewed them with the patient:      XR chest portable - 1 view  Narrative: CHEST   INDICATION:   sepsis eval   COMPARISON:  12/23/2014  EXAM PERFORMED/VIEWS:  XR CHEST PORTABLE  FINDINGS:  Evidence of prior CABG  Cardiomediastinal silhouette appears unremarkable  There is slight blunting of the left costophrenic angle which may indicate some minimal pleural thickening or pleural fluid  Patient is also somewhat rotated and some of the density in the area might be superimposed rib shadows and prominent soft   tissues of the chest wall  No pulmonary infiltrates are appreciated  No pneumothorax  Right lung clear  Osseous structures appear within normal limits for patient age  Impression: Minimal blunting left costophrenic angle which could be some pleural thickening or minimal effusion  or artifact of positioning  Workstation performed: [id number]  CT chest abdomen pelvis w contrast  Narrative: CT CHEST, ABDOMEN AND PELVIS WITH IV CONTRAST  INDICATION:   sepsis  Abdominal pain and nausea  COMPARISON:  CT chest dated 9/27/2011  TECHNIQUE: CT examination of the chest, abdomen and pelvis was performed  Axial, sagittal, and coronal 2D reformatted images were created from the source data and submitted for interpretation  Radiation dose length product (DLP) for this visit:  [id number] mGy-cm   This examination, like all CT scans performed in the [facility], was performed utilizing techniques to minimize radiation dose exposure, including the use of iterative   reconstruction and automated exposure control    IV Contrast:  100 mL of iohexol (OMNIPAQUE)  Enteric Contrast: Enteric contrast was not administered  FINDINGS:  CHEST  LUNGS:  Lungs are grossly clear  PLEURA:  Unremarkable  HEART/GREAT VESSELS:  Mild aortic atherosclerosis  No aortic aneurysm  Moderate to severe coronary atherosclerosis  Status post CABG  The heart appears unremarkable otherwise  MEDIASTINUM AND MARCIO:  Status post CABG  Otherwise grossly unremarkable  CHEST WALL AND LOWER NECK: Incidental discovery of one or more thyroid nodule(s) measuring less than 1 5 cm and without suspicious features is noted in this patient who is above 28years old; according to guidelines published in the February 2015 white   paper on incidental thyroid nodules in the Journal of the [employer] of Radiology [facility]), no further evaluation is recommended  Otherwise grossly unremarkable  ABDOMEN  LIVER/BILIARY TREE:  Liver is diffusely decreased in density consistent with fatty change  No CT evidence of suspicious hepatic mass  Normal hepatic contours  No biliary dilatation  GALLBLADDER:  No calcified gallstones  No pericholecystic inflammatory change  SPLEEN:  Unremarkable  PANCREAS:  Some focal calcifications are seen in the pancreatic head, possibly sequela of chronic inflammation  Pancreatic atrophy  Otherwise grossly unremarkable  ADRENAL GLANDS:  2 cm myelolipoma in the right adrenal gland is incidentally noted  Otherwise grossly unremarkable  KIDNEYS/URETERS:  Unremarkable  No hydronephrosis  STOMACH AND BOWEL:  Grossly unremarkable  APPENDIX:  No findings to suggest appendicitis  ABDOMINOPELVIC CAVITY:  No ascites  No pneumoperitoneum  No lymphadenopathy  VESSELS:  Mild atherosclerosis; no aortic aneurysm  PELVIS  REPRODUCTIVE ORGANS:  Unremarkable for patient's age  URINARY BLADDER:  Partially distended bladder  Mild circumferential bladder wall thickening noted, probably exaggerated by underdistention    ABDOMINAL WALL/INGUINAL REGIONS:  Body wall edema  OSSEOUS STRUCTURES:  Degenerative changes of the spine, most prominent at L4/L5 with intervertebral disc space narrowing and vacuum disc phenomenon  No acute fracture or destructive osseous lesion  Impression: No acute process seen in the chest   Fatty infiltration of the liver is suspected  In the setting of abdominal pain and/or elevated liver function tests, consider steatohepatitis  Partially distended bladder  Mild circumferential bladder wall thickening noted, probably exaggerated by underdistention  A cystitis is considered in the appropriate clinical setting  Status post CABG  Other findings as above  Workstation performed: [id number]          ASSESSMENT and Plan:     79 y o  old male with    1  Lower urinary tract symptoms: This is most likely due to bladder outlet obstruction from BPH  Differential diagnosis also includes diabetic cystopathy  I recommend being treatment for bladder outlet obstruction due to BPH with tamsulosin 0 4 mg daily    Side effects of tamsulosin were discussed  Will also check a PSA  Overactive bladder can also be attributed to his diabetes  We will assess his response to Flomax in the next several weeks  2   Nocturia: This is most likely due to symptoms of BPH  This may also be attributed to nocturnal polyuria (he does have a history of obstructive sleep apnea), overactive bladder, diabetics biopsy  We will see how he response to tamsulosin and reassess his nocturia  I also recommend strict glucose control  PLAN:           Thank you for allowing me to participate in this patients care  Please do not hesitate to call with any additional questions    Lena Hampton MD

## 2021-04-08 ENCOUNTER — OFFICE VISIT (OUTPATIENT)
Dept: UROLOGY | Facility: CLINIC | Age: 71
End: 2021-04-08
Payer: COMMERCIAL

## 2021-04-08 VITALS
BODY MASS INDEX: 38.64 KG/M2 | HEART RATE: 71 BPM | HEIGHT: 70 IN | WEIGHT: 269.9 LBS | SYSTOLIC BLOOD PRESSURE: 162 MMHG | DIASTOLIC BLOOD PRESSURE: 92 MMHG

## 2021-04-08 DIAGNOSIS — R35.0 BENIGN PROSTATIC HYPERPLASIA WITH URINARY FREQUENCY: Primary | ICD-10-CM

## 2021-04-08 DIAGNOSIS — N40.1 BENIGN PROSTATIC HYPERPLASIA WITH URINARY FREQUENCY: Primary | ICD-10-CM

## 2021-04-08 DIAGNOSIS — N30.90 CYSTITIS: ICD-10-CM

## 2021-04-08 LAB
POST-VOID RESIDUAL VOLUME, ML POC: 0 ML
SL AMB  POCT GLUCOSE, UA: 2000
SL AMB LEUKOCYTE ESTERASE,UA: NORMAL
SL AMB POCT BILIRUBIN,UA: NORMAL
SL AMB POCT BLOOD,UA: NORMAL
SL AMB POCT CLARITY,UA: CLEAR
SL AMB POCT COLOR,UA: YELLOW
SL AMB POCT KETONES,UA: NORMAL
SL AMB POCT NITRITE,UA: NORMAL
SL AMB POCT PH,UA: 6
SL AMB POCT SPECIFIC GRAVITY,UA: 1.02
SL AMB POCT URINE PROTEIN: NORMAL
SL AMB POCT UROBILINOGEN: 0.2

## 2021-04-08 PROCEDURE — 1036F TOBACCO NON-USER: CPT | Performed by: STUDENT IN AN ORGANIZED HEALTH CARE EDUCATION/TRAINING PROGRAM

## 2021-04-08 PROCEDURE — 1160F RVW MEDS BY RX/DR IN RCRD: CPT | Performed by: STUDENT IN AN ORGANIZED HEALTH CARE EDUCATION/TRAINING PROGRAM

## 2021-04-08 PROCEDURE — 99203 OFFICE O/P NEW LOW 30 MIN: CPT | Performed by: STUDENT IN AN ORGANIZED HEALTH CARE EDUCATION/TRAINING PROGRAM

## 2021-04-08 PROCEDURE — 3008F BODY MASS INDEX DOCD: CPT | Performed by: STUDENT IN AN ORGANIZED HEALTH CARE EDUCATION/TRAINING PROGRAM

## 2021-04-08 PROCEDURE — 51798 US URINE CAPACITY MEASURE: CPT | Performed by: STUDENT IN AN ORGANIZED HEALTH CARE EDUCATION/TRAINING PROGRAM

## 2021-04-08 PROCEDURE — 81002 URINALYSIS NONAUTO W/O SCOPE: CPT | Performed by: STUDENT IN AN ORGANIZED HEALTH CARE EDUCATION/TRAINING PROGRAM

## 2021-04-08 PROCEDURE — 3061F NEG MICROALBUMINURIA REV: CPT | Performed by: STUDENT IN AN ORGANIZED HEALTH CARE EDUCATION/TRAINING PROGRAM

## 2021-04-08 RX ORDER — TAMSULOSIN HYDROCHLORIDE 0.4 MG/1
0.4 CAPSULE ORAL
Qty: 30 CAPSULE | Refills: 3 | Status: SHIPPED | OUTPATIENT
Start: 2021-04-08 | End: 2021-06-07 | Stop reason: ALTCHOICE

## 2021-04-20 ENCOUNTER — TELEPHONE (OUTPATIENT)
Dept: UROLOGY | Facility: MEDICAL CENTER | Age: 71
End: 2021-04-20

## 2021-04-20 NOTE — TELEPHONE ENCOUNTER
Sounds like a good plan  Will see if the nasal symptoms and back pain improve with discontinuing Flomax  Recommend proceeding with f/u as scheduled with Dr Keturah Brittle to further discuss treatment and alternative medications if warranted

## 2021-04-20 NOTE — TELEPHONE ENCOUNTER
Patient of Dr Elías Knox  Patient is having reaction to medication tamsulosin 0 4 mg and would like to speak to nurse  Patient is having back pain and joint pain along with congested/runny nose  Please advise

## 2021-04-20 NOTE — TELEPHONE ENCOUNTER
Patient seen recently for urinary frequency, urgency and hesitancy with weak stream   Started on tamsulosin for likely bph at last visit  Noted symptoms could also be related to diabetes  Called and spoke with patient  He reports since starting the flomax he has noted nasal congestion and generalized joint pain  He also notes lower back discomfort across the whole lower back about 3/10  Furthermore he has not noticed drastic improvement to urinary symptoms  He noted mild improvement to flow but still has significant frequency and urgency  I directed him to stop flomax for now  Will FU with provider and be in touch regarding any alternative recommendations  FU is currently scheduled 5/5/21 with Dr Jeremias Majano

## 2021-04-20 NOTE — TELEPHONE ENCOUNTER
Called and left a detailed message for patient regarding provider's recommendation  Stop tamsulosin and monitor for resolution of symptoms  FU as scheduled 5/5 to discuss alternative options as appropriate

## 2021-04-26 DIAGNOSIS — E13.42 POLYNEUROPATHY DUE TO SECONDARY DIABETES (HCC): ICD-10-CM

## 2021-04-26 RX ORDER — OXCARBAZEPINE 600 MG/1
600 TABLET, FILM COATED ORAL
Qty: 90 TABLET | Refills: 3 | Status: SHIPPED | OUTPATIENT
Start: 2021-04-26 | End: 2022-03-23

## 2021-04-26 NOTE — TELEPHONE ENCOUNTER
Patient left a  requesting oxcarbazepine refill  Called patient back to confirm his dose  600 mg at bed to [facility]  90 day supply

## 2021-05-04 NOTE — TELEPHONE ENCOUNTER
Patient od Dr  Jeremias Madison calling to report he received a call an hour ago from someone in [facility] office instructing him to go for PSA that was entered into his chart for OV tomorrow @ [address]   He attempted to have PSA but lab near him was closed  Please call about OV status for tomorrow without PSA    [phone]

## 2021-05-04 NOTE — TELEPHONE ENCOUNTER
Patient returned call stating that he is unable to have his PSA done due to lab closing at 3:00pm  Patient is asking why we waited so long to call him to tell him to have the bloodwork done  Explained to him that we had patient's in the clinic today and I was reviewing the schedule with the time that I had available and called as soon as I seen that he needed it done  Asked patient if he was able to go to another [facility] facility to have it done so I did not have to re-schedule him  Looked up labs in his area on [employer] that were open until 8:00  Patient stated ' Schedule me a new appointment, I am not driving around '     Patient can be scheduled for next available appointment       Thanks

## 2021-05-05 ENCOUNTER — APPOINTMENT (OUTPATIENT)
Dept: LAB | Facility: CLINIC | Age: 71
End: 2021-05-05
Payer: COMMERCIAL

## 2021-05-05 DIAGNOSIS — N40.1 BENIGN PROSTATIC HYPERPLASIA WITH URINARY FREQUENCY: ICD-10-CM

## 2021-05-05 DIAGNOSIS — R35.0 BENIGN PROSTATIC HYPERPLASIA WITH URINARY FREQUENCY: ICD-10-CM

## 2021-05-05 LAB — PSA SERPL-MCNC: 0.3 NG/ML (ref 0–4)

## 2021-05-05 PROCEDURE — 36415 COLL VENOUS BLD VENIPUNCTURE: CPT

## 2021-05-05 PROCEDURE — G0103 PSA SCREENING: HCPCS

## 2021-05-05 NOTE — TELEPHONE ENCOUNTER
Patient called in to be rescheduled from missed appt  Offered patient 6/3 with Dr Jonathan Fuller but he stated he needs something sooner  Patient stated he is going to get his psa done this morning  Patient did mention someone was suppose to call him back from the office to get him back in sooner   Patient can be reached at [phone]

## 2021-05-05 NOTE — TELEPHONE ENCOUNTER
There are multiple openings tomorrow - called patient and offered - he accepted - PSA being done today

## 2021-05-05 NOTE — PROGRESS NOTES
Assessment and plan:       1  BPH with LUTS and nocturia  - Will switch Tamsulosin to Cardura  - If side effects continue, may need to consider TURP in the future    2  PSA screen  - His PSA is within normal limits it is 0 3 as of yesterday  Emily Pimentel MD      Chief Complaint     Chief Complaint   Patient presents with    Benign Prostatic Hypertrophy    Nocturia         History of Present Illness     Trini Cardozo is a 70 y o  with a history of  BPH with LUTS and nocturia who is here for follow-up  At his last appointment we discussed starting tamsulosin 0 4 mg daily  He did have some relief with this medication, but did have some side effects  Detailed Urologic History     - please refer to HPI    Review of Systems     Review of Systems   Constitutional: Negative for chills and fever  HENT: Negative for ear pain and sore throat  Eyes: Negative for pain and visual disturbance  Respiratory: Negative for cough and shortness of breath  Cardiovascular: Negative for chest pain and palpitations  Gastrointestinal: Negative for abdominal pain and vomiting  Genitourinary: Negative for dysuria and hematuria  Musculoskeletal: Negative for arthralgias and back pain  Skin: Negative for color change and rash  Neurological: Negative for seizures and syncope  All other systems reviewed and are negative  Allergies     Allergies   Allergen Reactions    Penicillins      Other reaction(s):  Other, Unknown       Physical Exam     Physical Exam        Vital Signs  Vitals:    05/06/21 0838   BP: 152/74   BP Location: Left arm   Patient Position: Sitting   Cuff Size: Standard   Weight: 124 kg (274 lb)   Height: 5' 10" (1 778 m)         Current Medications       Current Outpatient Medications:     amLODIPine (NORVASC) 10 mg tablet, Take 1 tablet by mouth daily, Disp: , Rfl:     aspirin 81 MG tablet, Take 1 tablet by mouth daily, Disp: , Rfl:     Blood Glucose Monitoring Suppl [facility] BLOOD GLUCOSE METER) w/Device KIT, by Other route   Use as instructed, Disp: , Rfl:     carbidopa-levodopa (SINEMET)  mg per tablet, One p o  Q i d , Disp: 120 tablet, Rfl: 5    donepezil (ARICEPT) 10 mg tablet, One p o  Q h s , Disp: 30 tablet, Rfl: 5    furosemide (LASIX) 20 mg tablet, Take 1 tablet by mouth daily as needed, Disp: , Rfl:     gabapentin (NEURONTIN) 600 MG tablet, Take 1 tablet (600 mg total) by mouth 4 (four) times a day, Disp: 360 tablet, Rfl: 3    gabapentin (NEURONTIN) 600 MG tablet, Take 600 mg by mouth 3 (three) times a day, Disp: , Rfl:     glipiZIDE (GLUCOTROL) 10 mg tablet, Take 10 mg by mouth 2 (two) times a day, Disp: , Rfl:     hydrochlorothiazide (HYDRODIURIL) 25 mg tablet, Take 1 tablet by mouth daily, Disp: , Rfl:     hydroxychloroquine (PLAQUENIL) 200 mg tablet, TAKE 2 TABLETS DAILY, Disp: 180 tablet, Rfl: 1    insulin glargine (LANTUS SOLOSTAR) 100 units/mL injection pen, Inject 40 Units under the skin daily at bedtime , Disp: , Rfl:     irbesartan (AVAPRO) 300 mg tablet, Take 300 mg by mouth daily  , Disp: , Rfl:     LORazepam (ATIVAN) 1 mg tablet, Two p o  1 hour before MRI, may repeat 1 after 1 hour p r n , Disp: 3 tablet, Rfl: 0    metoprolol succinate (TOPROL-XL) 100 mg 24 hr tablet, Take 100 mg by mouth daily, Disp: , Rfl:     OXcarbazepine (TRILEPTAL) 300 mg tablet, 1/2 p o  Q 12 hours with 600 mg dose, Disp: 30 tablet, Rfl: 5    OXcarbazepine (TRILEPTAL) 600 mg tablet, Take 1 tablet (600 mg total) by mouth daily at bedtime, Disp: 90 tablet, Rfl: 3    TRUEPLUS PEN NEEDLES 32G X 4 MM MISC, , Disp: , Rfl:     doxazosin (CARDURA) 4 mg tablet, Take 1 tablet (4 mg total) by mouth daily at bedtime, Disp: 30 tablet, Rfl: 1    finasteride (PROSCAR) 5 mg tablet, Take 1 tablet (5 mg total) by mouth daily, Disp: 30 tablet, Rfl: 1    tamsulosin (FLOMAX) 0 4 mg, Take 1 capsule (0 4 mg total) by mouth daily with dinner (Patient not taking: Reported on 5/6/2021), Disp: 30 capsule, Rfl: 3      Active Problems     Patient Active Problem List   Diagnosis    [facility]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 ([facility] )    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HCC)    Ulnar neuropathy of left upper extremity    Tremor    Lumbar radiculopathy    Inflammatory arthritis    Acute kidney injury superimposed on chronic kidney disease ([facility] )    Severe sepsis ([facility] )    Diabetes ([place] [address] )    Cystitis    Abnormal CT of the abdomen    Abdominal pain    Coronary artery disease involving native coronary artery    Hypertension    CHF (congestive heart failure) ([place] [address] )         Past Medical History     Past Medical History:   Diagnosis Date    Arthritis     Hyperlipidemia     Hypertension     Poor circulation     Sleep apnea     Type 2 diabetes mellitus ([place] [address] )          Surgical History     Past Surgical History:   Procedure Laterality Date    BACK SURGERY      CORONARY ARTERY BYPASS GRAFT  07/22/2017    HEMORROIDECTOMY      TONSILLECTOMY      WRIST SURGERY           Family History     Family History   Problem Relation Age of Onset    Diabetes type II Mother     Hypertension Mother     Breast cancer Sister     Lung cancer Sister     Multiple endocrine neoplasia Brother     Breast cancer Sister          Social History     Social History     Social History     Tobacco Use   Smoking Status Never Smoker   Smokeless Tobacco Never Used         Pertinent Lab Values     Lab Results   Component Value Date    CREATININE 0 85 03/18/2021       Lab Results   Component Value Date    PSA 0 3 05/05/2021

## 2021-05-06 ENCOUNTER — OFFICE VISIT (OUTPATIENT)
Dept: UROLOGY | Facility: CLINIC | Age: 71
End: 2021-05-06
Payer: COMMERCIAL

## 2021-05-06 VITALS
WEIGHT: 274 LBS | BODY MASS INDEX: 39.22 KG/M2 | HEIGHT: 70 IN | SYSTOLIC BLOOD PRESSURE: 152 MMHG | DIASTOLIC BLOOD PRESSURE: 74 MMHG

## 2021-05-06 DIAGNOSIS — R35.0 BENIGN PROSTATIC HYPERPLASIA WITH URINARY FREQUENCY: Primary | ICD-10-CM

## 2021-05-06 DIAGNOSIS — N40.1 BENIGN PROSTATIC HYPERPLASIA WITH URINARY FREQUENCY: Primary | ICD-10-CM

## 2021-05-06 LAB — POST-VOID RESIDUAL VOLUME, ML POC: 20 ML

## 2021-05-06 PROCEDURE — 51798 US URINE CAPACITY MEASURE: CPT | Performed by: STUDENT IN AN ORGANIZED HEALTH CARE EDUCATION/TRAINING PROGRAM

## 2021-05-06 PROCEDURE — 99212 OFFICE O/P EST SF 10 MIN: CPT | Performed by: STUDENT IN AN ORGANIZED HEALTH CARE EDUCATION/TRAINING PROGRAM

## 2021-05-06 RX ORDER — FINASTERIDE 5 MG/1
5 TABLET, FILM COATED ORAL DAILY
Qty: 30 TABLET | Refills: 1 | Status: SHIPPED | OUTPATIENT
Start: 2021-05-06 | End: 2021-05-06

## 2021-05-06 RX ORDER — DOXAZOSIN MESYLATE 4 MG/1
4 TABLET ORAL
Qty: 30 TABLET | Refills: 1 | Status: SHIPPED | OUTPATIENT
Start: 2021-05-06 | End: 2021-06-07 | Stop reason: ALTCHOICE

## 2021-05-06 RX ORDER — DOXAZOSIN MESYLATE 4 MG/1
4 TABLET ORAL
Qty: 30 TABLET | Refills: 1 | Status: SHIPPED | OUTPATIENT
Start: 2021-05-06 | End: 2021-05-06

## 2021-05-06 RX ORDER — FINASTERIDE 5 MG/1
5 TABLET, FILM COATED ORAL DAILY
Qty: 30 TABLET | Refills: 1 | Status: SHIPPED | OUTPATIENT
Start: 2021-05-06 | End: 2021-06-15 | Stop reason: SDUPTHER

## 2021-06-03 ENCOUNTER — OFFICE VISIT (OUTPATIENT)
Dept: UROLOGY | Facility: CLINIC | Age: 71
End: 2021-06-03
Payer: COMMERCIAL

## 2021-06-03 VITALS
SYSTOLIC BLOOD PRESSURE: 118 MMHG | HEART RATE: 61 BPM | DIASTOLIC BLOOD PRESSURE: 68 MMHG | WEIGHT: 278.4 LBS | HEIGHT: 70 IN | BODY MASS INDEX: 39.86 KG/M2

## 2021-06-03 DIAGNOSIS — N40.1 BENIGN PROSTATIC HYPERPLASIA WITH URINARY FREQUENCY: Primary | ICD-10-CM

## 2021-06-03 DIAGNOSIS — R35.0 BENIGN PROSTATIC HYPERPLASIA WITH URINARY FREQUENCY: Primary | ICD-10-CM

## 2021-06-03 PROCEDURE — 1160F RVW MEDS BY RX/DR IN RCRD: CPT | Performed by: STUDENT IN AN ORGANIZED HEALTH CARE EDUCATION/TRAINING PROGRAM

## 2021-06-03 PROCEDURE — 3008F BODY MASS INDEX DOCD: CPT | Performed by: STUDENT IN AN ORGANIZED HEALTH CARE EDUCATION/TRAINING PROGRAM

## 2021-06-03 PROCEDURE — 99212 OFFICE O/P EST SF 10 MIN: CPT | Performed by: STUDENT IN AN ORGANIZED HEALTH CARE EDUCATION/TRAINING PROGRAM

## 2021-06-03 PROCEDURE — 1036F TOBACCO NON-USER: CPT | Performed by: STUDENT IN AN ORGANIZED HEALTH CARE EDUCATION/TRAINING PROGRAM

## 2021-06-03 NOTE — PATIENT INSTRUCTIONS
Transurethral Prostatectomy   AMBULATORY CARE:   What you need to know about a transurethral prostatectomy:  A transurethral prostatectomy is surgery to remove part or all of your prostate gland  This surgery is also called transurethral resection of the prostate (TURP)  How to prepare for a TURP:   · Your surgeon will tell you how to prepare  You may be told not to eat or drink anything after midnight on the day of surgery  Arrange to have someone drive you home after surgery  · Tell your surgeon about all the medicines you currently take  He or she will tell you if you need to stop any medicine before surgery, and when to stop  He or she will tell you which medicines to take or not take on the day of surgery  · Tell your surgeon if you have heart disease or blood clotting problems  · You may be given medicine to shrink the size of your prostate  You may also be given antibiotics to help prevent or treat a bacterial infection  Tell your surgeon if you had an allergic reaction to antibiotics, anesthesia, or other medicine  · You may need blood and urine tests  You may also need a rectal exam to check the size of your prostate  What will happen during a TURP:   · You may be given anesthesia to make you lose feeling from the waist down, or to keep you asleep during surgery  Your surgeon will insert a resectoscope through your urethra  A resectoscope is a tube with a small monitor on the end  The monitor shows your prostate on a screen during surgery  Your bladder may be filled with fluid during surgery  · Heat that is produced by the resectoscope will be used to remove part, or all, of your prostate  Heat will also be used to stop bleeding in the surgery area  Fluid is used to wash away extra tissue and blood  The resectoscope will be removed from your urethra  A catheter (soft tube) will be put through your urethra and into your bladder   The catheter will be left in place to drain urine out of your body and into a bag  What to expect after a TURP:  You will be taken to a room to rest  Do not get out of bed until healthcare providers say it is okay  When healthcare providers see that you are okay, you will be taken to your room  A Baugh catheter is a thin tube inserted through your urethra and moved into your bladder  The catheter is used to drain urine into a bag  Healthcare providers will remove the catheter when you no longer need it  Antibiotics may be given to prevent a bacterial infection  Risks of a TURP:   · Your prostate, bladder, or urethra may be damaged  Your urethra or part of your bladder may grow narrow  This can make it difficult or painful to urinate  You may feel like you need to urinate often, or have trouble controlling when you urinate  You may get blood clots in your urine that can block your urethra  · You may develop a urinary tract infection, or an infection in the surgery area  You may have trouble getting an erection or ejaculating  You may develop TURP syndrome, which can cause dizziness, fatigue, stomach pain, and vomiting  If you had a partial resection of the prostate, the part of your prostate that was not removed may grow too large  This can cause your signs and symptoms to return, and you may need to have surgery again  Seek care immediately if:   · You have severe abdominal or back pain  · You are dizzy or confused  · You have abdominal pain, nausea, and vomiting  · Your heartbeat is slower than usual     Call your doctor or urologist if:   · You urinate little or not at all  · You have a fever  · You have new or more blood in your urine  · You have trouble starting to urinate, or have a weak stream of urine when you urinate  · You feel like you have a full bladder, even after you urinate  You may also leak urine  · You often wake up during the night to urinate  You may also feel the need to urinate right away      · You feel pain and burning when you urinate  · You feel pain or pressure in your lower abdomen  · Your urine looks cloudy, and smells bad  · You have trouble getting an erection or ejaculating  · You have questions or concerns about your condition or care  Medicines: You may need any of the following:  · Prescription pain medicine  may be given  Ask your healthcare provider how to take this medicine safely  Some prescription pain medicines contain acetaminophen  Do not take other medicines that contain acetaminophen without talking to your healthcare provider  Too much acetaminophen may cause liver damage  Prescription pain medicine may cause constipation  Ask your healthcare provider how to prevent or treat constipation  · Antibiotics  prevent or fight an infection caused by bacteria  · Blood thinners  help prevent blood clots  Clots can cause strokes, heart attacks, and death  The following are general safety guidelines to follow while you are taking a blood thinner:    ? Watch for bleeding and bruising while you take blood thinners  Watch for bleeding from your gums or nose  Watch for blood in your urine and bowel movements  Use a soft washcloth on your skin, and a soft toothbrush to brush your teeth  This can keep your skin and gums from bleeding  If you shave, use an electric shaver  Do not play contact sports  ? Tell your dentist and other healthcare providers that you take a blood thinner  Wear a bracelet or necklace that says you take this medicine  ? Do not start or stop any other medicines unless your healthcare provider tells you to  Many medicines cannot be used with blood thinners  ? Take your blood thinner exactly as prescribed by your healthcare provider  Do not skip does or take less than prescribed  Tell your provider right away if you forget to take your blood thinner, or if you take too much  ? Warfarin  is a blood thinner that you may need to take   The following are things you should be aware of if you take warfarin:     § Foods and medicines can affect the amount of warfarin in your blood  Do not make major changes to your diet while you take warfarin  Warfarin works best when you eat about the same amount of vitamin K every day  Vitamin K is found in green leafy vegetables and certain other foods  Ask for more information about what to eat when you are taking warfarin  § You will need to see your healthcare provider for follow-up visits when you are on warfarin  You will need regular blood tests  These tests are used to decide how much medicine you need  · Take your medicine as directed  Contact your healthcare provider if you think your medicine is not helping or if you have side effects  Tell him or her if you are allergic to any medicine  Keep a list of the medicines, vitamins, and herbs you take  Include the amounts, and when and why you take them  Bring the list or the pill bottles to follow-up visits  Carry your medicine list with you in case of an emergency  Baugh catheter care:  Keep the bag below your waist  If the bag is too high, urine will flow back into your bladder  This can cause an infection  Do not pull on the catheter  This may cause pain and bleeding, and the catheter may come out  Do not let the catheter tubing kink or twist  A kink or twist will block the flow of urine  Bladder control:  After surgery, you may leak urine and have trouble controlling when you urinate  The following can help decrease or manage urine leakage:  · Do not have caffeine  Caffeine can cause problems with bladder control and increase your need to urinate  · Limit liquids  Drink smaller amounts of liquid throughout the day  Do not drink before bedtime  Ask if you should decrease the amount of liquid you drink each day  This may help you control your bladder  · Wear a pad or adult diapers, if needed  These may help to absorb leaking urine and decrease odor      · Do pelvic floor muscle exercises  Pelvic floor muscle exercises, also called Kegels, may help improve your bladder control  These exercises are done by tightening and relaxing your pelvic muscles  Ask how to do pelvic floor muscle exercises, and how often to do them  Activity:  Ask when it is okay for you to return to work and activities, or to have sex  Follow up with your surgeon or urologist as directed: You may need to return to make sure you do not have an infection, or to have your Baugh catheter removed  Write down your questions so you remember to ask them during your visits  © Copyright [address] Information is for End User's use only and may not be sold, redistributed or otherwise used for commercial purposes  All illustrations and images included in CareNotes® are the copyrighted property of [employer]  or [facility] Gabriela Luo   The above information is an  only  It is not intended as medical advice for individual conditions or treatments  Talk to your doctor, nurse or pharmacist before following any medical regimen to see if it is safe and effective for you  Prostatic Urethral Lift (UroLift System)   AMBULATORY CARE:   A prostatic urethral lift  is a minimally invasive procedure to widen your urethra  The procedure is used to treat benign prostatic hyperplasia (BPH), or enlarged prostate  Prostate tissue is held away from both sides of the urethra with permanent implants  This helps make it easier for you to urinate  A prostatic urethral lift may also be called the UroLift® System  How to prepare for this procedure:   · Your healthcare provider will tell you how to prepare  If you are having general anesthesia, your provider may tell you not to eat or drink after midnight before the procedure  Arrange to have someone drive you home after you are discharged  · Tell your provider about all medicines you currently take   He or she will tell you if you need to stop taking any medicine for the procedure, and when to stop  If you take blood thinners or aspirin, you may be told to stop about a week before the procedure  Your provider will tell you which medicines to take or not take on the day of the procedure  · Tell your provider about all your allergies, including antibiotics and anesthesia  Tell him or her if you know you are allergic to stainless steel, nickel, or titanium  · Your provider will talk to about what to expect when you have sex  You may feel a slight pulling sensation when you ejaculate  It should not be painful, and the pulling feeling should get better with time  What will happen during the procedure:   · Local anesthesia is usually used for this procedure  You may also be given medicine to help you relax during the procedure  General anesthesia may instead be used to keep you asleep and free from pain during the procedure  You may feel some pressure or feel like you need to urinate during the procedure  · Your healthcare provider will insert a resectoscope through your urethra  A resectoscope is a tube with a small monitor on the end  The monitor shows your prostate on a screen during the procedure  Your provider will put a device into your urethra  He or she will guide the device up to your prostate  The device will be used to lift and move prostate tissue away from one side of your urethra  · An implant will be placed to hold the prostate in the new position  Your provider may place 2 to 4 implants  He or she will do the same to prostate tissue on the other side of your urethra  What to expect after the procedure:   · You may have some pain in your pelvis or blood in your urine  You may have burning when you urinate  You may feel like you have to urinate urgently or more often  These effects are all expected and should get better within a few weeks  · Your BPH symptoms should start to improve within a few weeks of the procedure      Risks of a prostatic urethral lift:  You may have incontinence (leaking urine)  Scar tissue may build up  You may have trouble having sex, or develop a condition called retrograde ejaculation  This means semen goes into your bladder when you ejaculate  You may have blood clots in your urine that block your urethra  The procedure may not prevent your BPH symptoms from getting worse  You will be able to have a different procedure or surgery to help with symptoms  Call your doctor or urologist if:   · You have a fever  · You see blood clots in your urine  · You have trouble urinating  · You have pain or blood in your urine for longer than you were told to expect  · You have trouble getting an erection or ejaculating  · You have questions or concerns about your condition or care  Self-care:   · Rest as needed  Your healthcare provider will tell you when it is okay to go back to work or do your usual activities  This may be as soon as the day after your procedure  · Ask when it is okay to have sex after the procedure  You may need to wait a week or longer so the procedure area can heal fully  You should not notice much of a change during sex  You may notice a pulling feeling when you ejaculate  This should get better with time  Bladder management after the procedure:   · Urinate on a regular schedule  This will train your bladder to hold urine longer  A larger amount of urine may make it easier to urinate  · Drink less liquid during the day  Do not have liquid for several hours before you go to sleep  Do not drink large amounts of any liquid at one time  Limit alcohol and caffeine  These can cause problems with bladder control and increase your need to urinate  · Do pelvic floor muscle exercises  Pelvic floor muscle exercises, also called Kegels, may help improve your bladder control  These exercises are done by tightening and relaxing your pelvic muscles   Ask how to do pelvic floor muscle exercises, and how often to do them  · Lose weight if you are overweight  Obesity increases your risk for obstructive sleep apnea (MONICA)  MONICA can make you need to get up in the night to urinate  Exercise can help you reach or maintain a healthy weight  Aim to get at least 30 minutes of exercise on most days of the week  You can break the 30 minutes up into shorter blocks of exercise throughout the day  Follow up with your doctor or urologist as directed:  Write down your questions so you remember to ask them during your visits  © Copyright [address] Information is for End User's use only and may not be sold, redistributed or otherwise used for commercial purposes  All illustrations and images included in CareNotes® are the copyrighted property of [employer]  or [facility] [place] Gabriela Luo   The above information is an  only  It is not intended as medical advice for individual conditions or treatments  Talk to your doctor, nurse or pharmacist before following any medical regimen to see if it is safe and effective for you

## 2021-06-03 NOTE — PROGRESS NOTES
Assessment and plan:       1  BPH with LUTS  - He has experience significant improvement with Cardura and Finasteride and is now happy with his voiding symptoms  - He also experiences less side effects  - I have renewed his medications for 1 year  - I discussed his options should medications no longer be effective for him  He is a candidate for TURP or urolift  I have provided him information on both procedures  - We will see him in 1 year or sooner if problems occur  - If stable in 1 year, can consider transition of care back to PCP  Lena Hampton MD      Chief Complaint     Chief Complaint   Patient presents with    Follow-up     BPH w/ urinary freq   Nocturia         History of Present Illness     María Coppola is a 70 y o   with BPH with LUTS and nocturia on Cardura  At his last appointment he was switched from tamsulosin to cardura as he was experiencing some orthostatic hypotension  In the interim, he has experience significant improvement in his lower urinary tract symptoms  He has nocturia only 1 time per night  He denies orthostatic hypotension  He says that he has some nasal congestion, but it is mild and tolerable  He says that he is happy with his symptoms         Detailed Urologic History     - please refer to HPI    Review of Systems     Review of Systems   Constitutional: Negative for chills and fever  HENT: Negative for ear pain and sore throat  Eyes: Negative for pain and visual disturbance  Respiratory: Negative for cough and shortness of breath  Cardiovascular: Negative for chest pain and palpitations  Gastrointestinal: Negative for abdominal pain and vomiting  Genitourinary: Negative for dysuria and hematuria  Musculoskeletal: Negative for arthralgias and back pain  Skin: Negative for color change and rash  Neurological: Negative for seizures and syncope  All other systems reviewed and are negative              Allergies     Allergies   Allergen Reactions  Penicillins      Other reaction(s): Other, Unknown       Physical Exam     Physical Exam  Vitals signs and nursing note reviewed  Constitutional:       Appearance: He is well-developed  HENT:      Head: Normocephalic and atraumatic  Eyes:      Conjunctiva/sclera: Conjunctivae normal    Neck:      Musculoskeletal: Neck supple  Cardiovascular:      Heart sounds: No murmur  Pulmonary:      Effort: Pulmonary effort is normal  No respiratory distress  Abdominal:      Palpations: Abdomen is soft  Tenderness: There is no abdominal tenderness  Skin:     General: Skin is warm and dry  Neurological:      General: No focal deficit present  Mental Status: He is alert and oriented to person, place, and time  Vital Signs  Vitals:    06/03/21 0821   Weight: 126 kg (278 lb 6 4 oz)   Height: 5' 10" (1 778 m)         Current Medications       Current Outpatient Medications:     amLODIPine (NORVASC) 10 mg tablet, Take 1 tablet by mouth daily, Disp: , Rfl:     aspirin 81 MG tablet, Take 1 tablet by mouth daily, Disp: , Rfl:     Blood Glucose Monitoring Suppl (ACURA BLOOD GLUCOSE METER) w/Device KIT, by Other route   Use as instructed, Disp: , Rfl:     doxazosin (CARDURA) 4 mg tablet, Take 1 tablet (4 mg total) by mouth daily at bedtime, Disp: 30 tablet, Rfl: 1    finasteride (PROSCAR) 5 mg tablet, Take 1 tablet (5 mg total) by mouth daily, Disp: 30 tablet, Rfl: 1    furosemide (LASIX) 20 mg tablet, Take 1 tablet by mouth daily as needed, Disp: , Rfl:     gabapentin (NEURONTIN) 600 MG tablet, Take 1 tablet (600 mg total) by mouth 4 (four) times a day (Patient taking differently: Take 600 mg by mouth 3 (three) times a day ), Disp: 360 tablet, Rfl: 3    glipiZIDE (GLUCOTROL) 10 mg tablet, Take 10 mg by mouth 2 (two) times a day, Disp: , Rfl:     hydrochlorothiazide (HYDRODIURIL) 25 mg tablet, Take 1 tablet by mouth daily, Disp: , Rfl:     hydroxychloroquine (PLAQUENIL) 200 mg tablet, TAKE 2 TABLETS DAILY, Disp: 180 tablet, Rfl: 1    insulin glargine (LANTUS SOLOSTAR) 100 units/mL injection pen, Inject 40 Units under the skin daily at bedtime , Disp: , Rfl:     irbesartan (AVAPRO) 300 mg tablet, Take 300 mg by mouth daily  , Disp: , Rfl:     metoprolol succinate (TOPROL-XL) 100 mg 24 hr tablet, Take 100 mg by mouth daily, Disp: , Rfl:     OXcarbazepine (TRILEPTAL) 300 mg tablet, 1/2 p o  Q 12 hours with 600 mg dose (Patient taking differently: as needed ), Disp: 30 tablet, Rfl: 5    OXcarbazepine (TRILEPTAL) 600 mg tablet, Take 1 tablet (600 mg total) by mouth daily at bedtime, Disp: 90 tablet, Rfl: 3    TRUEPLUS PEN NEEDLES 32G X 4 MM MISC, , Disp: , Rfl:     carbidopa-levodopa (SINEMET)  mg per tablet, One p o  Q i d  (Patient not taking: Reported on 6/3/2021), Disp: 120 tablet, Rfl: 5    donepezil (ARICEPT) 10 mg tablet, One p o  Q h s  (Patient not taking: Reported on 6/3/2021), Disp: 30 tablet, Rfl: 5    gabapentin (NEURONTIN) 600 MG tablet, Take 600 mg by mouth 3 (three) times a day, Disp: , Rfl:     LORazepam (ATIVAN) 1 mg tablet, Two p o  1 hour before MRI, may repeat 1 after 1 hour p r n  (Patient not taking: Reported on 6/3/2021), Disp: 3 tablet, Rfl: 0    tamsulosin (FLOMAX) 0 4 mg, Take 1 capsule (0 4 mg total) by mouth daily with dinner (Patient not taking: Reported on 5/6/2021), Disp: 30 capsule, Rfl: 3      Active Problems     Patient Active Problem List   Diagnosis    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    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HCC)    Ulnar neuropathy of left upper extremity    Tremor    Lumbar radiculopathy    Inflammatory arthritis    Acute kidney injury superimposed on chronic kidney disease (HCC)    Severe sepsis (HCC)    Diabetes [facility]    Cystitis    Abnormal CT of the abdomen    Abdominal pain    Coronary artery disease involving native coronary artery    Hypertension    CHF (congestive heart failure) ([address] )         Past Medical History Past Medical History:   Diagnosis Date    Arthritis     Hyperlipidemia     Hypertension     Poor circulation     Sleep apnea     Type 2 diabetes mellitus ([address] )          Surgical History     Past Surgical History:   Procedure Laterality Date    BACK SURGERY      CORONARY ARTERY BYPASS GRAFT  07/22/2017    HEMORROIDECTOMY      TONSILLECTOMY      WRIST SURGERY           Family History     Family History   Problem Relation Age of Onset    Diabetes type II Mother     Hypertension Mother     Breast cancer Sister     Lung cancer Sister     Multiple endocrine neoplasia Brother     Breast cancer Sister          Social History     Social History     Social History     Tobacco Use   Smoking Status Never Smoker   Smokeless Tobacco Never Used         Pertinent Lab Values     Lab Results   Component Value Date    CREATININE 0 85 03/18/2021       Lab Results   Component Value Date    PSA 0 3 05/05/2021             Pertinent Imaging     XR chest portable - 1 view  Narrative: CHEST   INDICATION:   sepsis eval   COMPARISON:  12/23/2014  EXAM PERFORMED/VIEWS:  XR CHEST PORTABLE  FINDINGS:  Evidence of prior CABG  Cardiomediastinal silhouette appears unremarkable  There is slight blunting of the left costophrenic angle which may indicate some minimal pleural thickening or pleural fluid  Patient is also somewhat rotated and some of the density in the area might be superimposed rib shadows and prominent soft   tissues of the chest wall  No pulmonary infiltrates are appreciated  No pneumothorax  Right lung clear  Osseous structures appear within normal limits for patient age  Impression: Minimal blunting left costophrenic angle which could be some pleural thickening or minimal effusion  or artifact of positioning  Workstation performed: [id number]  CT chest abdomen pelvis w contrast  Narrative: CT CHEST, ABDOMEN AND PELVIS WITH IV CONTRAST  INDICATION:   sepsis    Abdominal pain and nausea  COMPARISON:  CT chest dated 9/27/2011  TECHNIQUE: CT examination of the chest, abdomen and pelvis was performed  Axial, sagittal, and coronal 2D reformatted images were created from the source data and submitted for interpretation  Radiation dose length product (DLP) for this visit:  [id number] mGy-cm   This examination, like all CT scans performed in the [facility], was performed utilizing techniques to minimize radiation dose exposure, including the use of iterative   reconstruction and automated exposure control  IV Contrast:  100 mL of iohexol (OMNIPAQUE)  Enteric Contrast: Enteric contrast was not administered  FINDINGS:  CHEST  LUNGS:  Lungs are grossly clear  PLEURA:  Unremarkable  HEART/GREAT VESSELS:  Mild aortic atherosclerosis  No aortic aneurysm  Moderate to severe coronary atherosclerosis  Status post CABG  The heart appears unremarkable otherwise  MEDIASTINUM AND MARCIO:  Status post CABG  Otherwise grossly unremarkable  CHEST WALL AND LOWER NECK: Incidental discovery of one or more thyroid nodule(s) measuring less than 1 5 cm and without suspicious features is noted in this patient who is above 28years old; according to guidelines published in the February 2015 white   paper on incidental thyroid nodules in the Journal of the [employer] of Radiology [facility]), no further evaluation is recommended  Otherwise grossly unremarkable  ABDOMEN  LIVER/BILIARY TREE:  Liver is diffusely decreased in density consistent with fatty change  No CT evidence of suspicious hepatic mass  Normal hepatic contours  No biliary dilatation  GALLBLADDER:  No calcified gallstones  No pericholecystic inflammatory change  SPLEEN:  Unremarkable  PANCREAS:  Some focal calcifications are seen in the pancreatic head, possibly sequela of chronic inflammation  Pancreatic atrophy  Otherwise grossly unremarkable  ADRENAL GLANDS:  2 cm myelolipoma in the right adrenal gland is incidentally noted    Otherwise grossly unremarkable  KIDNEYS/URETERS:  Unremarkable  No hydronephrosis  STOMACH AND BOWEL:  Grossly unremarkable  APPENDIX:  No findings to suggest appendicitis  ABDOMINOPELVIC CAVITY:  No ascites  No pneumoperitoneum  No lymphadenopathy  VESSELS:  Mild atherosclerosis; no aortic aneurysm  PELVIS  REPRODUCTIVE ORGANS:  Unremarkable for patient's age  URINARY BLADDER:  Partially distended bladder  Mild circumferential bladder wall thickening noted, probably exaggerated by underdistention  ABDOMINAL WALL/INGUINAL REGIONS:  Body wall edema  OSSEOUS STRUCTURES:  Degenerative changes of the spine, most prominent at L4/L5 with intervertebral disc space narrowing and vacuum disc phenomenon  No acute fracture or destructive osseous lesion  Impression: No acute process seen in the chest   Fatty infiltration of the liver is suspected  In the setting of abdominal pain and/or elevated liver function tests, consider steatohepatitis  Partially distended bladder  Mild circumferential bladder wall thickening noted, probably exaggerated by underdistention  A cystitis is considered in the appropriate clinical setting  Status post CABG  Other findings as above    Workstation performed: [id number]

## 2021-06-07 ENCOUNTER — TELEPHONE (OUTPATIENT)
Dept: UROLOGY | Facility: AMBULATORY SURGERY CENTER | Age: 71
End: 2021-06-07

## 2021-06-07 DIAGNOSIS — R35.0 BENIGN PROSTATIC HYPERPLASIA WITH URINARY FREQUENCY: Primary | ICD-10-CM

## 2021-06-07 DIAGNOSIS — N40.1 BENIGN PROSTATIC HYPERPLASIA WITH URINARY FREQUENCY: Primary | ICD-10-CM

## 2021-06-07 RX ORDER — ALFUZOSIN HYDROCHLORIDE 10 MG/1
10 TABLET, EXTENDED RELEASE ORAL DAILY
Qty: 30 TABLET | Refills: 0 | Status: SHIPPED | OUTPATIENT
Start: 2021-06-07 | End: 2021-06-15 | Stop reason: ALTCHOICE

## 2021-06-07 NOTE — TELEPHONE ENCOUNTER
Patient managed by Dr Yohannes Gray is calling to say last prescription sent was not coved by insurance  He stated it was discussed in last visit to call back and provider would change to different medication

## 2021-06-14 NOTE — TELEPHONE ENCOUNTER
Given failure of several medications I would recommend patient proceed with cystoscopy and TRUS evaluation for potential consideration of surgical options

## 2021-06-14 NOTE — TELEPHONE ENCOUNTER
Patient calling to report new medicine sent to pharmacy is not working  He is back to "square one" going often  Please call as he requested to speak to provider or Nurse   [phone]

## 2021-06-14 NOTE — TELEPHONE ENCOUNTER
Called and spoke to patient  Patient reports since change of medication to alfuzosin he has had zero improvement  Patient states he feels as "he is at square one, frequency and constant dribbling " Patient further states he feels as he is not emptying fully  Patient denies burning, fevers, abdominal/back pain  Made patient aware of ER precautions and patient verbalized understanding  Made patient aware message routed to providers

## 2021-06-15 DIAGNOSIS — N40.1 BENIGN PROSTATIC HYPERPLASIA WITH URINARY FREQUENCY: ICD-10-CM

## 2021-06-15 DIAGNOSIS — R35.0 BENIGN PROSTATIC HYPERPLASIA WITH URINARY FREQUENCY: ICD-10-CM

## 2021-06-15 RX ORDER — FINASTERIDE 5 MG/1
5 TABLET, FILM COATED ORAL DAILY
Qty: 90 TABLET | Refills: 3 | Status: SHIPPED | OUTPATIENT
Start: 2021-06-15 | End: 2022-03-23 | Stop reason: SDUPTHER

## 2021-06-15 RX ORDER — DOXAZOSIN MESYLATE 4 MG/1
4 TABLET ORAL
Qty: 90 TABLET | Refills: 3 | Status: SHIPPED | OUTPATIENT
Start: 2021-06-15 | End: 2022-03-23 | Stop reason: SDUPTHER

## 2021-06-15 NOTE — TELEPHONE ENCOUNTER
Patient called back   Patient informed medication should be sent to:  [facility]  [address]  [phone]  Patient can be reached at [phone]

## 2021-06-15 NOTE — TELEPHONE ENCOUNTER
Called and spoke to patient  Made patient aware of providers recommendation of:  I would recommend patient proceed with cystoscopy and TRUS evaluation for potential consideration of surgical options  Provided patient education of procedures  At this time pt requesting to be provided with rx for Finasteride/Doxazosin he took previously  Pt verbalized improvement of symptomology with these medications  At this time pt would like to push off surgical options  Made patient aware message will be sent to providers

## 2021-07-03 ENCOUNTER — APPOINTMENT (OUTPATIENT)
Dept: LAB | Facility: CLINIC | Age: 71
End: 2021-07-03
Payer: COMMERCIAL

## 2021-07-03 DIAGNOSIS — E13.69 OTHER SPECIFIED DIABETES MELLITUS WITH OTHER SPECIFIED COMPLICATION, UNSPECIFIED WHETHER LONG TERM INSULIN USE (HCC): ICD-10-CM

## 2021-07-03 DIAGNOSIS — Z79.4 ENCOUNTER FOR LONG-TERM (CURRENT) USE OF INSULIN (HCC): ICD-10-CM

## 2021-07-03 DIAGNOSIS — E53.8 VITAMIN B 12 DEFICIENCY: ICD-10-CM

## 2021-07-03 DIAGNOSIS — D69.9 BLEEDING DISORDER (HCC): ICD-10-CM

## 2021-07-03 DIAGNOSIS — E78.5 HYPERLIPIDEMIA, UNSPECIFIED HYPERLIPIDEMIA TYPE: ICD-10-CM

## 2021-07-03 DIAGNOSIS — E55.9 VITAMIN D DEFICIENCY: ICD-10-CM

## 2021-07-03 DIAGNOSIS — E78.2 MIXED HYPERLIPIDEMIA: ICD-10-CM

## 2021-07-03 DIAGNOSIS — E11.649 UNCONTROLLED TYPE 2 DIABETES MELLITUS WITH HYPOGLYCEMIA, UNSPECIFIED HYPOGLYCEMIA COMA STATUS (HCC): ICD-10-CM

## 2021-07-03 LAB
25(OH)D3 SERPL-MCNC: 19.7 NG/ML (ref 30–100)
ALBUMIN SERPL BCP-MCNC: 3.6 G/DL (ref 3.5–5)
ALP SERPL-CCNC: 94 U/L (ref 46–116)
ALT SERPL W P-5'-P-CCNC: 30 U/L (ref 12–78)
ANION GAP SERPL CALCULATED.3IONS-SCNC: 7 MMOL/L (ref 4–13)
AST SERPL W P-5'-P-CCNC: 17 U/L (ref 5–45)
BILIRUB DIRECT SERPL-MCNC: 0.22 MG/DL (ref 0–0.2)
BILIRUB SERPL-MCNC: 0.76 MG/DL (ref 0.2–1)
BUN SERPL-MCNC: 19 MG/DL (ref 5–25)
CALCIUM SERPL-MCNC: 9.2 MG/DL (ref 8.3–10.1)
CHLORIDE SERPL-SCNC: 108 MMOL/L (ref 100–108)
CHOLEST SERPL-MCNC: 154 MG/DL (ref 50–200)
CO2 SERPL-SCNC: 25 MMOL/L (ref 21–32)
CREAT SERPL-MCNC: 0.8 MG/DL (ref 0.6–1.3)
ERYTHROCYTE [DISTWIDTH] IN BLOOD BY AUTOMATED COUNT: 13.8 % (ref 11.6–15.1)
EST. AVERAGE GLUCOSE BLD GHB EST-MCNC: 134 MG/DL
GFR SERPL CREATININE-BSD FRML MDRD: 90 ML/MIN/1.73SQ M
GLUCOSE P FAST SERPL-MCNC: 123 MG/DL (ref 65–99)
HBA1C MFR BLD: 6.3 %
HCT VFR BLD AUTO: 35.4 % (ref 36.5–49.3)
HDLC SERPL-MCNC: 38 MG/DL
HGB BLD-MCNC: 12.3 G/DL (ref 12–17)
LDLC SERPL CALC-MCNC: 88 MG/DL (ref 0–100)
MCH RBC QN AUTO: 35.9 PG (ref 26.8–34.3)
MCHC RBC AUTO-ENTMCNC: 34.7 G/DL (ref 31.4–37.4)
MCV RBC AUTO: 103 FL (ref 82–98)
NONHDLC SERPL-MCNC: 116 MG/DL
PLATELET # BLD AUTO: 114 THOUSANDS/UL (ref 149–390)
PMV BLD AUTO: 10.7 FL (ref 8.9–12.7)
POTASSIUM SERPL-SCNC: 3.7 MMOL/L (ref 3.5–5.3)
PROT SERPL-MCNC: 7.1 G/DL (ref 6.4–8.2)
RBC # BLD AUTO: 3.43 MILLION/UL (ref 3.88–5.62)
SODIUM SERPL-SCNC: 140 MMOL/L (ref 136–145)
TRIGL SERPL-MCNC: 142 MG/DL
VIT B12 SERPL-MCNC: 397 PG/ML (ref 100–900)
WBC # BLD AUTO: 4.43 THOUSAND/UL (ref 4.31–10.16)

## 2021-07-03 PROCEDURE — 82306 VITAMIN D 25 HYDROXY: CPT

## 2021-07-03 PROCEDURE — 84378 SUGARS SINGLE QUANT: CPT

## 2021-07-03 PROCEDURE — 36415 COLL VENOUS BLD VENIPUNCTURE: CPT

## 2021-07-03 PROCEDURE — 80053 COMPREHEN METABOLIC PANEL: CPT

## 2021-07-03 PROCEDURE — 82607 VITAMIN B-12: CPT

## 2021-07-03 PROCEDURE — 3060F POS MICROALBUMINURIA REV: CPT | Performed by: STUDENT IN AN ORGANIZED HEALTH CARE EDUCATION/TRAINING PROGRAM

## 2021-07-03 PROCEDURE — 80061 LIPID PANEL: CPT

## 2021-07-03 PROCEDURE — 82248 BILIRUBIN DIRECT: CPT

## 2021-07-03 PROCEDURE — 3044F HG A1C LEVEL LT 7.0%: CPT | Performed by: STUDENT IN AN ORGANIZED HEALTH CARE EDUCATION/TRAINING PROGRAM

## 2021-07-03 PROCEDURE — 85027 COMPLETE CBC AUTOMATED: CPT

## 2021-07-03 PROCEDURE — 83036 HEMOGLOBIN GLYCOSYLATED A1C: CPT

## 2021-07-07 LAB — 1,5-ANHYDROGLUCITOL SERPL-MCNC: 5.6 UG/ML

## 2021-08-24 DIAGNOSIS — E13.42 POLYNEUROPATHY DUE TO SECONDARY DIABETES (HCC): Primary | ICD-10-CM

## 2021-08-24 RX ORDER — GABAPENTIN 600 MG/1
600 TABLET ORAL 4 TIMES DAILY
Qty: 360 TABLET | Refills: 1 | Status: SHIPPED | OUTPATIENT
Start: 2021-08-24 | End: 2022-04-27

## 2021-09-27 ENCOUNTER — OFFICE VISIT (OUTPATIENT)
Dept: NEUROLOGY | Facility: CLINIC | Age: 71
End: 2021-09-27
Payer: COMMERCIAL

## 2021-09-27 VITALS
BODY MASS INDEX: 40.75 KG/M2 | TEMPERATURE: 97.5 F | SYSTOLIC BLOOD PRESSURE: 170 MMHG | DIASTOLIC BLOOD PRESSURE: 71 MMHG | WEIGHT: 284 LBS | HEART RATE: 58 BPM

## 2021-09-27 DIAGNOSIS — R41.3 MEMORY DIFFICULTY: ICD-10-CM

## 2021-09-27 DIAGNOSIS — G20 PARKINSON'S DISEASE (HCC): ICD-10-CM

## 2021-09-27 DIAGNOSIS — E11.44 DIABETIC AMYOTROPHY ASSOCIATED WITH TYPE 2 DIABETES MELLITUS (HCC): ICD-10-CM

## 2021-09-27 DIAGNOSIS — E13.42 POLYNEUROPATHY DUE TO SECONDARY DIABETES (HCC): Primary | ICD-10-CM

## 2021-09-27 PROCEDURE — 99213 OFFICE O/P EST LOW 20 MIN: CPT | Performed by: PSYCHIATRY & NEUROLOGY

## 2021-09-27 RX ORDER — DONEPEZIL HYDROCHLORIDE 10 MG/1
10 TABLET, FILM COATED ORAL
Qty: 90 TABLET | Refills: 1 | Status: SHIPPED | OUTPATIENT
Start: 2021-09-27 | End: 2022-04-27

## 2021-09-27 NOTE — PROGRESS NOTES
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 ([facility] )    2  Parkinson's disease ([facility] )    3  Diabetic amyotrophy associated with type 2 diabetes mellitus ([facility] )    4  Memory difficulty        Plan:  Continue Trileptal   Increase gabapentin   Follow-up 6 months    Discussion:  Gab Carter continues to have issues with neuropathic pain at night taking gabapentin 600 milligrams 3 times daily and Trileptal 600 milligrams at bedtime  He may increase his gabapentin dose up to 1200 milligrams at bedtime and if this is not effective he will notify me and will consider further increase  He has discontinued the Sinemet has not found it to be effective in controlling his tremor and reports only time he notes the tremors when he is holding something in his right hand  The tremor worsens he will notify me  He is having issues with his gait related to neuropathy and wants to do therapy on his own as it is more economical   He has not noticed any problems with his memory on a day-to-day basis and discontinue the Aricept  He was not willing to participate [place] testing today  I will plan to see him back in follow-up in 6 months      Subjective:    HPI  Gab Carter returns in follow-up today  He reports that since here last he overall has been doing fairly well  He states his balance is not as good and he needs to rely on a cane to ambulate  He states he does have issues with neuropathic pain in his feet at nighttime taking gabapentin 600 milligrams 3 times daily and Trileptal 600 milligrams at bedtime  He states that if he does physical therapy on his own it is more economical when he goes to the gym and uses the pool in the ischium exercises that if I give him a prescription    He states that due to the pandemic he has not been active with this but anticipates restarting at the near future  He discontinued Aricept and states his memory seems to be fine  He states he is able to do things on a day-to-day basis without any problems  He states that the only time he notices the tremors when he is holding a cup in his right hand  He states he is able to hold a cup in the left hand without issues with tremor  He did not find the Sinemet to be effective so he discontinued it  He states his sugars are currently under good control anticipates having some work done on his prostate due to prostatic hypertrophy      Past Medical History:   Diagnosis Date    Arthritis     Hyperlipidemia     Hypertension     Poor circulation     Sleep apnea     Type 2 diabetes mellitus ([facility] )        Family History:  Family History   Problem Relation Age of Onset    Diabetes type II Mother     Hypertension Mother     Breast cancer Sister     Lung cancer Sister     Multiple endocrine neoplasia Brother     Breast cancer Sister        Past Surgical History:  Past Surgical History:   Procedure Laterality Date    BACK SURGERY      CORONARY ARTERY BYPASS GRAFT  07/22/2017    HEMORROIDECTOMY      TONSILLECTOMY      WRIST SURGERY         Social History:   reports that he has never smoked  He has never used smokeless tobacco  He reports current alcohol use  He reports that he does not use drugs  Allergies:  Penicillins      Current Outpatient Medications:     amLODIPine (NORVASC) 10 mg tablet, Take 1 tablet by mouth daily, Disp: , Rfl:     aspirin 81 MG tablet, Take 1 tablet by mouth daily, Disp: , Rfl:     Blood Glucose Monitoring Suppl (ACURA BLOOD GLUCOSE METER) w/Device KIT, by Other route   Use as instructed, Disp: , Rfl:     carbidopa-levodopa (SINEMET)  mg per tablet, One p o  Q i d  (Patient not taking: Reported on 6/3/2021), Disp: 120 tablet, Rfl: 5    donepezil (ARICEPT) 10 mg tablet, One p o  Q h s  (Patient not taking: Reported on 6/3/2021), Disp: 30 tablet, Rfl: 5    doxazosin (CARDURA) 4 mg tablet, Take 1 tablet (4 mg total) by mouth daily at bedtime, Disp: 90 tablet, Rfl: 3    finasteride (PROSCAR) 5 mg tablet, Take 1 tablet (5 mg total) by mouth daily, Disp: 90 tablet, Rfl: 3    furosemide (LASIX) 20 mg tablet, Take 1 tablet by mouth daily as needed, Disp: , Rfl:     gabapentin (NEURONTIN) 600 MG tablet, Take 1 tablet (600 mg total) by mouth 4 (four) times a day (Patient taking differently: Take 600 mg by mouth 3 (three) times a day ), Disp: 360 tablet, Rfl: 3    gabapentin (NEURONTIN) 600 MG tablet, Take 1 tablet (600 mg total) by mouth 4 (four) times a day, Disp: 360 tablet, Rfl: 1    glipiZIDE (GLUCOTROL) 10 mg tablet, Take 10 mg by mouth 2 (two) times a day, Disp: , Rfl:     hydrochlorothiazide (HYDRODIURIL) 25 mg tablet, Take 1 tablet by mouth daily, Disp: , Rfl:     hydroxychloroquine (PLAQUENIL) 200 mg tablet, TAKE 2 TABLETS DAILY, Disp: 180 tablet, Rfl: 1    insulin glargine (LANTUS SOLOSTAR) 100 units/mL injection pen, Inject 40 Units under the skin daily at bedtime , Disp: , Rfl:     irbesartan (AVAPRO) 300 mg tablet, Take 300 mg by mouth daily  , Disp: , Rfl:     LORazepam (ATIVAN) 1 mg tablet, Two p o  1 hour before MRI, may repeat 1 after 1 hour p r n  (Patient not taking: Reported on 6/3/2021), Disp: 3 tablet, Rfl: 0    metoprolol succinate (TOPROL-XL) 100 mg 24 hr tablet, Take 100 mg by mouth daily, Disp: , Rfl:     OXcarbazepine (TRILEPTAL) 300 mg tablet, 1/2 p o  Q 12 hours with 600 mg dose (Patient taking differently: as needed ), Disp: 30 tablet, Rfl: 5    OXcarbazepine (TRILEPTAL) 600 mg tablet, Take 1 tablet (600 mg total) by mouth daily at bedtime, Disp: 90 tablet, Rfl: 3    TRUEPLUS PEN NEEDLES 32G X 4 MM MISC, , Disp: , Rfl:     I have reviewed the past medical, social and family history, current medications, allergies, vitals, review of systems and updated this information as appropriate today Objective:    Vitals:  Blood pressure 170/71, pulse 58, temperature 97 5 °F (36 4 °C), weight 129 kg (284 lb)  Physical Exam    Neurological Exam  GENERAL:  Well-developed well-nourished man in no acute distress  HEENT/NECK: Head is atraumatic normocephalic, neck is supple  NEUROLOGIC:  Mental Status: Awake and alert without aphasia  Cranial Nerves: Extraocular movements are full  Face is symmetrical  Motor:  No resting tremor was noted on today's exam  Coordination:  Gait is stable with a straight cane            ROS:    Review of Systems   Constitutional: Negative  Negative for appetite change and fever  HENT: Negative  Negative for hearing loss, tinnitus, trouble swallowing and voice change  Eyes: Negative  Negative for photophobia and pain  Respiratory: Negative  Negative for shortness of breath  Cardiovascular: Negative  Negative for palpitations  Gastrointestinal: Negative  Negative for nausea and vomiting  Endocrine: Negative  Negative for cold intolerance  Genitourinary: Negative  Negative for dysuria, frequency and urgency  Musculoskeletal: Positive for gait problem (hard time walking, balance issues )  Negative for myalgias and neck pain  Skin: Negative  Negative for rash  Neurological: Positive for tremors and numbness (both feet, and both hands )  Negative for dizziness, seizures, syncope, facial asymmetry, speech difficulty, weakness, light-headedness and headaches  Burning  and shooting pains in toes    Hematological: Negative  Does not bruise/bleed easily  Psychiatric/Behavioral: Negative  Negative for confusion, hallucinations and sleep disturbance  Memory has stayed about the same

## 2022-01-04 ENCOUNTER — APPOINTMENT (OUTPATIENT)
Dept: LAB | Facility: CLINIC | Age: 72
End: 2022-01-04
Payer: COMMERCIAL

## 2022-01-04 DIAGNOSIS — E03.9 MYXEDEMA HEART DISEASE: ICD-10-CM

## 2022-01-04 DIAGNOSIS — E78.5 HYPERLIPOPROTEINEMIA: ICD-10-CM

## 2022-01-04 DIAGNOSIS — I51.9 MYXEDEMA HEART DISEASE: ICD-10-CM

## 2022-01-04 LAB
ALBUMIN SERPL BCP-MCNC: 3.7 G/DL (ref 3.5–5)
ALP SERPL-CCNC: 76 U/L (ref 46–116)
ALT SERPL W P-5'-P-CCNC: 27 U/L (ref 12–78)
ANION GAP SERPL CALCULATED.3IONS-SCNC: 5 MMOL/L (ref 4–13)
AST SERPL W P-5'-P-CCNC: 15 U/L (ref 5–45)
BILIRUB DIRECT SERPL-MCNC: 0.14 MG/DL (ref 0–0.2)
BILIRUB SERPL-MCNC: 0.78 MG/DL (ref 0.2–1)
BUN SERPL-MCNC: 19 MG/DL (ref 5–25)
CALCIUM SERPL-MCNC: 10.5 MG/DL (ref 8.3–10.1)
CHLORIDE SERPL-SCNC: 109 MMOL/L (ref 100–108)
CHOLEST SERPL-MCNC: 164 MG/DL
CO2 SERPL-SCNC: 29 MMOL/L (ref 21–32)
CREAT SERPL-MCNC: 0.92 MG/DL (ref 0.6–1.3)
ERYTHROCYTE [DISTWIDTH] IN BLOOD BY AUTOMATED COUNT: 13.7 % (ref 11.6–15.1)
EST. AVERAGE GLUCOSE BLD GHB EST-MCNC: 143 MG/DL
GFR SERPL CREATININE-BSD FRML MDRD: 83 ML/MIN/1.73SQ M
GLUCOSE P FAST SERPL-MCNC: 114 MG/DL (ref 65–99)
HBA1C MFR BLD: 6.6 %
HCT VFR BLD AUTO: 34.5 % (ref 36.5–49.3)
HDLC SERPL-MCNC: 46 MG/DL
HGB BLD-MCNC: 11.8 G/DL (ref 12–17)
LDLC SERPL CALC-MCNC: 94 MG/DL (ref 0–100)
MCH RBC QN AUTO: 33.9 PG (ref 26.8–34.3)
MCHC RBC AUTO-ENTMCNC: 34.2 G/DL (ref 31.4–37.4)
MCV RBC AUTO: 99 FL (ref 82–98)
NONHDLC SERPL-MCNC: 118 MG/DL
PLATELET # BLD AUTO: 118 THOUSANDS/UL (ref 149–390)
PMV BLD AUTO: 10.7 FL (ref 8.9–12.7)
POTASSIUM SERPL-SCNC: 3.6 MMOL/L (ref 3.5–5.3)
PROT SERPL-MCNC: 6.6 G/DL (ref 6.4–8.2)
RBC # BLD AUTO: 3.48 MILLION/UL (ref 3.88–5.62)
SODIUM SERPL-SCNC: 143 MMOL/L (ref 136–145)
T4 FREE SERPL-MCNC: 0.73 NG/DL (ref 0.76–1.46)
TRIGL SERPL-MCNC: 118 MG/DL
TSH SERPL DL<=0.05 MIU/L-ACNC: 4.52 UIU/ML (ref 0.36–3.74)
WBC # BLD AUTO: 5.4 THOUSAND/UL (ref 4.31–10.16)

## 2022-01-04 PROCEDURE — 80076 HEPATIC FUNCTION PANEL: CPT

## 2022-01-04 PROCEDURE — 84443 ASSAY THYROID STIM HORMONE: CPT

## 2022-01-04 PROCEDURE — 85027 COMPLETE CBC AUTOMATED: CPT

## 2022-01-04 PROCEDURE — 36415 COLL VENOUS BLD VENIPUNCTURE: CPT

## 2022-01-04 PROCEDURE — 84439 ASSAY OF FREE THYROXINE: CPT

## 2022-01-04 PROCEDURE — 80061 LIPID PANEL: CPT

## 2022-01-04 PROCEDURE — 80048 BASIC METABOLIC PNL TOTAL CA: CPT

## 2022-01-04 PROCEDURE — 83036 HEMOGLOBIN GLYCOSYLATED A1C: CPT

## 2022-01-27 NOTE — TELEPHONE ENCOUNTER
Called and left message for patient  Office number provided  When patient returns call, please find out which pharmacy patient prefers the medication be sent too       Thanks Topical Retinoid counseling:  Patient advised to apply a pea-sized amount only at bedtime and wait 30 minutes after washing their face before applying.  If too drying, patient may add a non-comedogenic moisturizer. The patient verbalized understanding of the proper use and possible adverse effects of retinoids.  All of the patient's questions and concerns were addressed.

## 2022-02-04 ENCOUNTER — TELEPHONE (OUTPATIENT)
Dept: NEUROLOGY | Facility: CLINIC | Age: 72
End: 2022-02-04

## 2022-03-16 ENCOUNTER — TELEPHONE (OUTPATIENT)
Dept: UROLOGY | Facility: MEDICAL CENTER | Age: 72
End: 2022-03-16

## 2022-03-16 DIAGNOSIS — R35.0 BENIGN PROSTATIC HYPERPLASIA WITH URINARY FREQUENCY: Primary | ICD-10-CM

## 2022-03-16 DIAGNOSIS — N40.1 BENIGN PROSTATIC HYPERPLASIA WITH URINARY FREQUENCY: Primary | ICD-10-CM

## 2022-03-16 NOTE — TELEPHONE ENCOUNTER
Patient seen by Dr Marcela Alfred in [facility]    Patient called and set up appointment for 03/23/22 and needs an order for psa

## 2022-03-21 ENCOUNTER — APPOINTMENT (OUTPATIENT)
Dept: LAB | Facility: CLINIC | Age: 72
End: 2022-03-21
Payer: COMMERCIAL

## 2022-03-21 DIAGNOSIS — N40.1 BENIGN PROSTATIC HYPERPLASIA WITH URINARY FREQUENCY: ICD-10-CM

## 2022-03-21 DIAGNOSIS — R35.0 BENIGN PROSTATIC HYPERPLASIA WITH URINARY FREQUENCY: ICD-10-CM

## 2022-03-21 LAB — PSA SERPL-MCNC: 0.1 NG/ML (ref 0–4)

## 2022-03-21 PROCEDURE — 84153 ASSAY OF PSA TOTAL: CPT

## 2022-03-22 DIAGNOSIS — E13.42 POLYNEUROPATHY DUE TO SECONDARY DIABETES (HCC): ICD-10-CM

## 2022-03-22 NOTE — PROGRESS NOTES
3/23/2022      Chief Complaint   Patient presents with    Benign Prostatic Hypertrophy     Assessment and Plan    1  BPH with LUTS  2  Urge urinary incontinence  - On Cardura and finasteride  - Complaining of ongoing urge urinary incontinence despite this  - Reviewed options and recommendation for proceeding with cystoscopy and TRUS for further evaluation and potential surgical options which he defers at this time  He wishes to try additional pharmacotherapy  Will trial Detrol for overactive bladder symptoms  3  Testicular swelling  4  Buried penis  5  CHF  - Physical exam negative for any significant scrotal swelling or testicular masses  No scrotal erythema  No tenderness to palpation  - increase in testicular size likely related to fluid overload from congestive heart failure  He does recently report increasing lower extremity edema and coughing  Recommend he follow-up with cardiologist     - follow-up in 3 months for symptom reassessment  History of Present Illness  Otilia Peres is a 70 y o  male here for follow up evaluation of  BPH with lower urinary tract symptoms  Patient has been managed on Cardura and finasteride  Previously failed tamsulosin due to orthostatic hypotension  Despite these 2 medications he does note ongoing urge urinary incontinence  He has to wear pads for leakage  Denies any dysuria or gross hematuria  He also does recently report increasing in testicular size  Denies any pain or discomfort, significant swelling or redness  Patient does have history of congestive heart failure  Does report recently he is coughing more and occasionally coughing up white phlegm and also notes increasing lower extremity edema  He denies any chest pain or shortness of breath  PSA from 3/21/22 was 0 1  Previously 0 3  PVR=0 mL   Urine dip negative for blood, leukocytes, or nitrites    Review of Systems   Constitutional: Negative for chills and fever     Respiratory: Positive for cough  Negative for shortness of breath  Cardiovascular: Positive for leg swelling  Negative for chest pain  Gastrointestinal: Negative for abdominal pain  Genitourinary: Positive for frequency, scrotal swelling and urgency  Negative for difficulty urinating, dysuria, flank pain, hematuria, penile discharge, penile pain, penile swelling and testicular pain  Neurological: Negative for dizziness         Past Medical History  Past Medical History:   Diagnosis Date    Arthritis     Hyperlipidemia     Hypertension     Poor circulation     Sleep apnea     Type 2 diabetes mellitus (HCC)        Past Social History  Past Surgical History:   Procedure Laterality Date    BACK SURGERY      CORONARY ARTERY BYPASS GRAFT  07/22/2017    HEMORROIDECTOMY      TONSILLECTOMY      WRIST SURGERY       Social History     Tobacco Use   Smoking Status Never Smoker   Smokeless Tobacco Never Used       Past Family History  Family History   Problem Relation Age of Onset    Diabetes type II Mother     Hypertension Mother     Breast cancer Sister     Lung cancer Sister     Multiple endocrine neoplasia Brother     Breast cancer Sister        Past Social history  Social History     Socioeconomic History    Marital status: Single     Spouse name: Not on file    Number of children: Not on file    Years of education: Not on file    Highest education level: Not on file   Occupational History    Not on file   Tobacco Use    Smoking status: Never Smoker    Smokeless tobacco: Never Used   Vaping Use    Vaping Use: Never used   Substance and Sexual Activity    Alcohol use: Yes     Comment: 2 drinks daily    Drug use: No    Sexual activity: Not on file   Other Topics Concern    Not on file   Social History Narrative    Not on file     Social Determinants of Health     Financial Resource Strain: Not on file   Food Insecurity: Not on file   Transportation Needs: Not on file   Physical Activity: Not on file Stress: Not on file   Social Connections: Not on file   Intimate Partner Violence: Not on file   Housing Stability: Not on file       Current Medications  Current Outpatient Medications   Medication Sig Dispense Refill    amLODIPine (NORVASC) 10 mg tablet Take 1 tablet by mouth daily      aspirin 81 MG tablet Take 1 tablet by mouth daily      Blood Glucose Monitoring Suppl (ACURA BLOOD GLUCOSE METER) w/Device KIT by Other route   Use as instructed      Cholecalciferol 1 25 MG (06156 UT) capsule Take 50,000 Units by mouth      donepezil (ARICEPT) 10 mg tablet Take 1 tablet (10 mg total) by mouth daily at bedtime 90 tablet 1    doxazosin (CARDURA) 4 mg tablet Take 1 tablet (4 mg total) by mouth daily at bedtime 90 tablet 3    finasteride (PROSCAR) 5 mg tablet Take 1 tablet (5 mg total) by mouth daily 90 tablet 3    furosemide (LASIX) 20 mg tablet Take 1 tablet by mouth daily as needed      gabapentin (NEURONTIN) 600 MG tablet Take 1 tablet (600 mg total) by mouth 4 (four) times a day (Patient taking differently: Take 600 mg by mouth 3 (three) times a day ) 360 tablet 3    glipiZIDE (GLUCOTROL) 10 mg tablet Take 10 mg by mouth 2 (two) times a day      hydrochlorothiazide (HYDRODIURIL) 25 mg tablet Take 1 tablet by mouth daily      insulin glargine (LANTUS SOLOSTAR) 100 units/mL injection pen Inject 40 Units under the skin daily at bedtime       irbesartan (AVAPRO) 300 mg tablet Take 300 mg by mouth daily        metoprolol succinate (TOPROL-XL) 100 mg 24 hr tablet Take 100 mg by mouth daily      OXcarbazepine (TRILEPTAL) 600 mg tablet TAKE 1 TABLET DAILY AT BEDTIME 90 tablet 3    TRUEPLUS PEN NEEDLES 32G X 4 MM MISC       carbidopa-levodopa (SINEMET)  mg per tablet One p o  Q i d  (Patient not taking: Reported on 6/3/2021) 120 tablet 5    gabapentin (NEURONTIN) 600 MG tablet Take 1 tablet (600 mg total) by mouth 4 (four) times a day (Patient not taking: Reported on 3/23/2022 ) 360 tablet 1    hydroxychloroquine (PLAQUENIL) 200 mg tablet TAKE 2 TABLETS DAILY 180 tablet 1    LORazepam (ATIVAN) 1 mg tablet Two p o  1 hour before MRI, may repeat 1 after 1 hour p r n  (Patient not taking: Reported on 6/3/2021) 3 tablet 0    OXcarbazepine (TRILEPTAL) 300 mg tablet 1/2 p o  Q 12 hours with 600 mg dose (Patient not taking: Reported on 3/23/2022 ) 30 tablet 5     No current facility-administered medications for this visit  Allergies  Allergies   Allergen Reactions    Penicillins      Other reaction(s): Other, Unknown         The following portions of the patient's history were reviewed and updated as appropriate: allergies, current medications, past medical history, past social history, past surgical history and problem list       Vitals  Vitals:    03/23/22 0947   BP: 152/70   Pulse: 88   Weight: 133 kg (293 lb)   Height: 5' 10" (1 778 m)           Physical Exam  Physical Exam  Constitutional:       Appearance: Normal appearance  He is obese  HENT:      Head: Normocephalic and atraumatic  Right Ear: External ear normal       Left Ear: External ear normal    Eyes:      General: No scleral icterus  Conjunctiva/sclera: Conjunctivae normal    Cardiovascular:      Pulses: Normal pulses  Pulmonary:      Effort: Pulmonary effort is normal    Genitourinary:     Comments: No significant scrotal swelling, edema, or erythema  No scrotal rashes  No tenderness to palpation  No testicular masses  Buried penis  Musculoskeletal:      Cervical back: Normal range of motion  Comments: Ambulating with cane   Skin:     General: Skin is warm and dry  Neurological:      General: No focal deficit present  Mental Status: He is alert and oriented to person, place, and time  Psychiatric:         Mood and Affect: Mood normal          Behavior: Behavior normal          Thought Content:  Thought content normal          Judgment: Judgment normal            Results  Recent Results (from the past 1 hour(s))   POCT urine dip    Collection Time: 03/23/22  9:57 AM   Result Value Ref Range    LEUKOCYTE ESTERASE,UA -     NITRITE,UA -     SL AMB POCT UROBILINOGEN 0 2     POCT URINE PROTEIN -      PH,UA 5 0     BLOOD,UA -     SPECIFIC GRAVITY,UA 1 030     KETONES,UA -     BILIRUBIN,UA -     GLUCOSE, UA -      COLOR,UA yellow     CLARITY,UA clear    POCT Measure PVR    Collection Time: 03/23/22  9:57 AM   Result Value Ref Range    POST-VOID RESIDUAL VOLUME, ML POC 0 mL   ]  Lab Results   Component Value Date    PSA 0 1 03/21/2022    PSA 0 3 05/05/2021     Lab Results   Component Value Date    CALCIUM 10 5 (H) 01/04/2022    K 3 6 01/04/2022    CO2 29 01/04/2022     (H) 01/04/2022    BUN 19 01/04/2022    CREATININE 0 92 01/04/2022     Lab Results   Component Value Date    WBC 5 40 01/04/2022    HGB 11 8 (L) 01/04/2022    HCT 34 5 (L) 01/04/2022    MCV 99 (H) 01/04/2022     (L) 01/04/2022           Orders  Orders Placed This Encounter   Procedures    POCT urine dip    POCT Measure PVR     Trinity Christiansen PA-C

## 2022-03-23 ENCOUNTER — OFFICE VISIT (OUTPATIENT)
Dept: UROLOGY | Facility: CLINIC | Age: 72
End: 2022-03-23
Payer: COMMERCIAL

## 2022-03-23 VITALS
WEIGHT: 293 LBS | HEIGHT: 70 IN | HEART RATE: 88 BPM | SYSTOLIC BLOOD PRESSURE: 152 MMHG | DIASTOLIC BLOOD PRESSURE: 70 MMHG | BODY MASS INDEX: 41.95 KG/M2

## 2022-03-23 DIAGNOSIS — N40.1 BENIGN PROSTATIC HYPERPLASIA WITH URINARY FREQUENCY: Primary | ICD-10-CM

## 2022-03-23 DIAGNOSIS — N39.41 URGE URINARY INCONTINENCE: ICD-10-CM

## 2022-03-23 DIAGNOSIS — R35.0 BENIGN PROSTATIC HYPERPLASIA WITH URINARY FREQUENCY: Primary | ICD-10-CM

## 2022-03-23 LAB
POST-VOID RESIDUAL VOLUME, ML POC: 0 ML
SL AMB  POCT GLUCOSE, UA: NORMAL
SL AMB LEUKOCYTE ESTERASE,UA: NORMAL
SL AMB POCT BILIRUBIN,UA: NORMAL
SL AMB POCT BLOOD,UA: NORMAL
SL AMB POCT CLARITY,UA: CLEAR
SL AMB POCT COLOR,UA: YELLOW
SL AMB POCT KETONES,UA: NORMAL
SL AMB POCT NITRITE,UA: NORMAL
SL AMB POCT PH,UA: 5
SL AMB POCT SPECIFIC GRAVITY,UA: 1.03
SL AMB POCT URINE PROTEIN: NORMAL
SL AMB POCT UROBILINOGEN: 0.2

## 2022-03-23 PROCEDURE — 51798 US URINE CAPACITY MEASURE: CPT | Performed by: PHYSICIAN ASSISTANT

## 2022-03-23 PROCEDURE — 99213 OFFICE O/P EST LOW 20 MIN: CPT | Performed by: PHYSICIAN ASSISTANT

## 2022-03-23 PROCEDURE — 81002 URINALYSIS NONAUTO W/O SCOPE: CPT | Performed by: PHYSICIAN ASSISTANT

## 2022-03-23 RX ORDER — FINASTERIDE 5 MG/1
5 TABLET, FILM COATED ORAL DAILY
Qty: 90 TABLET | Refills: 3 | Status: SHIPPED | OUTPATIENT
Start: 2022-03-23 | End: 2022-07-13

## 2022-03-23 RX ORDER — DOXAZOSIN MESYLATE 4 MG/1
4 TABLET ORAL
Qty: 90 TABLET | Refills: 3 | Status: SHIPPED | OUTPATIENT
Start: 2022-03-23 | End: 2022-07-13

## 2022-03-23 RX ORDER — TOLTERODINE 2 MG/1
2 CAPSULE, EXTENDED RELEASE ORAL DAILY
Qty: 90 CAPSULE | Refills: 0 | Status: SHIPPED | OUTPATIENT
Start: 2022-03-23 | End: 2022-07-05

## 2022-03-23 RX ORDER — OXCARBAZEPINE 600 MG/1
TABLET, FILM COATED ORAL
Qty: 90 TABLET | Refills: 3 | Status: SHIPPED | OUTPATIENT
Start: 2022-03-23

## 2022-03-28 ENCOUNTER — OFFICE VISIT (OUTPATIENT)
Dept: NEUROLOGY | Facility: CLINIC | Age: 72
End: 2022-03-28
Payer: COMMERCIAL

## 2022-03-28 VITALS
HEART RATE: 57 BPM | TEMPERATURE: 97.9 F | RESPIRATION RATE: 18 BRPM | WEIGHT: 287 LBS | HEIGHT: 70 IN | SYSTOLIC BLOOD PRESSURE: 146 MMHG | DIASTOLIC BLOOD PRESSURE: 62 MMHG | BODY MASS INDEX: 41.09 KG/M2

## 2022-03-28 DIAGNOSIS — I25.10 CORONARY ARTERY DISEASE INVOLVING NATIVE CORONARY ARTERY: ICD-10-CM

## 2022-03-28 DIAGNOSIS — I50.9 CONGESTIVE HEART FAILURE, UNSPECIFIED HF CHRONICITY, UNSPECIFIED HEART FAILURE TYPE (HCC): ICD-10-CM

## 2022-03-28 DIAGNOSIS — G20 PARKINSON'S DISEASE (HCC): ICD-10-CM

## 2022-03-28 DIAGNOSIS — E11.44 DIABETIC AMYOTROPHY ASSOCIATED WITH TYPE 2 DIABETES MELLITUS (HCC): ICD-10-CM

## 2022-03-28 DIAGNOSIS — R41.3 MEMORY DIFFICULTY: ICD-10-CM

## 2022-03-28 DIAGNOSIS — E13.42 POLYNEUROPATHY DUE TO SECONDARY DIABETES (HCC): Primary | ICD-10-CM

## 2022-03-28 PROCEDURE — 99214 OFFICE O/P EST MOD 30 MIN: CPT | Performed by: PSYCHIATRY & NEUROLOGY

## 2022-03-28 RX ORDER — RASAGILINE 1 MG/1
1 TABLET ORAL DAILY
Qty: 30 TABLET | Refills: 5 | Status: SHIPPED | OUTPATIENT
Start: 2022-03-28

## 2022-03-28 NOTE — PROGRESS NOTES
Rudy Wiley is a 70 y o  male returns follow-up today with history of diabetic neuropathy, memory difficulty and tremor    Assessment:  1  Polyne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 ([facility] [address] )    2  Diabetic amyotrophy associated with type 2 diabetes mellitus ([facility] [address] )    3  Memory difficulty    4  [facility]'s disease ([facility]        Plan:  Continue gabapentin and Trileptal   Continue Aricept  Initiate Azilect  Follow-up 6 months    Discussion:  Tamika Sampson reports his diabetic neuropathic pain symptoms remain under reasonable control with current management  He continues note issues with his balance but has been fairly stable  He feels his memory issues have been stable and did not want to participate with [facility] today however he will do [facility] at next follow-up  He continues note issues with rest tremor head did not find Sinemet helpful  Have recommended a trial of Azilect  I will see him back in follow-up on these are complete      Subjective:    OSWALDO  Tamika Sampson returns in follow-up today  He reports that overall his neuropathic pain symptoms remain under good control  He states most days he takes gabapentin 600 milligrams 3 times daily but states when he is having a bad nerve pain day he will take it 4 times a day  He remains on Trileptal and tolerates these well  He remains on Aricept and feels overall his memory symptoms have been stable  He did not want to participate in [address] testing today  He states his tremor seems to be a bit more apparent and did not find Sinemet helpful  He states he continues to note issues with his balance and does use a cane when he is out and about  He did have a fall when he tripped over something in the garage and is being more careful  Fortunately not hurt himself  He has a history of bypass surgery and CHF and would like to see a cardiologist in the [facility]'s system    He is having some issues with his prostate and an issue with some bleeding in 1 eye      Past Medical History: Diagnosis Date    Arthritis     Hyperlipidemia     Hypertension     Poor circulation     Sleep apnea     Type 2 diabetes mellitus ([facility] [address] )        Family History:  Family History   Problem Relation Age of Onset    Diabetes type II Mother     Hypertension Mother     Breast cancer Sister     Lung cancer Sister     Multiple endocrine neoplasia Brother     Breast cancer Sister        Past Surgical History:  Past Surgical History:   Procedure Laterality Date    BACK SURGERY      CORONARY ARTERY BYPASS GRAFT  07/22/2017    HEMORROIDECTOMY      TONSILLECTOMY      WRIST SURGERY         Social History:   reports that he has never smoked  He has never used smokeless tobacco  He reports current alcohol use  He reports that he does not use drugs  Allergies:  Penicillins      Current Outpatient Medications:     amLODIPine (NORVASC) 10 mg tablet, Take 1 tablet by mouth daily, Disp: , Rfl:     aspirin 81 MG tablet, Take 1 tablet by mouth daily, Disp: , Rfl:     Blood Glucose Monitoring Suppl (ACURA BLOOD GLUCOSE METER) w/Device KIT, by Other route   Use as instructed, Disp: , Rfl:     Cholecalciferol 1 25 MG (21821 UT) capsule, Take 50,000 Units by mouth, Disp: , Rfl:     donepezil (ARICEPT) 10 mg tablet, Take 1 tablet (10 mg total) by mouth daily at bedtime, Disp: 90 tablet, Rfl: 1    doxazosin (CARDURA) 4 mg tablet, Take 1 tablet (4 mg total) by mouth daily at bedtime, Disp: 90 tablet, Rfl: 3    finasteride (PROSCAR) 5 mg tablet, Take 1 tablet (5 mg total) by mouth daily, Disp: 90 tablet, Rfl: 3    furosemide (LASIX) 20 mg tablet, Take 1 tablet by mouth daily as needed, Disp: , Rfl:     gabapentin (NEURONTIN) 600 MG tablet, Take 1 tablet (600 mg total) by mouth 4 (four) times a day (Patient taking differently: Take 600 mg by mouth 3 (three) times a day ), Disp: 360 tablet, Rfl: 3    glipiZIDE (GLUCOTROL) 10 mg tablet, Take 10 mg by mouth 2 (two) times a day, Disp: , Rfl:     hydrochlorothiazide (HYDRODIURIL) 25 mg tablet, Take 1 tablet by mouth daily, Disp: , Rfl:     insulin glargine (LANTUS SOLOSTAR) 100 units/mL injection pen, Inject 40 Units under the skin daily at bedtime , Disp: , Rfl:     irbesartan (AVAPRO) 300 mg tablet, Take 300 mg by mouth daily  , Disp: , Rfl:     metoprolol succinate (TOPROL-XL) 100 mg 24 hr tablet, Take 100 mg by mouth daily, Disp: , Rfl:     OXcarbazepine (TRILEPTAL) 600 mg tablet, TAKE 1 TABLET DAILY AT BEDTIME, Disp: 90 tablet, Rfl: 3    tolterodine (DETROL LA) 2 mg 24 hr capsule, Take 1 capsule (2 mg total) by mouth daily, Disp: 90 capsule, Rfl: 0    TRUEPLUS PEN NEEDLES 32G X 4 MM MISC, , Disp: , Rfl:     carbidopa-levodopa (SINEMET)  mg per tablet, One p o  Q i d  (Patient not taking: Reported on 6/3/2021), Disp: 120 tablet, Rfl: 5    gabapentin (NEURONTIN) 600 MG tablet, Take 1 tablet (600 mg total) by mouth 4 (four) times a day (Patient not taking: Reported on 3/23/2022 ), Disp: 360 tablet, Rfl: 1    hydroxychloroquine (PLAQUENIL) 200 mg tablet, TAKE 2 TABLETS DAILY, Disp: 180 tablet, Rfl: 1    LORazepam (ATIVAN) 1 mg tablet, Two p o  1 hour before MRI, may repeat 1 after 1 hour p r n  (Patient not taking: Reported on 6/3/2021), Disp: 3 tablet, Rfl: 0    OXcarbazepine (TRILEPTAL) 300 mg tablet, 1/2 p o  Q 12 hours with 600 mg dose (Patient not taking: Reported on 3/28/2022 ), Disp: 30 tablet, Rfl: 5    I have reviewed the past medical, social and family history, current medications, allergies, vitals, review of systems and updated this information as appropriate today     Objective:    Vitals:  Blood pressure 146/62, pulse 57, temperature 97 9 °F (36 6 °C), temperature source Temporal, resp  rate 18, height 5' 10" (1 778 m), weight 130 kg (287 lb)      Physical Exam    Neurological Exam  GENERAL:  Well-developed well-nourished man in no acute distress  HEENT/NECK: Head is atraumatic normocephalic, neck is supple  NEUROLOGIC:  Mental Status: Awake and alert without aphasia  Cranial Nerves: Extraocular movements are full  Face is symmetrical  Coordination:  Gait is stable with a straight cane            ROS:    Review of Systems   Constitutional: Negative  Negative for appetite change and fever  HENT: Positive for tinnitus  Negative for hearing loss, trouble swallowing and voice change  Eyes: Negative  Negative for photophobia and pain  Respiratory: Positive for shortness of breath  Cardiovascular: Negative  Negative for palpitations  Gastrointestinal: Negative  Negative for nausea and vomiting  Endocrine: Negative  Negative for cold intolerance  Genitourinary: Positive for frequency  Negative for dysuria and urgency  Musculoskeletal: Negative  Negative for myalgias and neck pain  Skin: Negative  Negative for rash  Neurological: Positive for dizziness, tremors, weakness, light-headedness and numbness  Negative for seizures, syncope, facial asymmetry, speech difficulty and headaches  Patient states numbness from his knees down to his toes and numbness in both hands  Patient states weakness through out his body   Hematological: Bruises/bleeds easily  Psychiatric/Behavioral: Negative  Negative for confusion, hallucinations and sleep disturbance

## 2022-04-26 DIAGNOSIS — E13.42 POLYNEUROPATHY DUE TO SECONDARY DIABETES (HCC): ICD-10-CM

## 2022-04-26 DIAGNOSIS — R41.3 MEMORY DIFFICULTY: ICD-10-CM

## 2022-04-27 RX ORDER — DONEPEZIL HYDROCHLORIDE 10 MG/1
TABLET, FILM COATED ORAL
Qty: 90 TABLET | Refills: 3 | Status: SHIPPED | OUTPATIENT
Start: 2022-04-27

## 2022-04-27 RX ORDER — GABAPENTIN 600 MG/1
TABLET ORAL
Qty: 360 TABLET | Refills: 3 | Status: SHIPPED | OUTPATIENT
Start: 2022-04-27 | End: 2022-05-09 | Stop reason: SDUPTHER

## 2022-05-09 ENCOUNTER — CONSULT (OUTPATIENT)
Dept: CARDIOLOGY CLINIC | Facility: CLINIC | Age: 72
End: 2022-05-09
Payer: COMMERCIAL

## 2022-05-09 VITALS
RESPIRATION RATE: 16 BRPM | SYSTOLIC BLOOD PRESSURE: 142 MMHG | OXYGEN SATURATION: 94 % | HEIGHT: 70 IN | HEART RATE: 68 BPM | DIASTOLIC BLOOD PRESSURE: 68 MMHG | WEIGHT: 287 LBS | BODY MASS INDEX: 41.09 KG/M2

## 2022-05-09 DIAGNOSIS — I25.10 CORONARY ARTERY DISEASE INVOLVING NATIVE CORONARY ARTERY: ICD-10-CM

## 2022-05-09 DIAGNOSIS — I50.9 CONGESTIVE HEART FAILURE, UNSPECIFIED HF CHRONICITY, UNSPECIFIED HEART FAILURE TYPE (HCC): ICD-10-CM

## 2022-05-09 PROCEDURE — 93000 ELECTROCARDIOGRAM COMPLETE: CPT | Performed by: INTERNAL MEDICINE

## 2022-05-09 PROCEDURE — 99204 OFFICE O/P NEW MOD 45 MIN: CPT | Performed by: INTERNAL MEDICINE

## 2022-05-09 RX ORDER — CARVEDILOL 25 MG/1
25 TABLET ORAL 2 TIMES DAILY WITH MEALS
Qty: 60 TABLET | Refills: 3 | Status: SHIPPED | OUTPATIENT
Start: 2022-05-09

## 2022-05-09 RX ORDER — TORSEMIDE 20 MG/1
20 TABLET ORAL DAILY
Qty: 30 TABLET | Refills: 3 | Status: SHIPPED | OUTPATIENT
Start: 2022-05-09 | End: 2022-07-20 | Stop reason: SDUPTHER

## 2022-05-09 RX ORDER — IRBESARTAN 300 MG/1
300 TABLET ORAL DAILY
Qty: 30 TABLET | Refills: 3 | Status: SHIPPED | OUTPATIENT
Start: 2022-05-09 | End: 2022-05-31

## 2022-05-09 NOTE — PROGRESS NOTES
Cardiology Office Note    Melani Yoon 67 y o  male MRN: [id number]    05/09/22          Assessment:  1  Dyspnea on exertion   2  CAD s/p PCI and CABG x3 (2017)  3  Hypertension   4  HLD  5  IDDM with peripheral neuropathy   6  Ambulatory dysfunction due to neuropathy   7  Chronic low back pain    Plan:  · Will evaluate with a TTE and pharmacologic MPI  · Discontinue Norvasc, HCTZ, and Toprol-XL  · Start carvedilol 25 mg b i d  and continue irbesartan  · Discontinue Lasix p r n  and start torsemide 20 mg daily  · Cont aspirin   · Monitoring daily weights was advised  Ambulatory blood pressure monitoring and maintaining a low sodium diet was advised  · He was advised to notify us with the onset of cardiac symptoms  Follow up: 3 weeks or sooner as needed    1  Coronary artery disease involving native coronary artery  Ambulatory Referral to Cardiology    POCT ECG    Echo complete w/ contrast if indicated    NM myocardial perfusion spect (rx stress and/or rest)   2  Congestive heart failure, unspecified HF chronicity, unspecified heart failure type [facility])  Ambulatory Referral to Cardiology    POCT ECG    Echo complete w/ contrast if indicated    NM myocardial perfusion spect (rx stress and/or rest)    irbesartan (AVAPRO) 300 mg tablet    carvedilol (COREG) 25 mg tablet    torsemide (DEMADEX) 20 mg tablet    Basic metabolic panel       HPI: Melani Yoon is a 67y o  year old male with history of CAD s/p PCI and CABG who was referred by his neurologist Dr Jeremiah Walsh to establish care  He has a history of CAD s/p remote PCI and CABG x3 in 2017 at [facility]  Over the past year he has noted progressive dyspnea on exertion, weight gain and lower extremity edema  His previous dry weight was around 265 lb and is currently 287 lb  He recently started taking Lasix p r n  however has not been taking it daily due to polyuria    He has a history of diabetic polyneuropathy with resulting ambulatory dysfunction  He denies chest pain, palpitations, or any other cardiac concerns at this time  ECG personally reviewed:  Sinus bradycardia with first-degree AV block, left axis deviation, right bundle-branch block    Family History: father passed at age 66 from CVA and MI; mother with CHF; half brother passed from heart disease     Social history: never smoked; drinks 3 beers a day       Allergies   Allergen Reactions    Penicillins      Other reaction(s): Other, Unknown         Current Outpatient Medications:     aspirin 81 MG tablet, Take 1 tablet by mouth daily, Disp: , Rfl:     Blood Glucose Monitoring Suppl (ACURA BLOOD GLUCOSE METER) w/Device KIT, by Other route   Use as instructed, Disp: , Rfl:     donepezil (ARICEPT) 10 mg tablet, TAKE 1 TABLET DAILY AT BEDTIME, Disp: 90 tablet, Rfl: 3    doxazosin (CARDURA) 4 mg tablet, Take 1 tablet (4 mg total) by mouth daily at bedtime, Disp: 90 tablet, Rfl: 3    finasteride (PROSCAR) 5 mg tablet, Take 1 tablet (5 mg total) by mouth daily, Disp: 90 tablet, Rfl: 3    gabapentin (NEURONTIN) 600 MG tablet, Take 1 tablet (600 mg total) by mouth 4 (four) times a day (Patient taking differently: Take 600 mg by mouth 3 (three) times a day ), Disp: 360 tablet, Rfl: 3    glipiZIDE (GLUCOTROL) 10 mg tablet, Take 10 mg by mouth 2 (two) times a day, Disp: , Rfl:     hydroxychloroquine (PLAQUENIL) 200 mg tablet, TAKE 2 TABLETS DAILY, Disp: 180 tablet, Rfl: 1    insulin glargine (LANTUS SOLOSTAR) 100 units/mL injection pen, Inject 40 Units under the skin daily at bedtime , Disp: , Rfl:     irbesartan (AVAPRO) 300 mg tablet, Take 1 tablet (300 mg total) by mouth daily, Disp: 30 tablet, Rfl: 3    OXcarbazepine (TRILEPTAL) 600 mg tablet, TAKE 1 TABLET DAILY AT BEDTIME, Disp: 90 tablet, Rfl: 3    rasagiline (AZILECT) 1 MG, Take 1 tablet (1 mg total) by mouth daily, Disp: 30 tablet, Rfl: 5    tolterodine (DETROL LA) 2 mg 24 hr capsule, Take 1 capsule (2 mg total) by mouth daily, Disp: 90 capsule, Rfl: 0    TRUEPLUS PEN NEEDLES 32G X 4 MM MISC, , Disp: , Rfl:     carvedilol (COREG) 25 mg tablet, Take 1 tablet (25 mg total) by mouth 2 (two) times a day with meals, Disp: 60 tablet, Rfl: 3    torsemide (DEMADEX) 20 mg tablet, Take 1 tablet (20 mg total) by mouth daily, Disp: 30 tablet, Rfl: 3    Past Medical History:   Diagnosis Date    Arthritis     Hyperlipidemia     Hypertension     Poor circulation     Sleep apnea     Type 2 diabetes mellitus ([facility] [address] )        Family History   Problem Relation Age of Onset    Diabetes type II Mother     Hypertension Mother     Breast cancer Sister     Lung cancer Sister     Multiple endocrine neoplasia Brother     Breast cancer Sister        Past Surgical History:   Procedure Laterality Date    BACK SURGERY      CORONARY ARTERY BYPASS GRAFT  07/22/2017    HEMORROIDECTOMY      TONSILLECTOMY      WRIST SURGERY         Social History     Socioeconomic History    Marital status: Single     Spouse name: Not on file    Number of children: Not on file    Years of education: Not on file    Highest education level: Not on file   Occupational History    Not on file   Tobacco Use    Smoking status: Never Smoker    Smokeless tobacco: Never Used   Vaping Use    Vaping Use: Never used   Substance and Sexual Activity    Alcohol use: Yes     Comment: 2 drinks daily    Drug use: No    Sexual activity: Not on file   Other Topics Concern    Not on file   Social History Narrative    Not on file     Social Determinants of Health     Financial Resource Strain: Not on file   Food Insecurity: Not on file   Transportation Needs: Not on file   Physical Activity: Not on file   Stress: Not on file   Social Connections: Not on file   Intimate Partner Violence: Not on file   Housing Stability: Not on file       Review of Systems   Constitutional: Positive for weight gain  Negative for diaphoresis and weight loss  HENT: Negative for congestion  Cardiovascular: Positive for dyspnea on exertion and leg swelling  Negative for chest pain, irregular heartbeat, near-syncope, orthopnea, palpitations, paroxysmal nocturnal dyspnea and syncope  Respiratory: Negative for shortness of breath, sleep disturbances due to breathing and snoring  Hematologic/Lymphatic: Does not bruise/bleed easily  Skin: Negative for rash  Musculoskeletal: Negative for myalgias  Gastrointestinal: Negative for nausea and vomiting  Neurological: Negative for excessive daytime sleepiness and light-headedness  Psychiatric/Behavioral: The patient is not nervous/anxious  Vitals: /68 (BP Location: Left arm, Patient Position: Sitting, Cuff Size: Standard)   Pulse 68   Resp 16   Ht 5' 10" (1 778 m)   Wt 130 kg (287 lb)   SpO2 94%   BMI 41 18 kg/m²       Physical Exam:     GEN: Alert and oriented x 3, in no acute distress  Well appearing and well nourished  HEENT: Sclera anicteric, conjunctivae pink, mucous membranes moist  Oropharynx clear  NECK: Supple, no carotid bruits, no significant JVD  Trachea midline, no thyromegaly  HEART: Regular rhythm, normal S1 and S2, no murmurs, clicks, gallops or rubs  PMI nondisplaced, no thrills  LUNGS: Clear to auscultation bilaterally; no wheezes, rales, or rhonchi  No increased work of breathing or signs of respiratory distress  ABDOMEN: Soft, nontender, nondistended, normoactive bowel sounds  EXTREMITIES: Skin warm and well perfused, no clubbing, or cyanosis, 2+ edema  NEURO: No focal findings  Normal speech   Mood and affect normal    SKIN:  Chronic venous stasis dermatitis

## 2022-05-16 ENCOUNTER — TELEPHONE (OUTPATIENT)
Dept: CARDIOLOGY CLINIC | Facility: CLINIC | Age: 72
End: 2022-05-16

## 2022-05-16 DIAGNOSIS — I10 PRIMARY HYPERTENSION: Primary | ICD-10-CM

## 2022-05-16 DIAGNOSIS — I50.9 CONGESTIVE HEART FAILURE, UNSPECIFIED HF CHRONICITY, UNSPECIFIED HEART FAILURE TYPE (HCC): ICD-10-CM

## 2022-05-16 RX ORDER — AMLODIPINE BESYLATE 5 MG/1
5 TABLET ORAL 2 TIMES DAILY
Qty: 120 TABLET | Refills: 3 | Status: SHIPPED | OUTPATIENT
Start: 2022-05-16 | End: 2022-06-02 | Stop reason: DRUGHIGH

## 2022-05-16 NOTE — TELEPHONE ENCOUNTER
Patient was in to see Dr Precious Mendosa on 5/9/22  At the visit, Metoprolol was discontinue and patient was to start taking Carvedilol 25 mg BID  Patient states that on Tuesday, his blood pressure began to be elevated  Patient states that his systolic blood pressure has been between 551-716 and diastolic is averaging 65  Patient states he does feel a pounding sensation in his head at times

## 2022-05-16 NOTE — TELEPHONE ENCOUNTER
I spoke with him and restarted norvasc  Ambulatory blood pressure monitoring was advised  He was advised to notify us with the onset of cardiac symptoms

## 2022-05-17 ENCOUNTER — HOSPITAL ENCOUNTER (OUTPATIENT)
Dept: NON INVASIVE DIAGNOSTICS | Facility: CLINIC | Age: 72
Discharge: HOME | End: 2022-05-17
Payer: COMMERCIAL

## 2022-05-17 DIAGNOSIS — I25.10 CORONARY ARTERY DISEASE INVOLVING NATIVE CORONARY ARTERY: ICD-10-CM

## 2022-05-17 DIAGNOSIS — I50.9 CONGESTIVE HEART FAILURE, UNSPECIFIED HF CHRONICITY, UNSPECIFIED HEART FAILURE TYPE (HCC): ICD-10-CM

## 2022-05-17 LAB
AORTIC ROOT: 3.6 CM
APICAL FOUR CHAMBER EJECTION FRACTION: 68 %
ASCENDING AORTA: 3.6 CM
AV LVOT MEAN GRADIENT: 1 MMHG
AV LVOT PEAK GRADIENT: 1 MMHG
AV PEAK GRADIENT: 10 MMHG
DOP CALC LVOT PEAK VEL VTI: 16.46 CM
DOP CALC LVOT PEAK VEL: 0.6 M/S
E WAVE DECELERATION TIME: 229 MS
FRACTIONAL SHORTENING: 35 (ref 28–44)
INTERVENTRICULAR SEPTUM IN DIASTOLE (PARASTERNAL SHORT AXIS VIEW): 1.3 CM
INTERVENTRICULAR SEPTUM: 1.3 CM (ref 0.6–1.1)
LAAS-AP4: 23.9 CM2
LEFT ATRIUM AREA SYSTOLE SINGLE PLANE A4C: 23.5 CM2
LEFT ATRIUM SIZE: 4.6 CM
LEFT INTERNAL DIMENSION IN SYSTOLE: 3.5 CM (ref 2.1–4)
LEFT VENTRICULAR INTERNAL DIMENSION IN DIASTOLE: 5.4 CM (ref 3.5–6)
LEFT VENTRICULAR POSTERIOR WALL IN END DIASTOLE: 1.4 CM
LEFT VENTRICULAR STROKE VOLUME: 91 ML
LVSV (TEICH): 91 ML
MITRAL REGURGITATION PEAK VELOCITY: 5.57 M/S
MITRAL VALVE MEAN INFLOW VELOCITY: 4.68 M/S
MITRAL VALVE REGURGITANT PEAK GRADIENT: 124 MMHG
MV E'TISSUE VEL-SEP: 9 CM/S
MV PEAK A VEL: 0.77 M/S
MV PEAK E VEL: 126 CM/S
MV STENOSIS PRESSURE HALF TIME: 66 MS
MV VALVE AREA P 1/2 METHOD: 3.33
NUC STRESS DIASTOLIC VOLUME INDEX: 160 ML/M2
NUC STRESS EJECTION FRACTION: 53 %
NUC STRESS SYSTOLIC VOLUME INDEX: 76 ML/M2
PULMONARY REGURGITATION LATE DIASTOLIC VELOCITY: 0.02 M/S
RATE PRESSURE PRODUCT: NORMAL
RIGHT ATRIAL 2D VOLUME: 43 ML
RIGHT ATRIUM AREA SYSTOLE A4C: 19 CM2
RIGHT VENTRICLE ID DIMENSION: 4.5 CM
SL CV DOP CALC MV VTI RETROGRADE: 203.8 CM
SL CV MV MEAN GRADIENT RETROGRADE: 92 MMHG
SL CV PED ECHO LEFT VENTRICLE DIASTOLIC VOLUME (MOD BIPLANE) 2D: 143 ML
SL CV PED ECHO LEFT VENTRICLE SYSTOLIC VOLUME (MOD BIPLANE) 2D: 52 ML
SL CV REST NUCLEAR ISOTOPE DOSE: 15.39 MCI
SL CV STRESS NUCLEAR ISOTOPE DOSE: 47.8 MCI
SL CV STRESS RECOVERY BP: NORMAL MMHG
SL CV STRESS RECOVERY HR: 75 BPM
SL CV STRESS RECOVERY O2 SAT: 98 %
STRESS ANGINA INDEX: 0
STRESS BASELINE BP: NORMAL MMHG
STRESS BASELINE HR: 65 BPM
STRESS O2 SAT REST: 98 %
STRESS PEAK HR: 85 BPM
STRESS POST EXERCISE DUR MIN: 3 MIN
STRESS POST EXERCISE DUR SEC: 0 SEC
STRESS POST O2 SAT PEAK: 97 %
STRESS POST PEAK BP: 176 MMHG
STRESS TARGET HR: 85 BPM
STRESS/REST PERFUSION RATIO: 1
TR MAX PG: 51 MMHG
TR PEAK VELOCITY: 3.6 M/S
TRICUSPID VALVE PEAK REGURGITATION VELOCITY: 3.56 M/S

## 2022-05-17 PROCEDURE — 93018 CV STRESS TEST I&R ONLY: CPT | Performed by: INTERNAL MEDICINE

## 2022-05-17 PROCEDURE — C8929 TTE W OR WO FOL WCON,DOPPLER: HCPCS

## 2022-05-17 PROCEDURE — 93306 TTE W/DOPPLER COMPLETE: CPT | Performed by: INTERNAL MEDICINE

## 2022-05-17 PROCEDURE — 78452 HT MUSCLE IMAGE SPECT MULT: CPT | Performed by: INTERNAL MEDICINE

## 2022-05-17 PROCEDURE — A9502 TC99M TETROFOSMIN: HCPCS

## 2022-05-17 PROCEDURE — 78452 HT MUSCLE IMAGE SPECT MULT: CPT

## 2022-05-17 PROCEDURE — G1004 CDSM NDSC: HCPCS

## 2022-05-17 PROCEDURE — 93016 CV STRESS TEST SUPVJ ONLY: CPT | Performed by: INTERNAL MEDICINE

## 2022-05-17 PROCEDURE — 93017 CV STRESS TEST TRACING ONLY: CPT

## 2022-05-17 RX ADMIN — PERFLUTREN 0.9 ML/MIN: 6.52 INJECTION, SUSPENSION INTRAVENOUS at 11:00

## 2022-05-17 RX ADMIN — REGADENOSON 0.4 MG: 0.08 INJECTION, SOLUTION INTRAVENOUS at 09:17

## 2022-05-18 LAB
CHEST PAIN STATEMENT: NORMAL
MAX DIASTOLIC BP: 80 MMHG
MAX HEART RATE: 85 BPM
MAX PREDICTED HEART RATE: 148 BPM
MAX. SYSTOLIC BP: 184 MMHG
PROTOCOL NAME: NORMAL
REASON FOR TERMINATION: NORMAL
TARGET HR FORMULA: NORMAL
TEST INDICATION: NORMAL
TIME IN EXERCISE PHASE: NORMAL

## 2022-05-18 NOTE — RESULT ENCOUNTER NOTE
Please let the patient know that there is no evidence of new/progressive decrease in blood flow to the heart  Please also let him know that his heart function was normal on his recent echo  We can discuss further at his next appt

## 2022-05-26 DIAGNOSIS — I10 PRIMARY HYPERTENSION: Primary | ICD-10-CM

## 2022-05-26 RX ORDER — ISOSORBIDE MONONITRATE 30 MG/1
30 TABLET, EXTENDED RELEASE ORAL DAILY
Qty: 90 TABLET | Refills: 3 | Status: SHIPPED | OUTPATIENT
Start: 2022-05-26

## 2022-05-26 NOTE — TELEPHONE ENCOUNTER
Please advise him to start imdur 30 mg/day for his symptoms and blood pressure  Rx sent to his pharmacy  He may have a headache for the first few doses but it will get better  We can discuss in more detail at his upcoming appt

## 2022-05-26 NOTE — TELEPHONE ENCOUNTER
Patient calling to report same symptoms as reported on 5/16    "Patient states that his systolic blood pressure has been between 823-871 and diastolic is averaging 65      Patient states he does feel a pounding sensation in his head at times "    Has appt on 6/2 with Dr Colby Kapadia  Informed patient to get BW done prior to visit  Any suggestions until next visit?     Patient cell [phone]

## 2022-05-30 DIAGNOSIS — I50.9 CONGESTIVE HEART FAILURE, UNSPECIFIED HF CHRONICITY, UNSPECIFIED HEART FAILURE TYPE (HCC): ICD-10-CM

## 2022-05-31 ENCOUNTER — APPOINTMENT (OUTPATIENT)
Dept: LAB | Facility: CLINIC | Age: 72
End: 2022-05-31
Payer: COMMERCIAL

## 2022-05-31 DIAGNOSIS — I50.9 CONGESTIVE HEART FAILURE, UNSPECIFIED HF CHRONICITY, UNSPECIFIED HEART FAILURE TYPE (HCC): ICD-10-CM

## 2022-05-31 LAB
ANION GAP SERPL CALCULATED.3IONS-SCNC: 4 MMOL/L (ref 4–13)
BUN SERPL-MCNC: 20 MG/DL (ref 5–25)
CALCIUM SERPL-MCNC: 9.8 MG/DL (ref 8.3–10.1)
CHLORIDE SERPL-SCNC: 109 MMOL/L (ref 100–108)
CO2 SERPL-SCNC: 29 MMOL/L (ref 21–32)
CREAT SERPL-MCNC: 1.02 MG/DL (ref 0.6–1.3)
GFR SERPL CREATININE-BSD FRML MDRD: 73 ML/MIN/1.73SQ M
GLUCOSE SERPL-MCNC: 195 MG/DL (ref 65–140)
POTASSIUM SERPL-SCNC: 4 MMOL/L (ref 3.5–5.3)
SODIUM SERPL-SCNC: 142 MMOL/L (ref 136–145)

## 2022-05-31 PROCEDURE — 80048 BASIC METABOLIC PNL TOTAL CA: CPT

## 2022-05-31 PROCEDURE — 36415 COLL VENOUS BLD VENIPUNCTURE: CPT

## 2022-05-31 RX ORDER — IRBESARTAN 300 MG/1
TABLET ORAL
Qty: 90 TABLET | Refills: 3 | Status: SHIPPED | OUTPATIENT
Start: 2022-05-31 | End: 2022-06-21 | Stop reason: SDUPTHER

## 2022-06-02 ENCOUNTER — OFFICE VISIT (OUTPATIENT)
Dept: CARDIOLOGY CLINIC | Facility: CLINIC | Age: 72
End: 2022-06-02
Payer: COMMERCIAL

## 2022-06-02 VITALS
RESPIRATION RATE: 16 BRPM | BODY MASS INDEX: 41.37 KG/M2 | HEIGHT: 70 IN | OXYGEN SATURATION: 94 % | SYSTOLIC BLOOD PRESSURE: 150 MMHG | DIASTOLIC BLOOD PRESSURE: 64 MMHG | WEIGHT: 289 LBS | HEART RATE: 69 BPM

## 2022-06-02 DIAGNOSIS — I50.9 CONGESTIVE HEART FAILURE, UNSPECIFIED HF CHRONICITY, UNSPECIFIED HEART FAILURE TYPE (HCC): ICD-10-CM

## 2022-06-02 PROCEDURE — 99215 OFFICE O/P EST HI 40 MIN: CPT | Performed by: INTERNAL MEDICINE

## 2022-06-02 RX ORDER — AMLODIPINE BESYLATE 10 MG/1
10 TABLET ORAL DAILY
COMMUNITY

## 2022-06-02 NOTE — PROGRESS NOTES
Cardiology Office Note    Charles Mraie 67 y o  male MRN: [id number]    06/02/22          Assessment:  1  Acute on chronic HFpEF  2  CAD s/p PCI and CABG x3 (2017)  3  Hypertension   4  HLD  5  IDDM with peripheral neuropathy   6  Ambulatory dysfunction due to neuropathy   7  Chronic low back pain    Plan:  · His dry weight on his home scale is around 270-275lbs  He is currently at about 280lbs  Compliance with torsemide daily was advised  Fluid restriction and monitoring daily weights was advised  · BP improved  Cont norvasc, carvedilol, irbesartan, imdur, and doxazosin  · Cont aspirin; eventual addition of statin   · Case discussed with his son in detail  Ambulatory blood pressure monitoring and maintaining a low sodium diet was advised  · He was advised to notify us with the onset of cardiac symptoms  Follow up: 1 month or sooner as needed    1  Congestive heart failure, unspecified HF chronicity, unspecified heart failure type ([facility] [address] )         HPI: Charles Marie is a 67y o  year old male with history of CAD s/p PCI and CABG who presents for routine follow up  Past cardiac history:   · CAD s/p remote PCI and CABG x3 in 2017 at [facility]  On his previous evaluation he noted dyspnea on exertion, weight gain and lower extremity edema  He was evaluated with a TTE that revealed EF 55% with grade 2 diastolic dysfunction RV function was mildly reduced there is mild mitral and tricuspid regurgitation  Pharmacologic MPI revealed anterior infarct without evidence of ischemia  His blood pressure has been fluctuating and he was recently started on Imdur and restarted on amlodipine  His blood pressure has improved however still remains mildly elevated  Additionally he remains at 289 lb  His weight on his home scale was 280 lb today  His previous dry weight was 270-275 lb  He has not been taking torsemide daily due to polyuria  And he also does not maintain a fluid restriction    He drinks a few beers nightly  His activity is limited by lower extremity edema as well as diabetic polyneuropathy  He denies any other cardiac concerns at this time  Family History: father passed at age 66 from CVA and MI; mother with CHF; half brother passed from heart disease     Social history: never smoked; drinks 3 beers a day       Allergies   Allergen Reactions    Penicillins      Other reaction(s): Other, Unknown         Current Outpatient Medications:     amLODIPine (NORVASC) 10 mg tablet, Take 10 mg by mouth daily, Disp: , Rfl:     aspirin 81 MG tablet, Take 1 tablet by mouth daily, Disp: , Rfl:     Blood Glucose Monitoring Suppl (ACURA BLOOD GLUCOSE METER) w/Device KIT, by Other route   Use as instructed, Disp: , Rfl:     carvedilol (COREG) 25 mg tablet, Take 1 tablet (25 mg total) by mouth 2 (two) times a day with meals, Disp: 60 tablet, Rfl: 3    donepezil (ARICEPT) 10 mg tablet, TAKE 1 TABLET DAILY AT BEDTIME, Disp: 90 tablet, Rfl: 3    doxazosin (CARDURA) 4 mg tablet, Take 1 tablet (4 mg total) by mouth daily at bedtime, Disp: 90 tablet, Rfl: 3    finasteride (PROSCAR) 5 mg tablet, Take 1 tablet (5 mg total) by mouth daily, Disp: 90 tablet, Rfl: 3    gabapentin (NEURONTIN) 600 MG tablet, Take 1 tablet (600 mg total) by mouth 4 (four) times a day (Patient taking differently: Take 600 mg by mouth 3 (three) times a day), Disp: 360 tablet, Rfl: 3    glipiZIDE (GLUCOTROL) 10 mg tablet, Take 10 mg by mouth 2 (two) times a day, Disp: , Rfl:     hydroxychloroquine (PLAQUENIL) 200 mg tablet, TAKE 2 TABLETS DAILY, Disp: 180 tablet, Rfl: 1    insulin glargine (LANTUS SOLOSTAR) 100 units/mL injection pen, Inject 40 Units under the skin daily at bedtime , Disp: , Rfl:     irbesartan (AVAPRO) 300 mg tablet, take 1 tablet by mouth daily, Disp: 90 tablet, Rfl: 3    isosorbide mononitrate (IMDUR) 30 mg 24 hr tablet, Take 1 tablet (30 mg total) by mouth daily, Disp: 90 tablet, Rfl: 3    OXcarbazepine (TRILEPTAL) 600 mg tablet, TAKE 1 TABLET DAILY AT BEDTIME, Disp: 90 tablet, Rfl: 3    rasagiline (AZILECT) 1 MG, Take 1 tablet (1 mg total) by mouth daily, Disp: 30 tablet, Rfl: 5    tolterodine (DETROL LA) 2 mg 24 hr capsule, Take 1 capsule (2 mg total) by mouth daily, Disp: 90 capsule, Rfl: 0    torsemide (DEMADEX) 20 mg tablet, Take 1 tablet (20 mg total) by mouth daily, Disp: 30 tablet, Rfl: 3    TRUEPLUS PEN NEEDLES 32G X 4 MM MISC, , Disp: , Rfl:     Past Medical History:   Diagnosis Date    Arthritis     Hyperlipidemia     Hypertension     Poor circulation     Sleep apnea     Type 2 diabetes mellitus ([facility] [address] )        Family History   Problem Relation Age of Onset    Diabetes type II Mother     Hypertension Mother     Breast cancer Sister     Lung cancer Sister     Multiple endocrine neoplasia Brother     Breast cancer Sister        Past Surgical History:   Procedure Laterality Date    BACK SURGERY      CORONARY ARTERY BYPASS GRAFT  07/22/2017    HEMORROIDECTOMY      TONSILLECTOMY      WRIST SURGERY         Social History     Socioeconomic History    Marital status: Single     Spouse name: Not on file    Number of children: Not on file    Years of education: Not on file    Highest education level: Not on file   Occupational History    Not on file   Tobacco Use    Smoking status: Never Smoker    Smokeless tobacco: Never Used   Vaping Use    Vaping Use: Never used   Substance and Sexual Activity    Alcohol use: Yes     Comment: 2 drinks daily    Drug use: No    Sexual activity: Not on file   Other Topics Concern    Not on file   Social History Narrative    Not on file     Social Determinants of Health     Financial Resource Strain: Not on file   Food Insecurity: Not on file   Transportation Needs: Not on file   Physical Activity: Not on file   Stress: Not on file   Social Connections: Not on file   Intimate Partner Violence: Not on file   Housing Stability: Not on file       Review of Systems   Constitutional: Positive for weight gain  Negative for diaphoresis and weight loss  HENT: Negative for congestion  Cardiovascular: Positive for dyspnea on exertion and leg swelling  Negative for chest pain, irregular heartbeat, near-syncope, orthopnea, palpitations, paroxysmal nocturnal dyspnea and syncope  Respiratory: Negative for shortness of breath, sleep disturbances due to breathing and snoring  Hematologic/Lymphatic: Does not bruise/bleed easily  Skin: Negative for rash  Musculoskeletal: Negative for myalgias  Gastrointestinal: Negative for nausea and vomiting  Neurological: Negative for excessive daytime sleepiness and light-headedness  Psychiatric/Behavioral: The patient is not nervous/anxious  Vitals: /64 (BP Location: Left arm, Patient Position: Sitting)   Pulse 69   Resp 16   Ht 5' 10" (1 778 m)   Wt 131 kg (289 lb)   SpO2 94%   BMI 41 47 kg/m²       Physical Exam:     GEN: Alert and oriented x 3, in no acute distress  Well appearing and well nourished  HEENT: Sclera anicteric, conjunctivae pink, mucous membranes moist  Oropharynx clear  NECK: Supple, no carotid bruits, minimal JVD  Trachea midline, no thyromegaly  HEART: Regular rhythm, normal S1 and S2, no murmurs, clicks, gallops or rubs  PMI nondisplaced, no thrills  LUNGS: decreased bibasilar breath sounds; no wheezes, rales, or rhonchi  No increased work of breathing or signs of respiratory distress  ABDOMEN: Soft, nontender, nondistended, normoactive bowel sounds  EXTREMITIES: Skin warm and well perfused, no clubbing, cyanosis, 1-2+ edema  NEURO: No focal findings  Normal speech   Mood and affect normal    SKIN: chronic venous stasis dermatitis

## 2022-06-13 NOTE — ASSESSMENT & PLAN NOTE
Lab Results   Component Value Date    HGBA1C 6 3 (H) 03/15/2021       Recent Labs     03/15/21  1717 03/16/21  0719 03/16/21  1051 03/16/21  1627   POCGLU 118 131 181* 218*       Blood Sugar Average: Last 72 hrs:  (P) 485 [id number]   · Controlled based on A1c  · Patient is on 40 units lantus nightly, glucose levels are not severely elevated, started on 15 and will titrate as intake good  · Continue CCO diet, SSI #1  · ACHS blood glucose checks No

## 2022-06-21 DIAGNOSIS — I50.9 CONGESTIVE HEART FAILURE, UNSPECIFIED HF CHRONICITY, UNSPECIFIED HEART FAILURE TYPE (HCC): ICD-10-CM

## 2022-06-21 RX ORDER — IRBESARTAN 300 MG/1
300 TABLET ORAL DAILY
Qty: 90 TABLET | Refills: 3 | Status: SHIPPED | OUTPATIENT
Start: 2022-06-21

## 2022-06-23 ENCOUNTER — TELEPHONE (OUTPATIENT)
Dept: OTHER | Facility: OTHER | Age: 72
End: 2022-06-23

## 2022-07-05 DIAGNOSIS — N39.41 URGE URINARY INCONTINENCE: ICD-10-CM

## 2022-07-05 RX ORDER — TOLTERODINE 2 MG/1
CAPSULE, EXTENDED RELEASE ORAL
Qty: 90 CAPSULE | Refills: 3 | Status: SHIPPED | OUTPATIENT
Start: 2022-07-05

## 2022-07-13 ENCOUNTER — OFFICE VISIT (OUTPATIENT)
Dept: CARDIOLOGY CLINIC | Facility: CLINIC | Age: 72
End: 2022-07-13
Payer: COMMERCIAL

## 2022-07-13 VITALS
HEIGHT: 70 IN | BODY MASS INDEX: 41.23 KG/M2 | SYSTOLIC BLOOD PRESSURE: 154 MMHG | OXYGEN SATURATION: 95 % | DIASTOLIC BLOOD PRESSURE: 72 MMHG | HEART RATE: 71 BPM | RESPIRATION RATE: 16 BRPM | WEIGHT: 288 LBS

## 2022-07-13 DIAGNOSIS — I10 PRIMARY HYPERTENSION: Primary | ICD-10-CM

## 2022-07-13 PROCEDURE — 99215 OFFICE O/P EST HI 40 MIN: CPT | Performed by: INTERNAL MEDICINE

## 2022-07-13 RX ORDER — DOXAZOSIN MESYLATE 4 MG/1
4 TABLET ORAL
COMMUNITY

## 2022-07-13 RX ORDER — HYDROCHLOROTHIAZIDE 25 MG/1
25 TABLET ORAL DAILY
Qty: 60 TABLET | Refills: 3 | Status: SHIPPED | OUTPATIENT
Start: 2022-07-13

## 2022-07-13 NOTE — PROGRESS NOTES
Cardiology Office Note    iDogenes Coulter 67 y o  male MRN: [id number]    07/13/22          Assessment:  1  Acute on chronic HFpEF  2  CAD s/p PCI and CABG x3 (2017)  3  Hypertension   4  HLD  5  IDDM with peripheral neuropathy   6  Ambulatory dysfunction due to neuropathy   7  Chronic low back pain    Plan:  · He reports hypertensive readings on ambulatory monitoring  Will resume HCTZ  · Cont norvasc, carvedilol, irbesartan, imdur, and doxazosin  · He was advised to bring his medications to his next appointment to ensure he is taking them as prescribed  · Cont aspirin  · Cont torsemide 20mg/day  Check BMP  Ambulatory blood pressure monitoring and maintaining a low sodium diet was advised  · He was advised to notify us with the onset of cardiac symptoms  Follow up: 3 weeks or sooner as needed    1  Primary hypertension  hydrochlorothiazide (HYDRODIURIL) 25 mg tablet    Basic metabolic panel       HPI: Diogenes Coulter is a 67y o  year old male with history of CAD s/p PCI and CABG who presents for routine follow up  Past cardiac history:   · CAD s/p remote PCI and CABG x3 in 2017 at [facility]  · TTE 5/2022: EF 55% with grade 2 diastolic dysfunction RV function was mildly reduced there is mild mitral and tricuspid regurgitation  · Pharmacologic MPI 5/2022: anterior infarct without evidence of ischemia  On presentation today he notes progressive dyspnea, as well as weight and blood pressure fluctuations  He may not be taking norvasc as prescribed and thinks he was taking it BID  He had initial weight reduction however it trended back to about 288lbs  His dry weight should be around 270-275 lb  He reports compliance and adequate diuresis with torsemide  He has cut back on his beer intake  He has progressive neuropathy which is also causing distress  He denies any other cardiac concerns at this time            Family History: father passed at age 66 from [address] [place] [address] and MI; mother with CHF; half brother passed from heart disease     Social history: never smoked; drinks 3 beers a day       Allergies   Allergen Reactions    Penicillins      Other reaction(s): Other, Unknown         Current Outpatient Medications:     amLODIPine (NORVASC) 10 mg tablet, Take 10 mg by mouth daily, Disp: , Rfl:     aspirin 81 MG tablet, Take 1 tablet by mouth daily, Disp: , Rfl:     Blood Glucose Monitoring Suppl (ACURA BLOOD GLUCOSE METER) w/Device KIT, by Other route   Use as instructed, Disp: , Rfl:     carvedilol (COREG) 25 mg tablet, Take 1 tablet (25 mg total) by mouth 2 (two) times a day with meals, Disp: 60 tablet, Rfl: 3    donepezil (ARICEPT) 10 mg tablet, TAKE 1 TABLET DAILY AT BEDTIME, Disp: 90 tablet, Rfl: 3    doxazosin (CARDURA) 4 mg tablet, Take 4 mg by mouth daily at bedtime, Disp: , Rfl:     finasteride (PROSCAR) 5 mg tablet, Take 1 tablet (5 mg total) by mouth daily, Disp: 90 tablet, Rfl: 3    gabapentin (NEURONTIN) 600 MG tablet, Take 1 tablet (600 mg total) by mouth 4 (four) times a day (Patient taking differently: Take 600 mg by mouth 3 (three) times a day), Disp: 360 tablet, Rfl: 3    glipiZIDE (GLUCOTROL) 10 mg tablet, Take 10 mg by mouth 2 (two) times a day, Disp: , Rfl:     hydrochlorothiazide (HYDRODIURIL) 25 mg tablet, Take 1 tablet (25 mg total) by mouth daily, Disp: 60 tablet, Rfl: 3    hydroxychloroquine (PLAQUENIL) 200 mg tablet, TAKE 2 TABLETS DAILY, Disp: 180 tablet, Rfl: 1    insulin glargine (LANTUS SOLOSTAR) 100 units/mL injection pen, Inject 40 Units under the skin daily at bedtime , Disp: , Rfl:     irbesartan (AVAPRO) 300 mg tablet, Take 1 tablet (300 mg total) by mouth daily, Disp: 90 tablet, Rfl: 3    OXcarbazepine (TRILEPTAL) 600 mg tablet, TAKE 1 TABLET DAILY AT BEDTIME, Disp: 90 tablet, Rfl: 3    torsemide (DEMADEX) 20 mg tablet, Take 1 tablet (20 mg total) by mouth daily, Disp: 30 tablet, Rfl: 3    TRUEPLUS PEN NEEDLES 32G X 4 MM MISC, , Disp: , Rfl:    isosorbide mononitrate (IMDUR) 30 mg 24 hr tablet, Take 1 tablet (30 mg total) by mouth daily, Disp: 90 tablet, Rfl: 3    rasagiline (AZILECT) 1 MG, Take 1 tablet (1 mg total) by mouth daily, Disp: 30 tablet, Rfl: 5    tolterodine (DETROL LA) 2 mg 24 hr capsule, TAKE 1 CAPSULE DAILY, Disp: 90 capsule, Rfl: 3    Past Medical History:   Diagnosis Date    Arthritis     Hyperlipidemia     Hypertension     Poor circulation     Sleep apnea     Type 2 diabetes mellitus ([facility] [address] )        Family History   Problem Relation Age of Onset    Diabetes type II Mother     Hypertension Mother     Breast cancer Sister     Lung cancer Sister     Multiple endocrine neoplasia Brother     Breast cancer Sister        Past Surgical History:   Procedure Laterality Date    BACK SURGERY      CORONARY ARTERY BYPASS GRAFT  07/22/2017    HEMORROIDECTOMY      TONSILLECTOMY      WRIST SURGERY         Social History     Socioeconomic History    Marital status: Single     Spouse name: Not on file    Number of children: Not on file    Years of education: Not on file    Highest education level: Not on file   Occupational History    Not on file   Tobacco Use    Smoking status: Never Smoker    Smokeless tobacco: Never Used   Vaping Use    Vaping Use: Never used   Substance and Sexual Activity    Alcohol use: Yes     Comment: 2 drinks daily    Drug use: No    Sexual activity: Not on file   Other Topics Concern    Not on file   Social History Narrative    Not on file     Social Determinants of Health     Financial Resource Strain: Not on file   Food Insecurity: Not on file   Transportation Needs: Not on file   Physical Activity: Not on file   Stress: Not on file   Social Connections: Not on file   Intimate Partner Violence: Not on file   Housing Stability: Not on file       Review of Systems   Constitutional: Positive for weight gain  Negative for diaphoresis and weight loss  HENT: Negative for congestion      Cardiovascular: Positive for dyspnea on exertion and leg swelling  Negative for chest pain, irregular heartbeat, near-syncope, orthopnea, palpitations, paroxysmal nocturnal dyspnea and syncope  Respiratory: Negative for shortness of breath, sleep disturbances due to breathing and snoring  Hematologic/Lymphatic: Does not bruise/bleed easily  Skin: Negative for rash  Musculoskeletal: Negative for myalgias  Gastrointestinal: Negative for nausea and vomiting  Neurological: Positive for paresthesias  Negative for excessive daytime sleepiness and light-headedness  Psychiatric/Behavioral: The patient is not nervous/anxious  Vitals: /72 (BP Location: Left arm, Patient Position: Sitting)   Pulse 71   Resp 16   Ht 5' 10" (1 778 m)   Wt 131 kg (288 lb)   SpO2 95%   BMI 41 32 kg/m²       Physical Exam:     GEN: Alert and oriented x 3, in no acute distress  Well appearing and well nourished  HEENT: Sclera anicteric, conjunctivae pink, mucous membranes moist  Oropharynx clear  NECK: Supple, no carotid bruits, no significant JVD  Trachea midline, no thyromegaly  HEART: Regular rhythm, normal S1 and S2, no murmurs, clicks, gallops or rubs  PMI nondisplaced, no thrills  LUNGS: Clear to auscultation bilaterally; no wheezes, rales, or rhonchi  No increased work of breathing or signs of respiratory distress  ABDOMEN: Soft, nontender, nondistended, normoactive bowel sounds  EXTREMITIES: Skin warm , 1-2+ edema  NEURO: No focal findings  Normal speech   Mood and affect normal    SKIN: Chronic venous stasis dermatitis

## 2022-07-14 ENCOUNTER — LAB (OUTPATIENT)
Dept: LAB | Facility: CLINIC | Age: 72
End: 2022-07-14
Payer: COMMERCIAL

## 2022-07-14 DIAGNOSIS — E11.9 TYPE 2 DIABETES MELLITUS WITHOUT COMPLICATION, UNSPECIFIED WHETHER LONG TERM INSULIN USE (HCC): ICD-10-CM

## 2022-07-14 DIAGNOSIS — E78.5 HYPERLIPIDEMIA, UNSPECIFIED HYPERLIPIDEMIA TYPE: ICD-10-CM

## 2022-07-14 DIAGNOSIS — I10 PRIMARY HYPERTENSION: ICD-10-CM

## 2022-07-14 DIAGNOSIS — D50.9 IRON DEFICIENCY ANEMIA, UNSPECIFIED IRON DEFICIENCY ANEMIA TYPE: ICD-10-CM

## 2022-07-14 LAB
ALBUMIN SERPL BCP-MCNC: 3.6 G/DL (ref 3.5–5)
ALP SERPL-CCNC: 93 U/L (ref 46–116)
ALT SERPL W P-5'-P-CCNC: 35 U/L (ref 12–78)
ANION GAP SERPL CALCULATED.3IONS-SCNC: 3 MMOL/L (ref 4–13)
AST SERPL W P-5'-P-CCNC: 22 U/L (ref 5–45)
BILIRUB DIRECT SERPL-MCNC: 0.21 MG/DL (ref 0–0.2)
BILIRUB SERPL-MCNC: 1.16 MG/DL (ref 0.2–1)
BUN SERPL-MCNC: 14 MG/DL (ref 5–25)
CALCIUM SERPL-MCNC: 9.6 MG/DL (ref 8.3–10.1)
CHLORIDE SERPL-SCNC: 108 MMOL/L (ref 100–108)
CHOLEST SERPL-MCNC: 139 MG/DL
CO2 SERPL-SCNC: 28 MMOL/L (ref 21–32)
CREAT SERPL-MCNC: 0.95 MG/DL (ref 0.6–1.3)
CREAT UR-MCNC: 115 MG/DL
ERYTHROCYTE [DISTWIDTH] IN BLOOD BY AUTOMATED COUNT: 14.6 % (ref 11.6–15.1)
EST. AVERAGE GLUCOSE BLD GHB EST-MCNC: 131 MG/DL
GFR SERPL CREATININE-BSD FRML MDRD: 79 ML/MIN/1.73SQ M
GLUCOSE P FAST SERPL-MCNC: 133 MG/DL (ref 65–99)
HBA1C MFR BLD: 6.2 %
HCT VFR BLD AUTO: 34.4 % (ref 36.5–49.3)
HDLC SERPL-MCNC: 40 MG/DL
HGB BLD-MCNC: 11.8 G/DL (ref 12–17)
IRON SATN MFR SERPL: 36 % (ref 20–50)
IRON SERPL-MCNC: 123 UG/DL (ref 65–175)
LDLC SERPL CALC-MCNC: 73 MG/DL (ref 0–100)
MCH RBC QN AUTO: 35.3 PG (ref 26.8–34.3)
MCHC RBC AUTO-ENTMCNC: 34.3 G/DL (ref 31.4–37.4)
MCV RBC AUTO: 103 FL (ref 82–98)
MICROALBUMIN UR-MCNC: 84.6 MG/L (ref 0–20)
MICROALBUMIN/CREAT 24H UR: 74 MG/G CREATININE (ref 0–30)
NONHDLC SERPL-MCNC: 99 MG/DL
PLATELET # BLD AUTO: 104 THOUSANDS/UL (ref 149–390)
PMV BLD AUTO: 10.7 FL (ref 8.9–12.7)
POTASSIUM SERPL-SCNC: 4 MMOL/L (ref 3.5–5.3)
PROT SERPL-MCNC: 6.8 G/DL (ref 6.4–8.2)
RBC # BLD AUTO: 3.34 MILLION/UL (ref 3.88–5.62)
SODIUM SERPL-SCNC: 139 MMOL/L (ref 136–145)
TIBC SERPL-MCNC: 346 UG/DL (ref 250–450)
TRIGL SERPL-MCNC: 130 MG/DL
WBC # BLD AUTO: 3.84 THOUSAND/UL (ref 4.31–10.16)

## 2022-07-14 PROCEDURE — 83550 IRON BINDING TEST: CPT

## 2022-07-14 PROCEDURE — 80048 BASIC METABOLIC PNL TOTAL CA: CPT

## 2022-07-14 PROCEDURE — 85027 COMPLETE CBC AUTOMATED: CPT

## 2022-07-14 PROCEDURE — 36415 COLL VENOUS BLD VENIPUNCTURE: CPT

## 2022-07-14 PROCEDURE — 80061 LIPID PANEL: CPT

## 2022-07-14 PROCEDURE — 80076 HEPATIC FUNCTION PANEL: CPT

## 2022-07-14 PROCEDURE — 82570 ASSAY OF URINE CREATININE: CPT

## 2022-07-14 PROCEDURE — 83540 ASSAY OF IRON: CPT

## 2022-07-14 PROCEDURE — 82043 UR ALBUMIN QUANTITATIVE: CPT

## 2022-07-14 PROCEDURE — 83036 HEMOGLOBIN GLYCOSYLATED A1C: CPT

## 2022-07-19 ENCOUNTER — TELEPHONE (OUTPATIENT)
Dept: CARDIOLOGY CLINIC | Facility: CLINIC | Age: 72
End: 2022-07-19

## 2022-07-19 NOTE — TELEPHONE ENCOUNTER
See task, please schedule pt for a nurse visit weds or Thursday, remind pt to bring in his medications   no

## 2022-07-19 NOTE — TELEPHONE ENCOUNTER
Patient Alex [phone] contacting office to advise Dr. Dinh his blood pressure in the mornings runs high fluctuating between high 180's/90.    After patient takes his medication BP runs 157/74.

## 2022-07-19 NOTE — TELEPHONE ENCOUNTER
Please schedule him for a nurse visit at [place] tomorrow or Thursday for vital check and med review.   Please advise him to bring all of his pill bottles.   Thanks

## 2022-07-20 ENCOUNTER — OFFICE VISIT (OUTPATIENT)
Dept: CARDIOLOGY CLINIC | Facility: CLINIC | Age: 72
End: 2022-07-20
Payer: COMMERCIAL

## 2022-07-20 ENCOUNTER — TELEPHONE (OUTPATIENT)
Dept: CARDIOLOGY CLINIC | Facility: CLINIC | Age: 72
End: 2022-07-20

## 2022-07-20 VITALS
BODY MASS INDEX: 39.97 KG/M2 | HEART RATE: 57 BPM | WEIGHT: 279.2 LBS | DIASTOLIC BLOOD PRESSURE: 88 MMHG | SYSTOLIC BLOOD PRESSURE: 166 MMHG | OXYGEN SATURATION: 92 % | HEIGHT: 70 IN

## 2022-07-20 DIAGNOSIS — I50.31 ACUTE DIASTOLIC HEART FAILURE (HCC): Primary | ICD-10-CM

## 2022-07-20 PROCEDURE — 99214 OFFICE O/P EST MOD 30 MIN: CPT | Performed by: INTERNAL MEDICINE

## 2022-07-20 RX ORDER — TORSEMIDE 20 MG/1
40 TABLET ORAL DAILY
Qty: 120 TABLET | Refills: 3 | Status: SHIPPED | OUTPATIENT
Start: 2022-07-20

## 2022-07-20 NOTE — TELEPHONE ENCOUNTER
Called was tranfers by Jeffrey       Patient state blood pressure been high seen yesterday morning    188/80   197/88 pulse 70      Patient was schedule for nurse visit today in [facility] ..

## 2022-07-20 NOTE — TELEPHONE ENCOUNTER
Afshan from [facility] [phone] contacting office regarding medication soaanz needing prior auth for a 60 day quantity.

## 2022-07-20 NOTE — PROGRESS NOTES
Cardiology Office Note    Marisela Vo 67 y o  male MRN: [id number]    07/20/22          Assessment:  1  Acute on chronic HFpEF  2  CAD s/p PCI and CABG x3 (2017)  3  Hypertension   4  HLD  5  IDDM with peripheral neuropathy   6  Ambulatory dysfunction due to neuropathy   7  Chronic low back pain    Plan:  · He remain hypertensive and volume overloaded  · Increase torsemide to 40mg/day   · Start hydralazine 25mg TID  · Cont HCTZ, norvasc, carvedilol, irbesartan, imdur, and doxazosin  · Cont aspirin  · If no significant response will arrange for inpatient admission for diuresis and medication adjustment  · Check plasma metanephrines and renin/hermila ratio   Ambulatory blood pressure monitoring and maintaining a low sodium diet was advised  · He was advised to notify us with the onset of cardiac symptoms  Follow up: 2 weeks or sooner as needed    1  Acute diastolic heart failure (HCC)  torsemide (DEMADEX) 20 mg tablet       HPI: Marisela Vo is a 67y o  year old male with history of CAD s/p PCI and CABG who presents for routine follow up  Past cardiac history:   · CAD s/p remote PCI and CABG x3 in 2017 at [facility]  · TTE 5/2022: EF 55% with grade 2 diastolic dysfunction RV function was mildly reduced there is mild mitral and tricuspid regurgitation  · Pharmacologic MPI 5/2022: anterior infarct without evidence of ischemia  On presentation today he continues to note weight gain, lower extremity edema, scrotal swelling and accelerated hypertension on ambulatory monitoring  He reports taking his medications as prescribed  Weight improved to 279lbs  His dry weight is around 270-275 lb  He has cut back on his beer intake  He denies any other cardiac concerns at this time          Family History: father passed at age 66 from CVA and MI; mother with CHF; half brother passed from heart disease     Social history: never smoked; drinks 3 beers a day       Allergies   Allergen Reactions    Penicillins      Other reaction(s): Other, Unknown         Current Outpatient Medications:     amLODIPine (NORVASC) 10 mg tablet, Take 10 mg by mouth daily, Disp: , Rfl:     aspirin 81 MG tablet, Take 1 tablet by mouth daily, Disp: , Rfl:     Blood Glucose Monitoring Suppl (ACURA BLOOD GLUCOSE METER) w/Device KIT, by Other route   Use as instructed, Disp: , Rfl:     carvedilol (COREG) 25 mg tablet, Take 1 tablet (25 mg total) by mouth 2 (two) times a day with meals, Disp: 60 tablet, Rfl: 3    donepezil (ARICEPT) 10 mg tablet, TAKE 1 TABLET DAILY AT BEDTIME, Disp: 90 tablet, Rfl: 3    gabapentin (NEURONTIN) 600 MG tablet, Take 1 tablet (600 mg total) by mouth 4 (four) times a day (Patient taking differently: Take 600 mg by mouth 3 (three) times a day), Disp: 360 tablet, Rfl: 3    glipiZIDE (GLUCOTROL) 10 mg tablet, Take 10 mg by mouth 2 (two) times a day, Disp: , Rfl:     hydrALAZINE (APRESOLINE) 25 mg tablet, Take 1 tablet (25 mg total) by mouth 3 (three) times a day, Disp: 90 tablet, Rfl: 3    hydrochlorothiazide (HYDRODIURIL) 25 mg tablet, Take 1 tablet (25 mg total) by mouth daily, Disp: 60 tablet, Rfl: 3    insulin glargine (LANTUS SOLOSTAR) 100 units/mL injection pen, Inject 40 Units under the skin daily at bedtime , Disp: , Rfl:     irbesartan (AVAPRO) 300 mg tablet, Take 1 tablet (300 mg total) by mouth daily, Disp: 90 tablet, Rfl: 3    isosorbide mononitrate (IMDUR) 30 mg 24 hr tablet, Take 1 tablet (30 mg total) by mouth daily, Disp: 90 tablet, Rfl: 3    OXcarbazepine (TRILEPTAL) 600 mg tablet, TAKE 1 TABLET DAILY AT BEDTIME, Disp: 90 tablet, Rfl: 3    rasagiline (AZILECT) 1 MG, Take 1 tablet (1 mg total) by mouth daily, Disp: 30 tablet, Rfl: 5    torsemide (DEMADEX) 20 mg tablet, Take 2 tablets (40 mg total) by mouth daily, Disp: 120 tablet, Rfl: 3    TRUEPLUS PEN NEEDLES 32G X 4 MM MISC, , Disp: , Rfl:     doxazosin (CARDURA) 4 mg tablet, Take 4 mg by mouth daily at bedtime (Patient not taking: Reported on 7/20/2022), Disp: , Rfl:     finasteride (PROSCAR) 5 mg tablet, Take 1 tablet (5 mg total) by mouth daily (Patient not taking: Reported on 7/20/2022), Disp: 90 tablet, Rfl: 3    hydroxychloroquine (PLAQUENIL) 200 mg tablet, TAKE 2 TABLETS DAILY, Disp: 180 tablet, Rfl: 1    tolterodine (DETROL LA) 2 mg 24 hr capsule, TAKE 1 CAPSULE DAILY (Patient not taking: Reported on 7/20/2022), Disp: 90 capsule, Rfl: 3    Past Medical History:   Diagnosis Date    Arthritis     Hyperlipidemia     Hypertension     Poor circulation     Sleep apnea     [facility]        Family History   Problem Relation Age of Onset    Diabetes type II Mother     Hypertension Mother     Breast cancer Sister     Lung cancer Sister     Multiple endocrine neoplasia Brother     Breast cancer Sister        Past Surgical History:   Procedure Laterality Date    BACK SURGERY      CORONARY ARTERY BYPASS GRAFT  07/22/2017    HEMORROIDECTOMY      TONSILLECTOMY      WRIST SURGERY         Social History     Socioeconomic History    Marital status: Single     Spouse name: Not on file    Number of children: Not on file    Years of education: Not on file    Highest education level: Not on file   Occupational History    Not on file   Tobacco Use    Smoking status: Never Smoker    Smokeless tobacco: Never Used   Vaping Use    Vaping Use: Never used   Substance and Sexual Activity    Alcohol use: Yes     Comment: 2 drinks daily    Drug use: No    Sexual activity: Not on file   Other Topics Concern    Not on file   Social History Narrative    Not on file     Social Determinants of Health     Financial Resource Strain: Not on file   Food Insecurity: Not on file   Transportation Needs: Not on file   Physical Activity: Not on file   Stress: Not on file   Social Connections: Not on file   Intimate Partner Violence: Not on file   Housing Stability: Not on file       Review of Systems Constitutional: Negative for diaphoresis, weight gain and weight loss  HENT: Negative for congestion  Cardiovascular: Positive for dyspnea on exertion and leg swelling  Negative for chest pain, irregular heartbeat, near-syncope, orthopnea, palpitations, paroxysmal nocturnal dyspnea and syncope  Respiratory: Negative for shortness of breath, sleep disturbances due to breathing and snoring  Hematologic/Lymphatic: Does not bruise/bleed easily  Skin: Negative for rash  Musculoskeletal: Negative for myalgias  Gastrointestinal: Negative for nausea and vomiting  Genitourinary:        Scrotal swelling   Neurological: Negative for excessive daytime sleepiness and light-headedness  Psychiatric/Behavioral: The patient is not nervous/anxious  Vitals: /88 (BP Location: Left arm, Patient Position: Sitting, Cuff Size: Standard)   Pulse 57   Ht 5' 10" (1 778 m)   Wt 127 kg (279 lb 3 2 oz)   SpO2 92%   BMI 40 06 kg/m²       Physical Exam:     GEN: Alert and oriented x 3, in no acute distress  Well appearing and well nourished  HEENT: Sclera anicteric, conjunctivae pink, mucous membranes moist  Oropharynx clear  NECK: Supple, no carotid bruits, no significant JVD  Trachea midline, no thyromegaly  HEART: Regular rhythm, normal S1 and S2, no murmurs, clicks, gallops or rubs  PMI nondisplaced, no thrills  LUNGS: Clear to auscultation bilaterally; no wheezes, rales, or rhonchi  No increased work of breathing or signs of respiratory distress  ABDOMEN: Soft, nontender, nondistended, normoactive bowel sounds  EXTREMITIES: Skin warm and well perfused, 1-2+ edema  NEURO: No focal findings  Normal speech  Mood and affect normal    SKIN: Normal without suspicious lesions on exposed skin

## 2022-07-20 NOTE — TELEPHONE ENCOUNTER
Pt called this morn & said that his bp is still high when he gets up & stated that his head is pounding; spoke to Rachael & she said to transfer the call to her

## 2022-07-29 ENCOUNTER — APPOINTMENT (OUTPATIENT)
Dept: LAB | Facility: CLINIC | Age: 72
End: 2022-07-29
Payer: COMMERCIAL

## 2022-07-29 DIAGNOSIS — I50.9 CONGESTIVE HEART FAILURE, UNSPECIFIED HF CHRONICITY, UNSPECIFIED HEART FAILURE TYPE (HCC): ICD-10-CM

## 2022-07-29 DIAGNOSIS — I10 PRIMARY HYPERTENSION: ICD-10-CM

## 2022-07-29 LAB
ANION GAP SERPL CALCULATED.3IONS-SCNC: 5 MMOL/L (ref 4–13)
BUN SERPL-MCNC: 21 MG/DL (ref 5–25)
CALCIUM SERPL-MCNC: 9.9 MG/DL (ref 8.3–10.1)
CHLORIDE SERPL-SCNC: 107 MMOL/L (ref 96–108)
CO2 SERPL-SCNC: 29 MMOL/L (ref 21–32)
CREAT SERPL-MCNC: 1.04 MG/DL (ref 0.6–1.3)
GFR SERPL CREATININE-BSD FRML MDRD: 71 ML/MIN/1.73SQ M
GLUCOSE P FAST SERPL-MCNC: 142 MG/DL (ref 65–99)
POTASSIUM SERPL-SCNC: 3.9 MMOL/L (ref 3.5–5.3)
SODIUM SERPL-SCNC: 141 MMOL/L (ref 135–147)

## 2022-07-29 PROCEDURE — 80048 BASIC METABOLIC PNL TOTAL CA: CPT

## 2022-07-29 PROCEDURE — 84244 ASSAY OF RENIN: CPT

## 2022-07-29 PROCEDURE — 82088 ASSAY OF ALDOSTERONE: CPT

## 2022-07-29 PROCEDURE — 36415 COLL VENOUS BLD VENIPUNCTURE: CPT

## 2022-08-01 ENCOUNTER — TELEPHONE (OUTPATIENT)
Dept: CARDIOLOGY CLINIC | Facility: CLINIC | Age: 72
End: 2022-08-01

## 2022-08-01 NOTE — TELEPHONE ENCOUNTER
----- Message from Alpa Escalona MD sent at 8/1/2022  3:11 PM EDT -----  Please let him know that his blood work was stable

## 2022-08-03 ENCOUNTER — OFFICE VISIT (OUTPATIENT)
Dept: CARDIOLOGY CLINIC | Facility: CLINIC | Age: 72
End: 2022-08-03
Payer: COMMERCIAL

## 2022-08-03 VITALS
DIASTOLIC BLOOD PRESSURE: 64 MMHG | WEIGHT: 282 LBS | HEART RATE: 59 BPM | HEIGHT: 70 IN | RESPIRATION RATE: 16 BRPM | SYSTOLIC BLOOD PRESSURE: 146 MMHG | BODY MASS INDEX: 40.37 KG/M2 | OXYGEN SATURATION: 94 %

## 2022-08-03 DIAGNOSIS — I50.32 CHRONIC DIASTOLIC CONGESTIVE HEART FAILURE (HCC): Primary | ICD-10-CM

## 2022-08-03 PROCEDURE — 99214 OFFICE O/P EST MOD 30 MIN: CPT | Performed by: INTERNAL MEDICINE

## 2022-08-03 NOTE — PROGRESS NOTES
Cardiology Office Note    Medina Cornelius 67 y o  male MRN: [id number]    08/03/22          Assessment:  1  Chronic HFpEF  2  CAD s/p PCI and CABG x3 (2017)  3  Hypertension   4  HLD  5  IDDM with peripheral neuropathy   6  Ambulatory dysfunction due to neuropathy   7  Chronic low back pain    Plan:  · He is diuresing well and his weight is starting to trend down  Will continue torsemide 40 mg daily and HCTZ 25 mg daily  · BP has improved  Continue hydralazine, Norvasc, carvedilol, irbesartan, Imdur and doxazosin    · Cont aspirin  Ambulatory blood pressure monitoring and maintaining a low sodium diet was advised  · He was advised to notify us with the onset of cardiac symptoms  Follow up:  3 months or sooner as needed    1  Chronic diastolic congestive heart failure (HCC)         HPI: Medina Cornelius is a 67y o  year old male with history of CAD s/p PCI and CABG who presents for routine follow up  Past cardiac history:   · CAD s/p remote PCI and CABG x3 in 2017 at [facility]  · TTE 5/2022: EF 55%, G2dd, RV function was mildly reduced, mild MR, TR  · Pharmacologic MPI 5/2022: anterior infarct without evidence of ischemia  He has been doing well from a cardiac standpoint since his last evaluation  He reports adequate diuresis on his current regimen  His weight at home has improved to 276 lbs  His dry weight is around 270-275 lb  His blood pressure has improved on his current antihypertensive regimen  He is predominantly sedentary and uses a golf cart to get around however plans to increase his activity  He denies any other cardiac concerns at this time  Family History: father passed at age 66 from CVA and MI; mother with CHF; half brother passed from heart disease     Social history: never smoked; drinks 3 beers a day       Allergies   Allergen Reactions    Penicillins      Other reaction(s):  Other, Unknown         Current Outpatient Medications:     amLODIPine (NORVASC) 10 mg tablet, Take 10 mg by mouth daily, Disp: , Rfl:     aspirin 81 MG tablet, Take 1 tablet by mouth daily, Disp: , Rfl:     Blood Glucose Monitoring Suppl (ACURA BLOOD GLUCOSE METER) w/Device KIT, by Other route   Use as instructed, Disp: , Rfl:     carvedilol (COREG) 25 mg tablet, Take 1 tablet (25 mg total) by mouth 2 (two) times a day with meals, Disp: 60 tablet, Rfl: 3    donepezil (ARICEPT) 10 mg tablet, TAKE 1 TABLET DAILY AT BEDTIME, Disp: 90 tablet, Rfl: 3    doxazosin (CARDURA) 4 mg tablet, Take 4 mg by mouth daily at bedtime, Disp: , Rfl:     gabapentin (NEURONTIN) 600 MG tablet, Take 1 tablet (600 mg total) by mouth 4 (four) times a day (Patient taking differently: Take 600 mg by mouth 3 (three) times a day), Disp: 360 tablet, Rfl: 3    glipiZIDE (GLUCOTROL) 10 mg tablet, Take 10 mg by mouth 2 (two) times a day, Disp: , Rfl:     hydrALAZINE (APRESOLINE) 25 mg tablet, Take 1 tablet (25 mg total) by mouth 3 (three) times a day, Disp: 90 tablet, Rfl: 3    hydrochlorothiazide (HYDRODIURIL) 25 mg tablet, Take 1 tablet (25 mg total) by mouth daily, Disp: 60 tablet, Rfl: 3    hydroxychloroquine (PLAQUENIL) 200 mg tablet, TAKE 2 TABLETS DAILY, Disp: 180 tablet, Rfl: 1    insulin glargine (LANTUS SOLOSTAR) 100 units/mL injection pen, Inject 40 Units under the skin daily at bedtime , Disp: , Rfl:     irbesartan (AVAPRO) 300 mg tablet, Take 1 tablet (300 mg total) by mouth daily, Disp: 90 tablet, Rfl: 3    isosorbide mononitrate (IMDUR) 30 mg 24 hr tablet, Take 1 tablet (30 mg total) by mouth daily, Disp: 90 tablet, Rfl: 3    OXcarbazepine (TRILEPTAL) 600 mg tablet, TAKE 1 TABLET DAILY AT BEDTIME, Disp: 90 tablet, Rfl: 3    rasagiline (AZILECT) 1 MG, Take 1 tablet (1 mg total) by mouth daily, Disp: 30 tablet, Rfl: 5    torsemide (DEMADEX) 20 mg tablet, Take 2 tablets (40 mg total) by mouth daily, Disp: 120 tablet, Rfl: 3    TRUEPLUS PEN NEEDLES 32G X 4 MM MISC, , Disp: , Rfl:     finasteride (PROSCAR) 5 mg tablet, Take 1 tablet (5 mg total) by mouth daily (Patient not taking: No sig reported), Disp: 90 tablet, Rfl: 3    tolterodine (DETROL LA) 2 mg 24 hr capsule, TAKE 1 CAPSULE DAILY (Patient not taking: No sig reported), Disp: 90 capsule, Rfl: 3    Past Medical History:   Diagnosis Date    Arthritis     Hyperlipidemia     Hypertension     Poor circulation     Sleep apnea     Type 2 diabetes mellitus ([facility] )        Family History   Problem Relation Age of Onset    Diabetes type II Mother     Hypertension Mother     Breast cancer Sister     Lung cancer Sister     Multiple endocrine neoplasia Brother     Breast cancer Sister        Past Surgical History:   Procedure Laterality Date    BACK SURGERY      CORONARY ARTERY BYPASS GRAFT  07/22/2017    HEMORROIDECTOMY      TONSILLECTOMY      WRIST SURGERY         Social History     Socioeconomic History    Marital status: Single     Spouse name: Not on file    Number of children: Not on file    Years of education: Not on file    Highest education level: Not on file   Occupational History    Not on file   Tobacco Use    Smoking status: Never Smoker    Smokeless tobacco: Never Used   Vaping Use    Vaping Use: Never used   Substance and Sexual Activity    Alcohol use: Yes     Comment: 2 drinks daily    Drug use: No    Sexual activity: Not on file   Other Topics Concern    Not on file   Social History Narrative    Not on file     Social Determinants of Health     Financial Resource Strain: Not on file   Food Insecurity: Not on file   Transportation Needs: Not on file   Physical Activity: Not on file   Stress: Not on file   Social Connections: Not on file   Intimate Partner Violence: Not on file   Housing Stability: Not on file       Review of Systems   Constitutional: Negative for diaphoresis, weight gain and weight loss  HENT: Negative for congestion  Cardiovascular: Positive for leg swelling (improving)   Negative for chest pain, dyspnea on exertion, irregular heartbeat, near-syncope, orthopnea, palpitations, paroxysmal nocturnal dyspnea and syncope  Respiratory: Negative for shortness of breath, sleep disturbances due to breathing and snoring  Hematologic/Lymphatic: Does not bruise/bleed easily  Skin: Negative for rash  Musculoskeletal: Negative for myalgias  Gastrointestinal: Negative for nausea and vomiting  Neurological: Negative for excessive daytime sleepiness and light-headedness  Psychiatric/Behavioral: The patient is not nervous/anxious  Vitals: /64 (BP Location: Left arm, Patient Position: Sitting)   Pulse 59   Resp 16   Ht 5' 10" (1 778 m)   Wt 128 kg (282 lb)   SpO2 94%   BMI 40 46 kg/m²       Physical Exam:     GEN: Alert and oriented x 3, in no acute distress  Well appearing and well nourished  HEENT: Sclera anicteric, conjunctivae pink, mucous membranes moist  Oropharynx clear  NECK: Supple, no carotid bruits, no significant JVD  Trachea midline, no thyromegaly  HEART: Regular rhythm, normal S1 and S2, no murmurs, clicks, gallops or rubs  PMI nondisplaced, no thrills  LUNGS: Clear to auscultation bilaterally; no wheezes, rales, or rhonchi  No increased work of breathing or signs of respiratory distress  ABDOMEN: Soft, nontender, nondistended, normoactive bowel sounds  EXTREMITIES: Skin warm and well perfused, 2+ edema  NEURO: No focal findings  Normal speech   Mood and affect normal    SKIN:  Chronic venous stasis dermatitis

## 2022-08-04 LAB
ALDOST SERPL-MCNC: <1 NG/DL (ref 0–30)
ALDOST/RENIN PLAS-RTO: <.2 {RATIO} (ref 0–30)
RENIN PLAS-CCNC: 4.15 NG/ML/HR (ref 0.17–5.38)

## 2022-09-15 DIAGNOSIS — I50.9 CONGESTIVE HEART FAILURE, UNSPECIFIED HF CHRONICITY, UNSPECIFIED HEART FAILURE TYPE (HCC): ICD-10-CM

## 2022-09-15 RX ORDER — CARVEDILOL 25 MG/1
TABLET ORAL
Qty: 60 TABLET | Refills: 3 | Status: SHIPPED | OUTPATIENT
Start: 2022-09-15

## 2022-09-28 ENCOUNTER — TELEMEDICINE (OUTPATIENT)
Dept: NEUROLOGY | Facility: CLINIC | Age: 72
End: 2022-09-28
Payer: COMMERCIAL

## 2022-09-28 VITALS — DIASTOLIC BLOOD PRESSURE: 70 MMHG | BODY MASS INDEX: 39.89 KG/M2 | SYSTOLIC BLOOD PRESSURE: 140 MMHG | WEIGHT: 278 LBS

## 2022-09-28 DIAGNOSIS — E13.42 POLYNEUROPATHY DUE TO SECONDARY DIABETES (HCC): ICD-10-CM

## 2022-09-28 DIAGNOSIS — E11.42 DIABETIC POLYNEUROPATHY ASSOCIATED WITH TYPE 2 DIABETES MELLITUS (HCC): ICD-10-CM

## 2022-09-28 DIAGNOSIS — R41.3 MEMORY DIFFICULTY: Primary | ICD-10-CM

## 2022-09-28 DIAGNOSIS — G20 PARKINSON'S DISEASE (HCC): ICD-10-CM

## 2022-09-28 DIAGNOSIS — E11.44 DIABETIC AMYOTROPHY ASSOCIATED WITH TYPE 2 DIABETES MELLITUS (HCC): ICD-10-CM

## 2022-09-28 PROCEDURE — 99214 OFFICE O/P EST MOD 30 MIN: CPT | Performed by: PSYCHIATRY & NEUROLOGY

## 2022-09-28 RX ORDER — DULOXETIN HYDROCHLORIDE 30 MG/1
30 CAPSULE, DELAYED RELEASE ORAL DAILY
Qty: 30 CAPSULE | Refills: 5 | Status: SHIPPED | OUTPATIENT
Start: 2022-09-28

## 2022-09-28 RX ORDER — GABAPENTIN 600 MG/1
600 TABLET ORAL 4 TIMES DAILY
Qty: 360 TABLET | Refills: 3 | Status: SHIPPED | OUTPATIENT
Start: 2022-09-28

## 2022-09-28 RX ORDER — MEMANTINE HYDROCHLORIDE 10 MG/1
10 TABLET ORAL 2 TIMES DAILY
Qty: 60 TABLET | Refills: 5 | Status: SHIPPED | OUTPATIENT
Start: 2022-09-28

## 2022-09-28 RX ORDER — MEMANTINE HYDROCHLORIDE 5 MG-10 MG
KIT ORAL
Qty: 1 TABLET | Refills: 0 | Status: SHIPPED | OUTPATIENT
Start: 2022-09-28

## 2022-09-28 NOTE — PROGRESS NOTES
Virtual Regular Visit    Verification of patient location:    Patient is located in the following state in which I hold an active license [place]    Plan:  Continue Aricept  Initiate Namenda titrating from 5 mg to 10 mg b i d  Initiate Cymbalta 30 mg daily  Continue gabapentin and Trileptal  Initiate Sinemet 25/100 t i d  Follow-up 2 months    Discussion:  Dorrie Gitelman reports some persistent neuropathic pain symptoms in the distal lower extremity  He finds it CBD ointment seems to be effective  Remains on Trileptal as well as gabapentin but finds gabapentin causes some GI distress  Have recommended a trial of Cymbalta 30 mg with the potential of increasing to 60 mg  If he has a happy with this he may taper gabapentin  He continues report issues with his tremor and remains on Azilect  Will reinitiate Sinemet t i d  He feels his memory is getting worse  Have recommended continuing Aricept and adding Namenda  He recently had a fall when he was walking outside not using a cane  He scraped his knee and elbow and bumped his head but fortunately not cause significant injury to his head  Had difficulty getting up  He has not been in therapy for a while and plans to initiate water therapy and if he needs more therapy he will notify me    I will otherwise see him back in follow-up in a couple of    Problem List Items Addressed This Visit        Endocrine    Polyne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 ([facility] )    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[facility] )    Relevant Medications    DULoxetine (Cymbalta) 30 mg delayed release capsule      Other Visit Diagnoses     Memory difficulty    -  Primary    Relevant Medications    memantine (NAMENDA TITRATION PACK)    memantine (NAMENDA) 10 mg tablet    Parkinson's disease (HCC)        Relevant Medications    gabapentin (NEURONTIN) 600 MG tablet    carbidopa-levodopa (SINEMET)  mg per tablet    Diabetic polyneuropathy associated with type 2 diabetes mellitus ([facility] )        Relevant Medications gabapentin (NEURONTIN) 600 MG tablet               Reason for visit is   Chief Complaint   Patient presents with    Virtual Regular Visit        Encounter provider Elisa James MD    Provider located at [place] [id number]  [address] [place] [id number]      Recent Visits  No visits were found meeting these conditions  Showing recent visits within past 7 days and meeting all other requirements  Today's Visits  Date Type Provider Dept   09/28/22 Telemedicine Elisa James  [facility] today's visits and meeting all other requirements  Future Appointments  No visits were found meeting these conditions  Showing future appointments within next 150 days and meeting all other requirements       The patient was identified by name and date of birth  Otilia Peres was informed that this is a telemedicine visit and that the visit is being conducted through [employer] via [address] [place] [address] in lieu of office visit in the face of ongoing [place] viral epidemic  Patient understands that this format is not completely HIPPA compliant, however I am sitting in my office alone with the door closed and patient is in a comfortable environment to speak with me about current health issues  He acknowledged consent and understanding of privacy and security of the video platform  The patient has agreed to participate and understands they can discontinue the visit at any time  Patient is aware this is a billable service  Subjective  Otilia Peres is a 67 y o  male reports he feels his memory is getting a little worse  He has been on Aricept 10 mg daily  He was not able to come to the office today because he had a fall recently  He states he was walking up the steps of his driveway without a cane lost his balance and fell  He scraped his left knee elbow and shoulder    He states he did bump his head but did not lose consciousness and have significant head injury  He is having some issues of dysesthesias in his toes  He states he uses CBD ointment and this helps in combination with his gabapentin and oxcarbazepine  He states he typically takes the gabapentin 3 times daily but sometimes takes a 4th dose  He states it causes dyspepsia and was wondering if you could try an alternative medication  He does not want to initiate formal physical therapy because of the cost but states that aquatherapy helps and he can do this independently  If he needs more therapy will notify me  He notes some increase in tremor  He is on rasagiline  Will reinitiate Sinemet t i d  HPI     Past Medical History:   Diagnosis Date    Arthritis     Hyperlipidemia     Hypertension     Poor circulation     Sleep apnea     Type 2 diabetes mellitus (HCC)        Past Surgical History:   Procedure Laterality Date    BACK SURGERY      CORONARY ARTERY BYPASS GRAFT  07/22/2017    HEMORROIDECTOMY      TONSILLECTOMY      WRIST SURGERY         Current Outpatient Medications   Medication Sig Dispense Refill    amLODIPine (NORVASC) 10 mg tablet Take 10 mg by mouth daily      aspirin 81 MG tablet Take 1 tablet by mouth daily      Blood Glucose Monitoring Suppl (ACURA BLOOD GLUCOSE METER) w/Device KIT by Other route   Use as instructed      carbidopa-levodopa (SINEMET)  mg per tablet Take 1 tablet by mouth 3 (three) times a day 270 tablet 3    carvedilol (COREG) 25 mg tablet take 1 tablet by mouth twice a day with meals 60 tablet 3    donepezil (ARICEPT) 10 mg tablet TAKE 1 TABLET DAILY AT BEDTIME 90 tablet 3    doxazosin (CARDURA) 4 mg tablet Take 4 mg by mouth daily at bedtime      DULoxetine (Cymbalta) 30 mg delayed release capsule Take 1 capsule (30 mg total) by mouth daily 30 capsule 5    gabapentin (NEURONTIN) 600 MG tablet Take 1 tablet (600 mg total) by mouth 4 (four) times a day 360 tablet 3    glipiZIDE (GLUCOTROL) 10 mg tablet Take 10 mg by mouth 2 (two) times a day      hydrALAZINE (APRESOLINE) 25 mg tablet Take 1 tablet (25 mg total) by mouth 3 (three) times a day 90 tablet 3    hydrochlorothiazide (HYDRODIURIL) 25 mg tablet Take 1 tablet (25 mg total) by mouth daily 60 tablet 3    hydroxychloroquine (PLAQUENIL) 200 mg tablet TAKE 2 TABLETS DAILY 180 tablet 1    insulin glargine (LANTUS SOLOSTAR) 100 units/mL injection pen Inject 40 Units under the skin daily at bedtime       irbesartan (AVAPRO) 300 mg tablet Take 1 tablet (300 mg total) by mouth daily 90 tablet 3    isosorbide mononitrate (IMDUR) 30 mg 24 hr tablet Take 1 tablet (30 mg total) by mouth daily 90 tablet 3    memantine (NAMENDA TITRATION PACK) Follow package directions  1 tablet 0    memantine (NAMENDA) 10 mg tablet Take 1 tablet (10 mg total) by mouth 2 (two) times a day 60 tablet 5    OXcarbazepine (TRILEPTAL) 600 mg tablet TAKE 1 TABLET DAILY AT BEDTIME 90 tablet 3    rasagiline (AZILECT) 1 MG Take 1 tablet (1 mg total) by mouth daily 30 tablet 5    torsemide (DEMADEX) 20 mg tablet Take 2 tablets (40 mg total) by mouth daily 120 tablet 3    TRUEPLUS PEN NEEDLES 32G X 4 MM MISC       finasteride (PROSCAR) 5 mg tablet Take 1 tablet (5 mg total) by mouth daily (Patient not taking: No sig reported) 90 tablet 3    tolterodine (DETROL LA) 2 mg 24 hr capsule TAKE 1 CAPSULE DAILY (Patient not taking: No sig reported) 90 capsule 3     No current facility-administered medications for this visit  Allergies   Allergen Reactions    Penicillins      Other reaction(s): Other, Unknown       Review of Systems   Constitutional: Negative  Negative for appetite change and fever  HENT: Negative  Negative for hearing loss, tinnitus, trouble swallowing and voice change  Eyes: Negative  Negative for photophobia and pain  Respiratory: Negative  Negative for shortness of breath  Cardiovascular: Negative  Negative for palpitations  Gastrointestinal: Negative  Negative for nausea and vomiting  Endocrine: Negative  Negative for cold intolerance  Genitourinary: Negative  Negative for dysuria, frequency and urgency  Musculoskeletal: Positive for gait problem (pt fell on 9/26/22)  Negative for myalgias and neck pain  Skin: Negative  Negative for rash  Neurological: Negative for dizziness, tremors, seizures, syncope, facial asymmetry, speech difficulty, weakness, light-headedness, numbness and headaches  Hematological: Negative  Does not bruise/bleed easily  Psychiatric/Behavioral: Negative  Negative for confusion, hallucinations and sleep disturbance         Video Exam    Vitals:    09/28/22 1054   BP: 140/70   Weight: 126 kg (278 lb)       Physical Exam     I spent 18 minutes directly with the patient during this visit

## 2022-09-29 DIAGNOSIS — G20 PARKINSON'S DISEASE (HCC): ICD-10-CM

## 2022-09-29 RX ORDER — RASAGILINE 1 MG/1
TABLET ORAL
Qty: 30 TABLET | Refills: 5 | Status: SHIPPED | OUTPATIENT
Start: 2022-09-29

## 2022-10-12 PROBLEM — A41.9 SEVERE SEPSIS (HCC): Status: RESOLVED | Noted: 2021-03-15 | Resolved: 2022-10-12

## 2022-10-12 PROBLEM — R65.20 SEVERE SEPSIS (HCC): Status: RESOLVED | Noted: 2021-03-15 | Resolved: 2022-10-12

## 2022-11-10 ENCOUNTER — TELEPHONE (OUTPATIENT)
Dept: CARDIOLOGY CLINIC | Facility: CLINIC | Age: 72
End: 2022-11-10

## 2022-11-10 NOTE — TELEPHONE ENCOUNTER
Patient is on cancellation list to see Dr Coco Schneider  He was to be seen this month  He wants to know if he needs to have blood work done      [phone]

## 2022-11-11 NOTE — TELEPHONE ENCOUNTER
He will need a BMP prior to his next office visit  Please place the order and I can sign      Also please enroll in the heart failure nursing program     Thanks

## 2022-11-14 DIAGNOSIS — I50.9 CONGESTIVE HEART FAILURE, UNSPECIFIED HF CHRONICITY, UNSPECIFIED HEART FAILURE TYPE (HCC): Primary | ICD-10-CM

## 2022-12-19 DIAGNOSIS — I10 PRIMARY HYPERTENSION: ICD-10-CM

## 2022-12-19 RX ORDER — HYDRALAZINE HYDROCHLORIDE 25 MG/1
TABLET, FILM COATED ORAL
Qty: 90 TABLET | Refills: 3 | Status: SHIPPED | OUTPATIENT
Start: 2022-12-19 | End: 2022-12-30 | Stop reason: SDUPTHER

## 2022-12-27 DIAGNOSIS — I50.9 CONGESTIVE HEART FAILURE, UNSPECIFIED HF CHRONICITY, UNSPECIFIED HEART FAILURE TYPE (HCC): ICD-10-CM

## 2022-12-27 DIAGNOSIS — I10 PRIMARY HYPERTENSION: ICD-10-CM

## 2022-12-27 RX ORDER — ISOSORBIDE MONONITRATE 30 MG/1
30 TABLET, EXTENDED RELEASE ORAL DAILY
Qty: 90 TABLET | Refills: 3 | Status: SHIPPED | OUTPATIENT
Start: 2022-12-27

## 2022-12-27 RX ORDER — CARVEDILOL 25 MG/1
25 TABLET ORAL 2 TIMES DAILY WITH MEALS
Qty: 180 TABLET | Refills: 1 | Status: SHIPPED | OUTPATIENT
Start: 2022-12-27

## 2022-12-30 DIAGNOSIS — I10 PRIMARY HYPERTENSION: ICD-10-CM

## 2022-12-30 RX ORDER — HYDRALAZINE HYDROCHLORIDE 25 MG/1
25 TABLET, FILM COATED ORAL 3 TIMES DAILY
Qty: 270 TABLET | Refills: 3 | Status: SHIPPED | OUTPATIENT
Start: 2022-12-30

## 2023-01-06 ENCOUNTER — APPOINTMENT (OUTPATIENT)
Dept: LAB | Facility: CLINIC | Age: 73
End: 2023-01-06

## 2023-01-06 DIAGNOSIS — E11.9 DIABETES MELLITUS WITHOUT COMPLICATION (HCC): ICD-10-CM

## 2023-01-06 DIAGNOSIS — E55.9 VITAMIN D DEFICIENCY DISEASE: ICD-10-CM

## 2023-01-06 DIAGNOSIS — I51.9 MYXEDEMA HEART DISEASE: ICD-10-CM

## 2023-01-06 DIAGNOSIS — Z79.4 ENCOUNTER FOR LONG-TERM (CURRENT) USE OF INSULIN (HCC): ICD-10-CM

## 2023-01-06 DIAGNOSIS — E03.9 MYXEDEMA HEART DISEASE: ICD-10-CM

## 2023-01-06 DIAGNOSIS — D50.9 IRON DEFICIENCY ANEMIA, UNSPECIFIED IRON DEFICIENCY ANEMIA TYPE: ICD-10-CM

## 2023-01-06 DIAGNOSIS — I50.9 CONGESTIVE HEART FAILURE, UNSPECIFIED HF CHRONICITY, UNSPECIFIED HEART FAILURE TYPE (HCC): ICD-10-CM

## 2023-01-06 DIAGNOSIS — E78.5 HYPERLIPIDEMIA, UNSPECIFIED HYPERLIPIDEMIA TYPE: ICD-10-CM

## 2023-01-06 DIAGNOSIS — I73.9 PERIPHERAL VASCULAR DISEASE, UNSPECIFIED (HCC): ICD-10-CM

## 2023-01-06 LAB
25(OH)D3 SERPL-MCNC: 21.2 NG/ML (ref 30–100)
ALBUMIN SERPL BCP-MCNC: 3.8 G/DL (ref 3.5–5)
ALBUMIN SERPL BCP-MCNC: 3.8 G/DL (ref 3.5–5)
ALP SERPL-CCNC: 80 U/L (ref 46–116)
ALP SERPL-CCNC: 80 U/L (ref 46–116)
ALT SERPL W P-5'-P-CCNC: 33 U/L (ref 12–78)
ALT SERPL W P-5'-P-CCNC: 33 U/L (ref 12–78)
ANION GAP SERPL CALCULATED.3IONS-SCNC: 7 MMOL/L (ref 4–13)
AST SERPL W P-5'-P-CCNC: 23 U/L (ref 5–45)
AST SERPL W P-5'-P-CCNC: 23 U/L (ref 5–45)
BILIRUB DIRECT SERPL-MCNC: 0.13 MG/DL (ref 0–0.2)
BILIRUB SERPL-MCNC: 0.92 MG/DL (ref 0.2–1)
BILIRUB SERPL-MCNC: 0.92 MG/DL (ref 0.2–1)
BUN SERPL-MCNC: 16 MG/DL (ref 5–25)
CALCIUM SERPL-MCNC: 9.9 MG/DL (ref 8.3–10.1)
CHLORIDE SERPL-SCNC: 107 MMOL/L (ref 96–108)
CHOLEST SERPL-MCNC: 157 MG/DL
CO2 SERPL-SCNC: 28 MMOL/L (ref 21–32)
CREAT SERPL-MCNC: 0.98 MG/DL (ref 0.6–1.3)
FERRITIN SERPL-MCNC: 532 NG/ML (ref 8–388)
GFR SERPL CREATININE-BSD FRML MDRD: 76 ML/MIN/1.73SQ M
GLUCOSE P FAST SERPL-MCNC: 130 MG/DL (ref 65–99)
HDLC SERPL-MCNC: 49 MG/DL
IRON SATN MFR SERPL: 44 % (ref 20–50)
IRON SERPL-MCNC: 153 UG/DL (ref 65–175)
LDLC SERPL CALC-MCNC: 87 MG/DL (ref 0–100)
NONHDLC SERPL-MCNC: 108 MG/DL
POTASSIUM SERPL-SCNC: 4.5 MMOL/L (ref 3.5–5.3)
PROT SERPL-MCNC: 6.6 G/DL (ref 6.4–8.4)
PROT SERPL-MCNC: 6.6 G/DL (ref 6.4–8.4)
SODIUM SERPL-SCNC: 142 MMOL/L (ref 135–147)
T4 FREE SERPL-MCNC: 0.77 NG/DL (ref 0.76–1.46)
TIBC SERPL-MCNC: 347 UG/DL (ref 250–450)
TRIGL SERPL-MCNC: 105 MG/DL
TSH SERPL DL<=0.05 MIU/L-ACNC: 6.59 UIU/ML (ref 0.45–4.5)

## 2023-01-07 LAB
ERYTHROCYTE [DISTWIDTH] IN BLOOD BY AUTOMATED COUNT: 15 % (ref 11.6–15.1)
HCT VFR BLD AUTO: 34.3 % (ref 36.5–49.3)
HGB BLD-MCNC: 11.7 G/DL (ref 12–17)
MCH RBC QN AUTO: 34.6 PG (ref 26.8–34.3)
MCHC RBC AUTO-ENTMCNC: 34.1 G/DL (ref 31.4–37.4)
MCV RBC AUTO: 102 FL (ref 82–98)
PLATELET # BLD AUTO: 110 THOUSANDS/UL (ref 149–390)
PMV BLD AUTO: 10.5 FL (ref 8.9–12.7)
RBC # BLD AUTO: 3.38 MILLION/UL (ref 3.88–5.62)
WBC # BLD AUTO: 3.78 THOUSAND/UL (ref 4.31–10.16)

## 2023-01-09 LAB
EST. AVERAGE GLUCOSE BLD GHB EST-MCNC: 143 MG/DL
HBA1C MFR BLD: 6.6 %

## 2023-01-16 ENCOUNTER — OFFICE VISIT (OUTPATIENT)
Dept: CARDIOLOGY CLINIC | Facility: CLINIC | Age: 73
End: 2023-01-16

## 2023-01-16 VITALS
RESPIRATION RATE: 16 BRPM | OXYGEN SATURATION: 96 % | DIASTOLIC BLOOD PRESSURE: 78 MMHG | BODY MASS INDEX: 39.37 KG/M2 | HEIGHT: 70 IN | SYSTOLIC BLOOD PRESSURE: 144 MMHG | WEIGHT: 275 LBS | HEART RATE: 59 BPM

## 2023-01-16 DIAGNOSIS — I50.32 CHRONIC DIASTOLIC CONGESTIVE HEART FAILURE (HCC): Primary | ICD-10-CM

## 2023-01-16 NOTE — PROGRESS NOTES
Cardiology Office Note    Diane Cmapbell 67 y o  male MRN: [id number]    01/16/23          Assessment:  1  Chronic HFpEF  2  CAD s/p PCI and CABG x3 (2017)  3  Hypertension   4  HLD  5  IDDM with peripheral neuropathy   6  Ambulatory dysfunction due to neuropathy       Plan:  · He is close to euvolemic and at his dry weight of about 270 lbs  · Continue torsemide 20 to 40 mg daily and HCTZ  · BP reasonably well controlled  Continue hydralazine, Norvasc, carvedilol, irbesartan, Imdur and doxazosin  · Continue aspirin  Ambulatory blood pressure monitoring and maintaining a low sodium diet was advised  · He was advised to notify us with the onset of cardiac symptoms  Follow up:  4 months or sooner as needed    1  Chronic diastolic congestive heart failure (HCC)            HPI: Diane Campbell is a 67y o  year old male with history of CAD s/p PCI and CABG who presents for routine follow up  Past cardiac history:   · CAD s/p remote PCI and CABG x3 in 2017 [facility]  · TTE 5/2022: EF 55%, G2dd, RV function was mildly reduced, mild MR, TR  · Pharmacologic MPI 5/2022: anterior infarct without evidence of ischemia  He has been doing well from a cardiac standpoint since his last evaluation  He has been close to euvolemic and at his dry weight of about 270 lbs  He takes torsemide 20 to 40 mg daily  His blood pressure has been reasonably well controlled on his current antihypertensive regimen  He has been struggling with ambulatory dysfunction  He has cut back alcohol intake  He denies any other cardiac concerns at this time      Family History: father passed at age 66 from [address] [place] [address] and MI; mother with CHF; half brother passed from heart disease     Social history: never smoked; drinks 3 beers a day       Allergies   Allergen Reactions   • Penicillins      Other reaction(s):  Other, Unknown         Current Outpatient Medications:   •  amLODIPine (NORVASC) 10 mg tablet, Take 10 mg by mouth daily, Disp: , Rfl:   •  aspirin 81 MG tablet, Take 1 tablet by mouth daily, Disp: , Rfl:   •  Blood Glucose Monitoring Suppl (ACURA BLOOD GLUCOSE METER) w/Device KIT, by Other route   Use as instructed, Disp: , Rfl:   •  carbidopa-levodopa (SINEMET)  mg per tablet, Take 1 tablet by mouth 3 (three) times a day, Disp: 270 tablet, Rfl: 3  •  carvedilol (COREG) 25 mg tablet, Take 1 tablet (25 mg total) by mouth 2 (two) times a day with meals, Disp: 180 tablet, Rfl: 1  •  donepezil (ARICEPT) 10 mg tablet, TAKE 1 TABLET DAILY AT BEDTIME, Disp: 90 tablet, Rfl: 3  •  doxazosin (CARDURA) 4 mg tablet, Take 4 mg by mouth daily at bedtime, Disp: , Rfl:   •  DULoxetine (Cymbalta) 30 mg delayed release capsule, Take 1 capsule (30 mg total) by mouth daily, Disp: 30 capsule, Rfl: 5  •  gabapentin (NEURONTIN) 600 MG tablet, Take 1 tablet (600 mg total) by mouth 4 (four) times a day, Disp: 360 tablet, Rfl: 3  •  glipiZIDE (GLUCOTROL) 10 mg tablet, Take 10 mg by mouth 2 (two) times a day, Disp: , Rfl:   •  hydrALAZINE (APRESOLINE) 25 mg tablet, Take 1 tablet (25 mg total) by mouth 3 (three) times a day, Disp: 270 tablet, Rfl: 3  •  hydrochlorothiazide (HYDRODIURIL) 25 mg tablet, Take 1 tablet (25 mg total) by mouth daily, Disp: 60 tablet, Rfl: 3  •  hydroxychloroquine (PLAQUENIL) 200 mg tablet, TAKE 2 TABLETS DAILY, Disp: 180 tablet, Rfl: 1  •  insulin glargine (LANTUS SOLOSTAR) 100 units/mL injection pen, Inject 40 Units under the skin daily at bedtime , Disp: , Rfl:   •  irbesartan (AVAPRO) 300 mg tablet, Take 1 tablet (300 mg total) by mouth daily, Disp: 90 tablet, Rfl: 3  •  isosorbide mononitrate (IMDUR) 30 mg 24 hr tablet, Take 1 tablet (30 mg total) by mouth daily, Disp: 90 tablet, Rfl: 3  •  memantine (NAMENDA) 10 mg tablet, Take 1 tablet (10 mg total) by mouth 2 (two) times a day, Disp: 60 tablet, Rfl: 5  •  OXcarbazepine (TRILEPTAL) 600 mg tablet, TAKE 1 TABLET DAILY AT BEDTIME, Disp: 90 tablet, Rfl: 3  •  rasagiline (AZILECT) 1 MG, take 1 tablet once daily, Disp: 30 tablet, Rfl: 5  •  tolterodine (DETROL LA) 2 mg 24 hr capsule, TAKE 1 CAPSULE DAILY, Disp: 90 capsule, Rfl: 3  •  torsemide (DEMADEX) 20 mg tablet, Take 2 tablets (40 mg total) by mouth daily (Patient taking differently: Take 20 mg by mouth daily), Disp: 120 tablet, Rfl: 3  •  TRUEPLUS PEN NEEDLES 32G X 4 MM MISC, , Disp: , Rfl:   •  memantine (NAMENDA TITRATION PACK), Follow package directions   (Patient not taking: Reported on 1/16/2023), Disp: 1 tablet, Rfl: 0    Past Medical History:   Diagnosis Date   • Arthritis    • Hyperlipidemia    • Hypertension    • Poor circulation    • Sleep apnea    • [facility]        Family History   Problem Relation Age of Onset   • Diabetes type II Mother    • Hypertension Mother    • Breast cancer Sister    • Lung cancer Sister    • Multiple endocrine neoplasia Brother    • Breast cancer Sister        Past Surgical History:   Procedure Laterality Date   • BACK SURGERY     • CORONARY ARTERY BYPASS GRAFT  07/22/2017   • HEMORROIDECTOMY     • TONSILLECTOMY     • WRIST SURGERY         Social History     Socioeconomic History   • Marital status: Single     Spouse name: Not on file   • Number of children: Not on file   • Years of education: Not on file   • Highest education level: Not on file   Occupational History   • Not on file   Tobacco Use   • Smoking status: Never   • Smokeless tobacco: Never   Vaping Use   • Vaping Use: Never used   Substance and Sexual Activity   • Alcohol use: Yes     Comment: 2 drinks daily   • Drug use: No   • Sexual activity: Not on file   Other Topics Concern   • Not on file   Social History Narrative   • Not on file     Social Determinants of Health     Financial Resource Strain: Not on file   Food Insecurity: Not on file   Transportation Needs: Not on file   Physical Activity: Not on file   Stress: Not on file   Social Connections: Not on file   Intimate Partner Violence: Not on file Housing Stability: Not on file       Review of Systems   Constitutional: Negative for diaphoresis, weight gain and weight loss  HENT: Negative for congestion  Cardiovascular: Positive for dyspnea on exertion (chronic stable)  Negative for chest pain, irregular heartbeat, leg swelling, near-syncope, orthopnea, palpitations, paroxysmal nocturnal dyspnea and syncope  Respiratory: Negative for shortness of breath, sleep disturbances due to breathing and snoring  Hematologic/Lymphatic: Does not bruise/bleed easily  Skin: Negative for rash  Musculoskeletal: Negative for myalgias  Gastrointestinal: Negative for nausea and vomiting  Neurological: Negative for excessive daytime sleepiness and light-headedness  Psychiatric/Behavioral: The patient is not nervous/anxious  Vitals: /78 (BP Location: Left arm, Patient Position: Sitting, Cuff Size: Large)   Pulse 59   Resp 16   Ht 5' 10" (1 778 m)   Wt 125 kg (275 lb)   SpO2 96%   BMI 39 46 kg/m²       Physical Exam:     GEN: Alert and oriented x 3, in no acute distress  Well appearing and well nourished  HEENT: Sclera anicteric, conjunctivae pink, mucous membranes moist  Oropharynx clear  NECK: Supple, no carotid bruits, no significant JVD  Trachea midline, no thyromegaly  HEART: Regular rhythm, normal S1 and S2, no murmurs, clicks, gallops or rubs  PMI nondisplaced, no thrills  LUNGS: Clear to auscultation bilaterally; no wheezes, rales, or rhonchi  No increased work of breathing or signs of respiratory distress  ABDOMEN: Soft, nontender, nondistended, normoactive bowel sounds  EXTREMITIES: Skin warm, trace bilateral lower extremity edema  NEURO: No focal findings  Normal speech   Mood and affect normal    SKIN: Chronic venous stasis dermatitis

## 2023-04-03 DIAGNOSIS — E11.44 DIABETIC AMYOTROPHY ASSOCIATED WITH TYPE 2 DIABETES MELLITUS (HCC): ICD-10-CM

## 2023-04-03 RX ORDER — DULOXETIN HYDROCHLORIDE 30 MG/1
CAPSULE, DELAYED RELEASE ORAL
Qty: 30 CAPSULE | Refills: 5 | Status: SHIPPED | OUTPATIENT
Start: 2023-04-03

## 2023-04-03 NOTE — PROGRESS NOTES
Patient made no further appointments for physical therapy  Will discharge to HEP/pool fitness program   Goals were partially met  LOS:     VITALS:   T(C): 36.8 (04-03-23 @ 10:55), Max: 36.8 (04-03-23 @ 10:55)  HR: 73 (04-03-23 @ 10:55) (73 - 73)  BP: 139/81 (04-03-23 @ 10:55) (139/81 - 139/81)  RR: 16 (04-03-23 @ 10:55) (16 - 16)  SpO2: 99% (04-03-23 @ 10:55) (99% - 99%)    GENERAL: NAD, lying in bed comfortably  HEAD:  Atraumatic, Normocephalic  EYES: EOMI, conjunctiva and sclera clear  ENT: Moist mucous membranes  NECK: Supple  CHEST/LUNG: Clear to auscultation bilaterally; No rales, rhonchi, wheezing, or rubs. Unlabored respirations  HEART: Regular rate and rhythm; No murmurs, rubs, or gallops  ABDOMEN: Soft, nontender, nondistended  BACK: No CVA tenderness  EXTREMITIES:  No clubbing, cyanosis, or edema  NERVOUS SYSTEM:  A&Ox3, no focal deficits   SKIN: No rashes or lesions VITALS:   T(C): 36.8 (04-03-23 @ 10:55), Max: 36.8 (04-03-23 @ 10:55)  HR: 73 (04-03-23 @ 10:55) (73 - 73)  BP: 139/81 (04-03-23 @ 10:55) (139/81 - 139/81)  RR: 16 (04-03-23 @ 10:55) (16 - 16)  SpO2: 99% (04-03-23 @ 10:55) (99% - 99%)    GENERAL: NAD, lying in bed comfortably  HEAD:  Atraumatic, Normocephalic  EYES: EOMI, conjunctiva and sclera clear  ENT: Moist mucous membranes  NECK: Supple  CHEST/LUNG: Clear to auscultation bilaterally; No rales, rhonchi, wheezing, or rubs. Unlabored respirations  HEART: Regular rate and rhythm; No murmurs, rubs, or gallops  ABDOMEN: Soft, nontender, nondistended  BACK: No CVA tenderness  EXTREMITIES:  No clubbing, cyanosis, or edema  NERVOUS SYSTEM:  A&Ox3, no focal deficits   SKIN: No rashes or lesions

## 2023-04-04 NOTE — PROGRESS NOTES
4/6/2023      Chief Complaint   Patient presents with   • Follow-up         Assessment and Plan    67 y o  male     1  BPH with LUTS  2  Urge urinary incontinence  - AUA 10  - UA negative today  - PVR 24 mL  - Continue Cardura   - discussed conservative measures with adequate hydration, avoidance of bladder irritants, avoidance of constipation, tight glycemic control  - Discussed trial of another anticholinergic versus beta 3 agonist, patient declines any further pharmacotherapy at this time  - Discussed kegel exercises  - Follow up for cysto TRUS   - Call with any questions or concerns in the meantime  - All questions answered; patient understands and agrees with plan       History of Present Illness  Aleksandar Gallegos is a 67 y o  male patient with history of BPH with LUTS, urinary urgency here for follow up  Patient has been previously managed on Cardura and finasteride dual medical therapy with minimal benefit  Patient is no longer taking finasteride for unknown reasons  Patient has main concern of urinary urgency and urge urinary incontinence  Patient was previously trialed on Detrol, however states that this medication did not work and is no longer taking this  Patient does take hydrochlorothiazide as well as torsemide daily  Most recent hemoglobin A1c in January 2023 was 6 6, however recent daily sugars have been uncontrolled  Patient does have appointment with endocrinologist this upcoming week  Denies gross hematuria, dysuria, flank pain, suprapubic pressure, fever, chills, nausea, vomiting      AUA SYMPTOM SCORE    Flowsheet Row Most Recent Value   AUA SYMPTOM SCORE    How often have you had a sensation of not emptying your bladder completely after you finished urinating? 2 (P)     How often have you had to urinate again less than two hours after you finished urinating? 2 (P)     How often have you found you stopped and started again several times when you urinate? 1 (P)     How often have you "found it difficult to postpone urination? 2 (P)     How often have you had a weak urinary stream? 1 (P)     How often have you had to push or strain to begin urination? 0 (P)     How many times did you most typically get up to urinate from the time you went to bed at night until the time you got up in the morning? 2 (P)     Quality of Life: If you were to spend the rest of your life with your urinary condition just the way it is now, how would you feel about that? 3 (P)     AUA SYMPTOM SCORE 10 (P)             Review of Systems   Constitutional: Negative for activity change, appetite change, chills and fever  HENT: Negative for congestion and trouble swallowing  Respiratory: Negative for cough and shortness of breath  Cardiovascular: Negative for chest pain, palpitations and leg swelling  Gastrointestinal: Negative for abdominal pain, constipation, diarrhea, nausea and vomiting  Genitourinary: Negative for difficulty urinating, dysuria, flank pain, frequency, hematuria and urgency  Musculoskeletal: Negative for back pain and gait problem  Skin: Negative for wound  Allergic/Immunologic: Negative for immunocompromised state  Neurological: Negative for dizziness and syncope  Hematological: Does not bruise/bleed easily  Psychiatric/Behavioral: Negative for confusion  All other systems reviewed and are negative  Vitals  Vitals:    04/06/23 1440   BP: 146/88   Pulse: 66   SpO2: 97%   Weight: 130 kg (286 lb)   Height: 5' 10\" (1 778 m)       Physical Exam  Constitutional:       General: He is not in acute distress  Appearance: Normal appearance  He is not ill-appearing, toxic-appearing or diaphoretic  HENT:      Head: Normocephalic  Nose: No congestion  Eyes:      General: No scleral icterus  Right eye: No discharge  Left eye: No discharge  Conjunctiva/sclera: Conjunctivae normal       Pupils: Pupils are equal, round, and reactive to light     Pulmonary:      " Effort: Pulmonary effort is normal    Musculoskeletal:      Cervical back: Normal range of motion  Skin:     General: Skin is warm and dry  Coloration: Skin is not jaundiced or pale  Findings: No bruising, erythema, lesion or rash  Neurological:      General: No focal deficit present  Mental Status: He is alert and oriented to person, place, and time  Mental status is at baseline  Gait: Gait normal    Psychiatric:         Mood and Affect: Mood normal          Behavior: Behavior normal          Thought Content:  Thought content normal          Judgment: Judgment normal            Past History  Past Medical History:   Diagnosis Date   • Arthritis    • Hyperlipidemia    • Hypertension    • Poor circulation    • Sleep apnea    • Type 2 diabetes mellitus (HCC)      Social History     Socioeconomic History   • Marital status: Single     Spouse name: None   • Number of children: None   • Years of education: None   • Highest education level: None   Occupational History   • None   Tobacco Use   • Smoking status: Never     Passive exposure: Past   • Smokeless tobacco: Never   Vaping Use   • Vaping Use: Never used   Substance and Sexual Activity   • Alcohol use: Yes     Comment: 2 drinks daily   • Drug use: No   • Sexual activity: Yes     Partners: Female   Other Topics Concern   • None   Social History Narrative   • None     Social Determinants of Health     Financial Resource Strain: Not on file   Food Insecurity: Not on file   Transportation Needs: Not on file   Physical Activity: Not on file   Stress: Not on file   Social Connections: Not on file   Intimate Partner Violence: Not on file   Housing Stability: Not on file     Social History     Tobacco Use   Smoking Status Never   • Passive exposure: Past   Smokeless Tobacco Never     Family History   Problem Relation Age of Onset   • Diabetes type II Mother    • Hypertension Mother    • Breast cancer Sister    • Lung cancer Sister    • Multiple endocrine neoplasia Brother    • Breast cancer Sister        The following portions of the patient's history were reviewed and updated as appropriate: allergies, current medications, past medical history, past social history, past surgical history and problem list     Results  Recent Results (from the past 1 hour(s))   POCT urine dip    Collection Time: 04/06/23  2:35 PM   Result Value Ref Range    LEUKOCYTE ESTERASE,UA -     NITRITE,UA -     SL AMB POCT UROBILINOGEN 0 2     POCT URINE PROTEIN -      PH,UA 5 0     BLOOD,UA -     SPECIFIC GRAVITY,UA 1 010     KETONES,UA -     BILIRUBIN,UA -     GLUCOSE, UA 1      COLOR,UA Yellow     CLARITY,UA Clear    POCT Measure PVR    Collection Time: 04/06/23  2:41 PM   Result Value Ref Range    POST-VOID RESIDUAL VOLUME, ML POC 24 mL   ]  Lab Results   Component Value Date    PSA 0 1 03/21/2022    PSA 0 3 05/05/2021     Lab Results   Component Value Date    CALCIUM 9 9 01/06/2023    K 4 5 01/06/2023    CO2 28 01/06/2023     01/06/2023    BUN 16 01/06/2023    CREATININE 0 98 01/06/2023     Lab Results   Component Value Date    WBC 3 78 (L) 01/06/2023    HGB 11 7 (L) 01/06/2023    HCT 34 3 (L) 01/06/2023     (H) 01/06/2023     (L) 01/06/2023       Silvia Martinez PA-C

## 2023-04-06 ENCOUNTER — OFFICE VISIT (OUTPATIENT)
Dept: UROLOGY | Facility: CLINIC | Age: 73
End: 2023-04-06

## 2023-04-06 VITALS
HEART RATE: 66 BPM | DIASTOLIC BLOOD PRESSURE: 88 MMHG | HEIGHT: 70 IN | OXYGEN SATURATION: 97 % | WEIGHT: 286 LBS | BODY MASS INDEX: 40.94 KG/M2 | SYSTOLIC BLOOD PRESSURE: 146 MMHG

## 2023-04-06 DIAGNOSIS — N40.1 BENIGN LOCALIZED PROSTATIC HYPERPLASIA WITH LOWER URINARY TRACT SYMPTOMS (LUTS): Primary | ICD-10-CM

## 2023-04-06 LAB
POST-VOID RESIDUAL VOLUME, ML POC: 24 ML
SL AMB  POCT GLUCOSE, UA: 1
SL AMB LEUKOCYTE ESTERASE,UA: NORMAL
SL AMB POCT BILIRUBIN,UA: NORMAL
SL AMB POCT BLOOD,UA: NORMAL
SL AMB POCT CLARITY,UA: CLEAR
SL AMB POCT COLOR,UA: YELLOW
SL AMB POCT KETONES,UA: NORMAL
SL AMB POCT NITRITE,UA: NORMAL
SL AMB POCT PH,UA: 5
SL AMB POCT SPECIFIC GRAVITY,UA: 1.01
SL AMB POCT URINE PROTEIN: NORMAL
SL AMB POCT UROBILINOGEN: 0.2

## 2023-05-01 ENCOUNTER — TELEPHONE (OUTPATIENT)
Dept: UROLOGY | Facility: CLINIC | Age: 73
End: 2023-05-01

## 2023-05-01 DIAGNOSIS — E13.42 POLYNEUROPATHY DUE TO SECONDARY DIABETES (HCC): ICD-10-CM

## 2023-05-01 NOTE — TELEPHONE ENCOUNTER
Medication Refill Request     Name Oxcarbazepine  Dose/Frequency 600mg/1 tablet at bedtime  Quantity 90  Verified pharmacy   [x]  Verified ordering Provider   [x]  Does patient have enough for the next 3 days?  Yes [] No [x]

## 2023-05-01 NOTE — TELEPHONE ENCOUNTER
L/m to see if he can take the 11 AM appt tomorrow w/Dr Swati Burns - he was on the cancellation list - appt held for him

## 2023-05-02 RX ORDER — OXCARBAZEPINE 600 MG/1
600 TABLET, FILM COATED ORAL
Qty: 90 TABLET | Refills: 2 | Status: SHIPPED | OUTPATIENT
Start: 2023-05-02

## 2023-05-02 NOTE — TELEPHONE ENCOUNTER
Patient called in and is completely out of his Oxcarbazepine tablets  He has none for tonight  Please send script in asap  Last visit was 9/28/22/ next f/u is 7/5/23

## 2023-05-20 DIAGNOSIS — E13.42 POLYNEUROPATHY DUE TO SECONDARY DIABETES (HCC): ICD-10-CM

## 2023-05-24 RX ORDER — OXCARBAZEPINE 600 MG/1
TABLET, FILM COATED ORAL
Qty: 90 TABLET | Refills: 3 | Status: SHIPPED | OUTPATIENT
Start: 2023-05-24

## 2023-05-26 ENCOUNTER — TELEPHONE (OUTPATIENT)
Dept: CARDIOLOGY CLINIC | Facility: CLINIC | Age: 73
End: 2023-05-26

## 2023-05-26 DIAGNOSIS — I50.9 CONGESTIVE HEART FAILURE, UNSPECIFIED HF CHRONICITY, UNSPECIFIED HEART FAILURE TYPE (HCC): ICD-10-CM

## 2023-05-26 RX ORDER — CARVEDILOL 25 MG/1
25 TABLET ORAL 2 TIMES DAILY WITH MEALS
Qty: 180 TABLET | Refills: 1 | Status: SHIPPED | OUTPATIENT
Start: 2023-05-26

## 2023-05-26 NOTE — TELEPHONE ENCOUNTER
Patient reports he has had continuous SOB on exertion for a couple of months    Patient denies chest pain/pressure    Patient LE edema is improving but still present    Patient weight as of 5/26-279lbs    Patient reports he does not take Torsemide everyday due to frequent urination    Advised patient to take medications as instructed    Patient scheduled to see Dr Franky Ramos on 6/15    Advised patient if SOB increases without resolve to go to ED for evaluation    Patient confirmed and understood instructions

## 2023-05-26 NOTE — TELEPHONE ENCOUNTER
Pt called requesting an appt with Dr Gomez Henley appt was given for 6/15  Pt then stated he has been experiencing SOB call transferred to [facility]

## 2023-06-06 DIAGNOSIS — G20 PARKINSON'S DISEASE (HCC): ICD-10-CM

## 2023-06-06 DIAGNOSIS — E11.44 DIABETIC AMYOTROPHY ASSOCIATED WITH TYPE 2 DIABETES MELLITUS (HCC): ICD-10-CM

## 2023-06-07 RX ORDER — DULOXETIN HYDROCHLORIDE 30 MG/1
30 CAPSULE, DELAYED RELEASE ORAL DAILY
Qty: 90 CAPSULE | Refills: 3 | Status: SHIPPED | OUTPATIENT
Start: 2023-06-07

## 2023-06-15 ENCOUNTER — OFFICE VISIT (OUTPATIENT)
Dept: CARDIOLOGY CLINIC | Facility: CLINIC | Age: 73
End: 2023-06-15
Payer: COMMERCIAL

## 2023-06-15 VITALS
RESPIRATION RATE: 16 BRPM | WEIGHT: 283 LBS | SYSTOLIC BLOOD PRESSURE: 134 MMHG | DIASTOLIC BLOOD PRESSURE: 68 MMHG | BODY MASS INDEX: 40.52 KG/M2 | HEIGHT: 70 IN | OXYGEN SATURATION: 94 %

## 2023-06-15 DIAGNOSIS — I50.33 ACUTE ON CHRONIC HEART FAILURE WITH PRESERVED EJECTION FRACTION (HFPEF) (HCC): Primary | ICD-10-CM

## 2023-06-15 PROCEDURE — 99214 OFFICE O/P EST MOD 30 MIN: CPT | Performed by: INTERNAL MEDICINE

## 2023-06-15 RX ORDER — TORSEMIDE 20 MG/1
20 TABLET ORAL 2 TIMES DAILY
Qty: 180 TABLET | Refills: 3 | Status: SHIPPED | OUTPATIENT
Start: 2023-06-15

## 2023-06-15 RX ORDER — TORSEMIDE 20 MG/1
20 TABLET ORAL
COMMUNITY
End: 2023-06-15 | Stop reason: SDUPTHER

## 2023-06-15 NOTE — PROGRESS NOTES
Cardiology Office Note    Denny Michelle 68 y o  male MRN: [id number]    06/15/23          Assessment:  1  Acute on chronic HFpEF  2  CAD s/p PCI and CABG x3 (2017)  3  Hypertension   4  HLD  5  IDDM with peripheral neuropathy -A1c: 7 3  6  Ambulatory dysfunction due to neuropathy   7  Alcohol/cannabis use      Plan:  · He has gained about 10 lbs since his last evaluation  He was advised to increase torsemide to 20 mg BID  Check BMP in 1 week  · He was advised to cut back on alcohol/cannabis use  · BP at goal; cont hydralazine, Norvasc, Coreg, irbesartan, Imdur, doxazosin, and HCTZ  · Cont aspirin; lipids well controlled off statins  Ambulatory blood pressure monitoring and maintaining a low sodium diet was advised  · He was advised to notify us with the onset of cardiac symptoms  Follow up:  4 months or sooner as needed    1  Acute on chronic heart failure with preserved ejection fraction (HFpEF) ([facility])  torsemide (DEMADEX) 20 mg tablet    Basic metabolic panel          HPI: Denny Michelle is a 68y o  year old male with history of CAD s/p PCI and CABG who presents for routine follow up  Past cardiac history:   · CAD s/p remote PCI and CABG x3 in 2017 at [facility]  · TTE 5/2022: EF 55%, G2dd, RV function was mildly reduced, mild MR, TR  · Pharmacologic MPI 5/2022: anterior infarct without evidence of ischemia  On presentation today he notes exertional dyspnea and weight gain  He drinks 2-3 beers a night and about 4 shots of whiskey a night  He also smokes cannabis nightly  He has gained 10lbs since his last evaluation  He has only been taking torsemide once a day  He denies chest pain or any other cardiac concerns  Family History: father passed at age 66 from CVA and MI; mother with CHF; half brother passed from heart disease     Social history: never smoked; drinks 3 beers a day       Allergies   Allergen Reactions   • Penicillins      Other reaction(s):  Other, Unknown Current Outpatient Medications:   •  amLODIPine (NORVASC) 10 mg tablet, Take 10 mg by mouth daily, Disp: , Rfl:   •  aspirin 81 MG tablet, Take 1 tablet by mouth daily, Disp: , Rfl:   •  Blood Glucose Monitoring Suppl (ACURA BLOOD GLUCOSE METER) w/Device KIT, by Other route   Use as instructed, Disp: , Rfl:   •  carvedilol (COREG) 25 mg tablet, Take 1 tablet (25 mg total) by mouth 2 (two) times a day with meals, Disp: 180 tablet, Rfl: 1  •  donepezil (ARICEPT) 10 mg tablet, TAKE 1 TABLET DAILY AT BEDTIME, Disp: 90 tablet, Rfl: 3  •  doxazosin (CARDURA) 4 mg tablet, Take 4 mg by mouth daily at bedtime, Disp: , Rfl:   •  DULoxetine (CYMBALTA) 30 mg delayed release capsule, Take 1 capsule (30 mg total) by mouth daily, Disp: 90 capsule, Rfl: 3  •  gabapentin (NEURONTIN) 600 MG tablet, Take 1 tablet (600 mg total) by mouth 4 (four) times a day, Disp: 360 tablet, Rfl: 3  •  glipiZIDE (GLUCOTROL) 10 mg tablet, Take 10 mg by mouth 2 (two) times a day, Disp: , Rfl:   •  hydrALAZINE (APRESOLINE) 25 mg tablet, Take 1 tablet (25 mg total) by mouth 3 (three) times a day, Disp: 270 tablet, Rfl: 3  •  hydrochlorothiazide (HYDRODIURIL) 25 mg tablet, Take 1 tablet (25 mg total) by mouth daily, Disp: 60 tablet, Rfl: 3  •  hydroxychloroquine (PLAQUENIL) 200 mg tablet, TAKE 2 TABLETS DAILY, Disp: 180 tablet, Rfl: 1  •  insulin glargine (LANTUS SOLOSTAR) 100 units/mL injection pen, Inject 40 Units under the skin daily at bedtime , Disp: , Rfl:   •  irbesartan (AVAPRO) 300 mg tablet, Take 1 tablet (300 mg total) by mouth daily, Disp: 90 tablet, Rfl: 3  •  isosorbide mononitrate (IMDUR) 30 mg 24 hr tablet, Take 1 tablet (30 mg total) by mouth daily, Disp: 90 tablet, Rfl: 3  •  memantine (NAMENDA) 10 mg tablet, Take 1 tablet (10 mg total) by mouth 2 (two) times a day, Disp: 60 tablet, Rfl: 5  •  OXcarbazepine (TRILEPTAL) 600 mg tablet, TAKE 1 TABLET DAILY AT BEDTIME, Disp: 90 tablet, Rfl: 3  •  torsemide (DEMADEX) 20 mg tablet, Take 1 tablet (20 mg total) by mouth 2 (two) times a day Three times weekly, Disp: 180 tablet, Rfl: 3  •  TRUEPLUS PEN NEEDLES 32G X 4 MM MISC, , Disp: , Rfl:   •  carbidopa-levodopa (SINEMET)  mg per tablet, Take 1 tablet by mouth 3 (three) times a day (Patient not taking: Reported on 6/15/2023), Disp: 270 tablet, Rfl: 3    Past Medical History:   Diagnosis Date   • Arthritis    • Hyperlipidemia    • Hypertension    • Poor circulation    • Sleep apnea    • [facility]        Family History   Problem Relation Age of Onset   • Diabetes type II Mother    • Hypertension Mother    • Breast cancer Sister    • Lung cancer Sister    • Multiple endocrine neoplasia Brother    • Breast cancer Sister        Past Surgical History:   Procedure Laterality Date   • BACK SURGERY     • CORONARY ARTERY BYPASS GRAFT  07/22/2017   • HEMORROIDECTOMY     • TONSILLECTOMY     • WRIST SURGERY         Social History     Socioeconomic History   • Marital status: Single     Spouse name: Not on file   • Number of children: Not on file   • Years of education: Not on file   • Highest education level: Not on file   Occupational History   • Not on file   Tobacco Use   • Smoking status: Never     Passive exposure: Past   • Smokeless tobacco: Never   Vaping Use   • Vaping Use: Never used   Substance and Sexual Activity   • Alcohol use: Yes     Comment: 2 drinks daily   • Drug use: No   • Sexual activity: Yes     Partners: Female   Other Topics Concern   • Not on file   Social History Narrative   • Not on file     Social Determinants of Health     Financial Resource Strain: Not on file   Food Insecurity: Not on file   Transportation Needs: Not on file   Physical Activity: Not on file   Stress: Not on file   Social Connections: Not on file   Intimate Partner Violence: Not on file   Housing Stability: Not on file       Review of Systems   Constitutional: Positive for weight gain  Negative for diaphoresis and weight loss     HENT: "Negative for congestion  Cardiovascular: Positive for dyspnea on exertion  Negative for chest pain, irregular heartbeat, leg swelling, near-syncope, orthopnea, palpitations, paroxysmal nocturnal dyspnea and syncope  Respiratory: Negative for shortness of breath, sleep disturbances due to breathing and snoring  Hematologic/Lymphatic: Does not bruise/bleed easily  Skin: Negative for rash  Musculoskeletal: Negative for myalgias  Gastrointestinal: Negative for nausea and vomiting  Neurological: Negative for excessive daytime sleepiness and light-headedness  Psychiatric/Behavioral: The patient is not nervous/anxious  Vitals: /68 (BP Location: Right arm, Patient Position: Sitting, Cuff Size: Large)   Resp 16   Ht 5' 10\" (1 778 m)   Wt 128 kg (283 lb)   SpO2 94%   BMI 40 61 kg/m²       Physical Exam:     GEN: Alert and oriented x 3, in no acute distress  Well appearing and well nourished  HEENT: Sclera anicteric, conjunctivae pink, mucous membranes moist  Oropharynx clear  NECK: Supple, no carotid bruits, no significant JVD  Trachea midline, no thyromegaly  HEART: Regular rhythm, normal S1 and S2, no murmurs, clicks, gallops or rubs  PMI nondisplaced, no thrills  LUNGS: Clear to auscultation bilaterally; no wheezes, rales, or rhonchi  No increased work of breathing or signs of respiratory distress  ABDOMEN: Soft, nontender, nondistended, normoactive bowel sounds  EXTREMITIES: Skin warm and well perfused, 1+ bilateral lower extremity edema  NEURO: No focal findings  Normal speech   Mood and affect normal    SKIN: Chronic venous stasis dermatitis            " Fall

## 2023-07-03 ENCOUNTER — TELEPHONE (OUTPATIENT)
Dept: NEUROLOGY | Facility: CLINIC | Age: 73
End: 2023-07-03

## 2023-07-05 ENCOUNTER — OFFICE VISIT (OUTPATIENT)
Dept: NEUROLOGY | Facility: CLINIC | Age: 73
End: 2023-07-05
Payer: COMMERCIAL

## 2023-07-05 VITALS
HEART RATE: 64 BPM | DIASTOLIC BLOOD PRESSURE: 60 MMHG | BODY MASS INDEX: 39.65 KG/M2 | HEIGHT: 70 IN | WEIGHT: 277 LBS | SYSTOLIC BLOOD PRESSURE: 140 MMHG

## 2023-07-05 DIAGNOSIS — G20 PARKINSON'S DISEASE (HCC): Primary | ICD-10-CM

## 2023-07-05 DIAGNOSIS — R25.1 TREMOR: ICD-10-CM

## 2023-07-05 DIAGNOSIS — E11.44 DIABETIC AMYOTROPHY ASSOCIATED WITH TYPE 2 DIABETES MELLITUS (HCC): ICD-10-CM

## 2023-07-05 DIAGNOSIS — R41.3 MEMORY DIFFICULTY: ICD-10-CM

## 2023-07-05 DIAGNOSIS — E13.42 POLYNEUROPATHY DUE TO SECONDARY DIABETES (HCC): ICD-10-CM

## 2023-07-05 PROCEDURE — 99214 OFFICE O/P EST MOD 30 MIN: CPT | Performed by: PSYCHIATRY & NEUROLOGY

## 2023-07-05 RX ORDER — DULOXETIN HYDROCHLORIDE 60 MG/1
CAPSULE, DELAYED RELEASE ORAL
Qty: 90 CAPSULE | Refills: 3 | Status: SHIPPED | OUTPATIENT
Start: 2023-07-05

## 2023-07-05 RX ORDER — PRIMIDONE 50 MG/1
TABLET ORAL
Qty: 30 TABLET | Refills: 5 | Status: SHIPPED | OUTPATIENT
Start: 2023-07-05

## 2023-07-05 RX ORDER — DONEPEZIL HYDROCHLORIDE 10 MG/1
TABLET, FILM COATED ORAL
Qty: 90 TABLET | Refills: 3 | Status: SHIPPED | OUTPATIENT
Start: 2023-07-05

## 2023-07-05 NOTE — PROGRESS NOTES
Bisi Key is a 68 y.o. male returns of updated history of Parkinson's disease polyneuropathy secondary to diabetes and memory difficulty    Assessment:  1. Parkinson's disease ([address])    2. Diabetic amyotrophy associated with type 2 diabetes mellitus ([address])    3. Polyne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 ([address])    4. Memory difficulty    5. Tremor        Plan:  Continue Aricept and Namenda  Continue Trileptal gabapentin  Increase Cymbalta to 60 mg  Trial of primidone  Follow-up 6 months    Discussion:  Emilio Hull reports improvement in his neuropathic pain with the addition of Cymbalta. He does still have some neuropathic pain will increase the dose to 60 mg. He is currently stable cognitively on Aricept and Namenda. He states he tried Sinemet and did not find it helpful for the tremor and what he is dealing with now is more of an action tremor. We will try low-dose primidone. I will see him back in follow-up in 6 months      Subjective:    HPI  Emilio Hull returns in follow-up today. He reports from the standpoint of his neuropathy he did find the addition of Cymbalta helpful but does still have some burning neuropathic pain symptoms. He is only taking 30 mg. He remains on Trileptal and gabapentin. He states cognitively he feels things are fairly stable and scored a few points lower on MoCA testing that he did 2-1/2 years ago. He will remain on Aricept and Namenda. He states he did try Sinemet but did not find it helpful for the tremor so he has discontinued it. He states he has more of an action type tremor that interferes with his activities such as holding a glass or handwriting.   He is working with cardiology to reduce weight      Past Medical History:   Diagnosis Date   • Arthritis    • Hyperlipidemia    • Hypertension    • Poor circulation    • Sleep apnea    • Type 2 diabetes mellitus (HCC)        Family History:  Family History   Problem Relation Age of Onset   • Diabetes type II Mother    • Hypertension Mother    • Breast cancer Sister    • Lung cancer Sister    • Multiple endocrine neoplasia Brother    • Breast cancer Sister        Past Surgical History:  Past Surgical History:   Procedure Laterality Date   • BACK SURGERY     • CORONARY ARTERY BYPASS GRAFT  07/22/2017   • HEMORROIDECTOMY     • TONSILLECTOMY     • WRIST SURGERY         Social History:   reports that he has never smoked. He has been exposed to tobacco smoke. He has never used smokeless tobacco. He reports current alcohol use. He reports that he does not use drugs. Allergies:  Penicillins      Current Outpatient Medications:   •  amLODIPine (NORVASC) 10 mg tablet, Take 10 mg by mouth daily, Disp: , Rfl:   •  aspirin 81 MG tablet, Take 1 tablet by mouth daily, Disp: , Rfl:   •  Blood Glucose Monitoring Suppl (ACURA BLOOD GLUCOSE METER) w/Device KIT, by Other route.  Use as instructed, Disp: , Rfl:   •  carvedilol (COREG) 25 mg tablet, Take 1 tablet (25 mg total) by mouth 2 (two) times a day with meals, Disp: 180 tablet, Rfl: 1  •  donepezil (ARICEPT) 10 mg tablet, 1 p.o. nightly, Disp: 90 tablet, Rfl: 3  •  doxazosin (CARDURA) 4 mg tablet, Take 4 mg by mouth daily at bedtime, Disp: , Rfl:   •  DULoxetine (CYMBALTA) 60 mg delayed release capsule, 1 p.o. daily, Disp: 90 capsule, Rfl: 3  •  gabapentin (NEURONTIN) 600 MG tablet, Take 1 tablet (600 mg total) by mouth 4 (four) times a day, Disp: 360 tablet, Rfl: 3  •  glipiZIDE (GLUCOTROL) 10 mg tablet, Take 10 mg by mouth 2 (two) times a day, Disp: , Rfl:   •  hydrALAZINE (APRESOLINE) 25 mg tablet, Take 1 tablet (25 mg total) by mouth 3 (three) times a day, Disp: 270 tablet, Rfl: 3  •  hydrochlorothiazide (HYDRODIURIL) 25 mg tablet, Take 1 tablet (25 mg total) by mouth daily, Disp: 60 tablet, Rfl: 3  •  insulin glargine (LANTUS SOLOSTAR) 100 units/mL injection pen, Inject 40 Units under the skin daily at bedtime , Disp: , Rfl:   •  irbesartan (AVAPRO) 300 mg tablet, Take 1 tablet (300 mg total) by mouth daily, Disp: 90 tablet, Rfl: 3  •  isosorbide mononitrate (IMDUR) 30 mg 24 hr tablet, Take 1 tablet (30 mg total) by mouth daily, Disp: 90 tablet, Rfl: 3  •  memantine (NAMENDA) 10 mg tablet, Take 1 tablet (10 mg total) by mouth 2 (two) times a day, Disp: 60 tablet, Rfl: 5  •  OXcarbazepine (TRILEPTAL) 600 mg tablet, TAKE 1 TABLET DAILY AT BEDTIME, Disp: 90 tablet, Rfl: 3  •  primidone (MYSOLINE) 50 mg tablet, 1/2 p.o. Q.h.s. x1 week, may increase to 1 p.o. Q.h.s. If needed, Disp: 30 tablet, Rfl: 5  •  torsemide (DEMADEX) 20 mg tablet, Take 1 tablet (20 mg total) by mouth 2 (two) times a day Three times weekly, Disp: 180 tablet, Rfl: 3  •  TRUEPLUS PEN NEEDLES 32G X 4 MM MISC, , Disp: , Rfl:   •  carbidopa-levodopa (SINEMET)  mg per tablet, Take 1 tablet by mouth 3 (three) times a day (Patient not taking: Reported on 6/15/2023), Disp: 270 tablet, Rfl: 3  •  hydroxychloroquine (PLAQUENIL) 200 mg tablet, TAKE 2 TABLETS DAILY, Disp: 180 tablet, Rfl: 1    I have reviewed the past medical, social and family history, current medications, allergies, vitals, review of systems and updated this information as appropriate today     Objective:    Vitals:  Blood pressure 140/60, pulse 64, height 5' 10" (1.778 m), weight 126 kg (277 lb). Physical Exam    Neurological Exam  GENERAL: Well-developed well-nourished man in no acute distress  HEENT/NECK: Head is atraumatic normocephalic, neck is supple  NEUROLOGIC:  Mental Status: Awake and alert without aphasia. He scored a 20+1/30 on MoCA  Cranial Nerves: Extraocular movements are full. Face is symmetrical  Coordination: 10 Hz tremors noted with posture maintenance and intention. Gait is stable with a straight cane      ROS:    Review of Systems   Constitutional: Negative for appetite change, fatigue and fever. HENT: Negative. Negative for hearing loss, tinnitus, trouble swallowing and voice change. Eyes: Negative.   Negative for photophobia, pain and visual disturbance. Respiratory: Negative. Negative for shortness of breath. Cardiovascular: Negative. Negative for palpitations. Gastrointestinal: Negative. Negative for nausea and vomiting. Endocrine: Negative. Negative for cold intolerance. Genitourinary: Negative. Negative for dysuria, frequency and urgency. Musculoskeletal: Negative for back pain, gait problem, myalgias and neck pain. Skin: Negative. Negative for rash. Allergic/Immunologic: Negative. Neurological: Positive for tremors, weakness and numbness. Negative for dizziness, seizures, syncope, facial asymmetry, speech difficulty, light-headedness and headaches. Hematological: Negative. Does not bruise/bleed easily. Psychiatric/Behavioral: Negative. Negative for confusion, hallucinations and sleep disturbance.

## 2023-07-11 ENCOUNTER — LAB (OUTPATIENT)
Dept: LAB | Facility: CLINIC | Age: 73
End: 2023-07-11
Payer: COMMERCIAL

## 2023-07-11 DIAGNOSIS — D50.9 IRON DEFICIENCY ANEMIA, UNSPECIFIED IRON DEFICIENCY ANEMIA TYPE: ICD-10-CM

## 2023-07-11 DIAGNOSIS — E03.9 PRIMARY HYPOTHYROIDISM: ICD-10-CM

## 2023-07-11 DIAGNOSIS — I50.33 ACUTE ON CHRONIC HEART FAILURE WITH PRESERVED EJECTION FRACTION (HFPEF) (HCC): ICD-10-CM

## 2023-07-11 DIAGNOSIS — E11.9 DIABETES MELLITUS WITHOUT COMPLICATION (HCC): ICD-10-CM

## 2023-07-11 LAB
ANION GAP SERPL CALCULATED.3IONS-SCNC: 6 MMOL/L
BUN SERPL-MCNC: 16 MG/DL (ref 5–25)
CALCIUM SERPL-MCNC: 9.6 MG/DL (ref 8.3–10.1)
CHLORIDE SERPL-SCNC: 106 MMOL/L (ref 96–108)
CO2 SERPL-SCNC: 28 MMOL/L (ref 21–32)
CREAT SERPL-MCNC: 0.98 MG/DL (ref 0.6–1.3)
GFR SERPL CREATININE-BSD FRML MDRD: 76 ML/MIN/1.73SQ M
GLUCOSE P FAST SERPL-MCNC: 146 MG/DL (ref 65–99)
IRON SATN MFR SERPL: 30 % (ref 20–50)
IRON SERPL-MCNC: 105 UG/DL (ref 65–175)
IRON SERPL-MCNC: 106 UG/DL (ref 65–175)
POTASSIUM SERPL-SCNC: 4.1 MMOL/L (ref 3.5–5.3)
SODIUM SERPL-SCNC: 140 MMOL/L (ref 135–147)
TIBC SERPL-MCNC: 348 UG/DL (ref 250–450)
TSH SERPL DL<=0.05 MIU/L-ACNC: 4.16 UIU/ML (ref 0.45–4.5)

## 2023-07-11 PROCEDURE — 80048 BASIC METABOLIC PNL TOTAL CA: CPT

## 2023-07-11 PROCEDURE — 83540 ASSAY OF IRON: CPT

## 2023-07-11 PROCEDURE — 36415 COLL VENOUS BLD VENIPUNCTURE: CPT

## 2023-07-11 PROCEDURE — 84443 ASSAY THYROID STIM HORMONE: CPT

## 2023-07-11 PROCEDURE — 83036 HEMOGLOBIN GLYCOSYLATED A1C: CPT

## 2023-07-11 PROCEDURE — 83550 IRON BINDING TEST: CPT

## 2023-07-12 LAB
EST. AVERAGE GLUCOSE BLD GHB EST-MCNC: 157 MG/DL
HBA1C MFR BLD: 7.1 %

## 2023-07-14 ENCOUNTER — TELEPHONE (OUTPATIENT)
Dept: CARDIOLOGY CLINIC | Facility: CLINIC | Age: 73
End: 2023-07-14

## 2023-07-14 NOTE — TELEPHONE ENCOUNTER
----- Message from Edgar Figueroa MD sent at 7/13/2023  4:26 PM EDT -----  Please let him know that his blood work is stable.

## 2023-07-20 ENCOUNTER — TRANSCRIBE ORDERS (OUTPATIENT)
Dept: SURGERY | Facility: CLINIC | Age: 73
End: 2023-07-20

## 2023-07-20 DIAGNOSIS — L72.9 SUBCUTANEOUS CYST: Primary | ICD-10-CM

## 2023-07-20 DIAGNOSIS — R41.3 MEMORY DIFFICULTY: ICD-10-CM

## 2023-07-20 RX ORDER — MEMANTINE HYDROCHLORIDE 10 MG/1
10 TABLET ORAL 2 TIMES DAILY
Qty: 60 TABLET | Refills: 5 | Status: SHIPPED | OUTPATIENT
Start: 2023-07-20 | End: 2023-07-25

## 2023-07-25 DIAGNOSIS — R41.3 MEMORY DIFFICULTY: ICD-10-CM

## 2023-07-25 RX ORDER — MEMANTINE HYDROCHLORIDE 10 MG/1
10 TABLET ORAL 2 TIMES DAILY
Qty: 180 TABLET | Refills: 3 | Status: SHIPPED | OUTPATIENT
Start: 2023-07-25

## 2023-08-09 ENCOUNTER — TELEPHONE (OUTPATIENT)
Dept: SURGERY | Facility: CLINIC | Age: 73
End: 2023-08-09

## 2023-09-04 DIAGNOSIS — I50.9 CONGESTIVE HEART FAILURE, UNSPECIFIED HF CHRONICITY, UNSPECIFIED HEART FAILURE TYPE (HCC): ICD-10-CM

## 2023-09-04 DIAGNOSIS — I10 PRIMARY HYPERTENSION: ICD-10-CM

## 2023-09-05 RX ORDER — HYDROCHLOROTHIAZIDE 25 MG/1
25 TABLET ORAL DAILY
Qty: 60 TABLET | Refills: 5 | Status: SHIPPED | OUTPATIENT
Start: 2023-09-05

## 2023-09-05 RX ORDER — IRBESARTAN 300 MG/1
300 TABLET ORAL DAILY
Qty: 90 TABLET | Refills: 3 | Status: SHIPPED | OUTPATIENT
Start: 2023-09-05 | End: 2023-09-08 | Stop reason: SDUPTHER

## 2023-09-08 ENCOUNTER — TELEPHONE (OUTPATIENT)
Dept: CARDIOLOGY CLINIC | Facility: CLINIC | Age: 73
End: 2023-09-08

## 2023-09-08 DIAGNOSIS — I50.9 CONGESTIVE HEART FAILURE, UNSPECIFIED HF CHRONICITY, UNSPECIFIED HEART FAILURE TYPE (HCC): ICD-10-CM

## 2023-09-08 RX ORDER — IRBESARTAN 300 MG/1
300 TABLET ORAL DAILY
Qty: 30 TABLET | Refills: 0 | Status: SHIPPED | OUTPATIENT
Start: 2023-09-08

## 2023-09-08 NOTE — TELEPHONE ENCOUNTER
Patient states irbesartan (AVAPRO) 300 mg tablet was sent to [facility] and they is a delay so he is out of medication. Can we send a temporary script to [facility] in [place]?  Please advise [phone]

## 2023-09-18 ENCOUNTER — TELEPHONE (OUTPATIENT)
Dept: SURGERY | Facility: CLINIC | Age: 73
End: 2023-09-18

## 2023-09-18 NOTE — TELEPHONE ENCOUNTER
Called pt and asked if he would like to reschedule his appt he canceled on 08/11/23 for a cyst on his scalp. Patient states the cyst doesn't bother him at all and refused to make an appt.

## 2023-09-18 NOTE — TELEPHONE ENCOUNTER
----- Message from Shelbie Prasad sent at 8/9/2023  3:22 PM EDT -----  Regarding: Cyst on left posterior scalp measuring about 2 cm  Call pt in 1 mo to see if he wants to revisit the issue of the cyst located on his scalp. Appt: 08/11/23 - appt was canceled.

## 2023-09-22 ENCOUNTER — OFFICE VISIT (OUTPATIENT)
Dept: CARDIOLOGY CLINIC | Facility: CLINIC | Age: 73
End: 2023-09-22
Payer: COMMERCIAL

## 2023-09-22 VITALS
DIASTOLIC BLOOD PRESSURE: 80 MMHG | BODY MASS INDEX: 40.23 KG/M2 | HEART RATE: 62 BPM | RESPIRATION RATE: 16 BRPM | WEIGHT: 281 LBS | OXYGEN SATURATION: 95 % | HEIGHT: 70 IN | SYSTOLIC BLOOD PRESSURE: 140 MMHG

## 2023-09-22 DIAGNOSIS — I10 PRIMARY HYPERTENSION: ICD-10-CM

## 2023-09-22 DIAGNOSIS — E78.00 PURE HYPERCHOLESTEROLEMIA: ICD-10-CM

## 2023-09-22 DIAGNOSIS — I50.32 CHRONIC DIASTOLIC (CONGESTIVE) HEART FAILURE (HCC): Primary | ICD-10-CM

## 2023-09-22 DIAGNOSIS — I25.10 CORONARY ARTERY DISEASE INVOLVING NATIVE HEART WITHOUT ANGINA PECTORIS, UNSPECIFIED VESSEL OR LESION TYPE: ICD-10-CM

## 2023-09-22 PROCEDURE — 99214 OFFICE O/P EST MOD 30 MIN: CPT | Performed by: PHYSICIAN ASSISTANT

## 2023-09-22 NOTE — PATIENT INSTRUCTIONS
Continue on medications. Continue with daily weights. Salt restriction. Patient advised to report 3 pound weight gain in 24 hours or 5 pound weight gain in 3 days. Continue other medications. Report any symptoms. Continue with cutting back on alcohol use, continue with exercise. With PCP. Continue all medications. Previous studies reviewed with patient, medications reviewed and possible side effects discussed. Continue risk factor modification. Optimize weight, regular exercise and follow up with appropriate specialists and primary care physician as discussed. All questions answered. Patient advised to report any problems prompting to medical attention.  Return for follow up visit in 6 months or earlier if needed

## 2023-09-22 NOTE — PROGRESS NOTES
[facility] [place] [id number] [facility]  [address] [address]  Cardiology Follow Up    Jessica Bradford  1950  [id number]    1. Chronic diastolic (congestive) heart failure ([address])        2. Coronary artery disease involving native heart without angina pectoris, unspecified vessel or lesion type        3. Primary hypertension        4. Pure hypercholesterolemia            Discussion/Summary:  1. Chronic diastolic heart failure, appears pretty euvolemic at this time. Continue with current dose of torsemide. Continue with daily weights. Salt restriction. Advised to get on the scale every day, advised to add an extra dose of torsemide if 3 pounds in 24 hours or 5 pound in 3 days. 2.  CAD with history of PCI and CABG. Currently stable without anginal symptoms. Stress test May 2022. Continue amlodipine, aspirin, Coreg, hydralazine, HCTZ, irbesartan, Imdur, torsemide    3. Hypertension, stable. She had some trouble getting his irbesartan but is now back on track. Continue irbesartan, HCTZ, hydralazine, Imdur, Coreg, Norvasc, torsemide    4. Hyperlipidemia, last lipid profile within normal limits, currently off of statins. Continue on medications. Continue with daily weights. Salt restriction. Patient advised to report 3 pound weight gain in 24 hours or 5 pound weight gain in 3 days. Continue other medications. Report any symptoms. Continue with cutting back on alcohol use, continue with exercise. With PCP. Continue all medications. Previous studies reviewed with patient, medications reviewed and possible side effects discussed. Continue risk factor modification. Optimize weight, regular exercise and follow up with appropriate specialists and primary care physician as discussed. All questions answered. Patient advised to report any problems prompting to medical attention.  Return for follow up visit in 6 months or earlier if needed    Chief Complaint   Patient presents with   • Follow-up       Interval History: Presents to the office today for routine follow-up visit with history of CAD, PCI, CABG, chronic diastolic heart failure, hypertension, hyperlipidemia, diabetes. He is overall been feeling well. Been taking all medications without side effects. Patient states he gets on the scale every day. Patient states he watches his salt intake. Patient notes he has been trying to be more active lately including walking to the mailbox, weed whacking etc.  Also notes he has decreased his alcohol use. Last echo May 2022--EF 55%, G2 DD, moderate pulmonary hypertension  Last stress test May 2022--no evidence of stress-induced myocardial ischemia fixed defect noted.      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[facility])   • Ulnar neuropathy of left upper extremity   • Tremor   • Lumbar radiculopathy   • Inflammatory arthritis   • Acute kidney injury superimposed on chronic kidney disease ([facility])   • Diabetes ([facility])   • Cystitis   • Abnormal CT of the abdomen   • Abdominal pain   • Coronary artery disease involving native coronary artery   • Hypertension   • CHF (congestive heart failure) ([facility])     Past Medical History:   Diagnosis Date   • Arthritis    • Hyperlipidemia    • Hypertension    • Poor circulation    • Sleep apnea    • Type 2 diabetes mellitus ([address])      Social History     Socioeconomic History   • Marital status: Single     Spouse name: Not on file   • Number of children: Not on file   • Years of education: Not on file   • Highest education level: Not on file   Occupational History   • Not on file   Tobacco Use   • Smoking status: Never     Passive exposure: Past   • Smokeless tobacco: Never   Vaping Use   • Vaping Use: Never used   Substance and Sexual Activity   • Alcohol use: Yes     Comment: 2 drinks daily   • Drug use: No   • Sexual activity: Yes     Partners: Female   Other Topics Concern • Not on file   Social History Narrative   • Not on file     Social Determinants of Health     Financial Resource Strain: Not on file   Food Insecurity: Not on file   Transportation Needs: Not on file   Physical Activity: Not on file   Stress: Not on file   Social Connections: Not on file   Intimate Partner Violence: Not on file   Housing Stability: Not on file      Family History   Problem Relation Age of Onset   • Diabetes type II Mother    • Hypertension Mother    • Breast cancer Sister    • Lung cancer Sister    • Multiple endocrine neoplasia Brother    • Breast cancer Sister      Past Surgical History:   Procedure Laterality Date   • BACK SURGERY     • CORONARY ARTERY BYPASS GRAFT  07/22/2017   • HEMORROIDECTOMY     • TONSILLECTOMY     • WRIST SURGERY         Current Outpatient Medications:   •  amLODIPine (NORVASC) 10 mg tablet, Take 10 mg by mouth daily, Disp: , Rfl:   •  aspirin 81 MG tablet, Take 1 tablet by mouth daily, Disp: , Rfl:   •  Blood Glucose Monitoring Suppl (ACURA BLOOD GLUCOSE METER) w/Device KIT, by Other route.  Use as instructed, Disp: , Rfl:   •  carbidopa-levodopa (SINEMET)  mg per tablet, Take 1 tablet by mouth 3 (three) times a day, Disp: 270 tablet, Rfl: 3  •  carvedilol (COREG) 25 mg tablet, Take 1 tablet (25 mg total) by mouth 2 (two) times a day with meals, Disp: 180 tablet, Rfl: 1  •  donepezil (ARICEPT) 10 mg tablet, 1 p.o. nightly, Disp: 90 tablet, Rfl: 3  •  doxazosin (CARDURA) 4 mg tablet, Take 4 mg by mouth daily at bedtime, Disp: , Rfl:   •  DULoxetine (CYMBALTA) 60 mg delayed release capsule, 1 p.o. daily, Disp: 90 capsule, Rfl: 3  •  gabapentin (NEURONTIN) 600 MG tablet, Take 1 tablet (600 mg total) by mouth 4 (four) times a day, Disp: 360 tablet, Rfl: 3  •  glipiZIDE (GLUCOTROL) 10 mg tablet, Take 10 mg by mouth 2 (two) times a day, Disp: , Rfl:   •  hydrALAZINE (APRESOLINE) 25 mg tablet, Take 1 tablet (25 mg total) by mouth 3 (three) times a day, Disp: 270 tablet, Rfl: 3  •  hydrochlorothiazide (HYDRODIURIL) 25 mg tablet, TAKE 1 TABLET DAILY, Disp: 60 tablet, Rfl: 5  •  hydroxychloroquine (PLAQUENIL) 200 mg tablet, TAKE 2 TABLETS DAILY, Disp: 180 tablet, Rfl: 1  •  insulin glargine (LANTUS SOLOSTAR) 100 units/mL injection pen, Inject 40 Units under the skin daily at bedtime , Disp: , Rfl:   •  irbesartan (AVAPRO) 300 mg tablet, Take 1 tablet (300 mg total) by mouth daily, Disp: 30 tablet, Rfl: 0  •  isosorbide mononitrate (IMDUR) 30 mg 24 hr tablet, Take 1 tablet (30 mg total) by mouth daily, Disp: 90 tablet, Rfl: 3  •  memantine (NAMENDA) 10 mg tablet, take 1 tablet by mouth twice a day, Disp: 180 tablet, Rfl: 3  •  OXcarbazepine (TRILEPTAL) 600 mg tablet, TAKE 1 TABLET DAILY AT BEDTIME, Disp: 90 tablet, Rfl: 3  •  primidone (MYSOLINE) 50 mg tablet, 1/2 p.o. Q.h.s. x1 week, may increase to 1 p.o. Q.h.s. If needed, Disp: 30 tablet, Rfl: 5  •  torsemide (DEMADEX) 20 mg tablet, Take 1 tablet (20 mg total) by mouth 2 (two) times a day Three times weekly, Disp: 180 tablet, Rfl: 3  •  TRUEPLUS PEN NEEDLES 32G X 4 MM MISC, , Disp: , Rfl:   Allergies   Allergen Reactions   • Penicillins      Other reaction(s): Other, Unknown         Imaging: No results found. Review of Systems:  Review of Systems   Constitutional: Negative. Respiratory: Negative. Cardiovascular: Negative. Neurological: Negative. Hematological: Negative. Psychiatric/Behavioral: Negative. All other systems reviewed and are negative. /80 (BP Location: Left arm, Patient Position: Sitting, Cuff Size: Standard)   Pulse 62   Resp 16   Ht 5' 10" (1.778 m)   Wt 127 kg (281 lb)   SpO2 95%   BMI 40.32 kg/m²     Physical Exam:  Physical Exam  Vitals and nursing note reviewed. Constitutional:       Appearance: Normal appearance. HENT:      Head: Normocephalic and atraumatic. Cardiovascular:      Rate and Rhythm: Normal rate and regular rhythm. Pulses: Normal pulses.       Heart sounds: Normal heart sounds. Pulmonary:      Effort: Pulmonary effort is normal.      Breath sounds: Normal breath sounds. Musculoskeletal:         General: Normal range of motion. Cervical back: Normal range of motion and neck supple. Comments: +trace edema  Chronic venous skin changes notes   Skin:     General: Skin is warm and dry. Neurological:      General: No focal deficit present. Mental Status: He is alert and oriented to person, place, and time. Psychiatric:         Mood and Affect: Mood normal.         Behavior: Behavior normal.         Thought Content:  Thought content normal.         Judgment: Judgment normal.

## 2023-10-09 ENCOUNTER — TELEPHONE (OUTPATIENT)
Dept: NEUROLOGY | Facility: CLINIC | Age: 73
End: 2023-10-09

## 2023-10-09 NOTE — TELEPHONE ENCOUNTER
Received VM transcription from 10/6/23, 1:42 PM:    Good afternoon, Alex Baptiste calling Alex Baptiste 4/22/1955. I've called this is my 2nd time maybe my 3rd time, not sure. I need a phone call back for Dr. Kerr, who's going to ask him for a script for a physical therapy at the [facility], in the pool for my balance and stabilization and walking in the pool, cetera. I expect call back please [phone]. Thank you.  ----------------------------------------    Will call pt back when provider responds.     Dr. Kerr - If agreeable, please place order for PT in the pool for balance and stabilization.

## 2023-10-10 DIAGNOSIS — E11.42 DIABETIC POLYNEUROPATHY ASSOCIATED WITH TYPE 2 DIABETES MELLITUS (HCC): Primary | ICD-10-CM

## 2023-10-10 NOTE — TELEPHONE ENCOUNTER
October 10, 2023  Shaka Kerr MD  to Me        10/10/23 10:34 AM  Order has been placed, please notify patient.  Thank you

## 2023-10-22 NOTE — PROGRESS NOTES
PT Evaluation     Today's date: 10/24/2023  Patient name: Valentino Junker  : 1950  MRN: [id number]  Referring provider: Luz Elena Spence MD  Dx:   Encounter Diagnosis     ICD-10-CM    1. Diabetic polyneuropathy associated with type 2 diabetes mellitus (HCC)  E11.42       2. Debility  R53.81           Start Time: 1000  Stop Time: 1100  Total time in clinic (min): 60 minutes    Assessment  Assessment details: Valentino Junker is a 68 y.o. male who presents with  back pain, decreased core and lower extremity strength, decreased balance, decreased sensation and ambulatory dysfunction. Due to these impairments, Patient has difficulty performing a/iadls. Patient's clinical presentation is consistent with their referring diagnoses. Patient would benefit from a trail of aquatic skilled physical therapy to address their aforementioned impairments, improve their level of function and to improve their overall quality of life. Impairments: abnormal gait, abnormal or restricted ROM, abnormal movement, activity intolerance, impaired balance, impaired physical strength, lacks appropriate home exercise program, pain with function, safety issue, weight-bearing intolerance, poor posture  and poor body mechanics  Understanding of Dx/Px/POC: good   Prognosis: fair    Goals  ST-3 WEEKS  1. Decrease pain by 2 points on VAS at its worst.  2.  Increase safety awareness with decreased fall risk  3. Increase LE  and core strength by 1/2 MMT grade in all deficient planes. LT-6 WEEKS  1. Patient to be independent with a/iadls. 2. Increase functional activities for leisure and home activities to previous LOF.   3. Independent with HEP and/or fitness program.  4. Walk for up to 10 minutes without fatigue    Plan  Patient would benefit from: skilled physical therapy  Planned modality interventions: cryotherapy  Planned therapy interventions: activity modification, body mechanics training, aquatic therapy, functional ROM exercises, home exercise program, manual therapy, neuromuscular re-education, patient education, postural training, therapeutic activities and therapeutic exercise  Frequency: 2x week  Duration in weeks: 5  Plan of Care beginning date: 10/24/2023  Plan of Care expiration date: 12/31/2023  Treatment plan discussed with: patient      Subjective Evaluation    History of Present Illness  Mechanism of injury: Patient is seen in PT by his request to his MD for aquatic PT. Has attended aquatic PT in the past with good tolerance. Notes endurance is poor for functional activities. Notes limited walking  tolerance due to back pain and SOB. Walking is primary concern. Uses SPC  in community. Numbness from knees to bottom of feet. Progressive decline  in status over the past three years with weight gain and decreased confidence in functional mobility for walking and functional tasks. Patient Goals  Patient goals for therapy: decreased pain  Patient goal: stronger in back; walk better; improved balance  Pain  Location: chronic LBP daily  Quality: dull ache, pressure and tight  Relieving factors: rest  Aggravating factors: standing and walking    Social Support  Steps to enter house: yes  Stairs in house: no   Lives with: spouse    Employment status: not working      Objective     Observations   Left Ankle/Foot   Positive for edema and trophic changes. Right Ankle/Foot   Positive for edema and trophic changes.      Additional Observation Details  Distal LE edema left worse than right  Discoloration bilateral LE knee to feet    Strength/Myotome Testing     Left Shoulder     Planes of Motion   Flexion: WFL   Abduction: WFL     Right Shoulder     Planes of Motion   Flexion: WFL   Abduction: WFL     Left Elbow   Flexion: WFL  Extension: WFL    Right Elbow   Flexion: WFL  Extension: WFL    Left Hip   Planes of Motion   Flexion: 4-  Abduction: 4-    Right Hip   Planes of Motion   Flexion: 4  Abduction: 4-    Left Knee Flexion: 4-  Extension: 4-    Right Knee   Flexion: 4  Extension: 4    Left Ankle/Foot   Dorsiflexion: 3+    Right Ankle/Foot   Dorsiflexion: 4    Ambulation   Weight-Bearing Status   Assistive device used: cane    Ambulation: Stairs   Able to perform: yes  Ascending: step-to  Ascend stairs: independent  Railings: 1  Descending: step-to  Descend stairs: independent  Railings: 1  Neuro Exam:     Sensation   Light touch LE: left impaired and right impaired  Proprioception LE: left impaired and right impaired    Coordination   Heel to shin: left dysmetric and right dysmetric  Rapid alternating movements: UE impaired    Transfers   Sit to stand: independent   Into the car: independent (per patient report)   Out of the car: independent     Functional outcomes   TUG: 15.83 (seconds)  Functional outcome gait comment: SPC in community  Decreased step length   Decreased gait speed  Flat foot             Precautions: HTN, DM, cardiac, back pain/surgery hx, fall risk, PD      Daily Treatment Diary     Date 10/24          Patient education 10          Water Walk (FW, BW, side) x10’  5'          LE work at wall               Hip FF/ext swing              Toe/heel              Hip ABD/ADD              March             Knee flexion & extension             Squats           UE noodle x3             Push/pull              Rotate              Row forward & back              OH side bend            UE/Core (AROM, paddles, MB)              ABD/ADD              Horizontal Pec Fly              Alt. arm swing (shld flex/ext)              Push pull              Single paddle rotation           SLS (EO, EC, ball toss)            Aqua Step (FW, lat, eccentric)            Core work:              MF press (red, blue, blk)             Kickboard press (blue, red)              Row/ext w/ tubing (red, blue, blk)           Seated on bench             ankle DF/PF              March              ABD/ADD              Knee flexion/extension DW TX - hang in the deep water  10'            DW ABD/ADD              DW Bicycle  8'            DW Scissor hip flexion/extension              DW DKTC            Stretches              HS at step              Wall stretches              Calf stretch at wall              Other:            Hot Tub - 10 minutes only  nt

## 2023-10-24 ENCOUNTER — EVALUATION (OUTPATIENT)
Dept: PHYSICAL THERAPY | Age: 73
End: 2023-10-24
Payer: COMMERCIAL

## 2023-10-24 DIAGNOSIS — E11.42 DIABETIC POLYNEUROPATHY ASSOCIATED WITH TYPE 2 DIABETES MELLITUS (HCC): Primary | ICD-10-CM

## 2023-10-24 DIAGNOSIS — R53.81 DEBILITY: ICD-10-CM

## 2023-10-24 PROCEDURE — 97162 PT EVAL MOD COMPLEX 30 MIN: CPT | Performed by: PHYSICAL THERAPIST

## 2023-10-24 PROCEDURE — 97113 AQUATIC THERAPY/EXERCISES: CPT | Performed by: PHYSICAL THERAPIST

## 2023-10-26 ENCOUNTER — OFFICE VISIT (OUTPATIENT)
Dept: PHYSICAL THERAPY | Age: 73
End: 2023-10-26
Payer: COMMERCIAL

## 2023-10-26 DIAGNOSIS — R53.81 DEBILITY: ICD-10-CM

## 2023-10-26 DIAGNOSIS — E11.42 DIABETIC POLYNEUROPATHY ASSOCIATED WITH TYPE 2 DIABETES MELLITUS (HCC): Primary | ICD-10-CM

## 2023-10-26 PROCEDURE — 97113 AQUATIC THERAPY/EXERCISES: CPT

## 2023-10-26 NOTE — PROGRESS NOTES
Daily Note     Today's date: 10/26/2023  Patient name: Parker Saunders  : 1950  MRN: [id number]  Referring provider: Bismark Porras MD  Dx:   Encounter Diagnosis     ICD-10-CM    1. Diabetic polyneuropathy associated with type 2 diabetes mellitus (HCC)  E11.42       2. Debility  R53.81           Start Time: 1300  Stop Time: 1400  Total time in clinic (min): 60 minutes    Subjective: pt notes the pool felt good last session and he was a little sore. Objective: See treatment diary below  Pt seen 1:1 for 30 minutes and group therapy for remainder    Assessment: Tolerated treatment fair. Added to pt's program today to include standing ex's, seated ex's, noodle ex's and resistive bands. B knee stiffness w/ ex's and difficulty w/ any ankle DF/PF. Patient would benefit from continued PT but at this time he will be discharged to our aquatic fitness program due to a high copay. Pt has a good understanding of his ex's and will do well to continue on his own.  Pt has previously done our aquatic fitness program.       Plan: discharge pt to fitness program     Precautions: HTN, DM, cardiac, back pain/surgery hx, fall risk, PD      Daily Treatment Diary     Date 10/24 10/26         Patient education 10          Water Walk (FW, BW, side) x10’  5' 10         LE work at wall               Hip FF/ext swing   2           Toe/heel   x5           Hip ABD/ADD   2                      Knee flexion & extension  1           Squats  1         UE noodle x3             Push/pull   1           Rotate   1           Row forward & back   2           OH side bend            UE/Core (AROM, paddles, MB)              ABD/ADD              Horizontal Pec Fly              Alt. arm swing (shld flex/ext)              Push pull              Single paddle rotation           SLS (EO, EC, ball toss)            Aqua Step (FW, lat, eccentric)            Core work:              MF press (red, blue, blk)             Kickboard press (blue, red)              Row/ext w/ tubing (red, blue, blk)  2' be         Seated on bench             ankle DF/PF   x10           March   1           ABD/ADD              Knee flexion/extension   1         DW TX - hang in the deep water  10' 10,5           DW ABD/ADD              DW Bicycle  8' 8           DW Scissor hip flexion/extension              DW DKTC            Stretches              HS at step              Wall stretches              Calf stretch at wall              Other:            Hot Tub - 10 minutes only  nt

## 2023-11-29 DIAGNOSIS — I50.9 CONGESTIVE HEART FAILURE, UNSPECIFIED HF CHRONICITY, UNSPECIFIED HEART FAILURE TYPE (HCC): ICD-10-CM

## 2023-11-29 RX ORDER — CARVEDILOL 25 MG/1
25 TABLET ORAL 2 TIMES DAILY WITH MEALS
Qty: 180 TABLET | Refills: 3 | Status: SHIPPED | OUTPATIENT
Start: 2023-11-29

## 2023-12-06 DIAGNOSIS — I50.9 CONGESTIVE HEART FAILURE, UNSPECIFIED HF CHRONICITY, UNSPECIFIED HEART FAILURE TYPE (HCC): ICD-10-CM

## 2023-12-06 RX ORDER — IRBESARTAN 300 MG/1
300 TABLET ORAL DAILY
Qty: 30 TABLET | Refills: 0 | Status: SHIPPED | OUTPATIENT
Start: 2023-12-06

## 2023-12-06 NOTE — TELEPHONE ENCOUNTER
Patient Sofi [phone] contacting office requesting 90 day irbesartan medication refill to be sent to [facility].

## 2023-12-07 DIAGNOSIS — I10 PRIMARY HYPERTENSION: ICD-10-CM

## 2023-12-07 RX ORDER — HYDRALAZINE HYDROCHLORIDE 25 MG/1
25 TABLET, FILM COATED ORAL 3 TIMES DAILY
Qty: 270 TABLET | Refills: 3 | Status: SHIPPED | OUTPATIENT
Start: 2023-12-07

## 2023-12-07 RX ORDER — ISOSORBIDE MONONITRATE 30 MG/1
30 TABLET, EXTENDED RELEASE ORAL DAILY
Qty: 90 TABLET | Refills: 3 | Status: SHIPPED | OUTPATIENT
Start: 2023-12-07

## 2024-01-10 DIAGNOSIS — I50.9 CONGESTIVE HEART FAILURE, UNSPECIFIED HF CHRONICITY, UNSPECIFIED HEART FAILURE TYPE (HCC): ICD-10-CM

## 2024-01-10 RX ORDER — IRBESARTAN 300 MG/1
300 TABLET ORAL DAILY
Qty: 90 TABLET | Refills: 3 | Status: SHIPPED | OUTPATIENT
Start: 2024-01-10

## 2024-01-30 ENCOUNTER — APPOINTMENT (OUTPATIENT)
Dept: LAB | Facility: CLINIC | Age: 74
End: 2024-01-30
Payer: COMMERCIAL

## 2024-01-30 DIAGNOSIS — Z12.5 PROSTATE CANCER SCREENING: ICD-10-CM

## 2024-01-30 LAB — PSA SERPL-MCNC: 0.31 NG/ML (ref 0–4)

## 2024-01-30 PROCEDURE — 36415 COLL VENOUS BLD VENIPUNCTURE: CPT

## 2024-01-30 PROCEDURE — G0103 PSA SCREENING: HCPCS

## 2024-04-12 ENCOUNTER — OFFICE VISIT (OUTPATIENT)
Dept: UROLOGY | Facility: CLINIC | Age: 74
End: 2024-04-12
Payer: COMMERCIAL

## 2024-04-12 VITALS
WEIGHT: 271.2 LBS | SYSTOLIC BLOOD PRESSURE: 146 MMHG | HEART RATE: 58 BPM | DIASTOLIC BLOOD PRESSURE: 84 MMHG | TEMPERATURE: 98 F | HEIGHT: 70 IN | RESPIRATION RATE: 18 BRPM | BODY MASS INDEX: 38.82 KG/M2 | OXYGEN SATURATION: 98 %

## 2024-04-12 DIAGNOSIS — N39.41 URGE URINARY INCONTINENCE: ICD-10-CM

## 2024-04-12 DIAGNOSIS — N40.1 BENIGN LOCALIZED PROSTATIC HYPERPLASIA WITH LOWER URINARY TRACT SYMPTOMS (LUTS): Primary | ICD-10-CM

## 2024-04-12 DIAGNOSIS — N39.0 URINARY TRACT INFECTION WITHOUT HEMATURIA, SITE UNSPECIFIED: ICD-10-CM

## 2024-04-12 LAB
POST-VOID RESIDUAL VOLUME, ML POC: 26 ML
SL AMB  POCT GLUCOSE, UA: NORMAL
SL AMB LEUKOCYTE ESTERASE,UA: NORMAL
SL AMB POCT BILIRUBIN,UA: NORMAL
SL AMB POCT BLOOD,UA: NORMAL
SL AMB POCT CLARITY,UA: NORMAL
SL AMB POCT COLOR,UA: NORMAL
SL AMB POCT KETONES,UA: NORMAL
SL AMB POCT NITRITE,UA: NORMAL
SL AMB POCT PH,UA: 5
SL AMB POCT SPECIFIC GRAVITY,UA: 1.03
SL AMB POCT URINE PROTEIN: 100
SL AMB POCT UROBILINOGEN: 0.2

## 2024-04-12 PROCEDURE — 87086 URINE CULTURE/COLONY COUNT: CPT | Performed by: PHYSICIAN ASSISTANT

## 2024-04-12 PROCEDURE — 81002 URINALYSIS NONAUTO W/O SCOPE: CPT | Performed by: PHYSICIAN ASSISTANT

## 2024-04-12 PROCEDURE — 87186 SC STD MICRODIL/AGAR DIL: CPT | Performed by: PHYSICIAN ASSISTANT

## 2024-04-12 PROCEDURE — 51798 US URINE CAPACITY MEASURE: CPT | Performed by: PHYSICIAN ASSISTANT

## 2024-04-12 PROCEDURE — 87077 CULTURE AEROBIC IDENTIFY: CPT | Performed by: PHYSICIAN ASSISTANT

## 2024-04-12 PROCEDURE — 99213 OFFICE O/P EST LOW 20 MIN: CPT | Performed by: PHYSICIAN ASSISTANT

## 2024-04-12 RX ORDER — NITROFURANTOIN 25; 75 MG/1; MG/1
100 CAPSULE ORAL 2 TIMES DAILY
Qty: 10 CAPSULE | Refills: 0 | Status: SHIPPED | OUTPATIENT
Start: 2024-04-12

## 2024-04-12 RX ORDER — CICLOPIROX 80 MG/ML
SOLUTION TOPICAL
COMMUNITY
Start: 2024-01-24

## 2024-04-12 RX ORDER — TOLTERODINE TARTRATE 1 MG/1
1 TABLET, EXTENDED RELEASE ORAL 2 TIMES DAILY
Qty: 60 TABLET | Refills: 3 | Status: SHIPPED | OUTPATIENT
Start: 2024-04-12

## 2024-04-12 RX ORDER — CEPHALEXIN 500 MG/1
CAPSULE ORAL
COMMUNITY
Start: 2024-03-14 | End: 2024-04-12

## 2024-04-12 NOTE — PROGRESS NOTES
4/12/2024      Chief Complaint   Patient presents with    Follow-up         Assessment and Plan    73 y.o. male     1. BPH with LUTS  2. Urge urinary incontinence  - AUA today 13 mL  - UA today positive for leukocytes and nitrites. Will send for culture. Antibiotics sent to pharmacy. Will adjust based on final results  - PVR today 26 mL  - discussed conservative measures with adequate hydration, avoidance of bladder irritants, avoidance of constipation, tight glycemic control  - Discussed trial of another anticholinergic versus beta 3 agonist. Will trial trospium. Medication sent to pharmacy   - Discussed kegel exercises  - Discussed cysto TRUS. Patient hesitant states symptoms primarily   - Follow up in 3 months for PVR check  - Call with any questions or concerns in the meantime  - All questions answered; patient understands and agrees with plan       History of Present Illness  Alex Baptiste is a 73 y.o. male patient with history of BPH with LUTS and urgency here for follow up.     Patient has been previously managed on Cardura and finasteride dual medical therapy with minimal benefit. Patient is no longer taking finasteride for unknown reasons. Patient has main concern of urinary urgency and urge urinary incontinence. Patient was previously trialed on Detrol, however states that this medication did not work and is no longer taking this. Patient does take hydrochlorothiazide as well as torsemide daily. Patient was supposed to follow up for cystoscopy TRUS, however, was lost to follow up. PSA 0.31 in January 2024.    Review of Systems   Constitutional:  Negative for activity change, appetite change, chills and fever.   HENT:  Negative for congestion and trouble swallowing.    Respiratory:  Negative for cough and shortness of breath.    Cardiovascular:  Negative for chest pain, palpitations and leg swelling.   Gastrointestinal:  Negative for abdominal pain, constipation, diarrhea, nausea and vomiting.  "  Genitourinary:  Positive for urgency. Negative for difficulty urinating, dysuria, flank pain, frequency and hematuria.   Musculoskeletal:  Negative for back pain and gait problem.   Skin:  Negative for wound.   Allergic/Immunologic: Negative for immunocompromised state.   Neurological:  Negative for dizziness and syncope.   Hematological:  Does not bruise/bleed easily.   Psychiatric/Behavioral:  Negative for confusion.    All other systems reviewed and are negative.          AUA SYMPTOM SCORE      Flowsheet Row Most Recent Value   AUA SYMPTOM SCORE    How often have you had a sensation of not emptying your bladder completely after you finished urinating? 1 (P)    How often have you had to urinate again less than two hours after you finished urinating? 3 (P)    How often have you found you stopped and started again several times when you urinate? 0 (P)    How often have you found it difficult to postpone urination? 4 (P)    How often have you had a weak urinary stream? 1 (P)    How often have you had to push or strain to begin urination? 1 (P)    How many times did you most typically get up to urinate from the time you went to bed at night until the time you got up in the morning? 3 (P)    Quality of Life: If you were to spend the rest of your life with your urinary condition just the way it is now, how would you feel about that? 5 (P)    AUA SYMPTOM SCORE 13 (P)              Vitals  Vitals:    04/12/24 1335   BP: 146/84   BP Location: Left arm   Patient Position: Sitting   Pulse: 58   Resp: 18   Temp: 98 °F (36.7 °C)   TempSrc: Tympanic   SpO2: 98%   Weight: 123 kg (271 lb 3.2 oz)   Height: 5' 10\" (1.778 m)       Physical Exam  Constitutional:       General: He is not in acute distress.     Appearance: Normal appearance. He is not ill-appearing, toxic-appearing or diaphoretic.   HENT:      Head: Normocephalic.      Nose: No congestion.   Eyes:      General: No scleral icterus.        Right eye: No discharge.       "   Left eye: No discharge.      Conjunctiva/sclera: Conjunctivae normal.      Pupils: Pupils are equal, round, and reactive to light.   Pulmonary:      Effort: Pulmonary effort is normal.   Musculoskeletal:      Cervical back: Normal range of motion.   Skin:     General: Skin is warm and dry.      Coloration: Skin is not jaundiced or pale.      Findings: No bruising, erythema, lesion or rash.   Neurological:      General: No focal deficit present.      Mental Status: He is alert and oriented to person, place, and time. Mental status is at baseline.      Gait: Gait normal.   Psychiatric:         Mood and Affect: Mood normal.         Behavior: Behavior normal.         Thought Content: Thought content normal.         Judgment: Judgment normal.           Past History  Past Medical History:   Diagnosis Date    Arthritis     Hyperlipidemia     Hypertension     Poor circulation     Sleep apnea     Type 2 diabetes mellitus (HCC)      Social History     Socioeconomic History    Marital status: Single     Spouse name: None    Number of children: None    Years of education: None    Highest education level: None   Occupational History    None   Tobacco Use    Smoking status: Never     Passive exposure: Past    Smokeless tobacco: Never   Vaping Use    Vaping status: Never Used   Substance and Sexual Activity    Alcohol use: Yes     Comment: 2 drinks daily    Drug use: No    Sexual activity: Yes     Partners: Female   Other Topics Concern    None   Social History Narrative    None     Social Determinants of Health     Financial Resource Strain: Not on file   Food Insecurity: Not on file   Transportation Needs: Not on file   Physical Activity: Not on file   Stress: Not on file   Social Connections: Not on file   Intimate Partner Violence: Not on file   Housing Stability: Not on file     Social History     Tobacco Use   Smoking Status Never    Passive exposure: Past   Smokeless Tobacco Never     Family History   Problem Relation Age  of Onset    Diabetes type II Mother     Hypertension Mother     Breast cancer Sister     Lung cancer Sister     Multiple endocrine neoplasia Brother     Breast cancer Sister        The following portions of the patient's history were reviewed and updated as appropriate: allergies, current medications, past medical history, past social history, past surgical history and problem list.    Results  Recent Results (from the past 1 hour(s))   POCT urine dip    Collection Time: 04/12/24  1:40 PM   Result Value Ref Range    LEUKOCYTE ESTERASE,++     NITRITE,UA +     SL AMB POCT UROBILINOGEN 0.2     POCT URINE PROTEIN 100      PH,UA 5.0     BLOOD,UA -     SPECIFIC GRAVITY,UA 1.030     KETONES,UA -     BILIRUBIN,UA -     GLUCOSE, UA -      COLOR,UA dark yellow     CLARITY,UA cloudy    POCT Measure PVR    Collection Time: 04/12/24  1:52 PM   Result Value Ref Range    POST-VOID RESIDUAL VOLUME, ML POC 26 mL   ]  Lab Results   Component Value Date    PSA 0.31 01/30/2024    PSA 0.1 03/21/2022    PSA 0.3 05/05/2021     Lab Results   Component Value Date    CALCIUM 9.7 11/14/2023    K 4.1 11/14/2023    CO2 31 11/14/2023     11/14/2023    BUN 13 11/14/2023    CREATININE 0.96 11/14/2023     Lab Results   Component Value Date    WBC 3.78 (L) 01/06/2023    HGB 11.7 (L) 01/06/2023    HCT 34.3 (L) 01/06/2023     (H) 01/06/2023     (L) 01/06/2023       Diana Hernandez PA-C

## 2024-04-14 LAB — BACTERIA UR CULT: ABNORMAL

## 2024-04-15 ENCOUNTER — TELEPHONE (OUTPATIENT)
Dept: UROLOGY | Facility: CLINIC | Age: 74
End: 2024-04-15

## 2024-04-15 NOTE — TELEPHONE ENCOUNTER
Spoke with pt relaying marco MUÑOZ message,    Continue Macrobid as prescribed for UTI  Pt verbalized understanding. No further assistance   ----- Message from Diana Hernandez PA-C sent at 4/15/2024  7:55 AM EDT -----  Continue Macrobid as prescribed for UTI

## 2024-04-30 DIAGNOSIS — E13.42 POLYNEUROPATHY DUE TO SECONDARY DIABETES (HCC): ICD-10-CM

## 2024-04-30 RX ORDER — OXCARBAZEPINE 600 MG/1
TABLET, FILM COATED ORAL
Qty: 90 TABLET | Refills: 3 | Status: SHIPPED | OUTPATIENT
Start: 2024-04-30

## 2024-06-06 ENCOUNTER — TELEPHONE (OUTPATIENT)
Dept: NEUROLOGY | Facility: CLINIC | Age: 74
End: 2024-06-06

## 2024-06-06 NOTE — TELEPHONE ENCOUNTER
" 6/6/24 1:26PM:    \" Alex Baptiste, date of birth [birth year]. Call in reference to my tremor. They are getting very bad. A need to speak with Dr. Hodge or get a visual appointment please. Thank you. Bye.\"    #: [phone]  --------------------------------------------  LOV 7/5/23 with Dr. Shaka Kerr    Per LOV Plan:\"He states he tried Sinemet and did not find it helpful for the tremor and what he is dealing with now is more of an action tremor. We will try low-dose primidone. I will see him back in follow-up in 6 months\"    No F/U appts with Dr. Kerr scheduled.      "

## 2024-06-10 NOTE — TELEPHONE ENCOUNTER
6/10/24, 0916    My name is Mark Baptiste, 1950 calling in reference to my shaking has gotten worse, and need to speak with Dr. Kerr and I was supposed to see him back in February or March. I called twice and no one called me back to schedule an appointment or do a virtual. I need to find out about my medication. And I need to talk to him. This is the 2nd time I called. Can somebody return my call [phone]. Thank you    Call returned to pt. Acknowledge vm. Complaining of worsening tremors (both hands) emily when he try to  some things like picking up  a coffee cup. He tried primidone in the past but he had side effects (hallucinations). He is currently taking Sinemet - mg 1 tab tid.    Requesting f/u appt be scheduled. [facility] scheduled w/ Dr Kerr on 6/13/24 at 12:50    fyi

## 2024-06-10 NOTE — TELEPHONE ENCOUNTER
Please r/s this as Dr. Kerr does not see [facility] visits on botox days. There is an open slot for 06/12 @ 4PM if pt would like to accept.

## 2024-06-12 ENCOUNTER — OFFICE VISIT (OUTPATIENT)
Dept: NEUROLOGY | Facility: CLINIC | Age: 74
End: 2024-06-12
Payer: COMMERCIAL

## 2024-06-12 VITALS
HEIGHT: 70 IN | HEART RATE: 83 BPM | WEIGHT: 275 LBS | DIASTOLIC BLOOD PRESSURE: 80 MMHG | BODY MASS INDEX: 39.37 KG/M2 | SYSTOLIC BLOOD PRESSURE: 130 MMHG

## 2024-06-12 DIAGNOSIS — R41.3 MEMORY DIFFICULTY: ICD-10-CM

## 2024-06-12 DIAGNOSIS — E13.42 POLYNEUROPATHY DUE TO SECONDARY DIABETES (HCC): Primary | ICD-10-CM

## 2024-06-12 DIAGNOSIS — R27.8 SENSORY ATAXIA: ICD-10-CM

## 2024-06-12 DIAGNOSIS — R25.1 TREMOR: ICD-10-CM

## 2024-06-12 DIAGNOSIS — G20.A1 PARKINSON'S DISEASE: ICD-10-CM

## 2024-06-12 PROCEDURE — 99213 OFFICE O/P EST LOW 20 MIN: CPT | Performed by: PSYCHIATRY & NEUROLOGY

## 2024-06-12 RX ORDER — PRIMIDONE 50 MG/1
TABLET ORAL
Qty: 30 TABLET | Refills: 5 | Status: SHIPPED | OUTPATIENT
Start: 2024-06-12

## 2024-06-12 NOTE — PROGRESS NOTES
Alex Baptiste is a 74 y.o. male returns follow-up with history of neuropathy secondary to diabetes, memory difficulty Parkinson's disease and tremor    Assessment:  1. 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 Memory difficulty    3. Parkinson's disease    4. Tremor        Plan:  Continue Sinemet  Continue Aricept and Namenda  Continue Trileptal, Cymbalta and gabapentin  Restart primidone  Physical therapy  Follow-up 6 months    Discussion:  Alex reports that he tried primidone but had some issues with dreams so he discontinued it.  He states that since that time the tremors got a lot worse and he is open to trying it again (is already on a beta-blocker).  He is not interested in considering DBS or ultrasound ablation to control his tremor.  We discussed alternative such as lift where and ankle weights on his arms when eating or writing.  He reports his neuropathic pain symptoms are under good control with current management and has not noticed any significant decline cognitively.  Will continue present management and follow-up in 6 months.  He will try physical therapy to work on gait and balance      Subjective:    OSWALDO Johnson returns in follow-up today.  He reports since her last his tremor has gotten a lot worse.  He states he did try primidone but had issues with vivid dreams so he stopped it.  His tremors are more intention tremor.  His Parkinson symptoms are under good control with Sinemet.  He reports his neuropathic pain symptoms are under good control with his combination of medications with infrequent pain in his toes.  He did have a fall recently and had difficult time getting up and is open to trying physical therapy.  He has not noticed any decline cognitively.  He denies any new medical issues      Past Medical History:   Diagnosis Date    Arthritis     Hyperlipidemia     Hypertension     Poor circulation     Sleep apnea     Type 2 diabetes mellitus (HCC)        Family  History:  Family History   Problem Relation Age of Onset    Diabetes type II Mother     Hypertension Mother     Breast cancer Sister     Lung cancer Sister     Multiple endocrine neoplasia Brother     Breast cancer Sister        Past Surgical History:  Past Surgical History:   Procedure Laterality Date    BACK SURGERY      CORONARY ARTERY BYPASS GRAFT  07/22/2017    HEMORROIDECTOMY      TONSILLECTOMY      WRIST SURGERY         Social History:   reports that he has never smoked. He has been exposed to tobacco smoke. He has never used smokeless tobacco. He reports current alcohol use. He reports that he does not use drugs.    Allergies:  Penicillins      Current Outpatient Medications:     amLODIPine (NORVASC) 10 mg tablet, Take 10 mg by mouth daily, Disp: , Rfl:     aspirin 81 MG tablet, Take 1 tablet by mouth daily, Disp: , Rfl:     Blood Glucose Monitoring Suppl (ACURA BLOOD GLUCOSE METER) w/Device KIT, by Other route. Use as instructed, Disp: , Rfl:     carbidopa-levodopa (SINEMET)  mg per tablet, Take 1 tablet by mouth 3 (three) times a day, Disp: 270 tablet, Rfl: 3    carvedilol (COREG) 25 mg tablet, TAKE 1 TABLET TWICE A DAY WITH MEALS, Disp: 180 tablet, Rfl: 3    ciclopirox (PENLAC) 8 % solution, apply to NAILS DAILY REMOVE ON 7TH DAY WITH NAIL POLISH REMOVER, Disp: , Rfl:     donepezil (ARICEPT) 10 mg tablet, 1 p.o. nightly, Disp: 90 tablet, Rfl: 3    doxazosin (CARDURA) 4 mg tablet, Take 4 mg by mouth daily at bedtime, Disp: , Rfl:     DULoxetine (CYMBALTA) 60 mg delayed release capsule, 1 p.o. daily, Disp: 90 capsule, Rfl: 3    gabapentin (NEURONTIN) 600 MG tablet, Take 1 tablet (600 mg total) by mouth 4 (four) times a day, Disp: 360 tablet, Rfl: 3    glipiZIDE (GLUCOTROL) 10 mg tablet, Take 10 mg by mouth 2 (two) times a day, Disp: , Rfl:     hydrALAZINE (APRESOLINE) 25 mg tablet, TAKE 1 TABLET THREE TIMES A DAY, Disp: 270 tablet, Rfl: 3    hydrochlorothiazide (HYDRODIURIL) 25 mg tablet, TAKE 1  "TABLET DAILY, Disp: 60 tablet, Rfl: 5    insulin glargine (LANTUS SOLOSTAR) 100 units/mL injection pen, Inject 40 Units under the skin daily at bedtime , Disp: , Rfl:     irbesartan (AVAPRO) 300 mg tablet, TAKE 1 TABLET DAILY, Disp: 90 tablet, Rfl: 3    isosorbide mononitrate (IMDUR) 30 mg 24 hr tablet, TAKE 1 TABLET DAILY, Disp: 90 tablet, Rfl: 3    memantine (NAMENDA) 10 mg tablet, take 1 tablet by mouth twice a day, Disp: 180 tablet, Rfl: 3    nitrofurantoin (MACROBID) 100 mg capsule, Take 1 capsule (100 mg total) by mouth 2 (two) times a day, Disp: 10 capsule, Rfl: 0    OXcarbazepine (TRILEPTAL) 600 mg tablet, TAKE 1 TABLET DAILY AT BEDTIME, Disp: 90 tablet, Rfl: 3    primidone (MYSOLINE) 50 mg tablet, 1/2 p.o. Q.h.s. x1 week, may increase to 1 p.o. Q.h.s. If needed, Disp: 30 tablet, Rfl: 5    tolterodine (DETROL) 1 mg tablet, Take 1 tablet (1 mg total) by mouth 2 (two) times a day, Disp: 60 tablet, Rfl: 3    TRUEPLUS PEN NEEDLES 32G X 4 MM MISC, , Disp: , Rfl:     hydroxychloroquine (PLAQUENIL) 200 mg tablet, TAKE 2 TABLETS DAILY, Disp: 180 tablet, Rfl: 1    I have reviewed the past medical, social and family history, current medications, allergies, vitals, review of systems and updated this information as appropriate today     Objective:    Vitals:  Blood pressure 130/80, pulse 83, height 5' 10\" (1.778 m).    Physical Exam    Neurological Exam  GENERAL: Well-developed well-nourished man in no acute distress  HEENT/NECK: Head is atraumatic normocephalic, neck is supple  NEUROLOGIC:  Mental Status: Awake and alert without aphasia.  He scored a 23/30 on MoCA  Cranial Nerves: Extraocular movements are full. Face is symmetrical  Coordination: [8 to 10 Hz tremors noted with posture maintenance and intention.  Gait is stable      ROS:    Review of Systems   Constitutional:  Negative for appetite change, fatigue and fever.   HENT: Negative.  Negative for hearing loss, tinnitus, trouble swallowing and voice change.  "   Eyes: Negative.  Negative for photophobia, pain and visual disturbance.   Respiratory: Negative.  Negative for shortness of breath.    Cardiovascular: Negative.  Negative for palpitations.   Gastrointestinal: Negative.  Negative for nausea and vomiting.   Endocrine: Negative.  Negative for cold intolerance.   Genitourinary: Negative.  Negative for dysuria, frequency and urgency.   Musculoskeletal:  Negative for back pain, gait problem, myalgias, neck pain and neck stiffness.   Skin: Negative.  Negative for rash.   Allergic/Immunologic: Negative.    Neurological: Negative.  Negative for dizziness, tremors, seizures, syncope, facial asymmetry, speech difficulty, weakness, light-headedness, numbness and headaches.   Hematological: Negative.  Does not bruise/bleed easily.   Psychiatric/Behavioral: Negative.  Negative for confusion, hallucinations and sleep disturbance.

## 2024-06-20 DIAGNOSIS — E11.44 DIABETIC AMYOTROPHY ASSOCIATED WITH TYPE 2 DIABETES MELLITUS (HCC): ICD-10-CM

## 2024-06-20 RX ORDER — DULOXETIN HYDROCHLORIDE 60 MG/1
CAPSULE, DELAYED RELEASE ORAL
Qty: 90 CAPSULE | Refills: 1 | Status: SHIPPED | OUTPATIENT
Start: 2024-06-20

## 2024-06-29 DIAGNOSIS — G20.A1 PARKINSON'S DISEASE: ICD-10-CM

## 2024-06-29 DIAGNOSIS — R41.3 MEMORY DIFFICULTY: ICD-10-CM

## 2024-07-01 ENCOUNTER — TELEPHONE (OUTPATIENT)
Age: 74
End: 2024-07-01

## 2024-07-01 DIAGNOSIS — E11.42 DIABETIC POLYNEUROPATHY ASSOCIATED WITH TYPE 2 DIABETES MELLITUS (HCC): ICD-10-CM

## 2024-07-01 DIAGNOSIS — G20.A1 PARKINSON'S DISEASE: ICD-10-CM

## 2024-07-01 RX ORDER — DONEPEZIL HYDROCHLORIDE 10 MG/1
TABLET, FILM COATED ORAL
Qty: 90 TABLET | Refills: 3 | Status: SHIPPED | OUTPATIENT
Start: 2024-07-01

## 2024-07-01 RX ORDER — GABAPENTIN 600 MG/1
600 TABLET ORAL 4 TIMES DAILY
Qty: 120 TABLET | Refills: 0 | Status: SHIPPED | OUTPATIENT
Start: 2024-07-01

## 2024-07-01 RX ORDER — GABAPENTIN 600 MG/1
600 TABLET ORAL 4 TIMES DAILY
Qty: 360 TABLET | Refills: 3 | Status: SHIPPED | OUTPATIENT
Start: 2024-07-01

## 2024-07-01 NOTE — TELEPHONE ENCOUNTER
Pt called in to get a refill of   gabapentin (NEURONTIN) 600 MG tablet .       Pt would like 1 month supply it sent to: (few pills left)   "[facility]   [address] [place] [id number]   Store number: [id number]     He would like the remainder of refills sent to:   [facility] - [address]     Pt would also like a refill on:     carbidopa-levodopa (SINEMET)  mg per tablet      He would like it sent to:   "[facility]   [address] [place] [phone]   Store number: [id number]       The other medication that  put him on is not work as good as the carbidpoa levodopa. Hte new medication he noticed an increase in his tremor.

## 2024-07-01 NOTE — TELEPHONE ENCOUNTER
Spoke with pt and made him aware a 90 day script of carbidopa-levodopa was sent to [facility] and a 30 day supply was sent to [facility]    Pt is also requesting that a 30 day supply of gabapentin be sent to [facility], and a 90 day supply be sent to [facility]. See previous note from Mariam Keyes.     Pended the 30 day supply of gabapentin in this encounter. Pt would also need a 90 day supply sent to [facility]. See other refill request for the pended 90 day script to be sent to [facility].

## 2024-07-01 NOTE — TELEPHONE ENCOUNTER
Pt is requesting a 90 day script of gabapentin to be sent to [facility]. Pended med in this encounter.

## 2024-07-01 NOTE — TELEPHONE ENCOUNTER
Henrry Beltran MD  You12 minutes ago (2:17 PM)       I sent 1 prescription to [facility] and the other to the mail order.    Thank you

## 2024-07-01 NOTE — TELEPHONE ENCOUNTER
Spoke with pt. Pt saw Dr. Kerr on 6/12/24 for his tremor. Pt reports that he stopped taking the carbidopa-levodopa and was placed on primidone. Pt requested to ask provider if he can restart the carbidopa-levodopa, and if so, can a 30 day supply be sent to [facility], and a 90 day supply also be sent to [facility]. Pt reports that his tremors have worsened since stopping the carbidopa-levodopa, and starting the primidone. Pt was taking carbidopa-levodopa 25/100 1 tab up to 4 times a day. Also reports the primidone made it very difficult to fall asleep. Routed to provider to advise.

## 2024-07-01 NOTE — TELEPHONE ENCOUNTER
Henrry Beltran MD  You27 minutes ago (12:38 PM)       Can please put in the prescription for carbidopa levodopa so that I can sign off.    Thank you.

## 2024-07-02 NOTE — TELEPHONE ENCOUNTER
Pt called in this morning stating he was unable to  his gabapentin at [facility]. Pt was told this medication needs a prior authorization. Pt also stated he only has a few more days left.  Please give pt a call when authorization is complete and he is able to  his medication.

## 2024-07-03 NOTE — TELEPHONE ENCOUNTER
[facility] and states that gabapentin 600mg needs PA  bin-[id number]  [id number]  ID-[id number]  [phone]    Please assist with PA

## 2024-07-05 ENCOUNTER — TELEPHONE (OUTPATIENT)
Dept: NEUROLOGY | Facility: CLINIC | Age: 74
End: 2024-07-05

## 2024-07-05 DIAGNOSIS — E11.42 DIABETIC POLYNEUROPATHY ASSOCIATED WITH TYPE 2 DIABETES MELLITUS (HCC): ICD-10-CM

## 2024-07-08 ENCOUNTER — TELEPHONE (OUTPATIENT)
Dept: NEUROLOGY | Facility: CLINIC | Age: 74
End: 2024-07-08

## 2024-07-08 NOTE — TELEPHONE ENCOUNTER
Afshan from [facility] called. Gabapentin 600 mg tabs, 120 per 30 days requires PA through [employer].     Urgent PA initiated on CMM. Key: [id number]    Approved today  Meets Pharmacy Policy  Authorization Expiration Date: 7/6/2025    Called [facility], spoke to Afshan and made aware of the approval. She got a paid claim and she will process it.

## 2024-07-10 ENCOUNTER — NURSE TRIAGE (OUTPATIENT)
Age: 74
End: 2024-07-10

## 2024-07-10 NOTE — TELEPHONE ENCOUNTER
"Patient called due to very bothersome increase in hand tremors. Patient denies pain but is very upset as the tremors are affecting his ADLs. Patient is unable to eat soup from a spoon, drink from a cup without spilling, carrying bags without having increased tremors. Patient is saddened as he spoke of the things he was once able to do. Patient is hopeful there will be a medication that can help his increased tremors. Patient has found no benefit with Sinemet  to date taking 2 tabs BID. Patient wants to stay in the [place] location but if there is a Parkinson's specialist that could better assist, patient would consider going to another location.     Dr Beltran, please provide recommendations. Thank you!      Reason for Disposition   Patient's symptoms are safe to treat at home per nursing judgment    Answer Assessment - Initial Assessment Questions  Freezing? No    Shuffling? No    Difficulty Walking? Yes    Uses a cane, motorized wheelchair and a gold cart when home - unable to walk distances such as a hallway.     Recent Falls? Yes  Patient fell in his garage around the beginning of June. Patient was leaning forward to do something and lost his balance. Fell on his right side onto boxes. Was unable to get up independently. Called 911 for assistance.  Denies injuries     Any extra movements? Yes. Increased tremors. Patient is unable to write, eat soup from a spoon, when patient picks up a drink, his tremors increase in the hand being used.  Carrying anything that causes pressure of his hands causes increased tremors in his hands.  Patient saw Dr Kerr 6/24. Patient was started on a stronger dose of Primidone and had no improvement. Dc'd Primidone do to inability to sleep and terrible dreams.  Patient increased Carbidopa-Levodopa to 2 in the am and 2 in the evening.    Any Hallucinations? \"A little bit-sometimes. I dream dumb stuff almost like you're there.\"    What time of day are symptoms worse? No specific " "time of day. Anytime throughout the day when using his hands. Not much at rest.    Current medications confirmed as:  Carbidopa-Levodopa   mg   to 2 in the am and 2 in the PM    Ask how long after each dose do they feel that it \"wears off\"?  N/A    Does patient have a DBS? No    Protocols used: No Protocol Available-ADULT-[place]    "

## 2024-07-12 ENCOUNTER — APPOINTMENT (OUTPATIENT)
Dept: LAB | Facility: CLINIC | Age: 74
End: 2024-07-12
Payer: COMMERCIAL

## 2024-07-12 DIAGNOSIS — D50.9 IRON DEFICIENCY ANEMIA, UNSPECIFIED IRON DEFICIENCY ANEMIA TYPE: ICD-10-CM

## 2024-07-12 DIAGNOSIS — I51.9 MYXEDEMA HEART DISEASE: ICD-10-CM

## 2024-07-12 DIAGNOSIS — E78.5 HYPERLIPIDEMIA, UNSPECIFIED HYPERLIPIDEMIA TYPE: ICD-10-CM

## 2024-07-12 DIAGNOSIS — E13.69 OTHER SPECIFIED DIABETES MELLITUS WITH OTHER SPECIFIED COMPLICATION, UNSPECIFIED WHETHER LONG TERM INSULIN USE (HCC): ICD-10-CM

## 2024-07-12 DIAGNOSIS — E03.9 MYXEDEMA HEART DISEASE: ICD-10-CM

## 2024-07-12 LAB
ALBUMIN SERPL BCG-MCNC: 3.9 G/DL (ref 3.5–5)
ALP SERPL-CCNC: 78 U/L (ref 34–104)
ALT SERPL W P-5'-P-CCNC: 7 U/L (ref 7–52)
ANION GAP SERPL CALCULATED.3IONS-SCNC: 8 MMOL/L (ref 4–13)
AST SERPL W P-5'-P-CCNC: 17 U/L (ref 13–39)
BILIRUB DIRECT SERPL-MCNC: 0.22 MG/DL (ref 0–0.2)
BILIRUB SERPL-MCNC: 1.06 MG/DL (ref 0.2–1)
BUN SERPL-MCNC: 13 MG/DL (ref 5–25)
CALCIUM SERPL-MCNC: 10.1 MG/DL (ref 8.4–10.2)
CHLORIDE SERPL-SCNC: 103 MMOL/L (ref 96–108)
CHOLEST SERPL-MCNC: 161 MG/DL
CO2 SERPL-SCNC: 30 MMOL/L (ref 21–32)
CREAT SERPL-MCNC: 0.8 MG/DL (ref 0.6–1.3)
ERYTHROCYTE [DISTWIDTH] IN BLOOD BY AUTOMATED COUNT: 14.9 % (ref 11.6–15.1)
EST. AVERAGE GLUCOSE BLD GHB EST-MCNC: 134 MG/DL
GFR SERPL CREATININE-BSD FRML MDRD: 88 ML/MIN/1.73SQ M
GLUCOSE P FAST SERPL-MCNC: 116 MG/DL (ref 65–99)
HBA1C MFR BLD: 6.3 %
HCT VFR BLD AUTO: 35.2 % (ref 36.5–49.3)
HDLC SERPL-MCNC: 49 MG/DL
HGB BLD-MCNC: 11.9 G/DL (ref 12–17)
IRON SATN MFR SERPL: 55 % (ref 15–50)
IRON SERPL-MCNC: 200 UG/DL (ref 50–212)
LDLC SERPL CALC-MCNC: 95 MG/DL (ref 0–100)
MCH RBC QN AUTO: 33.9 PG (ref 26.8–34.3)
MCHC RBC AUTO-ENTMCNC: 33.8 G/DL (ref 31.4–37.4)
MCV RBC AUTO: 100 FL (ref 82–98)
NONHDLC SERPL-MCNC: 112 MG/DL
PLATELET # BLD AUTO: 114 THOUSANDS/UL (ref 149–390)
PMV BLD AUTO: 11.1 FL (ref 8.9–12.7)
POTASSIUM SERPL-SCNC: 4.1 MMOL/L (ref 3.5–5.3)
PROT SERPL-MCNC: 6.4 G/DL (ref 6.4–8.4)
RBC # BLD AUTO: 3.51 MILLION/UL (ref 3.88–5.62)
SODIUM SERPL-SCNC: 141 MMOL/L (ref 135–147)
T4 FREE SERPL-MCNC: 0.62 NG/DL (ref 0.61–1.12)
TIBC SERPL-MCNC: 361 UG/DL (ref 250–450)
TRIGL SERPL-MCNC: 85 MG/DL
TSH SERPL DL<=0.05 MIU/L-ACNC: 3.71 UIU/ML (ref 0.45–4.5)
UIBC SERPL-MCNC: 161 UG/DL (ref 155–355)
WBC # BLD AUTO: 4.33 THOUSAND/UL (ref 4.31–10.16)

## 2024-07-12 PROCEDURE — 84443 ASSAY THYROID STIM HORMONE: CPT

## 2024-07-12 PROCEDURE — 83550 IRON BINDING TEST: CPT

## 2024-07-12 PROCEDURE — 84439 ASSAY OF FREE THYROXINE: CPT

## 2024-07-12 PROCEDURE — 80076 HEPATIC FUNCTION PANEL: CPT

## 2024-07-12 PROCEDURE — 85027 COMPLETE CBC AUTOMATED: CPT

## 2024-07-12 PROCEDURE — 83540 ASSAY OF IRON: CPT

## 2024-07-12 PROCEDURE — 83036 HEMOGLOBIN GLYCOSYLATED A1C: CPT

## 2024-07-12 PROCEDURE — 36415 COLL VENOUS BLD VENIPUNCTURE: CPT

## 2024-07-12 PROCEDURE — 80061 LIPID PANEL: CPT

## 2024-07-12 PROCEDURE — 80048 BASIC METABOLIC PNL TOTAL CA: CPT

## 2024-07-16 NOTE — TELEPHONE ENCOUNTER
Henrry Beltran MD  Neu[facility] [place] Clinical Team 122 hours ago (12:43 PM)       I tried to call the patient there was no response I have not seen this patient he is Dr. Kerr's patient he could be seen by our movement disorder clinic Dr. De Jesus to help with his Parkinson's disease

## 2024-08-09 NOTE — PROGRESS NOTES
8/12/2024      Chief Complaint   Patient presents with    Follow-up         Assessment and Plan    74 y.o. male     1. BPH with LUTS  2. Urge urinary incontinence  - UA today negative for infection or blood   - PVR today 32 mL  - discussed conservative measures with adequate hydration, avoidance of bladder irritants, avoidance of constipation, tight glycemic control   - Discontinue tolterodine. Trial of trospium   - Discussed cystoscopy TRUS. Patient declines at this time  - Follow up in 2-3 months for reassessment   - Call with any questions or concerns in the meantime  - All questions answered; patient understands and agrees with plan       History of Present Illness  Alex Baptiste is a 74 y.o. male patient with history of  BPH with LUTS and urgency here for follow up.      Patient has been previously managed on Cardura and finasteride dual medical therapy with minimal benefit. Patient is no longer taking finasteride for unknown reasons. Patient has main concern of urinary urgency and urge urinary incontinence. Patient does take hydrochlorothiazide as well as torsemide daily. Patient was supposed to follow up for cystoscopy TRUS, however, was lost to follow up. PSA 0.31 in January 2024. He was seen in the office in April 2024 and was started on tolterodine. States this medication has been helping until recently. Has noticed darker urine recently, however, has been increasing beer intake.     Review of Systems   Constitutional:  Negative for activity change, appetite change, chills and fever.   HENT:  Negative for congestion and trouble swallowing.    Respiratory:  Negative for cough and shortness of breath.    Cardiovascular:  Negative for chest pain, palpitations and leg swelling.   Gastrointestinal:  Negative for abdominal pain, constipation, diarrhea, nausea and vomiting.   Genitourinary:  Negative for difficulty urinating, dysuria, flank pain, frequency, hematuria and urgency.   Musculoskeletal:  Negative  "for back pain and gait problem.   Skin:  Negative for wound.   Allergic/Immunologic: Negative for immunocompromised state.   Neurological:  Negative for dizziness and syncope.   Hematological:  Does not bruise/bleed easily.   Psychiatric/Behavioral:  Negative for confusion.    All other systems reviewed and are negative.          AUA SYMPTOM SCORE      Flowsheet Row Most Recent Value   AUA SYMPTOM SCORE    How often have you had a sensation of not emptying your bladder completely after you finished urinating? 1 (P)     How often have you had to urinate again less than two hours after you finished urinating? 4 (P)     How often have you found you stopped and started again several times when you urinate? 0 (P)     How often have you found it difficult to postpone urination? 5 (P)     How often have you had a weak urinary stream? 1 (P)     How often have you had to push or strain to begin urination? 0 (P)     How many times did you most typically get up to urinate from the time you went to bed at night until the time you got up in the morning? 2 (P)     Quality of Life: If you were to spend the rest of your life with your urinary condition just the way it is now, how would you feel about that? 3 (P)     AUA SYMPTOM SCORE 13 (P)               Vitals  Vitals:    08/12/24 1404   BP: 166/92   Pulse: 68   Temp: 97.6 °F (36.4 °C)   TempSrc: Temporal   SpO2: 97%   Weight: 124 kg (274 lb)   Height: 5' 10\" (1.778 m)       Physical Exam  Constitutional:       General: He is not in acute distress.     Appearance: Normal appearance. He is not ill-appearing, toxic-appearing or diaphoretic.   HENT:      Head: Normocephalic.      Nose: No congestion.   Eyes:      General: No scleral icterus.        Right eye: No discharge.         Left eye: No discharge.      Conjunctiva/sclera: Conjunctivae normal.      Pupils: Pupils are equal, round, and reactive to light.   Pulmonary:      Effort: Pulmonary effort is normal.   Musculoskeletal:    "   Cervical back: Normal range of motion.   Skin:     General: Skin is warm and dry.      Coloration: Skin is not jaundiced or pale.      Findings: No bruising, erythema, lesion or rash.   Neurological:      General: No focal deficit present.      Mental Status: He is alert and oriented to person, place, and time. Mental status is at baseline.      Gait: Gait normal.   Psychiatric:         Mood and Affect: Mood normal.         Behavior: Behavior normal.         Thought Content: Thought content normal.         Judgment: Judgment normal.           Past History  Past Medical History:   Diagnosis Date    Arthritis     Hyperlipidemia     Hypertension     Poor circulation     Sleep apnea     Type 2 diabetes mellitus (HCC)      Social History     Socioeconomic History    Marital status: Single     Spouse name: None    Number of children: None    Years of education: None    Highest education level: None   Occupational History    None   Tobacco Use    Smoking status: Never     Passive exposure: Past    Smokeless tobacco: Never   Vaping Use    Vaping status: Never Used   Substance and Sexual Activity    Alcohol use: Yes     Comment: 2 drinks daily    Drug use: No    Sexual activity: Yes     Partners: Female   Other Topics Concern    None   Social History Narrative    None     Social Determinants of Health     Financial Resource Strain: Not on file   Food Insecurity: Not on file   Transportation Needs: Not on file   Physical Activity: Not on file   Stress: Not on file   Social Connections: Not on file   Intimate Partner Violence: Not on file   Housing Stability: Not on file     Social History     Tobacco Use   Smoking Status Never    Passive exposure: Past   Smokeless Tobacco Never     Family History   Problem Relation Age of Onset    Diabetes type II Mother     Hypertension Mother     Breast cancer Sister     Lung cancer Sister     Multiple endocrine neoplasia Brother     Breast cancer Sister        The following portions of  the patient's history were reviewed and updated as appropriate: allergies, current medications, past medical history, past social history, past surgical history and problem list.    Results  Recent Results (from the past 1 hour(s))   POCT urine dip    Collection Time: 08/12/24  2:00 PM   Result Value Ref Range    LEUKOCYTE ESTERASE,UA -     NITRITE,UA -     SL AMB POCT UROBILINOGEN 0.2     POCT URINE PROTEIN -      PH,UA 5.0     BLOOD,UA -     SPECIFIC GRAVITY,UA 1.025     KETONES,UA -     BILIRUBIN,UA -     GLUCOSE, UA +      COLOR,UA Yellow     CLARITY,UA Clear    POCT Measure PVR    Collection Time: 08/12/24  2:05 PM   Result Value Ref Range    POST-VOID RESIDUAL VOLUME, ML POC 32 mL   ]  Lab Results   Component Value Date    PSA 0.31 01/30/2024    PSA 0.1 03/21/2022    PSA 0.3 05/05/2021     Lab Results   Component Value Date    CALCIUM 10.1 07/12/2024    K 4.1 07/12/2024    CO2 30 07/12/2024     07/12/2024    BUN 13 07/12/2024    CREATININE 0.80 07/12/2024     Lab Results   Component Value Date    WBC 4.33 07/12/2024    HGB 11.9 (L) 07/12/2024    HCT 35.2 (L) 07/12/2024     (H) 07/12/2024     (L) 07/12/2024       Diana Hernandez PA-C

## 2024-08-12 ENCOUNTER — OFFICE VISIT (OUTPATIENT)
Dept: UROLOGY | Facility: CLINIC | Age: 74
End: 2024-08-12
Payer: COMMERCIAL

## 2024-08-12 VITALS
WEIGHT: 274 LBS | HEIGHT: 70 IN | TEMPERATURE: 97.6 F | DIASTOLIC BLOOD PRESSURE: 92 MMHG | HEART RATE: 68 BPM | OXYGEN SATURATION: 97 % | SYSTOLIC BLOOD PRESSURE: 166 MMHG | BODY MASS INDEX: 39.22 KG/M2

## 2024-08-12 DIAGNOSIS — N40.1 BENIGN LOCALIZED PROSTATIC HYPERPLASIA WITH LOWER URINARY TRACT SYMPTOMS (LUTS): Primary | ICD-10-CM

## 2024-08-12 DIAGNOSIS — N39.41 URGE URINARY INCONTINENCE: ICD-10-CM

## 2024-08-12 DIAGNOSIS — N39.0 URINARY TRACT INFECTION WITHOUT HEMATURIA, SITE UNSPECIFIED: ICD-10-CM

## 2024-08-12 LAB

## 2024-08-12 PROCEDURE — 99213 OFFICE O/P EST LOW 20 MIN: CPT | Performed by: PHYSICIAN ASSISTANT

## 2024-08-12 PROCEDURE — 51798 US URINE CAPACITY MEASURE: CPT | Performed by: PHYSICIAN ASSISTANT

## 2024-08-12 PROCEDURE — 81002 URINALYSIS NONAUTO W/O SCOPE: CPT | Performed by: PHYSICIAN ASSISTANT

## 2024-08-12 RX ORDER — ERGOCALCIFEROL 1.25 MG/1
50000 CAPSULE, LIQUID FILLED ORAL WEEKLY
COMMUNITY
Start: 2024-05-21

## 2024-08-12 RX ORDER — IMIQUIMOD 12.5 MG/.25G
CREAM TOPICAL
COMMUNITY
Start: 2024-06-12

## 2024-08-12 RX ORDER — TROSPIUM CHLORIDE 20 MG/1
20 TABLET, FILM COATED ORAL 2 TIMES DAILY
Qty: 180 TABLET | Refills: 3 | Status: SHIPPED | OUTPATIENT
Start: 2024-08-12 | End: 2024-08-12 | Stop reason: SDUPTHER

## 2024-08-12 RX ORDER — TROSPIUM CHLORIDE 20 MG/1
20 TABLET, FILM COATED ORAL 2 TIMES DAILY
Qty: 180 TABLET | Refills: 3 | Status: SHIPPED | OUTPATIENT
Start: 2024-08-12 | End: 2024-08-22 | Stop reason: ALTCHOICE

## 2024-08-12 RX ORDER — MOMETASONE FUROATE 1 MG/ML
SOLUTION TOPICAL
COMMUNITY
Start: 2024-06-12

## 2024-08-12 RX ORDER — SEMAGLUTIDE 0.68 MG/ML
INJECTION, SOLUTION SUBCUTANEOUS
COMMUNITY

## 2024-08-21 ENCOUNTER — TELEPHONE (OUTPATIENT)
Age: 74
End: 2024-08-21

## 2024-08-21 NOTE — TELEPHONE ENCOUNTER
Patient called stating he was seen by Diana and she prescribed him a new medication  Trospium Chloride (Sanctura) 20 Mg .  He stated its not working for him.  He feels is worse than before.  He would like if there is an alternate medication he can take.  He also mentioned the procedure that Diana recommended he would like to schedule it.  Review and call patient .    Patient can be reached at [phone]

## 2024-08-21 NOTE — TELEPHONE ENCOUNTER
These medications can take several weeks to start noticing symptom reduction. He just started taking this last week.  I usually recommend giving it at least 2-4 weeks. Can schedule him for cystoscopy and TRUS

## 2024-08-22 DIAGNOSIS — N39.41 URGE URINARY INCONTINENCE: Primary | ICD-10-CM

## 2024-08-22 RX ORDER — TOLTERODINE TARTRATE 1 MG/1
1 TABLET, EXTENDED RELEASE ORAL 2 TIMES DAILY
Qty: 180 TABLET | Refills: 1 | Status: SHIPPED | OUTPATIENT
Start: 2024-08-22

## 2024-08-22 NOTE — TELEPHONE ENCOUNTER
Called and spoke with patient at this time. Reviewed provider note about medication taking 2-4 weeks to fully kick in. Also option to schedule cysto/TRUS to further evaluate. Patient states he would prefer to go back to tolterodine. [facility] is preferred pharmacy, if we could send that script in and cancel that sanctura.    Scheduled patient for cysto/TRUS, he preferred [place] location. He was also added to waitlist for sooner availability.

## 2024-09-03 ENCOUNTER — EVALUATION (OUTPATIENT)
Age: 74
End: 2024-09-03
Payer: COMMERCIAL

## 2024-09-03 DIAGNOSIS — R27.8 SENSORY ATAXIA: ICD-10-CM

## 2024-09-03 DIAGNOSIS — E13.42 POLYNEUROPATHY DUE TO SECONDARY DIABETES (HCC): Primary | ICD-10-CM

## 2024-09-03 PROCEDURE — 97530 THERAPEUTIC ACTIVITIES: CPT

## 2024-09-03 PROCEDURE — 97162 PT EVAL MOD COMPLEX 30 MIN: CPT

## 2024-09-03 NOTE — LETTER
September 3, 2024    Roderick Guardado  [address] [facility] [address]    Patient: Alex Baptiste   YOB: 1950   Date of Visit: 9/3/2024     Encounter Diagnosis     ICD-10-CM    1. Polyne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 (HCC)  E13.42 Ambulatory Referral to Physical Therapy      2. Sensory ataxia  R27.8 Ambulatory Referral to Physical Therapy          Dear Dr. Guardado:    Thank you for your recent referral of Alex Baptiste. Please review the attached evaluation summary from Alex's recent visit.     Please verify that you agree with the plan of care by signing the attached order.     If you have any questions or concerns, please do not hesitate to call.     I sincerely appreciate the opportunity to share in the care of one of your patients and hope to have another opportunity to work with you in the near future.       Sincerely,    Bulmaro Olvera, PT, DPT      Referring Provider:      I certify that I have read the below Plan of Care and certify the need for these services furnished under this plan of treatment while under my care.                    Roderick Guardado  [address] [facility] [address]  Via Fax: [phone]                                                                              PT Evaluation        POC expires Unit limit Auth Expiration date PT/OT + Visit Limit?   12/3/24  pend bomn                           Visit/Unit Tracking  AUTH Status: pend Date IE - 9/3              bomn Used 1               Remaining                     Today's date: 9/3/2024  Patient name: Alex Baptiste  : 1950  MRN: [id number]  Referring provider: Shaka Kerr MD  Dx:   Encounter Diagnosis     ICD-10-CM    1. Polyne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 (HCC)  E13.42 Ambulatory Referral to Physical Therapy      2. Sensory ataxia  R27.8 Ambulatory Referral to Physical Therapy             Assessment  Assessment details: Patient is a 74 y.o. Male who presents to skilled outpatient PT with balance impairments secondary to polyneuropathy. Additionally, he reports that he has PD. His medical history/co-morbidities that impact his care include: HTN, hyperlipidemia, DM2, hx of CABG, polyneuropathy, impaired circulation in his legs. He presents with strength impairments, balance impairments, coordination impairments, sensory impairments, righting reaction impairments, impaired functional strength, impaired activity tolerance, impaired endurance. He scored as a fall risk for the Chapman and TUG. He is below age-matched norms for the 2MWT, 10mWT, 5xSTS. His sensory impairments make it difficult to manage static balance, particularly when reaching forward. He has fallen before and reports this can be if he leans forward, but he reports falling backwards more often. He is functioning below his PLOF and this has impacted his quality of life. Significant time was spent discussing treatments, prognosis, expectations, goals, impairments associated with his conditions. He can benefit from skilled PT to maximize his safety and function.    Outcome Measures Initial Eval  9/3     5xSTS 17.45 sec with hands     TUG  - Regular  - Cognitive   17.38 sec no AD  19.09 sec no AD     10 meter 0.67 m/s no AD     CHAPMAN 31/56     FGA defer     DGI defer     2/6MWT 130 ft in 2 mins with no AD     ABC 50.63%         Impairments: Abnormal coordination, Abnormal gait, Abnormal muscle tone, Abnormal or restricted ROM, Activity intolerance, Impaired balance, Impaired physical strength, Lacks appropriate HEP, Poor posture, Poor body mechanics, Pain with function, Safety issue, Abnormal movement, Abnormal muscle firing  Understanding of Dx/Px/POC: Good  Prognosis: Fair    Patient verbalized understanding of POC.       Please contact me if you have any questions or recommendations. Thank you for the referral and the opportunity to  share in [facility].      Plan  Patient would benefit from: Skilled PT  Planned modality interventions: Biofeedback, Cryotherapy, TENS, Thermotherapy  Planned therapy interventions: Abdominal trunk stabilization, ADL training, Balance, Balance/WB training, Breathing training, Body mechanics training, Coordination, Functional ROM exercises, Gait training, HEP, Joint Mobilization, Manual Therapy, Phillips taping, Motor coordination training, Neuromuscular re-education, Patient education, Postural training, Strengthening, Stretching, Therapeutic activities, Therapeutic exercises, Therapeutic training, Transfer training, Activity modification, Work reintegration  Frequency: 1-3x/wk  Plan of Care beginning date: 9/3/24  Plan of Care expiration date: 3 months - 12/3/2024  Treatment plan discussed with: Patient       Goals  Short Term Goals (4-6 weeks):    - Patient will improve time on TUG by 2.9 seconds to facilitate improved safety in all ambulation  - Patient will be independent in basic HEP 2-3 weeks  - Patient will improve 5xSTS score by 2.3 seconds to promote improved LE functional strength needed for ADLs  - Patient will complete DGI/FGA  - Patient will be able to walk for 6 minutes with the least restrictive assistive device    Long Term Goals (8-12 weeks):  - Patient will be independent in a comprehensive home exercise program  - Patient will improve scoring on DGI by 2.6 points to progress safety  - Patient will improve gait speed by 0.18 m/s to improve safety with community ambulation  - Patient will improve CHAPMAN by 6 points in order to improve static balance and reduce risk for falls  - Patient will improve scoring on FGA by 4 points to progress safety with dynamic tasks  - Patient will be able to demonstrate HT in gait without veering  - Patient will improve 6 Minute Walk Test score by 190 feet to promote improved cardiovascular endurance  - Patient will report 50% reduction in near falls in  order to improve safety with functional tasks and reduce his risk for falls  - Patient will report going on walks at least 3 days per week to promote independence and improved cardiovascular endurance  - Patient will be able to ascend/descend stairs reciprocally with 1 UE assist to promote independence and safety with ADLs  - Patient will report 50% reduction in near falls when ambulating on uneven terrain      Cut off score    All date taken from APTA Neuro Section or Rehab Measures      Eldridge/56  MDC: 6 pts  Age Norms:  60-69: M - 55   F - 55  70-79: M - 54   F - 53  80-89: M - 53   F - 50 5xSTS: Lanette et al 2010  MDC: 2.3 sec  Age Norms:  60-69: 11.4 sec  70-79: 12.6 sec  80-89: 14.8 sec   TUG  MDC: 4.14 sec  Cut off score:  >13.5 sec community dwelling adults  >32.2 frail elderly  <20 I for basic transfers  >30 dependent on transfers 10 Meter Walk Test: Danielle al 2011  MDC: 0.18 m/s  20-29: M - 1.35 m   F - 1.34 m  30-39: M - 1.43 m   F - 1.34 m  40-49: M - 1.43 m   F - 1.39 m  50-59: M - 1.43 m   F - 1.31 m  60-69: M - 1.34 m   F - 1.24 m  70-79: M - 1.26 m   F - 1.13 m  80-89: M - 0.97 m   F - 0.94 m    Household Ambulator < 0.4 m/s  Limited Community Ambulator 0.4 - 0.8 m/s  Community Ambulator 0.8 - 1.2 m/s  Safely cross the street > 1.2 m/s   FGA  MCID: 4 pts  Geriatrics/community < 22/30 fall risk  Geriatrics/community < 20/30 unexplained falls    DGI  MDC: vestibular - 4 pts  MDC: geriatric/community - 3 pts  Falls risk <19/24 mCTSIB  Norm: 20-60 yrs  Eyes open firm: norm sway 0.21-0.48  Eyes closed firm: norm sway 0.48-0.99  Eyes open foam: norm sway 0.38-0.71  Eyes closed foam: norm sway 0.70-2.22   6 Minute Walk Test  MDC: 190.98 ft  MCID: 164 ft    Age Norms  60-69: M - 1876 ft (571.80 m)  F - 1765 ft (537.98 m)  70-79: M - 1729 ft (527.00 m)  F - 1545 ft (470.92 m)  80-89: M - 1368 ft (416.97 m)  F - 1286 ft (391.97 m) ABC: Lit & Mercado, 2003  <67% increased risk for falls    Pike-Danny Monofilaments  Evaluator Size:        Force (grams):          Hand/Dorsal Thresholds:        Plantar Thresholds:  - 1.65                       - 0.008                       - Normal                                 - Normal  - 2.36                       - 0.02                         - Normal                                 - Normal  - 2.44                       - 0.04                         - Normal                                 - Normal  - 2.83                       - 0.07                         - Normal                                 - Normal  - 3.22                       - 0.16                         - Diminished light touch          - Normal  - 3.61                       - 0.40                         - Diminished light touch          - Normal  - 3.84                       - 0.60                         - Diminished protective           - Diminished light touch  - 4.08                       - 1.00                         - Diminished protective           - Diminished light touch  - 4.17                       - 1.40                         - Diminished protective           - Diminished light touch  - 4.31                       - 2.00                         - Diminished protective           - Diminished light touch  - 4.56                       - 4.00                         - Loss of protective sense      - Diminished protective  - 4.74                       - 6.00                         - Loss of protective sense      - Diminished protective  - 4.93                       - 8.00                         - Loss of protective sense      - Diminished protective  - 5.07                       - 10.0                         - Loss of protective sense     - Loss of protective sense  - 5.18                       - 15.0                         - Loss of protective sense     - Loss of protective sense  - 5.46                       - 26.0                         - Loss of protective sense     -  Loss of protective sense  - 5.88                       - 60.0                         - Loss of protective sense     - Loss of protective sense  - 6.10                       - 100                          - Loss of protective sense     - Loss of protective sense  - 6.45                       - 180                          - Loss of protective sense     - Loss of protective sense  - 6.65                       - 300                          - Deep pressure sense only  - Deep pressure sense only       Subjective    History of Present Illness  - Mechanism of injury: He has difficulty with balance, he has PD, he cannot reach downward, he falls backwards. He walks with a cane and he has a tremor that has gotten worse. He does not use an SPC in the house, he uses the furniture for support. He also has an electric wheelchair for going out to other places.  - Primary AD: SPC  - Assist level at home: independent  - Decreased fine motor tasks: Yes, at times based on how severe the tremor is    Patient goal: improve balance and walking    Pain  - Current pain ratin/10    Social Support  - Steps to enter house: 7 with bilateral railings  - Stairs in house: no   - [place]  - Lives with: spouse    - Employment status: retired ('s office)  - Hand dominance: R    Treatments  - Previous treatment: PT for other issues  - Current treatment: medication    Objective     LE MMT  - R Hip Flexion: 3+/5  L Hip Flexion: 3+/5  - R Hip Extension: 4-/5 L Hip Extension: 4-/5  - R Hip Abduction: 4-/5 L Hip Abduction: 4-/5  - R Hip Adduction: 4-/5 L Hip Adduction: 4-/5  - R Knee Extension: 4-/5 L Knee Extension: 4-/5  - R Knee Flexion: 3+/5 L Knee Flexion: 3+/5  - R Ankle DF: 3+/5  L Ankle DF: 3+/5  - R Ankle PF: 4-/5  L Ankle PF: 4-/5    Sensation  - Light touch: impaired from mid shin down  - Temperature: impaired from mid shin down    Coordination  - Alternate Toe Taps: impaired on L    Postural Screen  - Observation:  slouched, FHP, rounded shoulders    OT Screen  - Difficulty w/ clothing fasteners: Yes  - Difficulty w/ bathing: No  - Difficulty w/ dressing: No  - Difficulty w/ toileting: No    Gait  - Abnormalities: slight steppage on L, forward flexion, wide base of support       Outcome Measures Initial Eval  9/3     5xSTS 17.45 sec with hands     TUG  - Regular  - Cognitive   17.38 sec no AD  19.09 sec no AD     10 meter 0.67 m/s no AD     CHAPMAN 31/56     FGA defer     DGI defer     2/6MWT 130 ft in 2 mins with no AD     ABC 50.63%            Precautions: neuropathy, fall risk, HTN  Past Medical History:   Diagnosis Date   • Arthritis    • Hyperlipidemia    • Hypertension    • Poor circulation    • Sleep apnea    • Type 2 diabetes mellitus (HCC)

## 2024-09-03 NOTE — PROGRESS NOTES
PT Evaluation        POC expires Unit limit Auth Expiration date PT/OT + Visit Limit?   12/3/24  pend bomn                           Visit/Unit Tracking  AUTH Status: pend Date IE - 9/3              bomn Used 1               Remaining                     Today's date: 9/3/2024  Patient name: Alex Baptiste  : 1950  MRN: [id number]  Referring provider: Shaka Kerr MD  Dx:   Encounter Diagnosis     ICD-10-CM    1. Polyne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 (HCC)  E13.42 Ambulatory Referral to Physical Therapy      2. Sensory ataxia  R27.8 Ambulatory Referral to Physical Therapy            Assessment  Assessment details: Patient is a 74 y.o. Male who presents to skilled outpatient PT with balance impairments secondary to polyneuropathy. Additionally, he reports that he has PD. His medical history/co-morbidities that impact his care include: HTN, hyperlipidemia, DM2, hx of CABG, polyneuropathy, impaired circulation in his legs. He presents with strength impairments, balance impairments, coordination impairments, sensory impairments, righting reaction impairments, impaired functional strength, impaired activity tolerance, impaired endurance. He scored as a fall risk for the Eldridge and TUG. He is below age-matched norms for the 2MWT, 10mWT, 5xSTS. His sensory impairments make it difficult to manage static balance, particularly when reaching forward. He has fallen before and reports this can be if he leans forward, but he reports falling backwards more often. He is functioning below his PLOF and this has impacted his quality of life. Significant time was spent discussing treatments, prognosis, expectations, goals, impairments associated with his conditions. He can benefit from skilled PT to maximize his safety and function.    Outcome Measures Initial Eval  9/3     5xSTS 17.45 sec with hands     TUG  - Regular  - Cognitive   17.38 sec no  AD  19.09 sec no AD     10 meter 0.67 m/s no AD     CHAPMAN 31/56     FGA defer     DGI defer     2/6MWT 130 ft in 2 mins with no AD     ABC 50.63%         Impairments: Abnormal coordination, Abnormal gait, Abnormal muscle tone, Abnormal or restricted ROM, Activity intolerance, Impaired balance, Impaired physical strength, Lacks appropriate HEP, Poor posture, Poor body mechanics, Pain with function, Safety issue, Abnormal movement, Abnormal muscle firing  Understanding of Dx/Px/POC: Good  Prognosis: Fair    Patient verbalized understanding of POC.       Please contact me if you have any questions or recommendations. Thank you for the referral and the opportunity to share in Alex Baptiste's care.      Plan  Patient would benefit from: Skilled PT  Planned modality interventions: Biofeedback, Cryotherapy, TENS, Thermotherapy  Planned therapy interventions: Abdominal trunk stabilization, ADL training, Balance, Balance/WB training, Breathing training, Body mechanics training, Coordination, Functional ROM exercises, Gait training, HEP, Joint Mobilization, Manual Therapy, Phillips taping, Motor coordination training, Neuromuscular re-education, Patient education, Postural training, Strengthening, Stretching, Therapeutic activities, Therapeutic exercises, Therapeutic training, Transfer training, Activity modification, Work reintegration  Frequency: 1-3x/wk  Plan of Care beginning date: 9/3/24  Plan of Care expiration date: 3 months - 12/3/2024  Treatment plan discussed with: Patient       Goals  Short Term Goals (4-6 weeks):    - Patient will improve time on TUG by 2.9 seconds to facilitate improved safety in all ambulation  - Patient will be independent in basic HEP 2-3 weeks  - Patient will improve 5xSTS score by 2.3 seconds to promote improved LE functional strength needed for ADLs  - Patient will complete DGI/FGA  - Patient will be able to walk for 6 minutes with the least restrictive assistive device    Long Term  Goals (8-12 weeks):  - Patient will be independent in a comprehensive home exercise program  - Patient will improve scoring on DGI by 2.6 points to progress safety  - Patient will improve gait speed by 0.18 m/s to improve safety with community ambulation  - Patient will improve CHAPMAN by 6 points in order to improve static balance and reduce risk for falls  - Patient will improve scoring on FGA by 4 points to progress safety with dynamic tasks  - Patient will be able to demonstrate HT in gait without veering  - Patient will improve 6 Minute Walk Test score by 190 feet to promote improved cardiovascular endurance  - Patient will report 50% reduction in near falls in order to improve safety with functional tasks and reduce his risk for falls  - Patient will report going on walks at least 3 days per week to promote independence and improved cardiovascular endurance  - Patient will be able to ascend/descend stairs reciprocally with 1 UE assist to promote independence and safety with ADLs  - Patient will report 50% reduction in near falls when ambulating on uneven terrain      Cut off score    All date taken from APTA Neuro Section or Rehab Measures      Chapman/56  MDC: 6 pts  Age Norms:  60-69: M - 55   F - 55  70-79: M - 54   F - 53  80-89: M - 53   F - 50 5xSTS: Lanette et al 2010  MDC: 2.3 sec  Age Norms:  60-69: 11.4 sec  70-79: 12.6 sec  80-89: 14.8 sec   TUG  MDC: 4.14 sec  Cut off score:  >13.5 sec community dwelling adults  >32.2 frail elderly  <20 I for basic transfers  >30 dependent on transfers 10 Meter Walk Test: Kindra et al 2011  MDC: 0.18 m/s  20-29: M - 1.35 m   F - 1.34 m  30-39: M - 1.43 m   F - 1.34 m  40-49: M - 1.43 m   F - 1.39 m  50-59: M - 1.43 m   F - 1.31 m  60-69: M - 1.34 m   F - 1.24 m  70-79: M - 1.26 m   F - 1.13 m  80-89: M - 0.97 m   F - 0.94 m    Household Ambulator < 0.4 m/s  Limited Community Ambulator 0.4 - 0.8 m/s  Community Ambulator 0.8 - 1.2 m/s  Safely cross the street  > 1.2 m/s   FGA  MCID: 4 pts  Geriatrics/community < 22/30 fall risk  Geriatrics/community < 20/30 unexplained falls    DGI  MDC: vestibular - 4 pts  MDC: geriatric/community - 3 pts  Falls risk <19/24 mCTSIB  Norm: 20-60 yrs  Eyes open firm: norm sway 0.21-0.48  Eyes closed firm: norm sway 0.48-0.99  Eyes open foam: norm sway 0.38-0.71  Eyes closed foam: norm sway 0.70-2.22   6 Minute Walk Test  MDC: 190.98 ft  MCID: 164 ft    Age Norms  60-69: M - 1876 ft (571.80 m)  F - 1765 ft (537.98 m)  70-79: M - 1729 ft (527.00 m)  F - 1545 ft (470.92 m)  80-89: M - 1368 ft (416.97 m)  F - 1286 ft (391.97 m) ABC: Lit & Rogelio, 2003  <67% increased risk for falls   Hector-Danny Monofilaments  Evaluator Size:        Force (grams):          Hand/Dorsal Thresholds:        Plantar Thresholds:  - 1.65                       - 0.008                       - Normal                                 - Normal  - 2.36                       - 0.02                         - Normal                                 - Normal  - 2.44                       - 0.04                         - Normal                                 - Normal  - 2.83                       - 0.07                         - Normal                                 - Normal  - 3.22                       - 0.16                         - Diminished light touch          - Normal  - 3.61                       - 0.40                         - Diminished light touch          - Normal  - 3.84                       - 0.60                         - Diminished protective           - Diminished light touch  - 4.08                       - 1.00                         - Diminished protective           - Diminished light touch  - 4.17                       - 1.40                         - Diminished protective           - Diminished light touch  - 4.31                       - 2.00                         - Diminished protective           - Diminished light touch  - 4.56                        - 4.00                         - Loss of protective sense      - Diminished protective  - 4.74                       - 6.00                         - Loss of protective sense      - Diminished protective  - 4.93                       - 8.00                         - Loss of protective sense      - Diminished protective  - 5.07                       - 10.0                         - Loss of protective sense     - Loss of protective sense  - 5.18                       - 15.0                         - Loss of protective sense     - Loss of protective sense  - 5.46                       - 26.0                         - Loss of protective sense     - Loss of protective sense  - 5.88                       - 60.0                         - Loss of protective sense     - Loss of protective sense  - 6.10                       - 100                          - Loss of protective sense     - Loss of protective sense  - 6.45                       - 180                          - Loss of protective sense     - Loss of protective sense  - 6.65                       - 300                          - Deep pressure sense only  - Deep pressure sense only       Subjective    History of Present Illness  - Mechanism of injury: He has difficulty with balance, he has PD, he cannot reach downward, he falls backwards. He walks with a cane and he has a tremor that has gotten worse. He does not use an SPC in the house, he uses the furniture for support. He also has an electric wheelchair for going out to other places.  - Primary AD: SPC  - Assist level at home: independent  - Decreased fine motor tasks: Yes, at times based on how severe the tremor is    Patient goal: improve balance and walking    Pain  - Current pain ratin/10    Social Support  - Steps to enter house: 7 with bilateral railings  - Stairs in house: no   - [place]  - Lives with: spouse    - Employment status: retired ('s office)  - Hand dominance:  R    Treatments  - Previous treatment: PT for other issues  - Current treatment: medication    Objective     LE MMT  - R Hip Flexion: 3+/5  L Hip Flexion: 3+/5  - R Hip Extension: 4-/5 L Hip Extension: 4-/5  - R Hip Abduction: 4-/5 L Hip Abduction: 4-/5  - R Hip Adduction: 4-/5 L Hip Adduction: 4-/5  - R Knee Extension: 4-/5 L Knee Extension: 4-/5  - R Knee Flexion: 3+/5 L Knee Flexion: 3+/5  - R Ankle DF: 3+/5  L Ankle DF: 3+/5  - R Ankle PF: 4-/5  L Ankle PF: 4-/5    Sensation  - Light touch: impaired from mid shin down  - Temperature: impaired from mid shin down    Coordination  - Alternate Toe Taps: impaired on L    Postural Screen  - Observation: slouched, FHP, rounded shoulders    OT Screen  - Difficulty w/ clothing fasteners: Yes  - Difficulty w/ bathing: No  - Difficulty w/ dressing: No  - Difficulty w/ toileting: No    Gait  - Abnormalities: slight steppage on L, forward flexion, wide base of support       Outcome Measures Initial Eval  9/3     5xSTS 17.45 sec with hands     TUG  - Regular  - Cognitive   17.38 sec no AD  19.09 sec no AD     10 meter 0.67 m/s no AD     CHAPMAN 31/56     FGA defer     DGI defer     2/6MWT 130 ft in 2 mins with no AD     ABC 50.63%            Precautions: neuropathy, fall risk, HTN  Past Medical History:   Diagnosis Date    Arthritis     Hyperlipidemia     Hypertension     Poor circulation     Sleep apnea     Type 2 diabetes mellitus (HCC)

## 2024-09-06 ENCOUNTER — APPOINTMENT (OUTPATIENT)
Age: 74
End: 2024-09-06
Payer: COMMERCIAL

## 2024-09-06 ENCOUNTER — NURSE TRIAGE (OUTPATIENT)
Age: 74
End: 2024-09-06

## 2024-09-06 DIAGNOSIS — E11.42 DIABETIC POLYNEUROPATHY ASSOCIATED WITH TYPE 2 DIABETES MELLITUS (HCC): ICD-10-CM

## 2024-09-06 NOTE — TELEPHONE ENCOUNTER
Regarding: RX Refill Request  ----- Message from Ivanna CRENSHAW sent at 9/6/2024  4:11 PM EDT -----  Patient called to request a RX refill; patient currently has 6 pills left. GABAPENTIN 600 MG TABLET  send to [id number] - [place]...Patient would like a call back once complete, please assist. Thank you

## 2024-09-06 NOTE — TELEPHONE ENCOUNTER
Dr. De Jesus: would you be able to fill pt's Gabapentin Rx request in the interim until pt is schedule with a provider?     Per 7/10/24 telephone encounter:   Henrry Beltran MD  Neurology [place] Clinical Team 122 hours ago (12:43 PM)      I tried to call the patient there was no response I have not seen this patient he is Dr. Kerr's patient he could be seen by our movement disorder clinic Dr. De Jesus to help with his Parkinson's disease

## 2024-09-09 ENCOUNTER — APPOINTMENT (OUTPATIENT)
Age: 74
End: 2024-09-09
Payer: COMMERCIAL

## 2024-09-09 DIAGNOSIS — E11.42 DIABETIC POLYNEUROPATHY ASSOCIATED WITH TYPE 2 DIABETES MELLITUS (HCC): ICD-10-CM

## 2024-09-10 DIAGNOSIS — E11.42 DIABETIC POLYNEUROPATHY ASSOCIATED WITH TYPE 2 DIABETES MELLITUS (HCC): ICD-10-CM

## 2024-09-10 RX ORDER — GABAPENTIN 600 MG/1
600 TABLET ORAL 4 TIMES DAILY
Qty: 360 TABLET | Refills: 0 | Status: SHIPPED | OUTPATIENT
Start: 2024-09-10 | End: 2024-09-10 | Stop reason: SDUPTHER

## 2024-09-10 NOTE — TELEPHONE ENCOUNTER
Chart reviewed  Gabapentin was ordered as phone in    Rx re-entered. Pls review and sign off    thanks

## 2024-09-10 NOTE — TELEPHONE ENCOUNTER
Reason for call:   [x] Refill   [] Prior Auth  [] Other:     Office:   [x] PCP/Provider -   [] Specialty/Provider -     Medication:     gabapentin (NEURONTIN) 600 MG tablet       Dose/Frequency:  Take 1 tablet (600 mg total) by mouth 4 (four) times a day     Quantity: 360 tablet     Pharmacy: [id number] - [place] - [facility] MARGARITA ARTURO [phone]     Does the patient have enough for 3 days?   [] Yes   [x] No - Send as HP to POD

## 2024-09-11 ENCOUNTER — APPOINTMENT (OUTPATIENT)
Age: 74
End: 2024-09-11
Payer: COMMERCIAL

## 2024-09-11 ENCOUNTER — TELEPHONE (OUTPATIENT)
Dept: NEUROLOGY | Facility: CLINIC | Age: 74
End: 2024-09-11

## 2024-09-11 RX ORDER — GABAPENTIN 600 MG/1
600 TABLET ORAL 4 TIMES DAILY
Qty: 360 TABLET | Refills: 0 | Status: SHIPPED | OUTPATIENT
Start: 2024-09-11

## 2024-09-13 ENCOUNTER — TELEPHONE (OUTPATIENT)
Dept: NEUROLOGY | Facility: CLINIC | Age: 74
End: 2024-09-13

## 2024-09-16 ENCOUNTER — APPOINTMENT (OUTPATIENT)
Age: 74
End: 2024-09-16
Payer: COMMERCIAL

## 2024-09-17 DIAGNOSIS — R41.3 MEMORY DIFFICULTY: ICD-10-CM

## 2024-09-17 DIAGNOSIS — R25.1 TREMOR: ICD-10-CM

## 2024-09-17 DIAGNOSIS — E11.44 DIABETIC AMYOTROPHY ASSOCIATED WITH TYPE 2 DIABETES MELLITUS (HCC): ICD-10-CM

## 2024-09-17 NOTE — TELEPHONE ENCOUNTER
Inbound call received from [facility] requesting a new script for patient's Primidone 50 mg be sent to pharmacy.     Stated that an updated script needs to be sent due to the last script being written by Dr. Kerr.     3/20/24 Patient scheduled with Dr. Huitron for Hand Tremors.     Dr. De Jesus: Primidone 50 mg Rx medication pended, please sign if agreeable.

## 2024-09-18 ENCOUNTER — APPOINTMENT (OUTPATIENT)
Age: 74
End: 2024-09-18
Payer: COMMERCIAL

## 2024-09-18 ENCOUNTER — TELEPHONE (OUTPATIENT)
Dept: NEUROLOGY | Facility: CLINIC | Age: 74
End: 2024-09-18

## 2024-09-18 RX ORDER — DULOXETIN HYDROCHLORIDE 60 MG/1
CAPSULE, DELAYED RELEASE ORAL
Qty: 90 CAPSULE | Refills: 3 | Status: SHIPPED | OUTPATIENT
Start: 2024-09-18

## 2024-09-18 RX ORDER — PRIMIDONE 50 MG/1
TABLET ORAL
Qty: 30 TABLET | Refills: 6 | Status: SHIPPED | OUTPATIENT
Start: 2024-09-18

## 2024-09-18 RX ORDER — MEMANTINE HYDROCHLORIDE 10 MG/1
10 TABLET ORAL 2 TIMES DAILY
Qty: 180 TABLET | Refills: 3 | Status: SHIPPED | OUTPATIENT
Start: 2024-09-18

## 2024-09-18 NOTE — TELEPHONE ENCOUNTER
Received warm transfer from [place]. Spoke to pt called requesting refill of Cymbalta and memantine 10 mg, 90 day supply be sent to [facility] pharmacy. He is out of meds  Pt has upcoming appt w/ Dr Hare in Jan.    Rxs entered. Pls review and sign off    thanks

## 2024-09-18 NOTE — TELEPHONE ENCOUNTER
Pt made aware that rxs was sent to [facility] pharmacy as requested. Pt verbalized understanding and appreciative.

## 2024-09-18 NOTE — TELEPHONE ENCOUNTER
Pt called in to ask about medications.     Moved pts appt to 1/8/25 @ 2:30 with Dr. Hare and then transferred pt to nursing to assist with medications. Added to wait list.

## 2024-09-18 NOTE — TELEPHONE ENCOUNTER
"Nraa Huitron MD   to Neurology Neurovascular Team 4       9/18/24  7:03 AM  Please see refill note from 9/9/24. \"Patient said it's a hardship for him to travel and is requesting visit be scheduled at [facility].\" Pt can be sooner by general neuro.  Will refill primidone but needs to be scheduled sooner      Clerical Team:  Please see Dr. De Jesus note and call patient to schedule, thank you.  "

## 2024-09-19 NOTE — TELEPHONE ENCOUNTER
See refill enc 9/17/24    Called [facility] pharmacy, spoke to Flo who confirmed that they received the memantine script and ready to be picked up. Pt was notified per Flo.

## 2024-09-19 NOTE — TELEPHONE ENCOUNTER
Isamar from [facility] called needs new prescription for Memantine. They have an old prescription.  Please call [phone]  Invoice # [id number]

## 2024-09-23 ENCOUNTER — APPOINTMENT (OUTPATIENT)
Age: 74
End: 2024-09-23
Payer: COMMERCIAL

## 2024-09-25 ENCOUNTER — APPOINTMENT (OUTPATIENT)
Age: 74
End: 2024-09-25
Payer: COMMERCIAL

## 2024-10-22 ENCOUNTER — TELEPHONE (OUTPATIENT)
Age: 74
End: 2024-10-22

## 2024-10-22 NOTE — TELEPHONE ENCOUNTER
Patient called in stating he wanted to verifying his upcoming appointments with our office. I informed him he has an appointment on 11/12/2024 with Diana for a 3 month follow up and a cystoscopy with Dr. Chun scheduled on 12/4. Patient verbalized understanding and appreciative of information provided.

## 2024-10-29 DIAGNOSIS — E13.42 POLYNEUROPATHY DUE TO SECONDARY DIABETES (HCC): ICD-10-CM

## 2024-10-29 NOTE — TELEPHONE ENCOUNTER
Inbound call from [email or web address] pharmacist to make us aware the Patient would like a refill of Oxcarpazepine but it was previously prescribed by Dr. Shaka Kerr. Pharmacist made aware we will follow up with [facility] and provider.       Outbound call made to Patient and he confirmed his refill:    Medication: OXcarbazepine (TRILEPTAL) 600 mg tablet     Dose/Frequency: TAKE 1 TABLET DAILY AT BEDTIME    Quantity: 90    Pharmacy:   [facility] - [address] [address]  Phone: [phone]  Fax: [phone]       Office:   [] PCP/Provider -   [x] Speciality/Provider - Dr. Shaka Kerr    Does the patient have enough for 3 days?   [x] Yes, has two weeks worth.   [] No - Send as HP to POD    Dr. Hare Patient has an appointment with you on 01/08/2025 if agreeable please refill script. Thank you!

## 2024-10-30 RX ORDER — OXCARBAZEPINE 600 MG/1
600 TABLET, FILM COATED ORAL
Qty: 90 TABLET | Refills: 3 | Status: SHIPPED | OUTPATIENT
Start: 2024-10-30

## 2024-10-30 NOTE — TELEPHONE ENCOUNTER
Outbound call made to Patient and a generic voicemail was left. Upon call back please let Miguel know the script requested was sent to [facility]. Thank you!

## 2024-10-30 NOTE — TELEPHONE ENCOUNTER
Patient called back; advised requested script was sent to [facility].  Patient verbalized understanding.    Thank you

## 2024-11-12 ENCOUNTER — TELEPHONE (OUTPATIENT)
Age: 74
End: 2024-11-12

## 2024-11-12 ENCOUNTER — OFFICE VISIT (OUTPATIENT)
Dept: UROLOGY | Facility: CLINIC | Age: 74
End: 2024-11-12
Payer: COMMERCIAL

## 2024-11-12 VITALS
HEIGHT: 70 IN | DIASTOLIC BLOOD PRESSURE: 80 MMHG | OXYGEN SATURATION: 97 % | SYSTOLIC BLOOD PRESSURE: 140 MMHG | HEART RATE: 85 BPM | BODY MASS INDEX: 40.8 KG/M2 | WEIGHT: 285 LBS | TEMPERATURE: 98.4 F

## 2024-11-12 DIAGNOSIS — N39.41 URGE URINARY INCONTINENCE: ICD-10-CM

## 2024-11-12 DIAGNOSIS — N40.1 BENIGN LOCALIZED PROSTATIC HYPERPLASIA WITH LOWER URINARY TRACT SYMPTOMS (LUTS): Primary | ICD-10-CM

## 2024-11-12 LAB — POST-VOID RESIDUAL VOLUME, ML POC: 12 ML

## 2024-11-12 PROCEDURE — 51798 US URINE CAPACITY MEASURE: CPT | Performed by: PHYSICIAN ASSISTANT

## 2024-11-12 PROCEDURE — 99213 OFFICE O/P EST LOW 20 MIN: CPT | Performed by: PHYSICIAN ASSISTANT

## 2024-11-12 RX ORDER — KETOCONAZOLE 20 MG/G
CREAM TOPICAL
COMMUNITY
Start: 2024-10-14

## 2024-11-12 RX ORDER — FLUOROURACIL 50 MG/G
CREAM TOPICAL
COMMUNITY
Start: 2024-10-14

## 2024-11-12 RX ORDER — VIBEGRON 75 MG/1
75 TABLET, FILM COATED ORAL
Qty: 90 TABLET | Refills: 3 | Status: SHIPPED | OUTPATIENT
Start: 2024-11-12

## 2024-11-12 RX ORDER — BLOOD SUGAR DIAGNOSTIC
STRIP MISCELLANEOUS
COMMUNITY
Start: 2024-09-17

## 2024-11-12 NOTE — PROGRESS NOTES
11/12/2024      Chief Complaint   Patient presents with    Follow-up         Assessment and Plan    74 y.o. male     1. BPH with LUTS  2. Urge urinary incontinence  - PVR today 12 mL  - discussed conservative measures with adequate hydration, avoidance of bladder irritants, avoidance of constipation, tight glycemic control   - continues with bothersome symptoms  - does take hydrochlorothiazide as well as torsemide daily   - Trial of Gemtesa. Unable to take Myrbetriq due to high BP   - Follow up cysto TRUS as scheduled 12/4/24  - Call with any questions or concerns in the meantime  - All questions answered; patient understands and agrees with plan       History of Present Illness  Alex Baptiste is a 74 y.o. male patient with history of  BPH with LUTS and urgency here for follow up.      Patient has been previously managed on Cardura and finasteride dual medical therapy with minimal benefit. Patient is no longer taking finasteride for unknown reasons. Patient has main concern of urinary urgency and urge urinary incontinence. Patient does take hydrochlorothiazide as well as torsemide daily. Patient was supposed to follow up for cystoscopy TRUS, however, was lost to follow up. PSA 0.31 in January 2024. He was seen in the office in April 2024 and was started on tolterodine. Patient states medication was no longer helping and was switched to trospium. Presents today for recheck. Continues with symptoms despite the medication.     Review of Systems   Constitutional:  Negative for activity change, appetite change, chills and fever.   HENT:  Negative for congestion and trouble swallowing.    Respiratory:  Negative for cough and shortness of breath.    Cardiovascular:  Negative for chest pain, palpitations and leg swelling.   Gastrointestinal:  Negative for abdominal pain, constipation, diarrhea, nausea and vomiting.   Genitourinary:  Positive for frequency and urgency. Negative for difficulty urinating, dysuria, flank  "pain and hematuria.   Musculoskeletal:  Negative for back pain and gait problem.   Skin:  Negative for wound.   Allergic/Immunologic: Negative for immunocompromised state.   Neurological:  Negative for dizziness and syncope.   Hematological:  Does not bruise/bleed easily.   Psychiatric/Behavioral:  Negative for confusion.    All other systems reviewed and are negative.          AUA SYMPTOM SCORE      Flowsheet Row Most Recent Value   AUA SYMPTOM SCORE    How often have you had a sensation of not emptying your bladder completely after you finished urinating? 2 (P)     How often have you had to urinate again less than two hours after you finished urinating? 4 (P)     How often have you found you stopped and started again several times when you urinate? 1 (P)     How often have you found it difficult to postpone urination? 4 (P)     How often have you had a weak urinary stream? 0 (P)     How often have you had to push or strain to begin urination? 0 (P)     How many times did you most typically get up to urinate from the time you went to bed at night until the time you got up in the morning? 2 (P)     Quality of Life: If you were to spend the rest of your life with your urinary condition just the way it is now, how would you feel about that? 5 (P)     AUA SYMPTOM SCORE 13 (P)               Vitals  Vitals:    11/12/24 1307   BP: 140/80   Pulse: 85   Temp: 98.4 °F (36.9 °C)   TempSrc: Temporal   SpO2: 97%   Weight: 129 kg (285 lb)   Height: 5' 10\" (1.778 m)       Physical Exam  Constitutional:       General: He is not in acute distress.     Appearance: Normal appearance. He is not ill-appearing, toxic-appearing or diaphoretic.   HENT:      Head: Normocephalic.      Nose: No congestion.   Eyes:      General: No scleral icterus.        Right eye: No discharge.         Left eye: No discharge.      Conjunctiva/sclera: Conjunctivae normal.      Pupils: Pupils are equal, round, and reactive to light.   Pulmonary:      Effort: " Pulmonary effort is normal.   Musculoskeletal:      Cervical back: Normal range of motion.   Skin:     General: Skin is warm and dry.      Coloration: Skin is not jaundiced or pale.      Findings: No bruising, erythema, lesion or rash.   Neurological:      General: No focal deficit present.      Mental Status: He is alert and oriented to person, place, and time. Mental status is at baseline.      Gait: Gait normal.   Psychiatric:         Mood and Affect: Mood normal.         Behavior: Behavior normal.         Thought Content: Thought content normal.         Judgment: Judgment normal.           Past History  Past Medical History:   Diagnosis Date    Arthritis     Hyperlipidemia     Hypertension     Poor circulation     Sleep apnea     Type 2 diabetes mellitus (HCC)      Social History     Socioeconomic History    Marital status: Single     Spouse name: None    Number of children: None    Years of education: None    Highest education level: None   Occupational History    None   Tobacco Use    Smoking status: Never     Passive exposure: Past    Smokeless tobacco: Never   Vaping Use    Vaping status: Never Used   Substance and Sexual Activity    Alcohol use: Yes     Comment: 2 drinks daily    Drug use: No    Sexual activity: Yes     Partners: Female   Other Topics Concern    None   Social History Narrative    None     Social Determinants of Health     Financial Resource Strain: Not on file   Food Insecurity: Not on file   Transportation Needs: Not on file   Physical Activity: Not on file   Stress: Not on file   Social Connections: Not on file   Intimate Partner Violence: Not on file   Housing Stability: Not on file     Social History     Tobacco Use   Smoking Status Never    Passive exposure: Past   Smokeless Tobacco Never     Family History   Problem Relation Age of Onset    Diabetes type II Mother     Hypertension Mother     Breast cancer Sister     Lung cancer Sister     Multiple endocrine neoplasia Brother      Breast cancer Sister        The following portions of the patient's history were reviewed and updated as appropriate: allergies, current medications, past medical history, past social history, past surgical history and problem list.    Results  Recent Results (from the past 1 hour(s))   POCT Measure PVR    Collection Time: 11/12/24  1:10 PM   Result Value Ref Range    POST-VOID RESIDUAL VOLUME, ML POC 12 mL   ]  Lab Results   Component Value Date    PSA 0.31 01/30/2024    PSA 0.1 03/21/2022    PSA 0.3 05/05/2021     Lab Results   Component Value Date    CALCIUM 10.1 07/12/2024    K 4.1 07/12/2024    CO2 30 07/12/2024     07/12/2024    BUN 13 07/12/2024    CREATININE 0.80 07/12/2024     Lab Results   Component Value Date    WBC 4.33 07/12/2024    HGB 11.9 (L) 07/12/2024    HCT 35.2 (L) 07/12/2024     (H) 07/12/2024     (L) 07/12/2024       Diana Hernandez PA-C

## 2024-11-12 NOTE — TELEPHONE ENCOUNTER
Received call from pharmacy states the Gemtesa needs a prior authorization or switch medication to what is on formulary, Myrbetriq which does not require authorization.   Please advise.

## 2024-11-13 NOTE — TELEPHONE ENCOUNTER
PA for GEMTESA 75 MG SUBMITTED to [employer]    via    [x]CMM-KEY: [id number]      [x]PA sent as URGENT    All office notes, labs and other pertaining documents and studies sent. Clinical questions answered. Awaiting determination from insurance company.     Turnaround time for your insurance to make a decision on your Prior Authorization can take 7-21 business days.

## 2024-11-18 ENCOUNTER — TELEPHONE (OUTPATIENT)
Dept: NEUROLOGY | Facility: CLINIC | Age: 74
End: 2024-11-18

## 2024-11-27 DIAGNOSIS — I50.9 CONGESTIVE HEART FAILURE, UNSPECIFIED HF CHRONICITY, UNSPECIFIED HEART FAILURE TYPE (HCC): ICD-10-CM

## 2024-11-27 RX ORDER — CARVEDILOL 25 MG/1
25 TABLET ORAL 2 TIMES DAILY WITH MEALS
Qty: 180 TABLET | Refills: 3 | Status: CANCELLED | OUTPATIENT
Start: 2024-11-27

## 2024-12-02 NOTE — PROGRESS NOTES
"74-year-old male with BPH, LUTS    AC: Aspirin 81 mg    PVR 12 cc on 12/4/2024    LUTS: urgency, UUI (1-2 pads per day), nocturia x2  Denied dysuria, GH, straining    Drinks 1 bottle water per day. Drinks 2-3 bottles beer per day. Some spicy foods. 1 cup coffee per morning. 1-2 cans diet Pepsi per day.    On doxazosin for voiding symptoms    Random bladder scan 23 cc on 12/4/24                 Cystoscopy     Date/Time  12/4/2024 10:00 AM     Performed by  Yomi Chun DO   Authorized by  Yomi Chun DO     Universal Protocol:  procedure performed by consultantConsent: Verbal consent obtained. Written consent obtained.  Risks and benefits: risks, benefits and alternatives were discussed  Consent given by: patient  Time out: Immediately prior to procedure a \"time out\" was called to verify the correct patient, procedure, equipment, support staff and site/side marked as required.  Timeout called at: 12/4/2024 10:57 AM.  Patient understanding: patient states understanding of the procedure being performed  Patient consent: the patient's understanding of the procedure matches consent given  Procedure consent: procedure consent matches procedure scheduled  Relevant documents: relevant documents present and verified  Required items: required blood products, implants, devices, and special equipment available  Patient identity confirmed: verbally with patient      Procedure Details:  Procedure type: cystoscopy    Additional Procedure Details: Office Cystoscopy Procedure Note    Indication:     medically refractory lower urinary tract symptoms     Informed consent   The risks, benefits, complications, treatment options, and expected outcomes were discussed with the patient. The patient concurred with the proposed plan and provided informed consent.    Anesthesia  Lidocaine jelly 2%    Antibiotic prophylaxis   None    Procedure  The patient was placed in the supineposition, was prepped and draped in the " usual manner using sterile technique, and 2% lidocaine jelly instilled into the urethra.  A 17 F flexible cystoscope was then inserted into the urethra and the urethra and bladder carefully examined.  The following findings were noted:    Findings:  Urethra:  Normal  Prostate:  mild lateral lobe hypertrophy, min median lobe, no lesions; short prostate; overall does not appear to be particularly obstructive  Bladder:  Small dome chimney, mild trabeculations; no lesions, no stones  Ureteral orifices:  orthotopic  Other findings:  None, retroflexed view confirms    Specimens: ucx                 Complications:    None; patient tolerated the procedure well           Disposition: To home            Condition: Stable                          PLAN  -Fu Ucx. Will send abx if pos.  -Cont doxazosin  -Advised him to cut back on aggravating factors like alcohol, spicy foods, caffeine  -AP visit in 1-2 months. If still having symptoms can consider trospium (could not afford Gemtesa and had side effects with mirabegron).

## 2024-12-04 ENCOUNTER — TELEPHONE (OUTPATIENT)
Age: 74
End: 2024-12-04

## 2024-12-04 ENCOUNTER — PROCEDURE VISIT (OUTPATIENT)
Dept: UROLOGY | Facility: CLINIC | Age: 74
End: 2024-12-04
Payer: COMMERCIAL

## 2024-12-04 VITALS
SYSTOLIC BLOOD PRESSURE: 144 MMHG | HEIGHT: 70 IN | BODY MASS INDEX: 25.2 KG/M2 | WEIGHT: 176 LBS | DIASTOLIC BLOOD PRESSURE: 86 MMHG | HEART RATE: 72 BPM | OXYGEN SATURATION: 98 % | TEMPERATURE: 97.6 F

## 2024-12-04 DIAGNOSIS — N40.1 BENIGN LOCALIZED PROSTATIC HYPERPLASIA WITH LOWER URINARY TRACT SYMPTOMS (LUTS): Primary | ICD-10-CM

## 2024-12-04 LAB — POST-VOID RESIDUAL VOLUME, ML POC: 23 ML

## 2024-12-04 PROCEDURE — 51798 US URINE CAPACITY MEASURE: CPT | Performed by: UROLOGY

## 2024-12-04 PROCEDURE — 52000 CYSTOURETHROSCOPY: CPT | Performed by: UROLOGY

## 2024-12-04 PROCEDURE — 87086 URINE CULTURE/COLONY COUNT: CPT | Performed by: UROLOGY

## 2024-12-04 RX ORDER — TAMSULOSIN HYDROCHLORIDE 0.4 MG/1
0.4 CAPSULE ORAL
Qty: 30 CAPSULE | Refills: 2 | Status: SHIPPED | OUTPATIENT
Start: 2024-12-04

## 2024-12-04 RX ORDER — DOXAZOSIN 4 MG/1
4 TABLET ORAL
Qty: 90 TABLET | Refills: 6 | Status: SHIPPED | OUTPATIENT
Start: 2024-12-04 | End: 2024-12-04 | Stop reason: RX

## 2024-12-04 NOTE — PATIENT INSTRUCTIONS
You had cystoscopy done in the office today. This means that we looked inside your urethra and bladder with a camera.    You may see some blood in your urine for the next few days. This is normal. Please drink plenty of fluids. Call the office if you are passing large blood clots in your urine or if you are not able to urinate.    It may burn when you urinate for the next few days. This is normal.    Please call the office if you have fevers or chills in the next few days.    You will return to clinic in 1-2 months

## 2024-12-04 NOTE — TELEPHONE ENCOUNTER
Called and left a voicemail message for patient that Flomax was sent to his pharmacy in place of Doxazosin. Asked patient to return call to the office with any questions or concerns.

## 2024-12-04 NOTE — TELEPHONE ENCOUNTER
Pt called and stated pharmacy is currently out of stock of following medication:   doxazosin (CARDURA) 4 mg tablet     PT stated pharmacy is not sure when they will receive shipment of medication and pt is completely out and asking if something else can be called into pharmacy    Pt call back-[phone]

## 2024-12-05 LAB — BACTERIA UR CULT: NORMAL

## 2024-12-06 ENCOUNTER — TELEPHONE (OUTPATIENT)
Age: 74
End: 2024-12-06

## 2024-12-06 NOTE — TELEPHONE ENCOUNTER
Caller: Magaly from [facility]    Doctor:     Reason for call: She is faxing a prescription for carvedilol 25mg for the patient. Please complete.    Thank you    Call back#: [phone]

## 2024-12-11 ENCOUNTER — TELEPHONE (OUTPATIENT)
Dept: CARDIOLOGY CLINIC | Facility: CLINIC | Age: 74
End: 2024-12-11

## 2024-12-11 DIAGNOSIS — I50.9 CONGESTIVE HEART FAILURE, UNSPECIFIED HF CHRONICITY, UNSPECIFIED HEART FAILURE TYPE (HCC): ICD-10-CM

## 2024-12-11 DIAGNOSIS — I10 PRIMARY HYPERTENSION: ICD-10-CM

## 2024-12-11 RX ORDER — CARVEDILOL 25 MG/1
25 TABLET ORAL 2 TIMES DAILY WITH MEALS
Qty: 180 TABLET | Refills: 3 | Status: CANCELLED | OUTPATIENT
Start: 2024-12-11

## 2024-12-11 RX ORDER — CARVEDILOL 25 MG/1
25 TABLET ORAL 2 TIMES DAILY WITH MEALS
Qty: 60 TABLET | Refills: 1 | Status: SHIPPED | OUTPATIENT
Start: 2024-12-11

## 2024-12-11 NOTE — TELEPHONE ENCOUNTER
Refill request received from [facility] for Carvedilol 25 mg, isosorbide mononitrate (IMDUR) 30 mg, hydrALAZINE (APRESOLINE) 25 mg  through fax.    Message was sent to clerical to schedule pt for a follow up appt.    Previously, Dr. Dinh's pt.    Meds pending

## 2024-12-12 RX ORDER — HYDRALAZINE HYDROCHLORIDE 25 MG/1
25 TABLET, FILM COATED ORAL 3 TIMES DAILY
Qty: 270 TABLET | Refills: 3 | Status: SHIPPED | OUTPATIENT
Start: 2024-12-12

## 2024-12-12 RX ORDER — ISOSORBIDE MONONITRATE 30 MG/1
30 TABLET, EXTENDED RELEASE ORAL DAILY
Qty: 90 TABLET | Refills: 3 | Status: SHIPPED | OUTPATIENT
Start: 2024-12-12

## 2024-12-31 DIAGNOSIS — I50.9 CONGESTIVE HEART FAILURE, UNSPECIFIED HF CHRONICITY, UNSPECIFIED HEART FAILURE TYPE (HCC): ICD-10-CM

## 2024-12-31 RX ORDER — IRBESARTAN 300 MG/1
300 TABLET ORAL DAILY
Qty: 90 TABLET | Refills: 3 | Status: SHIPPED | OUTPATIENT
Start: 2024-12-31

## 2024-12-31 NOTE — TELEPHONE ENCOUNTER
Refill received from [facility] for Irbesartan 30 mg through fax.    Pt has an up coming appt with Dr. Cedillo.    Meds pending.    Thanks!

## 2025-01-08 ENCOUNTER — OFFICE VISIT (OUTPATIENT)
Dept: NEUROLOGY | Facility: CLINIC | Age: 75
End: 2025-01-08
Payer: COMMERCIAL

## 2025-01-08 VITALS
HEIGHT: 70 IN | BODY MASS INDEX: 38.94 KG/M2 | SYSTOLIC BLOOD PRESSURE: 160 MMHG | HEART RATE: 58 BPM | DIASTOLIC BLOOD PRESSURE: 76 MMHG | OXYGEN SATURATION: 96 % | WEIGHT: 272 LBS

## 2025-01-08 DIAGNOSIS — R41.3 MEMORY DIFFICULTY: ICD-10-CM

## 2025-01-08 DIAGNOSIS — M19.90 ARTHRITIS: ICD-10-CM

## 2025-01-08 DIAGNOSIS — R25.1 TREMOR: Primary | ICD-10-CM

## 2025-01-08 DIAGNOSIS — E13.42 POLYNEUROPATHY DUE TO SECONDARY DIABETES (HCC): ICD-10-CM

## 2025-01-08 DIAGNOSIS — G20.A1 PARKINSON DISEASE (HCC): ICD-10-CM

## 2025-01-08 DIAGNOSIS — R27.8 SENSORY ATAXIA: ICD-10-CM

## 2025-01-08 DIAGNOSIS — E11.44 DIABETIC AMYOTROPHY ASSOCIATED WITH TYPE 2 DIABETES MELLITUS (HCC): ICD-10-CM

## 2025-01-08 DIAGNOSIS — E11.42 DIABETIC POLYNEUROPATHY ASSOCIATED WITH TYPE 2 DIABETES MELLITUS (HCC): ICD-10-CM

## 2025-01-08 PROCEDURE — 99215 OFFICE O/P EST HI 40 MIN: CPT | Performed by: STUDENT IN AN ORGANIZED HEALTH CARE EDUCATION/TRAINING PROGRAM

## 2025-01-08 RX ORDER — DULOXETIN HYDROCHLORIDE 60 MG/1
60 CAPSULE, DELAYED RELEASE ORAL DAILY
Qty: 90 CAPSULE | Refills: 1 | Status: SHIPPED | OUTPATIENT
Start: 2025-01-08 | End: 2025-07-07

## 2025-01-08 RX ORDER — PRIMIDONE 50 MG/1
100 TABLET ORAL
Qty: 60 TABLET | Refills: 2 | Status: SHIPPED | OUTPATIENT
Start: 2025-01-08 | End: 2025-04-08

## 2025-01-08 RX ORDER — GABAPENTIN 600 MG/1
600 TABLET ORAL 4 TIMES DAILY
Qty: 360 TABLET | Refills: 1 | Status: SHIPPED | OUTPATIENT
Start: 2025-01-08 | End: 2025-07-07

## 2025-01-08 RX ORDER — MEMANTINE HYDROCHLORIDE 10 MG/1
10 TABLET ORAL 2 TIMES DAILY
Qty: 180 TABLET | Refills: 3 | Status: SHIPPED | OUTPATIENT
Start: 2025-01-08

## 2025-01-08 RX ORDER — OXCARBAZEPINE 600 MG/1
600 TABLET, FILM COATED ORAL
Qty: 90 TABLET | Refills: 1 | Status: SHIPPED | OUTPATIENT
Start: 2025-01-08 | End: 2025-07-07

## 2025-01-08 RX ORDER — DONEPEZIL HYDROCHLORIDE 10 MG/1
TABLET, FILM COATED ORAL
Qty: 90 TABLET | Refills: 3 | Status: SHIPPED | OUTPATIENT
Start: 2025-01-08

## 2025-01-08 NOTE — ASSESSMENT & PLAN NOTE
- duloxetine 60mg  - gabapentin 600mg QID  - oxcarbazepine 600mg QHS  Lab Results   Component Value Date    HGBA1C 6.6 (H) 11/19/2024

## 2025-01-08 NOTE — PROGRESS NOTES
Neurology Ambulatory Visit  Name: Alex Baptiste       : 1950       MRN: [id number]   Encounter Provider: Heather Hare MD   Encounter Date: 2025  Encounter department: [facility]    Alex Baptiste is a 74 y.o. male with PMH of DMII and HTN who presents as transfer of care from Dr. Kerr for diabetic polyneuropathy, Parkinson's Disease, and memory impairment.     He has Parkinsonism on exam today with bradykinesia, rigidity and rest tremor L>R. He also describes symptoms of essential tremor that have responded to primidone. The tremor did not respond to Sinemet. He may have both PD and ET. I believe he would benefit from a MIGUEL scan but he would like to hold off for the time being. In the meantime, we can increase his primidone dose.     His previous MOCA was  consistent with a mild cognitive impairment. I discussed that we could stop memantine if he would like but he prefers to continue this.     Assessment & Plan  Tremor  - increase primidone to 100mg QHS    - on carvedilol 25mg BID   - he is not interested in weighted utensils at this time  Orders:    primidone (MYSOLINE) 50 mg tablet; Take 2 tablets (100 mg total) by mouth daily at bedtime    Diabetic polyneuropathy associated with type 2 diabetes mellitus (HCC)  - referral to PT for aquatic therapy, as this has helped him in the past.     Lab Results   Component Value Date    HGBA1C 6.6 (H) 2024       Orders:    Ambulatory Referral to Physical Therapy; Future    DULoxetine (CYMBALTA) 60 mg delayed release capsule; Take 1 capsule (60 mg total) by mouth daily TAKE 1 CAPSULE DAILY    gabapentin (NEURONTIN) 600 MG tablet; Take 1 tablet (600 mg total) by mouth 4 (four) times a day    Ambulatory referral to Spine & Pain Management; Future    OXcarbazepine (TRILEPTAL) 600 mg tablet; Take 1 tablet (600 mg total) by mouth daily at bedtime    Memory difficulty    Orders:    donepezil (ARICEPT) 10 mg  "tablet; TAKE 1 TABLET NIGHTLY    memantine (NAMENDA) 10 mg tablet; Take 1 tablet (10 mg total) by mouth 2 (two) times a day    Arthritis  Has been having worsening joint pains (mostly left foot) and would like to see pain management.   Orders:    Ambulatory referral to Spine & Pain Management; Future    Parkinson disease (HCC)  - I discussed that MIGUEL scan may be helpful because he did not have an obvious improvement on Sinemet in the past. He would like to hold off.            HISTORY OF PRESENT ILLNESS    He has had a tremor for a few years L>R hand. He notices it the most when he is holding things, for instance soup.     He says that he has noticed some forgetfulness, which has been stable the last few years.     He has a left foot drop but he does not remember what that is for. He occasionally stumbles but has not had any falls recently. He ambulates with a cane.     He previously tried Sinemet but said it \"did nothing.\"     Past Medical History:    Past Medical History:   Diagnosis Date    Arthritis     Hyperlipidemia     Hypertension     Poor circulation     Sleep apnea     Type 2 diabetes mellitus (HCC)      Past Surgical History:   Procedure Laterality Date    BACK SURGERY      CORONARY ARTERY BYPASS GRAFT  07/22/2017    HEMORROIDECTOMY      TONSILLECTOMY      WRIST SURGERY         Family History:  Family History   Problem Relation Age of Onset    Diabetes type II Mother     Hypertension Mother     Breast cancer Sister     Lung cancer Sister     Multiple endocrine neoplasia Brother     Breast cancer Sister        Social History:  Social History     Tobacco Use    Smoking status: Never     Passive exposure: Past    Smokeless tobacco: Never   Vaping Use    Vaping status: Never Used   Substance Use Topics    Alcohol use: Yes     Comment: 2 drinks daily    Drug use: No        Allergies:  Allergies   Allergen Reactions    Penicillins      Other reaction(s): Other, Unknown       Medications:    Current Outpatient " Medications:     amLODIPine (NORVASC) 10 mg tablet, Take 10 mg by mouth daily, Disp: , Rfl:     aspirin 81 MG tablet, Take 1 tablet by mouth daily, Disp: , Rfl:     Blood Glucose Monitoring Suppl (ACURA BLOOD GLUCOSE METER) w/Device KIT, by Other route. Use as instructed, Disp: , Rfl:     carvedilol (COREG) 25 mg tablet, Take 1 tablet (25 mg total) by mouth 2 (two) times a day with meals, Disp: 60 tablet, Rfl: 1    ciclopirox (PENLAC) 8 % solution, apply to NAILS DAILY REMOVE ON 7TH DAY WITH NAIL POLISH REMOVER, Disp: , Rfl:     donepezil (ARICEPT) 10 mg tablet, TAKE 1 TABLET NIGHTLY, Disp: 90 tablet, Rfl: 3    DULoxetine (CYMBALTA) 60 mg delayed release capsule, Take 1 capsule (60 mg total) by mouth daily TAKE 1 CAPSULE DAILY, Disp: 90 capsule, Rfl: 1    fluorouracil (EFUDEX) 5 % cream, , Disp: , Rfl:     gabapentin (NEURONTIN) 600 MG tablet, Take 1 tablet (600 mg total) by mouth 4 (four) times a day, Disp: 360 tablet, Rfl: 1    glipiZIDE (GLUCOTROL) 10 mg tablet, Take 10 mg by mouth 2 (two) times a day, Disp: , Rfl:     hydrALAZINE (APRESOLINE) 25 mg tablet, Take 1 tablet (25 mg total) by mouth 3 (three) times a day, Disp: 270 tablet, Rfl: 3    hydrochlorothiazide (HYDRODIURIL) 25 mg tablet, TAKE 1 TABLET DAILY, Disp: 60 tablet, Rfl: 5    imiquimod (ALDARA) 5 % cream, , Disp: , Rfl:     insulin glargine (LANTUS SOLOSTAR) 100 units/mL injection pen, Inject 40 Units under the skin daily at bedtime , Disp: , Rfl:     irbesartan (AVAPRO) 300 mg tablet, Take 1 tablet (300 mg total) by mouth daily, Disp: 90 tablet, Rfl: 3    isosorbide mononitrate (IMDUR) 30 mg 24 hr tablet, Take 1 tablet (30 mg total) by mouth daily, Disp: 90 tablet, Rfl: 3    ketoconazole (NIZORAL) 2 % cream, , Disp: , Rfl:     memantine (NAMENDA) 10 mg tablet, Take 1 tablet (10 mg total) by mouth 2 (two) times a day, Disp: 180 tablet, Rfl: 3    mometasone (ELOCON) 0.1 % lotion, , Disp: , Rfl:     OneTouch Ultra test strip, , Disp: , Rfl:      "OXcarbazepine (TRILEPTAL) 600 mg tablet, Take 1 tablet (600 mg total) by mouth daily at bedtime, Disp: 90 tablet, Rfl: 1    Ozempic, 0.25 or 0.5 MG/DOSE, 2 MG/3ML injection pen, inject 0.25 milligrams subcutaneously weekly, Disp: , Rfl:     primidone (MYSOLINE) 50 mg tablet, Take 2 tablets (100 mg total) by mouth daily at bedtime, Disp: 60 tablet, Rfl: 2    tamsulosin (FLOMAX) 0.4 mg, Take 1 capsule (0.4 mg total) by mouth daily with dinner, Disp: 30 capsule, Rfl: 2    TRUEPLUS PEN NEEDLES 32G X 4 MM MISC, , Disp: , Rfl:     ergocalciferol (VITAMIN D2) 50,000 units, Take 50,000 Units by mouth once a week (Patient not taking: Reported on 1/8/2025), Disp: , Rfl:     hydroxychloroquine (PLAQUENIL) 200 mg tablet, TAKE 2 TABLETS DAILY, Disp: 180 tablet, Rfl: 1    Vibegron (Gemtesa) 75 MG TABS, Take 75 mg by mouth Daily at 2am (Patient not taking: Reported on 1/8/2025), Disp: 90 tablet, Rfl: 3      OBJECTIVE  /76 (BP Location: Left arm, Patient Position: Sitting, Cuff Size: Large)   Pulse 58   Ht 5' 10\" (1.778 m)   Wt 123 kg (272 lb)   SpO2 96%   BMI 39.03 kg/m²      Labs  I have reviewed pertinent labs:  CBC:   Lab Results   Component Value Date    WBC 4.33 07/12/2024    RBC 3.51 (L) 07/12/2024    HGB 11.9 (L) 07/12/2024    HCT 35.2 (L) 07/12/2024     (H) 07/12/2024     (L) 07/12/2024    MCH 33.9 07/12/2024    MCHC 33.8 07/12/2024    RDW 14.9 07/12/2024    MPV 11.1 07/12/2024    NEUTROABS 1.64 (L) 03/18/2021     CMP:   Lab Results   Component Value Date    SODIUM 141 07/12/2024    K 4.1 07/12/2024     07/12/2024    CO2 30 07/12/2024    AGAP 8 07/12/2024    BUN 13 07/12/2024    CREATININE 0.80 07/12/2024    GLUC 175 (H) 05/09/2024    GLUF 116 (H) 07/12/2024    CALCIUM 10.1 07/12/2024    AST 17 07/12/2024    ALT 7 07/12/2024    ALKPHOS 78 07/12/2024    TP 6.4 07/12/2024    ALB 3.9 07/12/2024    TBILI 1.06 (H) 07/12/2024    EGFR 88 07/12/2024       Lab Results   Component Value Date/Time    " VRZNYZZB30 397 07/03/2021 08:06 AM    RJQW86AXIXCR 21.2 (L) 01/06/2023 08:49 AM    TSH 5.31 11/14/2023 10:01 AM    NBB7TJXCLRTK 3.707 07/12/2024 12:21 PM    FREET4 0.62 07/12/2024 12:21 PM    FREET4 0.77 05/20/2020 09:04 AM    HGBA1C 6.6 (H) 11/19/2024 12:10 PM    FOLATE 19.8 (H) 07/30/2020 08:20 AM    RF Negative 08/15/2019 10:57 AM    CRP <3.0 08/15/2019 10:57 AM    ESR 5 08/15/2019 10:57 AM    SSAAB <0.2 08/15/2019 10:57 AM    SSBAB <0.2 08/15/2019 10:57 AM            General Exam  GENERAL APPEARANCE:  No distress, alert, interactive and cooperative.  CARDIOVASCULAR: Warm and well perfused  LUNGS: normal work of breathing on room air  EXTREMITIES: no peripheral edema     Neurologic Exam  MENTAL STATE:  Orientation was normal to time, place and person. Recent and remote memory were impaired. Unable to say the months of the year backwards or      CRANIAL NERVES:  CN 3, 4, 6  Pupils round, 4 mm in diameter, equally reactive to light. Lids symmetric; no ptosis. EOMs normal alignment, full range. No nystagmus.  CN 5  Facial sensation intact bilaterally.  CN 7  Normal and symmetric facial strength.   CN 8  Hearing intact to conversation.  CN 9  Palate elevates symmetrically.  CN 11  Normal strength of shoulder shrug and neck turning.  CN 12  Tongue midline, with normal bulk and strength.     MOTOR:  Increased tone and bradykinesia L>RUE. There is a pillrolling rest tremor of both hands, L>R, that improves with action.        L R  Deltoid:   5 5  Biceps:   5 5  Triceps:   5 5  Hip extension:  5 5  Knee extension: 5 5  Knee flexion:  5 5  Dorsiflexion:  5- 3  Plantarflexion:  5- 2     SENSORY:  Decreased sensation in lower extremities to light touch, temperature, and vibration up to mid shin.      COORDINATION:   In both upper extremities, finger-nose-finger was intact without dysmetria or overshoot.      GAIT:  Station was unstable with normal base, ambulates with a cane.     Administrative Statements   The total amount  of time spent with the patient and on chart review and documentation was 85 minutes. Issues addressed during this visit included counseling on diagnosis and prognosis, counseling on medical management, and counseling on medication side effects.

## 2025-01-08 NOTE — ASSESSMENT & PLAN NOTE
- increase primidone to 100mg QHS    - on carvedilol 25mg BID   - he is not interested in weighted utensils at this time  Orders:    primidone (MYSOLINE) 50 mg tablet; Take 2 tablets (100 mg total) by mouth daily at bedtime

## 2025-01-23 ENCOUNTER — NURSE TRIAGE (OUTPATIENT)
Age: 75
End: 2025-01-23

## 2025-01-23 DIAGNOSIS — R25.1 TREMOR: Primary | ICD-10-CM

## 2025-01-23 NOTE — TELEPHONE ENCOUNTER
"Last visit Dr. Hare 1/8    Patient reporting progressively worsening tremors upper extremities despite medication increase of primidone from 50 mg daily to 100 mg daily x 2 weeks ago as recommended; reports tremors are interfering with ADLs; said he did trial splitting dose  50 mg bid for a few days however no improvement with divided doses or 100 mg HS daily.    Patient discussed  he is considering MIGUEL scan; also said he called to schedule aquatic therapy however not scheduled yet due to location.     Patient is requesting cb from  if possible to discuss next steps; please provide recommendation, thank you.      Reason for Disposition   Patient's symptoms are safe to treat at home per nursing judgment    Answer Assessment - Initial Assessment Questions  1. REASON FOR CALL: \"What is your main concern right now?\"      Tremors upper extremities  b/l, worsened past few weeks despite increasing primidone from 50 mg daily to 100 mg daily HS; patient said he also tried splitting dose 50 mg in AM and 50 mg in PM a few days ago without improvement; interfering with shaving, showering.  2. ONSET: \"When did the  start?\"      Progressively worsening since office visit 1/8  3. SEVERITY: \"How bad is the ?\"      Interfering with ADLS  4. FEVER: \"Do you have a fever?\"      N/A  5. OTHER SYMPTOMS: \"Do you have any other new symptoms?\"      N/A  6. TREATMENTS AND RESPONSE: \"What have you done so far to try to make this better? What medicines have you used?\"      Patient reports no med changes since last visit 1/8 other than increase in primidone from 50 mg daily to 100 mg daily.  7. PREGNANCY: \"Is there any chance you are pregnant?\" \"When was your last menstrual period?\"      N/A    Protocols used: No Protocol Available-Adult-[place]    "

## 2025-01-24 DIAGNOSIS — G20.A1 PARKINSON DISEASE (HCC): Primary | ICD-10-CM

## 2025-01-24 RX ORDER — CARBIDOPA AND LEVODOPA 25; 100 MG/1; MG/1
1 TABLET ORAL 3 TIMES DAILY
Qty: 90 TABLET | Refills: 5 | Status: SHIPPED | OUTPATIENT
Start: 2025-01-24 | End: 2025-07-23

## 2025-01-24 NOTE — TELEPHONE ENCOUNTER
Follow up call to patient. Left a detailed message on voicemail for patient to call back to advise if he would to try Sinemet again. (Communication Consent on file.)

## 2025-01-24 NOTE — TELEPHONE ENCOUNTER
MD Gabrielle Cummings, RN; Neurology Epilepsy Team 129 minutes ago (3:22 PM)       Perfect. I sent the prescription. He should take 1/2 tablet of Sinemet three times a day for a week, then 1 tablet three times a day thereafter.  Tell him to let me know if the tremors improve once at the higher dose.

## 2025-01-24 NOTE — TELEPHONE ENCOUNTER
Henrry Beltran MD  You; Heather Hare MD17 hours ago (3:29 PM)       Discussed with patient he is currently taking primidone 50 mg twice a day looked at Dr. Hare's note it looks like that he has not responded to Sinemet and she was suspecting that he has both Parkinson's and essential tremor ordered DaTscan and advised the patient to continue with primidone 50 mg twice a day since he is very hesitant to increase it further

## 2025-01-24 NOTE — TELEPHONE ENCOUNTER
Warm transfer received from Neurology PEP and spoke with patient.     Patient aware that MIGUEL scan was ordered, provided CS number to schedule. Pt agreeable to trying the Sinemet again. Medication can be sent to [email or web address].  Pt stated that he does not remember why he was switched off of the Sinemet prior.

## 2025-01-24 NOTE — TELEPHONE ENCOUNTER
MD Ana Laura Cummings, RN1 hour ago (11:20 AM)       Yes agree with MIGUEL scan. Would he like to try Sinemet again and see if it helps?

## 2025-01-27 NOTE — PROGRESS NOTES
[facility] Cardiology  Follow up note  Alex Baptiste 74 y.o. male MRN: [id number]        1. Primary hypertension  2. Congestive heart failure, unspecified HF chronicity, unspecified heart failure type (HCC)      Assessment & Plan  Primary hypertension  Blood pressure is a bit elevated today, reports that he checks at home and it has continues to be elevated.  I will have the patient continue to check his BPs at home and bring his numbers back to next visit otherwise he is okay to continue with current antihypertensive regiment.  Ideally would like to place on spironolactone, given his polypharmacy and Parkinson's I am concerned with adding too many medications.  Will bring him back in 8 weeks to see if we could remove one of his current medications and add another.  Congestive heart failure, unspecified HF chronicity, unspecified heart failure type (HCC)  Wt Readings from Last 3 Encounters:   01/28/25 123 kg (272 lb)   01/08/25 123 kg (272 lb)   12/04/24 79.8 kg (176 lb)   Patient with chronic edema in the lower extremities, reports chronic dyspnea with exertion which has not changed.  No worsening orthopnea or PND.  Lower extremity seems to be a combination of HFpEF and changes secondary to chronic venous stasis.  Would ideally like to add spironolactone to his medical regimen but has previously had issues with diuretics due to issues with his prostate and Parkinson's.  Will bring him back in 8 weeks, obtain echocardiogram, and BNP.  He will bring a BP log, if blood pressures remain elevated we will likely add spironolactone and see if he could change one of his other medications.              HPI:   Aelx Baptiste is a 74 y.o. year old male history of heart failure with preserved ejection fraction, CAD status post PCI/CABG times 3/20/2017, hypertension, hyperlipidemia, insulin-dependent diabetes with neuropathy, Parkinson disease with mild cognitive impairment    Patient reports that he does have some  dyspnea on exertion, he is able to complete his daily activities but believes combination of his extra weight, HFpEF, and Parkinson's disease, as well as deconditioning.  He reports that he has chronic lower extremity edema but was unable to tolerate diuretics given prostate issues and trouble getting around due to his Parkinson's disease.  He sleeps in a recliner typically but reports he has no trouble lying flat on his back.    Currently denies any fever, chills, fatigue, new visual changes, lightheadedness, syncope, palpitations, shortness of breath at rest,  PND, nausea, vomiting, diarrhea, dark or bright red blood in stools, leg claudication.       Review of Systems    Past Medical History:   Diagnosis Date    Arthritis     Hyperlipidemia     Hypertension     Poor circulation     Sleep apnea     Type 2 diabetes mellitus (HCC)      Social History     Substance and Sexual Activity   Alcohol Use Yes    Comment: 2 drinks daily     Social History     Substance and Sexual Activity   Drug Use No     Social History     Tobacco Use   Smoking Status Never    Passive exposure: Past   Smokeless Tobacco Never       Allergies:  Allergies   Allergen Reactions    Penicillins      Other reaction(s): Other, Unknown       Medications:     Current Outpatient Medications:     amLODIPine (NORVASC) 10 mg tablet, Take 10 mg by mouth daily, Disp: , Rfl:     aspirin 81 MG tablet, Take 1 tablet by mouth daily, Disp: , Rfl:     Blood Glucose Monitoring Suppl (ACURA BLOOD GLUCOSE METER) w/Device KIT, by Other route. Use as instructed, Disp: , Rfl:     carbidopa-levodopa (Sinemet)  mg per tablet, Take 1 tablet by mouth 3 (three) times a day Take 1/2 tablet three times a day for a week, then take 1 tablet three times a day thereafter., Disp: 90 tablet, Rfl: 5    carvedilol (COREG) 25 mg tablet, Take 1 tablet (25 mg total) by mouth 2 (two) times a day with meals, Disp: 60 tablet, Rfl: 1    donepezil (ARICEPT) 10 mg tablet, TAKE 1 TABLET  NIGHTLY, Disp: 90 tablet, Rfl: 3    DULoxetine (CYMBALTA) 60 mg delayed release capsule, Take 1 capsule (60 mg total) by mouth daily TAKE 1 CAPSULE DAILY, Disp: 90 capsule, Rfl: 1    gabapentin (NEURONTIN) 600 MG tablet, Take 1 tablet (600 mg total) by mouth 4 (four) times a day, Disp: 360 tablet, Rfl: 1    glipiZIDE (GLUCOTROL) 10 mg tablet, Take 10 mg by mouth 2 (two) times a day, Disp: , Rfl:     hydrALAZINE (APRESOLINE) 25 mg tablet, Take 1 tablet (25 mg total) by mouth 3 (three) times a day, Disp: 270 tablet, Rfl: 3    hydrochlorothiazide (HYDRODIURIL) 25 mg tablet, TAKE 1 TABLET DAILY, Disp: 60 tablet, Rfl: 5    hydroxychloroquine (PLAQUENIL) 200 mg tablet, TAKE 2 TABLETS DAILY, Disp: 180 tablet, Rfl: 1    insulin glargine (LANTUS SOLOSTAR) 100 units/mL injection pen, Inject 40 Units under the skin daily at bedtime , Disp: , Rfl:     irbesartan (AVAPRO) 300 mg tablet, Take 1 tablet (300 mg total) by mouth daily, Disp: 90 tablet, Rfl: 3    isosorbide mononitrate (IMDUR) 30 mg 24 hr tablet, Take 1 tablet (30 mg total) by mouth daily, Disp: 90 tablet, Rfl: 3    OneTouch Ultra test strip, , Disp: , Rfl:     OXcarbazepine (TRILEPTAL) 600 mg tablet, Take 1 tablet (600 mg total) by mouth daily at bedtime, Disp: 90 tablet, Rfl: 1    Ozempic, 0.25 or 0.5 MG/DOSE, 2 MG/3ML injection pen, inject 0.25 milligrams subcutaneously weekly, Disp: , Rfl:     tamsulosin (FLOMAX) 0.4 mg, Take 1 capsule (0.4 mg total) by mouth daily with dinner, Disp: 30 capsule, Rfl: 2    TRUEPLUS PEN NEEDLES 32G X 4 MM MISC, , Disp: , Rfl:     ergocalciferol (VITAMIN D2) 50,000 units, Take 50,000 Units by mouth once a week (Patient not taking: Reported on 1/8/2025), Disp: , Rfl:       Vitals:    01/28/25 1038   BP: 152/78   Pulse: 71   Resp: 16   SpO2: 98%     Weight (last 2 days)       Date/Time Weight    01/28/25 1038 123 (272)          Physical Exam  Vitals and nursing note reviewed.   Constitutional:       General: He is not in acute  distress.     Appearance: He is well-developed.   HENT:      Head: Normocephalic and atraumatic.   Eyes:      Conjunctiva/sclera: Conjunctivae normal.   Cardiovascular:      Rate and Rhythm: Normal rate and regular rhythm.      Heart sounds: No murmur heard.  Pulmonary:      Effort: Pulmonary effort is normal. No respiratory distress.      Breath sounds: Normal breath sounds.   Abdominal:      Palpations: Abdomen is soft.      Tenderness: There is no abdominal tenderness.   Musculoskeletal:      Cervical back: Neck supple.      Right lower leg: Edema present.      Left lower leg: Edema present.   Skin:     General: Skin is warm and dry.      Capillary Refill: Capillary refill takes less than 2 seconds.   Neurological:      Mental Status: He is alert.   Psychiatric:         Mood and Affect: Mood normal.         Laboratory Studies:    Lab Results   Component Value Date    SODIUM 141 07/12/2024    K 4.1 07/12/2024     07/12/2024    CO2 30 07/12/2024    BUN 13 07/12/2024    CREATININE 0.80 07/12/2024    GLUC 175 (H) 05/09/2024    CALCIUM 10.1 07/12/2024   \  Lab Results   Component Value Date    WBC 4.33 07/12/2024    HGB 11.9 (L) 07/12/2024    HCT 35.2 (L) 07/12/2024     (H) 07/12/2024     (L) 07/12/2024     Lab Results   Component Value Date    HGBA1C 6.6 (H) 11/19/2024     Lab Results   Component Value Date    LDLCALC 95 07/12/2024     Lab Results   Component Value Date    CHOLESTEROL 161 07/12/2024    CHOLESTEROL 157 01/06/2023    CHOLESTEROL 139 07/14/2022     Lab Results   Component Value Date    HDL 49 07/12/2024    HDL 49 01/06/2023    HDL 40 07/14/2022     Lab Results   Component Value Date    TRIG 85 07/12/2024    TRIG 105 01/06/2023    TRIG 130 07/14/2022     Lab Results   Component Value Date    NONHDLC 112 07/12/2024    NONHDLC 108 01/06/2023    NONHDLC 99 07/14/2022         Cardiac testing:     EKG reviewed personally:   5/9/2022: Sinus rhythm, right bundle branch block,  "LAFB    Echocardiogram:  TTE 5/2022: EF 55%, G2dd, RV function was mildly reduced, mild MR, TR    Stress tests:  Pharmacologic MPI 5/2022: anterior infarct without evidence of ischemia.    Catheterization:  CAD s/p remote PCI and CABG x3 in 2017 at [facility].           Cathryn Cedillo MD    Portions of the record may have been created with voice recognition software.  Occasional wrong word or \"sound a like\" substitutions may have occurred due to the inherent limitations of voice recognition software.  Read the chart carefully and recognize, using context, where substitutions have occurred.   "

## 2025-01-28 ENCOUNTER — OFFICE VISIT (OUTPATIENT)
Dept: CARDIOLOGY CLINIC | Facility: CLINIC | Age: 75
End: 2025-01-28
Payer: COMMERCIAL

## 2025-01-28 VITALS
SYSTOLIC BLOOD PRESSURE: 152 MMHG | DIASTOLIC BLOOD PRESSURE: 78 MMHG | OXYGEN SATURATION: 98 % | HEART RATE: 71 BPM | BODY MASS INDEX: 38.94 KG/M2 | RESPIRATION RATE: 16 BRPM | WEIGHT: 272 LBS | HEIGHT: 70 IN

## 2025-01-28 DIAGNOSIS — I50.9 CONGESTIVE HEART FAILURE, UNSPECIFIED HF CHRONICITY, UNSPECIFIED HEART FAILURE TYPE (HCC): ICD-10-CM

## 2025-01-28 DIAGNOSIS — I10 PRIMARY HYPERTENSION: Primary | ICD-10-CM

## 2025-01-28 PROCEDURE — 99214 OFFICE O/P EST MOD 30 MIN: CPT | Performed by: STUDENT IN AN ORGANIZED HEALTH CARE EDUCATION/TRAINING PROGRAM

## 2025-01-28 RX ORDER — AMLODIPINE BESYLATE 10 MG/1
10 TABLET ORAL DAILY
Qty: 90 TABLET | Refills: 3 | Status: SHIPPED | OUTPATIENT
Start: 2025-01-28

## 2025-01-28 NOTE — ASSESSMENT & PLAN NOTE
Blood pressure is a bit elevated today, reports that he checks at home and it has continues to be elevated.  I will have the patient continue to check his BPs at home and bring his numbers back to next visit otherwise he is okay to continue with current antihypertensive regiment.  Ideally would like to place on spironolactone, given his polypharmacy and Parkinson's I am concerned with adding too many medications.  Will bring him back in 8 weeks to see if we could remove one of his current medications and add another.

## 2025-01-28 NOTE — ASSESSMENT & PLAN NOTE
Wt Readings from Last 3 Encounters:   01/28/25 123 kg (272 lb)   01/08/25 123 kg (272 lb)   12/04/24 79.8 kg (176 lb)   Patient with chronic edema in the lower extremities, reports chronic dyspnea with exertion which has not changed.  No worsening orthopnea or PND.  Lower extremity seems to be a combination of HFpEF and changes secondary to chronic venous stasis.  Would ideally like to add spironolactone to his medical regimen but has previously had issues with diuretics due to issues with his prostate and Parkinson's.  Will bring him back in 8 weeks, obtain echocardiogram, and BNP.  He will bring a BP log, if blood pressures remain elevated we will likely add spironolactone and see if he could change one of his other medications.

## 2025-01-29 ENCOUNTER — OFFICE VISIT (OUTPATIENT)
Dept: UROLOGY | Facility: CLINIC | Age: 75
End: 2025-01-29
Payer: COMMERCIAL

## 2025-01-29 VITALS
SYSTOLIC BLOOD PRESSURE: 138 MMHG | TEMPERATURE: 97.8 F | OXYGEN SATURATION: 98 % | WEIGHT: 272 LBS | DIASTOLIC BLOOD PRESSURE: 82 MMHG | BODY MASS INDEX: 38.94 KG/M2 | HEIGHT: 70 IN | HEART RATE: 75 BPM

## 2025-01-29 DIAGNOSIS — E66.01 SEVERE OBESITY (BMI >= 40) (HCC): ICD-10-CM

## 2025-01-29 DIAGNOSIS — N40.1 BENIGN LOCALIZED PROSTATIC HYPERPLASIA WITH LOWER URINARY TRACT SYMPTOMS (LUTS): Primary | ICD-10-CM

## 2025-01-29 DIAGNOSIS — N39.41 URGE URINARY INCONTINENCE: ICD-10-CM

## 2025-01-29 LAB — POST-VOID RESIDUAL VOLUME, ML POC: 32 ML

## 2025-01-29 PROCEDURE — 99213 OFFICE O/P EST LOW 20 MIN: CPT | Performed by: PHYSICIAN ASSISTANT

## 2025-01-29 PROCEDURE — 51798 US URINE CAPACITY MEASURE: CPT | Performed by: PHYSICIAN ASSISTANT

## 2025-01-29 RX ORDER — TROSPIUM CHLORIDE 20 MG/1
20 TABLET, FILM COATED ORAL 2 TIMES DAILY
Qty: 60 TABLET | Refills: 3 | Status: SHIPPED | OUTPATIENT
Start: 2025-01-29

## 2025-01-29 RX ORDER — TROSPIUM CHLORIDE 20 MG/1
20 TABLET, FILM COATED ORAL 2 TIMES DAILY
Qty: 180 TABLET | Refills: 3 | Status: SHIPPED | OUTPATIENT
Start: 2025-01-29 | End: 2025-01-29

## 2025-01-29 NOTE — PROGRESS NOTES
Name: Alex Baptiste      : 1950      MRN: [id number]  Encounter Provider: Diana Hernandez PA-C  Encounter Date: 2025   Encounter department: [facility] [place]  :  Assessment & Plan  Benign localized prostatic hyperplasia with lower urinary tract symptoms (LUTS)  Continue Flomax    Orders:    POCT Measure PVR    Urge urinary incontinence  Reviewed conservative measures   PVR today 32 mL  Side effects with Myrbetriq and Gemtesa too costly  Trial of tropsium   Orders:    trospium chloride (SANCTURA) 20 mg tablet; Take 1 tablet (20 mg total) by mouth 2 (two) times a day    Severe obesity (BMI >= 40) ([facility])             History of Present Illness   Alex Baptiste is a 74 y.o. male who presents for follow up.     Patient had recent cystoscopy TRUS showing no significant obstruction with recommendations for conservative measures and alpha blockade for symptoms.  Patient presents today with continuation of symptoms and would like to try OAB medication.  Previously Myrbetriq caused side effects and Gemtesa was too costly.  States he continues with urgency and urge urinary incontinence which is quite bothersome for him.    AUA SYMPTOM SCORE      Flowsheet Row Most Recent Value   AUA SYMPTOM SCORE    How often have you had a sensation of not emptying your bladder completely after you finished urinating? 1 (P)     How often have you had to urinate again less than two hours after you finished urinating? 1 (P)     How often have you found you stopped and started again several times when you urinate? 2 (P)     How often have you found it difficult to postpone urination? 4 (P)     How often have you had a weak urinary stream? 1 (P)     How often have you had to push or strain to begin urination? 1 (P)     How many times did you most typically get up to urinate from the time you went to bed at night until the time you got up in the morning? 1 (P)     Quality of Life: If you were to spend  "the rest of your life with your urinary condition just the way it is now, how would you feel about that? 6 (P)     AUA SYMPTOM SCORE 11 (P)            Review of Systems   Constitutional:  Negative for activity change, appetite change, chills and fever.   HENT:  Negative for congestion and trouble swallowing.    Respiratory:  Negative for cough and shortness of breath.    Cardiovascular:  Negative for chest pain, palpitations and leg swelling.   Gastrointestinal:  Negative for abdominal pain, constipation, diarrhea, nausea and vomiting.   Genitourinary:  Positive for frequency and urgency. Negative for difficulty urinating, dysuria, flank pain and hematuria.   Musculoskeletal:  Negative for back pain and gait problem.   Skin:  Negative for wound.   Allergic/Immunologic: Negative for immunocompromised state.   Neurological:  Negative for dizziness and syncope.   Hematological:  Does not bruise/bleed easily.   Psychiatric/Behavioral:  Negative for confusion.    All other systems reviewed and are negative.         Objective   /82 (Patient Position: Sitting, Cuff Size: Standard)   Pulse 75   Temp 97.8 °F (36.6 °C) (Temporal)   Ht 5' 10\" (1.778 m)   Wt 123 kg (272 lb)   SpO2 98%   BMI 39.03 kg/m²     Physical Exam  Constitutional:       Appearance: Normal appearance.   HENT:      Head: Normocephalic.      Nose: Nose normal.      Mouth/Throat:      Pharynx: Oropharynx is clear.   Eyes:      Extraocular Movements: Extraocular movements intact.      Pupils: Pupils are equal, round, and reactive to light.   Pulmonary:      Effort: Pulmonary effort is normal.   Musculoskeletal:         General: Normal range of motion.      Cervical back: Normal range of motion.   Skin:     General: Skin is warm.   Neurological:      General: No focal deficit present.      Mental Status: He is alert and oriented to person, place, and time. Mental status is at baseline.   Psychiatric:         Mood and Affect: Mood normal.         " Behavior: Behavior normal.         Thought Content: Thought content normal.         Judgment: Judgment normal.          Results  Lab Results   Component Value Date    PSA 0.31 01/30/2024    PSA 0.1 03/21/2022    PSA 0.3 05/05/2021     Lab Results   Component Value Date    CALCIUM 10.1 07/12/2024    K 4.1 07/12/2024    CO2 30 07/12/2024     07/12/2024    BUN 13 07/12/2024    CREATININE 0.80 07/12/2024     Lab Results   Component Value Date    WBC 4.33 07/12/2024    HGB 11.9 (L) 07/12/2024    HCT 35.2 (L) 07/12/2024     (H) 07/12/2024     (L) 07/12/2024       Office Urine Dip  Recent Results (from the past hour)   POCT Measure PVR    Collection Time: 01/29/25  2:51 PM   Result Value Ref Range    POST-VOID RESIDUAL VOLUME, ML POC 32 mL   ]

## 2025-02-03 ENCOUNTER — RESULTS FOLLOW-UP (OUTPATIENT)
Dept: CARDIOLOGY CLINIC | Facility: CLINIC | Age: 75
End: 2025-02-03

## 2025-02-03 ENCOUNTER — HOSPITAL ENCOUNTER (OUTPATIENT)
Dept: NON INVASIVE DIAGNOSTICS | Facility: CLINIC | Age: 75
Discharge: HOME | End: 2025-02-03
Payer: COMMERCIAL

## 2025-02-03 VITALS
DIASTOLIC BLOOD PRESSURE: 82 MMHG | HEART RATE: 89 BPM | WEIGHT: 272 LBS | BODY MASS INDEX: 38.94 KG/M2 | HEIGHT: 70 IN | SYSTOLIC BLOOD PRESSURE: 138 MMHG

## 2025-02-03 DIAGNOSIS — I10 PRIMARY HYPERTENSION: ICD-10-CM

## 2025-02-03 DIAGNOSIS — I50.9 CONGESTIVE HEART FAILURE, UNSPECIFIED HF CHRONICITY, UNSPECIFIED HEART FAILURE TYPE (HCC): ICD-10-CM

## 2025-02-03 LAB
AORTIC ROOT: 3.8 CM
BSA FOR ECHO PROCEDURE: 2.38 M2
E WAVE DECELERATION TIME: 168 MS
E/A RATIO: 0.73
FRACTIONAL SHORTENING: 33 (ref 28–44)
INTERVENTRICULAR SEPTUM IN DIASTOLE (PARASTERNAL SHORT AXIS VIEW): 1.6 CM
INTERVENTRICULAR SEPTUM: 1.6 CM (ref 0.6–1.1)
LAAS-AP2: 30.9 CM2
LAAS-AP4: 31.2 CM2
LEFT ATRIUM SIZE: 4.1 CM
LEFT ATRIUM VOLUME (MOD BIPLANE): 110 ML
LEFT ATRIUM VOLUME INDEX (MOD BIPLANE): 46.2 ML/M2
LEFT INTERNAL DIMENSION IN SYSTOLE: 3.5 CM (ref 2.1–4)
LEFT VENTRICULAR INTERNAL DIMENSION IN DIASTOLE: 5.2 CM (ref 3.5–6)
LEFT VENTRICULAR POSTERIOR WALL IN END DIASTOLE: 1.5 CM
LEFT VENTRICULAR STROKE VOLUME: 77 ML
LV EF US.2D.A4C+ESTIMATED: 67 %
LVSV (TEICH): 77 ML
MV E'TISSUE VEL-SEP: 6 CM/S
MV PEAK A VEL: 1.13 M/S
MV PEAK E VEL: 83 CM/S
MV STENOSIS PRESSURE HALF TIME: 49 MS
MV VALVE AREA P 1/2 METHOD: 4.49
RIGHT ATRIUM AREA SYSTOLE A4C: 19 CM2
RIGHT VENTRICLE ID DIMENSION: 3.7 CM
SINOTUBULAR JUNCTION: 3.2 CM
SL CV LEFT ATRIUM LENGTH A2C: 7.7 CM
SL CV LV EF: 60
SL CV PED ECHO LEFT VENTRICLE DIASTOLIC VOLUME (MOD BIPLANE) 2D: 128 ML
SL CV PED ECHO LEFT VENTRICLE SYSTOLIC VOLUME (MOD BIPLANE) 2D: 51 ML
STJ: 3.2 CM
TRICUSPID ANNULAR PLANE SYSTOLIC EXCURSION: 1.7 CM

## 2025-02-03 PROCEDURE — 93306 TTE W/DOPPLER COMPLETE: CPT

## 2025-02-03 PROCEDURE — 93306 TTE W/DOPPLER COMPLETE: CPT | Performed by: INTERNAL MEDICINE

## 2025-02-27 DIAGNOSIS — N40.1 BENIGN LOCALIZED PROSTATIC HYPERPLASIA WITH LOWER URINARY TRACT SYMPTOMS (LUTS): ICD-10-CM

## 2025-02-27 RX ORDER — TAMSULOSIN HYDROCHLORIDE 0.4 MG/1
CAPSULE ORAL
Qty: 30 CAPSULE | Refills: 5 | Status: SHIPPED | OUTPATIENT
Start: 2025-02-27

## 2025-03-25 ENCOUNTER — OFFICE VISIT (OUTPATIENT)
Dept: CARDIOLOGY CLINIC | Facility: CLINIC | Age: 75
End: 2025-03-25
Payer: COMMERCIAL

## 2025-03-25 VITALS
BODY MASS INDEX: 37.22 KG/M2 | OXYGEN SATURATION: 95 % | WEIGHT: 260 LBS | HEART RATE: 69 BPM | HEIGHT: 70 IN | SYSTOLIC BLOOD PRESSURE: 128 MMHG | DIASTOLIC BLOOD PRESSURE: 50 MMHG | RESPIRATION RATE: 16 BRPM

## 2025-03-25 DIAGNOSIS — I10 PRIMARY HYPERTENSION: Primary | ICD-10-CM

## 2025-03-25 PROCEDURE — 99213 OFFICE O/P EST LOW 20 MIN: CPT | Performed by: STUDENT IN AN ORGANIZED HEALTH CARE EDUCATION/TRAINING PROGRAM

## 2025-03-25 RX ORDER — ROSUVASTATIN CALCIUM 5 MG/1
1 TABLET, COATED ORAL DAILY
COMMUNITY
Start: 2025-03-20

## 2025-03-25 NOTE — PROGRESS NOTES
[facility] Cardiology  Follow up note  Alex Baptiste 74 y.o. male MRN: [id number]        1. Primary hypertension      Assessment & Plan  Primary hypertension  Since the patient's last visit he is lost about 12 pounds, blood pressure is much better controlled.  He will start physical therapy program.  Will continue to have him monitor his weight/BPs at home as we may need to down titrate his antihypertensive regiment.  Follow-up in 6 months.          HPI:   Alex Baptiste is a 74 y.o. year old maleistory of heart failure with preserved ejection fraction, CAD status post PCI/CABG times 3/20/2017, hypertension, hyperlipidemia, insulin-dependent diabetes with neuropathy, Parkinson disease with mild cognitive impairment     Since her last visit the patient has lost 12 pounds through diet and GLP-1 agonist appears his blood pressure is much better controlled.  He is about to start physical therapy program.  I advised him to continue monitoring his weights and blood pressures as we may be able to down titrate his antihypertensive regimen if he continues on this trajectory.    Denies palpitations, chest pain, dizziness, lightheadedness.  He does have chronic dyspnea exertion but with weight loss he reports that its improved.  He also has chronic edema which is unchanged.    We obtained an echocardiogram which showed an LVEF of 60%, grade 1 diastolic dysfunction, normal RV size and function, mild left atrial dilatation, aortic sclerosis without stenosis.    Review of Systems    Past Medical History:   Diagnosis Date    Arthritis     Hyperlipidemia     Hypertension     Poor circulation     Sleep apnea     Type 2 diabetes mellitus (HCC)      Social History     Substance and Sexual Activity   Alcohol Use Yes    Comment: 2 drinks daily     Social History     Substance and Sexual Activity   Drug Use No     Social History     Tobacco Use   Smoking Status Never    Passive exposure: Past   Smokeless Tobacco Never        Allergies:  Allergies   Allergen Reactions    Penicillins      Other reaction(s): Other, Unknown       Medications:     Current Outpatient Medications:     amLODIPine (NORVASC) 10 mg tablet, Take 1 tablet (10 mg total) by mouth daily, Disp: 90 tablet, Rfl: 3    aspirin 81 MG tablet, Take 1 tablet by mouth daily, Disp: , Rfl:     Blood Glucose Monitoring Suppl (ACURA BLOOD GLUCOSE METER) w/Device KIT, by Other route. Use as instructed, Disp: , Rfl:     carbidopa-levodopa (Sinemet)  mg per tablet, Take 1 tablet by mouth 3 (three) times a day Take 1/2 tablet three times a day for a week, then take 1 tablet three times a day thereafter., Disp: 90 tablet, Rfl: 5    carvedilol (COREG) 25 mg tablet, Take 1 tablet (25 mg total) by mouth 2 (two) times a day with meals, Disp: 60 tablet, Rfl: 1    donepezil (ARICEPT) 10 mg tablet, TAKE 1 TABLET NIGHTLY, Disp: 90 tablet, Rfl: 3    DULoxetine (CYMBALTA) 60 mg delayed release capsule, Take 1 capsule (60 mg total) by mouth daily TAKE 1 CAPSULE DAILY, Disp: 90 capsule, Rfl: 1    gabapentin (NEURONTIN) 600 MG tablet, Take 1 tablet (600 mg total) by mouth 4 (four) times a day, Disp: 360 tablet, Rfl: 1    glipiZIDE (GLUCOTROL) 10 mg tablet, Take 10 mg by mouth 2 (two) times a day, Disp: , Rfl:     hydrALAZINE (APRESOLINE) 25 mg tablet, Take 1 tablet (25 mg total) by mouth 3 (three) times a day, Disp: 270 tablet, Rfl: 3    hydrochlorothiazide (HYDRODIURIL) 25 mg tablet, TAKE 1 TABLET DAILY, Disp: 60 tablet, Rfl: 5    hydroxychloroquine (PLAQUENIL) 200 mg tablet, TAKE 2 TABLETS DAILY, Disp: 180 tablet, Rfl: 1    insulin glargine (LANTUS SOLOSTAR) 100 units/mL injection pen, Inject 40 Units under the skin daily at bedtime , Disp: , Rfl:     irbesartan (AVAPRO) 300 mg tablet, Take 1 tablet (300 mg total) by mouth daily, Disp: 90 tablet, Rfl: 3    isosorbide mononitrate (IMDUR) 30 mg 24 hr tablet, Take 1 tablet (30 mg total) by mouth daily, Disp: 90 tablet, Rfl: 3    OneTouch  Ultra test strip, , Disp: , Rfl:     OXcarbazepine (TRILEPTAL) 600 mg tablet, Take 1 tablet (600 mg total) by mouth daily at bedtime, Disp: 90 tablet, Rfl: 1    Ozempic, 0.25 or 0.5 MG/DOSE, 2 MG/3ML injection pen, inject 0.25 milligrams subcutaneously weekly, Disp: , Rfl:     rosuvastatin (CRESTOR) 5 mg tablet, Take 1 tablet by mouth in the morning, Disp: , Rfl:     trospium chloride (SANCTURA) 20 mg tablet, Take 1 tablet (20 mg total) by mouth 2 (two) times a day, Disp: 60 tablet, Rfl: 3    TRUEPLUS PEN NEEDLES 32G X 4 MM MISC, , Disp: , Rfl:     ergocalciferol (VITAMIN D2) 50,000 units, Take 50,000 Units by mouth once a week (Patient not taking: Reported on 1/8/2025), Disp: , Rfl:     tamsulosin (FLOMAX) 0.4 mg, take 1 capsule by mouth EVERY EVENING WITH DINNER (Patient not taking: Reported on 3/25/2025), Disp: 30 capsule, Rfl: 5      Vitals:    03/25/25 1444   BP: 128/50   Pulse: 69   Resp: 16   SpO2: 95%     Weight (last 2 days)       Date/Time Weight    03/25/25 1444 118 (260)          Physical Exam  Vitals and nursing note reviewed.   Constitutional:       General: He is not in acute distress.     Appearance: He is well-developed.   HENT:      Head: Normocephalic and atraumatic.   Eyes:      Conjunctiva/sclera: Conjunctivae normal.   Cardiovascular:      Rate and Rhythm: Normal rate and regular rhythm.      Heart sounds: No murmur heard.  Pulmonary:      Effort: Pulmonary effort is normal. No respiratory distress.      Breath sounds: Normal breath sounds.   Abdominal:      Palpations: Abdomen is soft.      Tenderness: There is no abdominal tenderness.   Musculoskeletal:      Cervical back: Neck supple.      Right lower leg: Edema present.      Left lower leg: Edema present.   Skin:     General: Skin is warm and dry.      Capillary Refill: Capillary refill takes less than 2 seconds.   Neurological:      Mental Status: He is alert.   Psychiatric:         Mood and Affect: Mood normal.   Laboratory  "Studies:    Laboratory studies personally reviewed    Cardiac testing:     EKG reviewed personally:   5/9/2022: Sinus rhythm, right bundle branch block, LAFB     Echocardiogram:  TTE 5/2022: EF 55%, G2dd, RV function was mildly reduced, mild MR, TR     Stress tests:  Pharmacologic MPI 5/2022: anterior infarct without evidence of ischemia.     Catheterization:  CAD s/p remote PCI and CABG x3 in 2017 at [facility].    Cathryn Cedillo MD    Portions of the record may have been created with voice recognition software.  Occasional wrong word or \"sound a like\" substitutions may have occurred due to the inherent limitations of voice recognition software.  Read the chart carefully and recognize, using context, where substitutions have occurred.   "

## 2025-03-25 NOTE — PROGRESS NOTES
Name: Alex Baptiste      : 1950      MRN: [id number]  Encounter Provider: Diana Hernandez PA-C  Encounter Date: 3/26/2025   Encounter department: [facility]OLOG[facility] [place]  :  Assessment & Plan  Benign localized prostatic hyperplasia with lower urinary tract symptoms (LUTS)  No longer taking Flomax   Orders:    POCT Measure PVR    Urgency of urination  Reviewed conservative measures   PVR today 32 mL  Side effects with Myrbetriq and Gemtesa too costly  Doing well with trospium. Will continue  Continues with some urgency. Offered bladder botox. Patient declines at this time  Follow up in 6 months for recheck            History of Present Illness   Alex Baptiste is a 74 y.o. male who presents for follow up.    Patient had recent cystoscopy TRUS showing no significant obstruction with recommendations for conservative measures and alpha blockade for symptoms. Previously Myrbetriq caused side effects and Gemtesa was too costly. Patient was started on trospium for OAB symptoms and presents today for recheck. No longer taking Flomax. Has had benefit with trospium. Continues with some urgency. BP elevated as he did not take BP meds yet today. Saw cardiology in office yesterday.     AUA SYMPTOM SCORE      Flowsheet Row Most Recent Value   AUA SYMPTOM SCORE    How often have you had a sensation of not emptying your bladder completely after you finished urinating? 1 (P)     How often have you had to urinate again less than two hours after you finished urinating? 2 (P)     How often have you found you stopped and started again several times when you urinate? 2 (P)     How often have you found it difficult to postpone urination? 4 (P)     How often have you had a weak urinary stream? 1 (P)     How often have you had to push or strain to begin urination? 0 (P)     How many times did you most typically get up to urinate from the time you went to bed at night until the time you got up in the  morning? 5 (P)     Quality of Life: If you were to spend the rest of your life with your urinary condition just the way it is now, how would you feel about that? 5 (P)     AUA SYMPTOM SCORE 15 (P)            Review of Systems   Constitutional:  Negative for activity change, appetite change, chills and fever.   HENT:  Negative for congestion and trouble swallowing.    Respiratory:  Negative for cough and shortness of breath.    Cardiovascular:  Negative for chest pain, palpitations and leg swelling.   Gastrointestinal:  Negative for abdominal pain, constipation, diarrhea, nausea and vomiting.   Genitourinary:  Positive for urgency. Negative for difficulty urinating, dysuria, flank pain, frequency and hematuria.   Musculoskeletal:  Negative for back pain and gait problem.   Skin:  Negative for wound.   Allergic/Immunologic: Negative for immunocompromised state.   Neurological:  Negative for dizziness and syncope.   Hematological:  Does not bruise/bleed easily.   Psychiatric/Behavioral:  Negative for confusion.    All other systems reviewed and are negative.         Objective   There were no vitals taken for this visit.    Physical Exam  Constitutional:       Appearance: Normal appearance.   HENT:      Head: Normocephalic.      Nose: Nose normal.      Mouth/Throat:      Pharynx: Oropharynx is clear.   Eyes:      Extraocular Movements: Extraocular movements intact.      Pupils: Pupils are equal, round, and reactive to light.   Pulmonary:      Effort: Pulmonary effort is normal.   Musculoskeletal:         General: Normal range of motion.      Cervical back: Normal range of motion.   Skin:     General: Skin is warm.   Neurological:      General: No focal deficit present.      Mental Status: He is alert and oriented to person, place, and time. Mental status is at baseline.   Psychiatric:         Mood and Affect: Mood normal.         Behavior: Behavior normal.         Thought Content: Thought content normal.          Judgment: Judgment normal.           Results   Lab Results   Component Value Date    PSA 0.31 01/30/2024    PSA 0.1 03/21/2022    PSA 0.3 05/05/2021     Lab Results   Component Value Date    CALCIUM 10.1 07/12/2024    K 4.1 07/12/2024    CO2 30 07/12/2024     07/12/2024    BUN 13 07/12/2024    CREATININE 0.80 07/12/2024     Lab Results   Component Value Date    WBC 4.33 07/12/2024    HGB 11.9 (L) 07/12/2024    HCT 35.2 (L) 07/12/2024     (H) 07/12/2024     (L) 07/12/2024       Office Urine Dip  No results found for this or any previous visit (from the past hour).

## 2025-03-25 NOTE — ASSESSMENT & PLAN NOTE
Since the patient's last visit he is lost about 12 pounds, blood pressure is much better controlled.  He will start physical therapy program.  Will continue to have him monitor his weight/BPs at home as we may need to down titrate his antihypertensive regiment.  Follow-up in 6 months.

## 2025-03-26 ENCOUNTER — OFFICE VISIT (OUTPATIENT)
Dept: UROLOGY | Facility: CLINIC | Age: 75
End: 2025-03-26
Payer: COMMERCIAL

## 2025-03-26 VITALS
DIASTOLIC BLOOD PRESSURE: 83 MMHG | BODY MASS INDEX: 37.22 KG/M2 | HEIGHT: 70 IN | SYSTOLIC BLOOD PRESSURE: 175 MMHG | HEART RATE: 78 BPM | WEIGHT: 260 LBS | OXYGEN SATURATION: 97 % | TEMPERATURE: 97.5 F

## 2025-03-26 DIAGNOSIS — N40.1 BENIGN LOCALIZED PROSTATIC HYPERPLASIA WITH LOWER URINARY TRACT SYMPTOMS (LUTS): Primary | ICD-10-CM

## 2025-03-26 DIAGNOSIS — R39.15 URGENCY OF URINATION: ICD-10-CM

## 2025-03-26 LAB — POST-VOID RESIDUAL VOLUME, ML POC: 33 ML

## 2025-03-26 PROCEDURE — 99213 OFFICE O/P EST LOW 20 MIN: CPT | Performed by: PHYSICIAN ASSISTANT

## 2025-03-26 PROCEDURE — 51798 US URINE CAPACITY MEASURE: CPT | Performed by: PHYSICIAN ASSISTANT

## 2025-05-07 ENCOUNTER — OFFICE VISIT (OUTPATIENT)
Dept: NEUROLOGY | Facility: CLINIC | Age: 75
End: 2025-05-07
Payer: COMMERCIAL

## 2025-05-07 VITALS
OXYGEN SATURATION: 95 % | WEIGHT: 270 LBS | SYSTOLIC BLOOD PRESSURE: 150 MMHG | DIASTOLIC BLOOD PRESSURE: 78 MMHG | HEIGHT: 70 IN | HEART RATE: 76 BPM | BODY MASS INDEX: 38.65 KG/M2

## 2025-05-07 DIAGNOSIS — R25.1 TREMOR: Primary | ICD-10-CM

## 2025-05-07 DIAGNOSIS — F40.240 CLAUSTROPHOBIA: ICD-10-CM

## 2025-05-07 DIAGNOSIS — E11.42 DIABETIC POLYNEUROPATHY ASSOCIATED WITH TYPE 2 DIABETES MELLITUS (HCC): ICD-10-CM

## 2025-05-07 DIAGNOSIS — R41.3 MEMORY DIFFICULTY: ICD-10-CM

## 2025-05-07 DIAGNOSIS — G20.A1 PARKINSON DISEASE (HCC): ICD-10-CM

## 2025-05-07 PROCEDURE — 99417 PROLNG OP E/M EACH 15 MIN: CPT | Performed by: STUDENT IN AN ORGANIZED HEALTH CARE EDUCATION/TRAINING PROGRAM

## 2025-05-07 PROCEDURE — G2211 COMPLEX E/M VISIT ADD ON: HCPCS | Performed by: STUDENT IN AN ORGANIZED HEALTH CARE EDUCATION/TRAINING PROGRAM

## 2025-05-07 PROCEDURE — 99215 OFFICE O/P EST HI 40 MIN: CPT | Performed by: STUDENT IN AN ORGANIZED HEALTH CARE EDUCATION/TRAINING PROGRAM

## 2025-05-07 RX ORDER — CARBIDOPA AND LEVODOPA 25; 100 MG/1; MG/1
1 TABLET ORAL 3 TIMES DAILY
Qty: 90 TABLET | Refills: 5 | Status: SHIPPED | OUTPATIENT
Start: 2025-05-07 | End: 2025-11-03

## 2025-05-07 RX ORDER — PRIMIDONE 50 MG/1
50 TABLET ORAL
Qty: 30 TABLET | Refills: 5 | Status: SHIPPED | OUTPATIENT
Start: 2025-05-07 | End: 2025-11-03

## 2025-05-07 RX ORDER — LORAZEPAM 0.5 MG/1
0.5 TABLET ORAL ONCE AS NEEDED
Qty: 2 TABLET | Refills: 0 | Status: SHIPPED | OUTPATIENT
Start: 2025-05-07

## 2025-05-07 NOTE — ASSESSMENT & PLAN NOTE
- patient would like to retry primidone: take 25mg at night for 1 week, then 50mg at night   Orders:    Comprehensive metabolic panel; Future    primidone (MYSOLINE) 50 mg tablet; Take 1 tablet (50 mg total) by mouth daily at bedtime

## 2025-05-07 NOTE — PROGRESS NOTES
Neurology Ambulatory Visit  Name: Alex Baptiste       : 1950       MRN: [id number]   Encounter Provider: Heather Hare MD   Encounter Date: 2025  Encounter department: [facility]    Alex Baptiste is a 75 y.o. male with diabetic neuropathy c/b left foot drop, who presents for follow-up of tremor suspected to be Parkinson's Disease, less likely Essential Tremor. On exam he has a resting tremor of both hands, bradykinesia and rigidity. He has not responded at all to Sinemet. I recommended an MRI brain and a MIGUEL scan (already ordered) however he thinks that the MIGUEL scan will be difficult logistically. He is open to a skin biopsy instead. Due to possible prior response to primidone he would like to retry this.     Assessment & Plan  Tremor  - patient would like to retry primidone: take 25mg at night for 1 week, then 50mg at night   Orders:    Comprehensive metabolic panel; Future    primidone (MYSOLINE) 50 mg tablet; Take 1 tablet (50 mg total) by mouth daily at bedtime    Parkinson disease (HCC)  -change Sinemet to 1 tab TID (currently taking 2 tabs BID)  - will coordinate for skin biopsy  Orders:    carbidopa-levodopa (Sinemet)  mg per tablet; Take 1 tablet by mouth 3 (three) times a day Take 1/2 tablet three times a day for a week, then take 1 tablet three times a day thereafter.    MRI brain without contrast; Future    Memory difficulty  - continue donepezil 10mg   Orders:    Comprehensive metabolic panel; Future    Diabetic polyneuropathy associated with type 2 diabetes mellitus (HCC)  - continue duloxetine 60mg daily  - continue gabapentin 600mg QID  - continue oxcarbazepine 600mg QHS  - check CMP on oxcarbazepine  - referral for aquatic therapy  Lab Results   Component Value Date    HGBA1C 6.6 (H) 2024       Orders:    Ambulatory Referral to Physical Therapy; Future    Claustrophobia    Orders:    LORazepam (Ativan) 0.5 mg tablet; Take 1  tablet (0.5 mg total) by mouth once as needed for anxiety (for MRI) for up to 1 dose     RTC 5 mo        INTERVAL HISTORY    He feels like his symptoms are pretty constant during the day. His Sinemet has not helped at all. He takes Sinemet 2 tablets twice a day, at 8am and 8pm. He has a MIGUEL scan ordered. He says that a skin biopsy would be easier. He is no longer on primidone.     He continues to have neuropathic pain in his feet. The pain is an 8-9/10. It feels like a burning/stabbing pain. He feels like Cymbalta helps the most. The Trileptal also helps.     Past work-up:  MRI lumbosacral plexus wwo 9/13/18:  Unremarkable MRI of the lumbosacral plexus.  L4-5 degenerative disc disease with loss of disc height, diffuse annular bulging and mild endplate hypertrophic change resulting in mild canal stenosis and foraminal narrowing.  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  Lumbar polyradiculopathy versus lumbar plexopathy is also suspected.     Past Medical History:    Past Medical History:   Diagnosis Date    Arthritis     Hyperlipidemia     Hypertension     Parkinson disease (HCC) 01/01/2025    Poor circulation     Sleep apnea     Type 2 diabetes mellitus (HCC)      Past Surgical History:   Procedure Laterality Date    BACK SURGERY      CORONARY ARTERY BYPASS GRAFT  07/22/2017    HEMORROIDECTOMY      TONSILLECTOMY      WRIST SURGERY         Family History:  Family History   Problem Relation Age of Onset    Diabetes type II Mother     Hypertension Mother     Breast cancer Sister     Lung cancer Sister     Multiple endocrine neoplasia Brother     Breast cancer Sister         Social History:  Social History     Tobacco Use    Smoking status: Never     Passive exposure: Past    Smokeless tobacco: Never   Vaping Use    Vaping status: Never Used   Substance Use Topics    Alcohol use: Yes     Comment: 2 drinks daily    Drug use: No        Allergies:  Allergies   Allergen Reactions    Penicillins      Other reaction(s): Other, Unknown       Medications:    Current Outpatient Medications:     amLODIPine (NORVASC) 10 mg tablet, Take 1 tablet (10 mg total) by mouth daily, Disp: 90 tablet, Rfl: 3    aspirin 81 MG tablet, Take 1 tablet by mouth daily, Disp: , Rfl:     Blood Glucose Monitoring Suppl (ACURA BLOOD GLUCOSE METER) w/Device KIT, by Other route. Use as instructed, Disp: , Rfl:     carbidopa-levodopa (Sinemet)  mg per tablet, Take 1 tablet by mouth 3 (three) times a day Take 1/2 tablet three times a day for a week, then take 1 tablet three times a day thereafter., Disp: 90 tablet, Rfl: 5    carvedilol (COREG) 25 mg tablet, Take 1 tablet (25 mg total) by mouth 2 (two) times a day with meals, Disp: 60 tablet, Rfl: 1    donepezil (ARICEPT) 10 mg tablet, TAKE 1 TABLET NIGHTLY, Disp: 90 tablet, Rfl: 3    DULoxetine (CYMBALTA) 60 mg delayed release capsule, Take 1 capsule (60 mg total) by mouth daily TAKE 1 CAPSULE DAILY, Disp: 90 capsule, Rfl: 1    ergocalciferol (VITAMIN D2) 50,000 units, Take 50,000 Units by mouth once a week, Disp: , Rfl:     gabapentin (NEURONTIN) 600 MG tablet, Take 1 tablet (600 mg total) by mouth 4 (four) times a day, Disp: 360 tablet, Rfl: 1    glipiZIDE (GLUCOTROL) 10 mg tablet, Take 10 mg by mouth 2 (two) times a day, Disp: , Rfl:     hydrALAZINE (APRESOLINE) 25 mg tablet, Take 1 tablet (25 mg total) by mouth 3 (three) times a day, Disp: 270 tablet, Rfl: 3    hydrochlorothiazide (HYDRODIURIL) 25 mg tablet, TAKE 1 TABLET DAILY, Disp: 60 tablet, Rfl: 5    hydroxychloroquine (PLAQUENIL) 200 mg tablet, TAKE 2 TABLETS DAILY, Disp: 180 tablet, Rfl: 1     "insulin glargine (LANTUS SOLOSTAR) 100 units/mL injection pen, Inject 40 Units under the skin daily at bedtime , Disp: , Rfl:     irbesartan (AVAPRO) 300 mg tablet, Take 1 tablet (300 mg total) by mouth daily, Disp: 90 tablet, Rfl: 3    isosorbide mononitrate (IMDUR) 30 mg 24 hr tablet, Take 1 tablet (30 mg total) by mouth daily, Disp: 90 tablet, Rfl: 3    LORazepam (Ativan) 0.5 mg tablet, Take 1 tablet (0.5 mg total) by mouth once as needed for anxiety (for MRI) for up to 1 dose, Disp: 2 tablet, Rfl: 0    OneTouch Ultra test strip, , Disp: , Rfl:     OXcarbazepine (TRILEPTAL) 600 mg tablet, Take 1 tablet (600 mg total) by mouth daily at bedtime, Disp: 90 tablet, Rfl: 1    Ozempic, 0.25 or 0.5 MG/DOSE, 2 MG/3ML injection pen, inject 0.25 milligrams subcutaneously weekly (Patient taking differently: Inject 0.5 mg under the skin every 7 days), Disp: , Rfl:     primidone (MYSOLINE) 50 mg tablet, Take 1 tablet (50 mg total) by mouth daily at bedtime, Disp: 30 tablet, Rfl: 5    TRUEPLUS PEN NEEDLES 32G X 4 MM MISC, , Disp: , Rfl:     rosuvastatin (CRESTOR) 5 mg tablet, Take 1 tablet by mouth in the morning, Disp: , Rfl:     trospium chloride (SANCTURA) 20 mg tablet, Take 1 tablet (20 mg total) by mouth 2 (two) times a day (Patient not taking: Reported on 5/7/2025), Disp: 60 tablet, Rfl: 3      OBJECTIVE  /78 (BP Location: Left arm, Patient Position: Sitting, Cuff Size: Standard)   Pulse 76   Ht 5' 10\" (1.778 m)   Wt 122 kg (270 lb)   SpO2 95%   BMI 38.74 kg/m²      Labs  I have reviewed pertinent labs:  CBC:   Lab Results   Component Value Date    WBC 4.33 07/12/2024    RBC 3.51 (L) 07/12/2024    HGB 11.9 (L) 07/12/2024    HCT 35.2 (L) 07/12/2024     (H) 07/12/2024     (L) 07/12/2024    MCH 33.9 07/12/2024    MCHC 33.8 07/12/2024    RDW 14.9 07/12/2024    MPV 11.1 07/12/2024    NEUTROABS 1.64 (L) 03/18/2021     CMP:   Lab Results   Component Value Date    SODIUM 141 07/12/2024    K 4.1 07/12/2024 "     07/12/2024    CO2 30 07/12/2024    AGAP 8 07/12/2024    BUN 13 07/12/2024    CREATININE 0.80 07/12/2024    GLUC 175 (H) 05/09/2024    GLUF 116 (H) 07/12/2024    CALCIUM 10.1 07/12/2024    AST 17 07/12/2024    ALT 7 07/12/2024    ALKPHOS 78 07/12/2024    TP 6.4 07/12/2024    ALB 3.9 07/12/2024    TBILI 1.06 (H) 07/12/2024    EGFR 88 07/12/2024       Lab Results   Component Value Date/Time    [id number] 397 07/03/2021 08:06 AM    BRCK04WMRFQO 21.2 (L) 01/06/2023 08:49 AM    TSH 5.31 11/14/2023 10:01 AM    XIH7SBKFMCZQ 3.707 07/12/2024 12:21 PM    FREET4 0.62 07/12/2024 12:21 PM    FREET4 0.77 05/20/2020 09:04 AM    HGBA1C 6.6 (H) 11/19/2024 12:10 PM    FOLATE 19.8 (H) 07/30/2020 08:20 AM    RF Negative 08/15/2019 10:57 AM    CRP <3.0 08/15/2019 10:57 AM    ESR 5 08/15/2019 10:57 AM    SSAAB <0.2 08/15/2019 10:57 AM    SSBAB <0.2 08/15/2019 10:57 AM              General Exam  GENERAL APPEARANCE:  No distress, alert, interactive and cooperative.  CARDIOVASCULAR: Warm and well perfused  LUNGS: normal work of breathing on room air  EXTREMITIES: no peripheral edema     Neurologic Exam  Mental Status: Alert and oriented to person, place, and time.   Language: Fluent, comprehension intact.  Cranial Nerves: EOMI with no nystagmus. Face is symmetric. No dysarthria. Hearing is intact to conversation.    Motor:  Antigravity in all extremities.      R L  Deltoid:   5 5  Biceps:   5 5  Triceps:   5 5  Wrist extension: 5 5  Hand :  5 5  Hip extension:  5 5  Knee extension: 4+ 5  Knee flexion:  4+ 5-  Dorsiflexion:  4 3    UPDRS  Time of medication: 8AM  Time of exam: 10:20AM    Speech: 0   Facial expression: 0.5    Rigidity:  (RUE)1  (LUE)0.5   (RLE)0   (LLE) 1   Finger Tapping:  (R)1.5   (L)1   Hand movements:  (R)0    (L)0   Pronation-Supination: (R)0   (L)1   Toe Tapping:   (R)1    (L)*foot drop   Gait: 1   Freezing of Gait: 0   Posture: 0   Body Bradykinesia: 0   Postural tremor:  (R)2    (L)2    Kinetic tremor:   (R)1    (L)1   Rest tremor:   (RUE)2  (LUE)2       (RLE)0   (LLE)0    (lip/jaw)0      Sensory: Decreased sensation to light touch in the left hand in digits 4 and 5. Decreased sensation to light touch in the right hand, digits 3 and 4. Decreased vibration in the left hand distal fingers. Decreased light touch in lower extremities up to knee. Decreased vibration in lower extremities up to knee.     Administrative Statements   The total amount of time spent with the patient and on chart review and documentation was 60 minutes. Issues addressed during this visit included counseling on diagnosis and prognosis, counseling on medical management, counseling on medication side effects, and discussion of exam findings.

## 2025-05-12 ENCOUNTER — TELEPHONE (OUTPATIENT)
Dept: NEUROLOGY | Facility: CLINIC | Age: 75
End: 2025-05-12

## 2025-05-15 NOTE — TELEPHONE ENCOUNTER
----- Message from Gabrielle SMITH sent at 5/9/2025  5:28 PM EDT -----  Regarding: FW: Syn One biopsy    ----- Message -----  From: Amie Cho RN  Sent: 5/9/2025   4:00 PM EDT  To: [facility]  Subject: FW: Syn One biopsy                                 ----- Message -----  From: Marietta Padilla MD  Sent: 5/9/2025   1:42 PM EDT  To: Nabila Bueno MA; #  Subject: RE: Syn One biopsy                               No, you dont need to.  ----- Message -----  From: Heather Hare MD  Sent: 5/9/2025  10:35 AM EDT  To: Marietta Padilla MD; Nabila Bueno MA; #  Subject: RE: Syn One biopsy                               Thank you! Do you know if there is an order I need to put in?  ----- Message -----  From: Marietta Padilla MD  Sent: 5/8/2025  10:11 PM EDT  To: Nabila Bueno MA; #  Subject: RE: Syn One biopsy                               Hi Heather, yes we do.     Copying our clinical team, we need to get auth from insurance first, once approved, patient can be scheduled.     Thanks  ----- Message -----  From: Heather Hare MD  Sent: 5/7/2025   4:55 PM EDT  To: Marietta Padilla MD  Subject: Syn One biopsy                                   Hi Marietta, do you do skin biopsies for parkinson's disease? I was hoping to get this patient set up for one, he thinks that a MIGUEL scan is going to be too difficult for him logistically.  
C[employer] form completed.     Please fax to [employer] along with the following information:    Last OV note  Patient demographic sheet  Insurance cards front and back      [employer] [phone] FAX#    [employer] have emailed you the completed form for [employer] please fax it to the above Fax number along with the requested information listed above.    Please scan into chart when complete, thank you!    STEFANIA'chayo Heaton for scheduling Syn One Biopsy once Auth is received.  Thank you!  
Received and uploaded completed [employer] form to pt's chart. faxed all required paperwork to number listed in previous encounter.     [phone]     Please let me know if there is anything else I can do to assist.       
Statement Selected

## 2025-05-19 ENCOUNTER — HOSPITAL ENCOUNTER (INPATIENT)
Facility: HOSPITAL | Age: 75
LOS: 5 days | Discharge: HOME | DRG: 291 | End: 2025-05-24
Attending: EMERGENCY MEDICINE | Admitting: STUDENT IN AN ORGANIZED HEALTH CARE EDUCATION/TRAINING PROGRAM
Payer: COMMERCIAL

## 2025-05-19 ENCOUNTER — APPOINTMENT (EMERGENCY)
Dept: RADIOLOGY | Facility: HOSPITAL | Age: 75
DRG: 291 | End: 2025-05-19
Payer: COMMERCIAL

## 2025-05-19 DIAGNOSIS — I50.33 ACUTE ON CHRONIC HEART FAILURE WITH PRESERVED EJECTION FRACTION (HFPEF) (HCC): ICD-10-CM

## 2025-05-19 DIAGNOSIS — I87.2 VENOUS STASIS DERMATITIS OF BOTH LOWER EXTREMITIES: ICD-10-CM

## 2025-05-19 DIAGNOSIS — R63.5 WEIGHT GAIN: ICD-10-CM

## 2025-05-19 DIAGNOSIS — R60.0 BILATERAL LOWER EXTREMITY EDEMA: ICD-10-CM

## 2025-05-19 DIAGNOSIS — I50.9 CHF EXACERBATION (HCC): Primary | ICD-10-CM

## 2025-05-19 DIAGNOSIS — S81.802A OPEN WOUND OF BOTH LOWER EXTREMITIES, INITIAL ENCOUNTER: ICD-10-CM

## 2025-05-19 DIAGNOSIS — L03.116 CELLULITIS OF LEFT LOWER EXTREMITY: ICD-10-CM

## 2025-05-19 DIAGNOSIS — R06.09 DYSPNEA ON EXERTION: ICD-10-CM

## 2025-05-19 DIAGNOSIS — S81.801A OPEN WOUND OF BOTH LOWER EXTREMITIES, INITIAL ENCOUNTER: ICD-10-CM

## 2025-05-19 PROBLEM — F32.A DEPRESSION: Status: ACTIVE | Noted: 2025-05-19

## 2025-05-19 PROBLEM — G20.A1 PARKINSON DISEASE (HCC): Status: ACTIVE | Noted: 2025-05-19

## 2025-05-19 PROBLEM — G20.A1 PARKINSON'S DISEASE WITHOUT DYSKINESIA OR FLUCTUATING MANIFESTATIONS (HCC): Status: ACTIVE | Noted: 2025-05-19

## 2025-05-19 PROBLEM — E78.5 HYPERLIPIDEMIA: Status: ACTIVE | Noted: 2025-05-19

## 2025-05-19 LAB
2HR DELTA HS TROPONIN: 2 NG/L
4HR DELTA HS TROPONIN: 3 NG/L
ALBUMIN SERPL BCG-MCNC: 3.8 G/DL (ref 3.5–5)
ALP SERPL-CCNC: 85 U/L (ref 34–104)
ALT SERPL W P-5'-P-CCNC: 10 U/L (ref 7–52)
ANION GAP SERPL CALCULATED.3IONS-SCNC: 5 MMOL/L (ref 4–13)
AST SERPL W P-5'-P-CCNC: 25 U/L (ref 13–39)
ATRIAL RATE: 57 BPM
BASOPHILS # BLD AUTO: 0.02 THOUSANDS/ÂΜL (ref 0–0.1)
BASOPHILS NFR BLD AUTO: 0 % (ref 0–1)
BILIRUB DIRECT SERPL-MCNC: 0.31 MG/DL (ref 0–0.2)
BILIRUB SERPL-MCNC: 1.48 MG/DL (ref 0.2–1)
BNP SERPL-MCNC: 155 PG/ML (ref 0–100)
BUN SERPL-MCNC: 19 MG/DL (ref 5–25)
CALCIUM SERPL-MCNC: 9.1 MG/DL (ref 8.4–10.2)
CARDIAC TROPONIN I PNL SERPL HS: 24 NG/L (ref ?–50)
CARDIAC TROPONIN I PNL SERPL HS: 26 NG/L (ref ?–50)
CARDIAC TROPONIN I PNL SERPL HS: 27 NG/L (ref ?–50)
CHLORIDE SERPL-SCNC: 104 MMOL/L (ref 96–108)
CO2 SERPL-SCNC: 27 MMOL/L (ref 21–32)
CREAT SERPL-MCNC: 0.82 MG/DL (ref 0.6–1.3)
EOSINOPHIL # BLD AUTO: 0.02 THOUSAND/ÂΜL (ref 0–0.61)
EOSINOPHIL NFR BLD AUTO: 0 % (ref 0–6)
ERYTHROCYTE [DISTWIDTH] IN BLOOD BY AUTOMATED COUNT: 14.9 % (ref 11.6–15.1)
GFR SERPL CREATININE-BSD FRML MDRD: 86 ML/MIN/1.73SQ M
GLUCOSE SERPL-MCNC: 131 MG/DL (ref 65–140)
GLUCOSE SERPL-MCNC: 166 MG/DL (ref 65–140)
GLUCOSE SERPL-MCNC: 88 MG/DL (ref 65–140)
HCT VFR BLD AUTO: 31.2 % (ref 36.5–49.3)
HGB BLD-MCNC: 10.6 G/DL (ref 12–17)
IMM GRANULOCYTES # BLD AUTO: 0.05 THOUSAND/UL (ref 0–0.2)
IMM GRANULOCYTES NFR BLD AUTO: 1 % (ref 0–2)
LYMPHOCYTES # BLD AUTO: 0.68 THOUSANDS/ÂΜL (ref 0.6–4.47)
LYMPHOCYTES NFR BLD AUTO: 8 % (ref 14–44)
MCH RBC QN AUTO: 33.5 PG (ref 26.8–34.3)
MCHC RBC AUTO-ENTMCNC: 34 G/DL (ref 31.4–37.4)
MCV RBC AUTO: 99 FL (ref 82–98)
MONOCYTES # BLD AUTO: 0.94 THOUSAND/ÂΜL (ref 0.17–1.22)
MONOCYTES NFR BLD AUTO: 11 % (ref 4–12)
NEUTROPHILS # BLD AUTO: 6.84 THOUSANDS/ÂΜL (ref 1.85–7.62)
NEUTS SEG NFR BLD AUTO: 80 % (ref 43–75)
NRBC BLD AUTO-RTO: 0 /100 WBCS
P AXIS: 65 DEGREES
PLATELET # BLD AUTO: 105 THOUSANDS/UL (ref 149–390)
PMV BLD AUTO: 9.9 FL (ref 8.9–12.7)
POTASSIUM SERPL-SCNC: 4.3 MMOL/L (ref 3.5–5.3)
PR INTERVAL: 210 MS
PROT SERPL-MCNC: 6 G/DL (ref 6.4–8.4)
QRS AXIS: -80 DEGREES
QRSD INTERVAL: 166 MS
QT INTERVAL: 472 MS
QTC INTERVAL: 459 MS
RBC # BLD AUTO: 3.16 MILLION/UL (ref 3.88–5.62)
SODIUM SERPL-SCNC: 136 MMOL/L (ref 135–147)
T WAVE AXIS: 82 DEGREES
VENTRICULAR RATE: 57 BPM
WBC # BLD AUTO: 8.55 THOUSAND/UL (ref 4.31–10.16)

## 2025-05-19 PROCEDURE — 80048 BASIC METABOLIC PNL TOTAL CA: CPT | Performed by: EMERGENCY MEDICINE

## 2025-05-19 PROCEDURE — 71046 X-RAY EXAM CHEST 2 VIEWS: CPT

## 2025-05-19 PROCEDURE — 83880 ASSAY OF NATRIURETIC PEPTIDE: CPT | Performed by: EMERGENCY MEDICINE

## 2025-05-19 PROCEDURE — 82948 REAGENT STRIP/BLOOD GLUCOSE: CPT

## 2025-05-19 PROCEDURE — 99285 EMERGENCY DEPT VISIT HI MDM: CPT | Performed by: EMERGENCY MEDICINE

## 2025-05-19 PROCEDURE — 85025 COMPLETE CBC W/AUTO DIFF WBC: CPT | Performed by: EMERGENCY MEDICINE

## 2025-05-19 PROCEDURE — 80076 HEPATIC FUNCTION PANEL: CPT | Performed by: EMERGENCY MEDICINE

## 2025-05-19 PROCEDURE — 99285 EMERGENCY DEPT VISIT HI MDM: CPT

## 2025-05-19 PROCEDURE — 36415 COLL VENOUS BLD VENIPUNCTURE: CPT | Performed by: EMERGENCY MEDICINE

## 2025-05-19 PROCEDURE — 93010 ELECTROCARDIOGRAM REPORT: CPT | Performed by: INTERNAL MEDICINE

## 2025-05-19 PROCEDURE — 99222 1ST HOSP IP/OBS MODERATE 55: CPT | Performed by: INTERNAL MEDICINE

## 2025-05-19 PROCEDURE — 93005 ELECTROCARDIOGRAM TRACING: CPT

## 2025-05-19 PROCEDURE — 84484 ASSAY OF TROPONIN QUANT: CPT | Performed by: EMERGENCY MEDICINE

## 2025-05-19 RX ORDER — FUROSEMIDE 10 MG/ML
40 INJECTION INTRAMUSCULAR; INTRAVENOUS 2 TIMES DAILY
Status: DISCONTINUED | OUTPATIENT
Start: 2025-05-19 | End: 2025-05-19

## 2025-05-19 RX ORDER — PRIMIDONE 50 MG/1
50 TABLET ORAL
Status: DISCONTINUED | OUTPATIENT
Start: 2025-05-19 | End: 2025-05-24 | Stop reason: HOSPADM

## 2025-05-19 RX ORDER — PRAVASTATIN SODIUM 40 MG
40 TABLET ORAL
Status: DISCONTINUED | OUTPATIENT
Start: 2025-05-19 | End: 2025-05-24 | Stop reason: HOSPADM

## 2025-05-19 RX ORDER — POLYETHYLENE GLYCOL 3350 17 G/17G
17 POWDER, FOR SOLUTION ORAL DAILY
Status: DISCONTINUED | OUTPATIENT
Start: 2025-05-20 | End: 2025-05-24 | Stop reason: HOSPADM

## 2025-05-19 RX ORDER — ERGOCALCIFEROL 1.25 MG/1
50000 CAPSULE, LIQUID FILLED ORAL WEEKLY
Status: DISCONTINUED | OUTPATIENT
Start: 2025-05-19 | End: 2025-05-24 | Stop reason: HOSPADM

## 2025-05-19 RX ORDER — CARVEDILOL 12.5 MG/1
25 TABLET ORAL 2 TIMES DAILY WITH MEALS
Status: DISCONTINUED | OUTPATIENT
Start: 2025-05-19 | End: 2025-05-24 | Stop reason: HOSPADM

## 2025-05-19 RX ORDER — DOCUSATE SODIUM 100 MG/1
100 CAPSULE, LIQUID FILLED ORAL 2 TIMES DAILY
Status: DISCONTINUED | OUTPATIENT
Start: 2025-05-19 | End: 2025-05-24 | Stop reason: HOSPADM

## 2025-05-19 RX ORDER — FUROSEMIDE 10 MG/ML
40 INJECTION INTRAMUSCULAR; INTRAVENOUS 2 TIMES DAILY
Status: DISCONTINUED | OUTPATIENT
Start: 2025-05-20 | End: 2025-05-21

## 2025-05-19 RX ORDER — INSULIN GLARGINE 100 [IU]/ML
20 INJECTION, SOLUTION SUBCUTANEOUS
Status: DISCONTINUED | OUTPATIENT
Start: 2025-05-19 | End: 2025-05-24 | Stop reason: HOSPADM

## 2025-05-19 RX ORDER — CARBIDOPA AND LEVODOPA 25; 100 MG/1; MG/1
1 TABLET ORAL 3 TIMES DAILY
Status: DISCONTINUED | OUTPATIENT
Start: 2025-05-19 | End: 2025-05-24 | Stop reason: HOSPADM

## 2025-05-19 RX ORDER — LOSARTAN POTASSIUM 50 MG/1
100 TABLET ORAL DAILY
Status: DISCONTINUED | OUTPATIENT
Start: 2025-05-20 | End: 2025-05-24 | Stop reason: HOSPADM

## 2025-05-19 RX ORDER — GABAPENTIN 300 MG/1
300 CAPSULE ORAL 3 TIMES DAILY
Status: DISCONTINUED | OUTPATIENT
Start: 2025-05-19 | End: 2025-05-20

## 2025-05-19 RX ORDER — ENOXAPARIN SODIUM 100 MG/ML
40 INJECTION SUBCUTANEOUS DAILY
Status: DISCONTINUED | OUTPATIENT
Start: 2025-05-20 | End: 2025-05-24 | Stop reason: HOSPADM

## 2025-05-19 RX ORDER — ACETAMINOPHEN 325 MG/1
650 TABLET ORAL EVERY 6 HOURS PRN
Status: DISCONTINUED | OUTPATIENT
Start: 2025-05-19 | End: 2025-05-24 | Stop reason: HOSPADM

## 2025-05-19 RX ORDER — HYDRALAZINE HYDROCHLORIDE 25 MG/1
25 TABLET, FILM COATED ORAL 3 TIMES DAILY
Status: DISCONTINUED | OUTPATIENT
Start: 2025-05-19 | End: 2025-05-24 | Stop reason: HOSPADM

## 2025-05-19 RX ORDER — FUROSEMIDE 10 MG/ML
40 INJECTION INTRAMUSCULAR; INTRAVENOUS ONCE
Status: COMPLETED | OUTPATIENT
Start: 2025-05-19 | End: 2025-05-19

## 2025-05-19 RX ORDER — DULOXETIN HYDROCHLORIDE 60 MG/1
60 CAPSULE, DELAYED RELEASE ORAL DAILY
Status: DISCONTINUED | OUTPATIENT
Start: 2025-05-20 | End: 2025-05-24 | Stop reason: HOSPADM

## 2025-05-19 RX ORDER — OXCARBAZEPINE 150 MG/1
600 TABLET, FILM COATED ORAL
Status: DISCONTINUED | OUTPATIENT
Start: 2025-05-19 | End: 2025-05-24 | Stop reason: HOSPADM

## 2025-05-19 RX ORDER — ONDANSETRON 2 MG/ML
4 INJECTION INTRAMUSCULAR; INTRAVENOUS EVERY 6 HOURS PRN
Status: DISCONTINUED | OUTPATIENT
Start: 2025-05-19 | End: 2025-05-24 | Stop reason: HOSPADM

## 2025-05-19 RX ORDER — AMLODIPINE BESYLATE 10 MG/1
10 TABLET ORAL DAILY
Status: DISCONTINUED | OUTPATIENT
Start: 2025-05-20 | End: 2025-05-24 | Stop reason: HOSPADM

## 2025-05-19 RX ORDER — ASPIRIN 81 MG/1
81 TABLET ORAL DAILY
Status: DISCONTINUED | OUTPATIENT
Start: 2025-05-20 | End: 2025-05-24 | Stop reason: HOSPADM

## 2025-05-19 RX ORDER — DONEPEZIL HYDROCHLORIDE 5 MG/1
10 TABLET, FILM COATED ORAL
Status: DISCONTINUED | OUTPATIENT
Start: 2025-05-19 | End: 2025-05-24 | Stop reason: HOSPADM

## 2025-05-19 RX ORDER — ISOSORBIDE MONONITRATE 30 MG/1
30 TABLET, EXTENDED RELEASE ORAL DAILY
Status: DISCONTINUED | OUTPATIENT
Start: 2025-05-20 | End: 2025-05-24 | Stop reason: HOSPADM

## 2025-05-19 RX ORDER — INSULIN LISPRO 100 [IU]/ML
1-6 INJECTION, SOLUTION INTRAVENOUS; SUBCUTANEOUS
Status: DISCONTINUED | OUTPATIENT
Start: 2025-05-19 | End: 2025-05-24 | Stop reason: HOSPADM

## 2025-05-19 RX ORDER — INSULIN LISPRO 100 [IU]/ML
4-20 INJECTION, SOLUTION INTRAVENOUS; SUBCUTANEOUS
Status: DISCONTINUED | OUTPATIENT
Start: 2025-05-19 | End: 2025-05-24 | Stop reason: HOSPADM

## 2025-05-19 RX ADMIN — OXCARBAZEPINE 600 MG: 150 TABLET, FILM COATED ORAL at 21:05

## 2025-05-19 RX ADMIN — PRAVASTATIN SODIUM 40 MG: 40 TABLET ORAL at 16:57

## 2025-05-19 RX ADMIN — INSULIN GLARGINE 20 UNITS: 100 INJECTION, SOLUTION SUBCUTANEOUS at 21:05

## 2025-05-19 RX ADMIN — GABAPENTIN 300 MG: 300 CAPSULE ORAL at 16:57

## 2025-05-19 RX ADMIN — CARBIDOPA AND LEVODOPA 1 TABLET: 25; 100 TABLET ORAL at 16:57

## 2025-05-19 RX ADMIN — DOCUSATE SODIUM 100 MG: 100 CAPSULE, LIQUID FILLED ORAL at 17:02

## 2025-05-19 RX ADMIN — CARBIDOPA AND LEVODOPA 1 TABLET: 25; 100 TABLET ORAL at 21:06

## 2025-05-19 RX ADMIN — DONEPEZIL HYDROCHLORIDE 10 MG: 5 TABLET ORAL at 21:06

## 2025-05-19 RX ADMIN — INSULIN LISPRO 1 UNITS: 100 INJECTION, SOLUTION INTRAVENOUS; SUBCUTANEOUS at 21:04

## 2025-05-19 RX ADMIN — CARVEDILOL 25 MG: 12.5 TABLET, FILM COATED ORAL at 16:57

## 2025-05-19 RX ADMIN — PRIMIDONE 50 MG: 50 TABLET ORAL at 21:06

## 2025-05-19 RX ADMIN — FUROSEMIDE 40 MG: 10 INJECTION, SOLUTION INTRAMUSCULAR; INTRAVENOUS at 15:52

## 2025-05-19 RX ADMIN — HYDRALAZINE HYDROCHLORIDE 25 MG: 25 TABLET ORAL at 16:57

## 2025-05-19 RX ADMIN — ERGOCALCIFEROL 50000 UNITS: 1.25 CAPSULE, LIQUID FILLED ORAL at 21:06

## 2025-05-19 RX ADMIN — HYDRALAZINE HYDROCHLORIDE 25 MG: 25 TABLET ORAL at 21:06

## 2025-05-19 RX ADMIN — GABAPENTIN 300 MG: 300 CAPSULE ORAL at 21:06

## 2025-05-19 NOTE — ED PROVIDER NOTES
Time reflects when diagnosis was documented in both MDM as applicable and the Disposition within this note       Time User Action Codes Description Comment    5/19/2025  2:39 PM Connie Guevara [I50.9] CHF exacerbation (HCC)     5/19/2025  2:39 PM Connie Guevara [R60.0] Bilateral lower extremity edema     5/19/2025  2:40 PM Connie Guevara [I87.2] Venous stasis dermatitis of both lower extremities     5/19/2025  2:53 PM Connie Guevara [R06.09] Dyspnea on exertion     5/19/2025  2:53 PM Connie Guevara [R63.5] Weight gain           ED Disposition       ED Disposition   Admit    Condition   Stable    Date/Time   Mon May 19, 2025  2:53 PM    Comment   Case was discussed with Dr Jacobs and the patient's admission status was agreed to be Admission Status: inpatient status to the service of Dr. Jacobs .               Assessment & Plan       Medical Decision Making  This is a 75-year-old male who presents here today for evaluation of dyspnea on exertion and leg swelling.  He has chronic leg swelling, but has been gradually worsening over the past couple of weeks.  He has had progressive dyspnea on exertion, now unable to go between rooms without getting significantly short of breath.  He says sometimes talking too much get some short of breath as well.  He is having intermittent chest pain and occasional brief palpitations.  He denies any syncope or presyncope.  He has gained about 12 pounds over the past couple of weeks, 6 pounds over 4 days.  He does take HCTZ.  He does endorse a history of CHF.  He denies fevers or URI symptoms.  Last echo was 2/3/2025, and showed EF of 60% with mild concentric hypertrophy, and grade 1 diastolic dysfunction.    Review of systems: Otherwise negative unless stated above    He is well-appearing, no acute distress.  He has significant lower extremity edema with venous stasis changes to his legs  bilaterally.  There are no current signs of infection.  He has very mild bibasilar rhonchi.  He has no conversational dyspnea.  Exam is otherwise unremarkable.  Concern is greatest for CHF exacerbation, less likely infectious etiology, CHF.  I am not concerned about DVT or PE.  I have lesser suspicion for ACS.  Will check lab work and chest x-ray to evaluate.    There is possible increased vascular markings in the chest x-ray though limited by body habitus.  He is mildly anemic to 10.6, usually high of Levens but not checked in a year.  Thrombocytopenia is at baseline.  He has no JANIS and normal electrolytes.  BNP is 155, without prior for comparison.  We will give him IV Lasix, and admit him for further evaluation of symptoms.  I updated patient on findings and plan of care, and he expresses understanding.    Problems Addressed:  Bilateral lower extremity edema: chronic illness or injury with exacerbation, progression, or side effects of treatment  CHF exacerbation (HCC): acute illness or injury  Dyspnea on exertion: acute illness or injury  Venous stasis dermatitis of both lower extremities: chronic illness or injury with exacerbation, progression, or side effects of treatment  Weight gain: acute illness or injury    Amount and/or Complexity of Data Reviewed  External Data Reviewed: labs, ECG and notes.  Labs: ordered. Decision-making details documented in ED Course.  Radiology: ordered and independent interpretation performed. Decision-making details documented in ED Course.  ECG/medicine tests: ordered and independent interpretation performed. Decision-making details documented in ED Course.    Risk  Prescription drug management.  Decision regarding hospitalization.             Medications   furosemide (LASIX) injection 40 mg (has no administration in time range)       ED Risk Strat Scores                    (ISAR) Identification of Seniors at Risk  Before the illness or injury that brought you to the Emergency,  did you need someone to help you on a regular basis?: 0  In the last 24 hours, have you needed more help than usual?: 1  Have you been hospitalized for one or more nights during the past 6 months?: 0  In general, do you see well?: 0  In general, do you have serious problems with your memory?: 0  Do you take more than three different medications every day?: 1  ISAR Score: 2                                History of Present Illness       Chief Complaint   Patient presents with    Chest Pain     Chest pain for a few weeks, recently worsening sob and lower extremity swelling       Past Medical History:   Diagnosis Date    Arthritis     Hyperlipidemia     Hypertension     Parkinson disease (HCC) 01/01/2025    Poor circulation     Sleep apnea     Type 2 diabetes mellitus (HCC)       Past Surgical History:   Procedure Laterality Date    BACK SURGERY      CORONARY ARTERY BYPASS GRAFT  07/22/2017    HEMORROIDECTOMY      TONSILLECTOMY      WRIST SURGERY        Family History   Problem Relation Age of Onset    Diabetes type II Mother     Hypertension Mother     Breast cancer Sister     Lung cancer Sister     Multiple endocrine neoplasia Brother     Breast cancer Sister       Social History[1]   E-Cigarette/Vaping    E-Cigarette Use Never User       E-Cigarette/Vaping Substances    Nicotine No     THC No     CBD No     Flavoring No     Other No     Unknown No       I have reviewed and agree with the history as documented.       Chest Pain      Review of Systems   Cardiovascular:  Positive for chest pain.           Objective       ED Triage Vitals   Temperature Pulse Blood Pressure Respirations SpO2 Patient Position - Orthostatic VS   05/19/25 1206 05/19/25 1203 05/19/25 1211 05/19/25 1203 05/19/25 1203 05/19/25 1203   98.4 °F (36.9 °C) 60 156/66 18 98 % Lying      Temp Source Heart Rate Source BP Location FiO2 (%) Pain Score    05/19/25 1206 05/19/25 1330 05/19/25 1203 -- 05/19/25 1203    Oral Monitor Right arm  No Pain       Vitals      Date and Time Temp Pulse SpO2 Resp BP Pain Score FACES Pain Rating User   05/19/25 1330 -- 60 97 % 22 180/81 -- -- SB   05/19/25 1211 -- -- -- -- 156/66 -- -- AM   05/19/25 1206 98.4 °F (36.9 °C) -- -- -- -- -- -- SG   05/19/25 1203 -- 60 98 % 18 -- No Pain -- JESSICA            Physical Exam  Vitals and nursing note reviewed.   Constitutional:       General: He is not in acute distress.     Appearance: He is well-developed. He is obese.   HENT:      Head: Normocephalic and atraumatic.      Mouth/Throat:      Mouth: Mucous membranes are moist.     Eyes:      Conjunctiva/sclera: Conjunctivae normal.      Pupils: Pupils are equal, round, and reactive to light.     Neck:      Trachea: No tracheal deviation.     Cardiovascular:      Rate and Rhythm: Normal rate and regular rhythm.      Heart sounds: Normal heart sounds.      Comments: Chronic venous stasis changes, no erythema or signs of superimposed infection  Pulmonary:      Effort: Pulmonary effort is normal. No tachypnea, accessory muscle usage or respiratory distress.      Breath sounds: Rhonchi (mild, bibasilar) present.      Comments: No conversational dyspnea  Abdominal:      General: There is no distension.      Palpations: Abdomen is soft.      Tenderness: There is no abdominal tenderness.     Musculoskeletal:      Cervical back: Normal range of motion.      Right lower leg: 3+ Edema present.      Left lower leg: 3+ Edema present.     Skin:     General: Skin is warm and dry.     Neurological:      Mental Status: He is alert and oriented to person, place, and time.      GCS: GCS eye subscore is 4. GCS verbal subscore is 5. GCS motor subscore is 6.     Psychiatric:         Mood and Affect: Mood and affect normal.         Behavior: Behavior normal.         Results Reviewed       Procedure Component Value Units Date/Time    HS Troponin I 4hr [[id number]]     Lab Status: No result Specimen: Blood     HS Troponin I 2hr [[id number]]     Lab Status: No  result Specimen: Blood     HS Troponin 0hr (reflex protocol) [[id number]]  (Normal) Collected: 05/19/25 1302    Lab Status: Final result Specimen: Blood from Arm, Left Updated: 05/19/25 1329     hs TnI 0hr 24 ng/L     B-Type Natriuretic Peptide(BNP) [[id number]]  (Abnormal) Collected: 05/19/25 1304    Lab Status: Final result Specimen: Blood from Arm, Left Updated: 05/19/25 1328      pg/mL     Basic metabolic panel [[id number]] Collected: 05/19/25 1302    Lab Status: Final result Specimen: Blood from Arm, Left Updated: 05/19/25 1323     Sodium 136 mmol/L      Potassium 4.3 mmol/L      Chloride 104 mmol/L      CO2 27 mmol/L      ANION GAP 5 mmol/L      BUN 19 mg/dL      Creatinine 0.82 mg/dL      Glucose 131 mg/dL      Calcium 9.1 mg/dL      eGFR 86 ml/min/1.73sq m     Narrative:      National Kid[employer] guidelines for Chronic Kidney Disease (CKD):     Stage 1 with normal or high GFR (GFR > 90 mL/min/1.73 square meters)    Stage 2 Mild CKD (GFR = 60-89 mL/min/1.73 square meters)    Stage 3A Moderate CKD (GFR = 45-59 mL/min/1.73 square meters)    Stage 3B Moderate CKD (GFR = 30-44 mL/min/1.73 square meters)    Stage 4 Severe CKD (GFR = 15-29 mL/min/1.73 square meters)    Stage 5 End Stage CKD (GFR <15 mL/min/1.73 square meters)  Note: GFR calculation is accurate only with a steady state creatinine    Hepatic function panel [[id number]]  (Abnormal) Collected: 05/19/25 1302    Lab Status: Final result Specimen: Blood from Arm, Left Updated: 05/19/25 1323     Total Bilirubin 1.48 mg/dL      Bilirubin, Direct 0.31 mg/dL      Alkaline Phosphatase 85 U/L      AST 25 U/L      ALT 10 U/L      Total Protein 6.0 g/dL      Albumin 3.8 g/dL     CBC and differential [[id number]]  (Abnormal) Collected: 05/19/25 1302    Lab Status: Final result Specimen: Blood from Arm, Left Updated: 05/19/25 1317     WBC 8.55 Thousand/uL      RBC 3.16 Million/uL      Hemoglobin 10.6 g/dL      Hematocrit 31.2 %      MCV 99 fL       MCH 33.5 pg      MCHC 34.0 g/dL      RDW 14.9 %      MPV 9.9 fL      Platelets 105 Thousands/uL      nRBC 0 /100 WBCs      Segmented % 80 %      Immature Grans % 1 %      Lymphocytes % 8 %      Monocytes % 11 %      Eosinophils Relative 0 %      Basophils Relative 0 %      Absolute Neutrophils 6.84 Thousands/µL      Absolute Immature Grans 0.05 Thousand/uL      Absolute Lymphocytes 0.68 Thousands/µL      Absolute Monocytes 0.94 Thousand/µL      Eosinophils Absolute 0.02 Thousand/µL      Basophils Absolute 0.02 Thousands/µL             XR chest 2 views    (Results Pending)       ECG 12 Lead Documentation Only    Date/Time: 5/19/2025 12:31 PM    Performed by: Connie Guevara MD  Authorized by: Connie Guevara MD    Indications / Diagnosis:  WINSTON, occasional chest pain  ECG reviewed by me, the ED Provider: yes    Patient location:  ED  Previous ECG:     Previous ECG:  Compared to current    Comparison ECG info:  03/15/21    Similarity:  Changes noted (increased QRS duration, 1st degree AVB)  Interpretation:     Interpretation: abnormal    Rate:     ECG rate:  57    ECG rate assessment: bradycardic    Rhythm:     Rhythm: sinus rhythm and A-V block    Ectopy:     Ectopy: none    QRS:     QRS axis:  Left    QRS intervals:  Wide  Conduction:     Conduction: abnormal      Abnormal conduction: complete RBBB and 1st degree    ST segments:     ST segments:  Non-specific    Depression:  V2 and V3  T waves:     T waves: flattening and inverted      Flattening:  I    Inverted:  AVL      ED Medication and Procedure Management   Prior to Admission Medications   Prescriptions Last Dose Informant Patient Reported? Taking?   Blood Glucose Monitoring Suppl (ACURA BLOOD GLUCOSE METER) w/Device KIT  Self, Child Yes No   Sig: by Other route. Use as instructed   DULoxetine (CYMBALTA) 60 mg delayed release capsule  Self, Child No No   Sig: Take 1 capsule (60 mg total) by mouth daily TAKE 1 CAPSULE DAILY    LORazepam (Ativan) 0.5 mg tablet   No No   Sig: Take 1 tablet (0.5 mg total) by mouth once as needed for anxiety (for MRI) for up to 1 dose   OXcarbazepine (TRILEPTAL) 600 mg tablet  Self, Child No No   Sig: Take 1 tablet (600 mg total) by mouth daily at bedtime   OneTouch Ultra test strip  Self, Child Yes No   Ozempic, 0.25 or 0.5 MG/DOSE, 2 MG/3ML injection pen  Self, Child Yes No   Sig: inject 0.25 milligrams subcutaneously weekly   Patient taking differently: Inject 0.5 mg under the skin every 7 days   TRUEPLUS PEN NEEDLES 32G X 4 MM MISC  Self, Child Yes No   amLODIPine (NORVASC) 10 mg tablet  Self, Child No No   Sig: Take 1 tablet (10 mg total) by mouth daily   aspirin 81 MG tablet  Self, Child Yes No   Sig: Take 1 tablet by mouth daily   carbidopa-levodopa (Sinemet)  mg per tablet   No No   Sig: Take 1 tablet by mouth 3 (three) times a day Take 1/2 tablet three times a day for a week, then take 1 tablet three times a day thereafter.   carvedilol (COREG) 25 mg tablet  Self, Child No No   Sig: Take 1 tablet (25 mg total) by mouth 2 (two) times a day with meals   donepezil (ARICEPT) 10 mg tablet  Self, Child No No   Sig: TAKE 1 TABLET NIGHTLY   ergocalciferol (VITAMIN D2) 50,000 units  Self, Child Yes No   Sig: Take 50,000 Units by mouth once a week   gabapentin (NEURONTIN) 600 MG tablet  Self, Child No No   Sig: Take 1 tablet (600 mg total) by mouth 4 (four) times a day   glipiZIDE (GLUCOTROL) 10 mg tablet  Self, Child Yes No   Sig: Take 10 mg by mouth 2 (two) times a day   hydrALAZINE (APRESOLINE) 25 mg tablet  Self, Child No No   Sig: Take 1 tablet (25 mg total) by mouth 3 (three) times a day   hydrochlorothiazide (HYDRODIURIL) 25 mg tablet  Self, Child No No   Sig: TAKE 1 TABLET DAILY   hydroxychloroquine (PLAQUENIL) 200 mg tablet  Self, Child No No   Sig: TAKE 2 TABLETS DAILY   insulin glargine (LANTUS SOLOSTAR) 100 units/mL injection pen  Self, Child Yes No   Sig: Inject 40 Units under the skin  daily at bedtime    irbesartan (AVAPRO) 300 mg tablet  Self, Child No No   Sig: Take 1 tablet (300 mg total) by mouth daily   isosorbide mononitrate (IMDUR) 30 mg 24 hr tablet  Self, Child No No   Sig: Take 1 tablet (30 mg total) by mouth daily   primidone (MYSOLINE) 50 mg tablet   No No   Sig: Take 1 tablet (50 mg total) by mouth daily at bedtime   rosuvastatin (CRESTOR) 5 mg tablet  Self, Child Yes No   Sig: Take 1 tablet by mouth in the morning   trospium chloride (SANCTURA) 20 mg tablet  Self, Child No No   Sig: Take 1 tablet (20 mg total) by mouth 2 (two) times a day   Patient not taking: Reported on 5/7/2025      Facility-Administered Medications: None     Patient's Medications   Discharge Prescriptions    No medications on file     No discharge procedures on file.  ED SEPSIS DOCUMENTATION   Time reflects when diagnosis was documented in both MDM as applicable and the Disposition within this note       Time User Action Codes Description Comment    5/19/2025  2:39 PM Connie Guevara [I50.9] CHF exacerbation (HCC)     5/19/2025  2:39 PM Connie Guevara [R60.0] Bilateral lower extremity edema     5/19/2025  2:40 PM Connie Guevara [I87.2] Venous stasis dermatitis of both lower extremities     5/19/2025  2:53 PM Connie Guevara [R06.09] Dyspnea on exertion     5/19/2025  2:53 PM Connie Guevara [R63.5] Weight gain                      [1]   Social History  Tobacco Use    Smoking status: Never     Passive exposure: Past    Smokeless tobacco: Never   Vaping Use    Vaping status: Never Used   Substance Use Topics    Alcohol use: Yes     Comment: 5 drinks daily    Drug use: No        Connie Guevara MD  05/19/25 1516

## 2025-05-19 NOTE — ASSESSMENT & PLAN NOTE
Lab Results   Component Value Date    HGBA1C 6.6 (H) 11/19/2024       Recent Labs     05/19/25  1655   POCGLU 88       Blood Sugar Average: Last 72 hrs:  (P) 88  Update hemoglobin A1c  Holding PTA glipizide  Patient takes Lantus 40 units at bedtime, will decrease dose to 20 units at bedtime  SSI with Accu-Cheks  Carb controlled diet  Hypoglycemia protocol

## 2025-05-19 NOTE — ASSESSMENT & PLAN NOTE
Lab Results   Component Value Date    HGBA1C 6.6 (H) 11/19/2024       Recent Labs     05/19/25  1655   POCGLU 88       Blood Sugar Average: Last 72 hrs:  (P) 88  Takes gabapentin 600 mg 4 times daily  Will decrease to 300 mg 3 times daily.  (Patient agreeable to same)

## 2025-05-19 NOTE — ASSESSMENT & PLAN NOTE
Wt Readings from Last 3 Encounters:   05/19/25 127 kg (279 lb 3.2 oz)   05/07/25 122 kg (270 lb)   03/26/25 118 kg (260 lb)     Patient presents to the ED with chronic lower extremity edema and shortness of breath which have been worsening over the past couple of weeks.  Patient reported difficulty to get between rooms without becoming short of breath and has gained 6 pounds in 4 days.  Reports occasional chest pain especially with deep breathing and bilateral lower extremity noted with chronic venous status.    Troponin 24, 155, 26  Chest x-ray demonstrated cardiomegaly and mild pulmonary vascular congestive change.   2/3/2025 echocardiogram demonstrated LVEF 60%,  mild concentric hypertrophy. Systolic function is normal. Wall motion is normal. Diastolic function is mildly abnormal, consistent with grade I (abnormal) relaxation.  ED gave Lasix 40 mg IV x 1 dose  Will continue Lasix 40 mg IV twice daily  Cardiology consult  CHF education  Strict input and output  Carb controlled diet with 1800 mL/day fluid restriction  Telemetry monitor

## 2025-05-19 NOTE — PLAN OF CARE
Problem: PAIN - ADULT  Goal: Verbalizes/displays adequate comfort level or baseline comfort level  Description: Interventions:  - Encourage patient to monitor pain and request assistance  - Assess pain using appropriate pain scale  - Administer analgesics as ordered based on type and severity of pain and evaluate response  - Implement non-pharmacological measures as appropriate and evaluate response  - Consider cultural and social influences on pain and pain management  - Notify physician/advanced practitioner if interventions unsuccessful or patient reports new pain  - Educate patient/family on pain management process including their role and importance of  reporting pain   - Provide non-pharmacologic/complimentary pain relief interventions  Outcome: Progressing     Problem: INFECTION - ADULT  Goal: Absence or prevention of progression during hospitalization  Description: INTERVENTIONS:  - Assess and monitor for signs and symptoms of infection  - Monitor lab/diagnostic results  - Monitor all insertion sites, i.e. indwelling lines, tubes, and drains  - Monitor endotracheal if appropriate and nasal secretions for changes in amount and color  - [place] appropriate cooling/warming therapies per order  - Administer medications as ordered  - Instruct and encourage patient and family to use good hand hygiene technique  - Identify and instruct in appropriate isolation precautions for identified infection/condition  Outcome: Progressing  Goal: Absence of fever/infection during neutropenic period  Description: INTERVENTIONS:  - Monitor WBC  - Perform strict hand hygiene  - Limit to healthy visitors only  - No plants, dried, fresh or silk flowers with dobson in patient room  Outcome: Progressing

## 2025-05-19 NOTE — H&P
H&P - Hospitalist   Name: Alex Baptiste 75 y.o. male I MRN: [id number]  Unit/Bed#: 2 E 271-01 I Date of Admission: 5/19/2025   Date of Service: 5/19/2025 I Hospital Day: 0     Assessment & Plan  Acute exacerbation of CHF (congestive heart failure) (HCC)  Wt Readings from Last 3 Encounters:   05/19/25 127 kg (279 lb 3.2 oz)   05/07/25 122 kg (270 lb)   03/26/25 118 kg (260 lb)     Patient presents to the ED with chronic lower extremity edema and shortness of breath which have been worsening over the past couple of weeks.  Patient reported difficulty to get between rooms without becoming short of breath and has gained 6 pounds in 4 days.  Reports occasional chest pain especially with deep breathing and bilateral lower extremity noted with chronic venous status.    Troponin 24, 155, 26  Chest x-ray demonstrated cardiomegaly and mild pulmonary vascular congestive change.   2/3/2025 echocardiogram demonstrated LVEF 60%,  mild concentric hypertrophy. Systolic function is normal. Wall motion is normal. Diastolic function is mildly abnormal, consistent with grade I (abnormal) relaxation.  ED gave Lasix 40 mg IV x 1 dose  Will continue Lasix 40 mg IV twice daily  Cardiology consult  CHF education  Strict input and output  Carb controlled diet with 1800 mL/day fluid restriction  Telemetry monitor      Polyne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 (formerly [facility])  Lab Results   Component Value Date    HGBA1C 6.6 (H) 11/19/2024       Recent Labs     05/19/25  1655   POCGLU 88       Blood Sugar Average: Last 72 hrs:  (P) 88  Takes gabapentin 600 mg 4 times daily  Will decrease to 300 mg 3 times daily.  (Patient agreeable to same)  Diabetes (formerly [facility])  Lab Results   Component Value Date    HGBA1C 6.6 (H) 11/19/2024       Recent Labs     05/19/25  1655   POCGLU 88       Blood Sugar Average: Last 72 hrs:  (P) 88  Update hemoglobin A1c  Holding PTA glipizide  Patient takes Lantus 40 units at bedtime, will decrease dose to 20 units at  bedtime  SSI with Accu-Cheks  Carb controlled diet  Hypoglycemia protocol  Coronary artery disease involving native coronary artery  Noted history s/p CABG 7/2017  Hypertension  Continue home medication except for HCTZ 25 mg    Severe obesity (BMI >= 40) ([facility])  Counseled on lifestyle modification  Heart healthy diet  Nutrition consult  Parkinson disease ([facility])  Continue Sinemet and primidone  Depression  Continue Cymbalta Trileptal  Hyperlipidemia  Continue statin      VTE Pharmacologic Prophylaxis:   Moderate Risk (Score 3-4) - Pharmacological DVT Prophylaxis Ordered: enoxaparin (Lovenox).  Code Status: Level 1 - Full Code   Discussion with family: Patient will update family.     Anticipated Length of Stay: Patient will be admitted on an inpatient basis with an anticipated length of stay of greater than 2 midnights secondary to CHF exacerbation.    History of Present Illness   Chief Complaint: Shortness of breath and bilateral lower extremity edema    Alex Baptiste is a 75 y.o. male with a PMH of CAD s/p CABG in 2017, hypertension, hyperlipidemia, Parkinson disease, insulin-dependent diabetes with neuropathy who presents with report of worsening shortness of breath and lower extremity edema with 6 pounds weight gain in 4 days.  Patient reported chest pain and occasional palpitations.  He reported difficulty moving from room to room without becoming short of breath.  Chest x-ray showed cardiomegaly and mild pulmonary vascular congestive changes and BNP noted 155.  ED gave Lasix 40 mg IV which will be continued pending cardiology evaluation.  Patient will be admitted to [facility] service for further management.    Review of Systems   Constitutional:  Positive for activity change.   Respiratory:  Positive for shortness of breath.    Cardiovascular:  Positive for chest pain.   Genitourinary:  Positive for urgency.   Skin:  Positive for color change.        Bilateral lower extremity   Neurological:  Positive for tremors.        Historical Information   Past Medical History:   Diagnosis Date    Arthritis     Hyperlipidemia     Hypertension     Parkinson disease (HCC) 01/01/2025    Poor circulation     Sleep apnea     Type 2 diabetes mellitus (HCC)      Past Surgical History:   Procedure Laterality Date    BACK SURGERY      CORONARY ARTERY BYPASS GRAFT  07/22/2017    HEMORROIDECTOMY      TONSILLECTOMY      WRIST SURGERY       Social History     Tobacco Use    Smoking status: Never     Passive exposure: Past    Smokeless tobacco: Never   Vaping Use    Vaping status: Never Used   Substance and Sexual Activity    Alcohol use: Yes     Comment: 5 drinks daily    Drug use: No    Sexual activity: Yes     Partners: Female     E-Cigarette/Vaping    E-Cigarette Use Never User      E-Cigarette/Vaping Substances    Nicotine No     THC No     CBD No     Flavoring No     Other No     Unknown No        Social History:  Marital Status: Single   Occupation:   Patient Pre-hospital Living Situation:   Patient Pre-hospital Level of Mobility:   Patient Pre-hospital Diet Restrictions:     Meds/Allergies   I have reviewed home medications with patient personally.    Allergies   Allergen Reactions    Penicillins      Other reaction(s): Other, Unknown       Objective :  Temp:  [98 °F (36.7 °C)-98.4 °F (36.9 °C)] 98 °F (36.7 °C)  HR:  [57-62] 59  BP: (147-180)/(66-99) 167/75  Resp:  [18-22] 20  SpO2:  [95 %-98 %] 95 %  O2 Device: None (Room air)    Physical Exam  Constitutional:       Appearance: He is morbidly obese.   HENT:      Nose: Nose normal.     Cardiovascular:      Rate and Rhythm: Rhythm irregular.   Pulmonary:      Breath sounds: Rhonchi present.   Abdominal:      General: There is distension.      Tenderness: There is no abdominal tenderness.     Musculoskeletal:      Right lower leg: Tenderness present. 3+ Edema present.      Left lower leg: Tenderness present. 3+ Edema present.     Skin:     General: Skin is warm.      Capillary Refill: Capillary  refill takes 2 to 3 seconds.      Comments: Venous stasis     Neurological:      Mental Status: He is alert. Mental status is at baseline.     Psychiatric:         Mood and Affect: Mood normal.         Behavior: Behavior normal.          Lines/Drains:            Lab Results: I have reviewed the following results:  Results from last 7 days   Lab Units 05/19/25  1302   WBC Thousand/uL 8.55   HEMOGLOBIN g/dL 10.6*   HEMATOCRIT % 31.2*   PLATELETS Thousands/uL 105*   SEGS PCT % 80*   LYMPHO PCT % 8*   MONO PCT % 11   EOS PCT % 0     Results from last 7 days   Lab Units 05/19/25  1302   SODIUM mmol/L 136   POTASSIUM mmol/L 4.3   CHLORIDE mmol/L 104   CO2 mmol/L 27   BUN mg/dL 19   CREATININE mg/dL 0.82   ANION GAP mmol/L 5   CALCIUM mg/dL 9.1   ALBUMIN g/dL 3.8   TOTAL BILIRUBIN mg/dL 1.48*   ALK PHOS U/L 85   ALT U/L 10   AST U/L 25   GLUCOSE RANDOM mg/dL 131         Results from last 7 days   Lab Units 05/19/25  1655   POC GLUCOSE mg/dl 88     Lab Results   Component Value Date    HGBA1C 6.6 (H) 11/19/2024    HGBA1C 6.3 (H) 07/12/2024    HGBA1C 6.9 (H) 05/09/2024           Imaging Results Review: I reviewed radiology reports from this admission including: chest xray.      Administrative Statements       ** Please Note: This note has been constructed using a voice recognition system. **

## 2025-05-19 NOTE — Clinical Note
Case was discussed with Dr Jacobs and the patient's admission status was agreed to be Admission Status: observation status to the service of Dr. Jacobs .

## 2025-05-20 PROBLEM — I50.33 ACUTE ON CHRONIC HEART FAILURE WITH PRESERVED EJECTION FRACTION (HFPEF) (HCC): Status: ACTIVE | Noted: 2021-03-15

## 2025-05-20 LAB
ANION GAP SERPL CALCULATED.3IONS-SCNC: 6 MMOL/L (ref 4–13)
BUN SERPL-MCNC: 15 MG/DL (ref 5–25)
CALCIUM SERPL-MCNC: 9.2 MG/DL (ref 8.4–10.2)
CHLORIDE SERPL-SCNC: 103 MMOL/L (ref 96–108)
CO2 SERPL-SCNC: 28 MMOL/L (ref 21–32)
CREAT SERPL-MCNC: 0.76 MG/DL (ref 0.6–1.3)
ERYTHROCYTE [DISTWIDTH] IN BLOOD BY AUTOMATED COUNT: 14.9 % (ref 11.6–15.1)
EST. AVERAGE GLUCOSE BLD GHB EST-MCNC: 131 MG/DL
GFR SERPL CREATININE-BSD FRML MDRD: 89 ML/MIN/1.73SQ M
GLUCOSE SERPL-MCNC: 166 MG/DL (ref 65–140)
GLUCOSE SERPL-MCNC: 176 MG/DL (ref 65–140)
GLUCOSE SERPL-MCNC: 214 MG/DL (ref 65–140)
GLUCOSE SERPL-MCNC: 87 MG/DL (ref 65–140)
GLUCOSE SERPL-MCNC: 98 MG/DL (ref 65–140)
HBA1C MFR BLD: 6.2 %
HCT VFR BLD AUTO: 33.5 % (ref 36.5–49.3)
HGB BLD-MCNC: 11.4 G/DL (ref 12–17)
MAGNESIUM SERPL-MCNC: 1.9 MG/DL (ref 1.9–2.7)
MCH RBC QN AUTO: 33.2 PG (ref 26.8–34.3)
MCHC RBC AUTO-ENTMCNC: 34 G/DL (ref 31.4–37.4)
MCV RBC AUTO: 98 FL (ref 82–98)
PLATELET # BLD AUTO: 102 THOUSANDS/UL (ref 149–390)
PMV BLD AUTO: 10.8 FL (ref 8.9–12.7)
POTASSIUM SERPL-SCNC: 3.7 MMOL/L (ref 3.5–5.3)
RBC # BLD AUTO: 3.43 MILLION/UL (ref 3.88–5.62)
SODIUM SERPL-SCNC: 137 MMOL/L (ref 135–147)
TSH SERPL DL<=0.05 MIU/L-ACNC: 2.55 UIU/ML (ref 0.45–4.5)
WBC # BLD AUTO: 7.96 THOUSAND/UL (ref 4.31–10.16)

## 2025-05-20 PROCEDURE — 80048 BASIC METABOLIC PNL TOTAL CA: CPT

## 2025-05-20 PROCEDURE — 99232 SBSQ HOSP IP/OBS MODERATE 35: CPT | Performed by: NURSE PRACTITIONER

## 2025-05-20 PROCEDURE — 82948 REAGENT STRIP/BLOOD GLUCOSE: CPT

## 2025-05-20 PROCEDURE — 85027 COMPLETE CBC AUTOMATED: CPT

## 2025-05-20 PROCEDURE — 99223 1ST HOSP IP/OBS HIGH 75: CPT | Performed by: INTERNAL MEDICINE

## 2025-05-20 PROCEDURE — 83735 ASSAY OF MAGNESIUM: CPT

## 2025-05-20 PROCEDURE — 83036 HEMOGLOBIN GLYCOSYLATED A1C: CPT

## 2025-05-20 PROCEDURE — 84443 ASSAY THYROID STIM HORMONE: CPT

## 2025-05-20 RX ORDER — SPIRONOLACTONE 25 MG/1
25 TABLET ORAL DAILY
Status: DISCONTINUED | OUTPATIENT
Start: 2025-05-20 | End: 2025-05-24 | Stop reason: HOSPADM

## 2025-05-20 RX ORDER — GABAPENTIN 300 MG/1
600 CAPSULE ORAL 3 TIMES DAILY
Status: DISCONTINUED | OUTPATIENT
Start: 2025-05-20 | End: 2025-05-24 | Stop reason: HOSPADM

## 2025-05-20 RX ADMIN — CARBIDOPA AND LEVODOPA 1 TABLET: 25; 100 TABLET ORAL at 15:45

## 2025-05-20 RX ADMIN — POLYETHYLENE GLYCOL 3350 17 G: 17 POWDER, FOR SOLUTION ORAL at 08:37

## 2025-05-20 RX ADMIN — GABAPENTIN 600 MG: 300 CAPSULE ORAL at 21:13

## 2025-05-20 RX ADMIN — PRAVASTATIN SODIUM 40 MG: 40 TABLET ORAL at 15:44

## 2025-05-20 RX ADMIN — INSULIN LISPRO 1 UNITS: 100 INJECTION, SOLUTION INTRAVENOUS; SUBCUTANEOUS at 21:12

## 2025-05-20 RX ADMIN — ENOXAPARIN SODIUM 40 MG: 40 INJECTION SUBCUTANEOUS at 08:38

## 2025-05-20 RX ADMIN — FUROSEMIDE 40 MG: 10 INJECTION, SOLUTION INTRAMUSCULAR; INTRAVENOUS at 08:38

## 2025-05-20 RX ADMIN — ISOSORBIDE MONONITRATE 30 MG: 30 TABLET, EXTENDED RELEASE ORAL at 08:38

## 2025-05-20 RX ADMIN — HYDRALAZINE HYDROCHLORIDE 25 MG: 25 TABLET ORAL at 08:37

## 2025-05-20 RX ADMIN — HYDRALAZINE HYDROCHLORIDE 25 MG: 25 TABLET ORAL at 15:45

## 2025-05-20 RX ADMIN — CARBIDOPA AND LEVODOPA 1 TABLET: 25; 100 TABLET ORAL at 21:14

## 2025-05-20 RX ADMIN — DONEPEZIL HYDROCHLORIDE 10 MG: 5 TABLET ORAL at 21:14

## 2025-05-20 RX ADMIN — LOSARTAN POTASSIUM 100 MG: 50 TABLET, FILM COATED ORAL at 08:37

## 2025-05-20 RX ADMIN — DULOXETINE 60 MG: 60 CAPSULE, DELAYED RELEASE ORAL at 08:37

## 2025-05-20 RX ADMIN — CARVEDILOL 25 MG: 12.5 TABLET, FILM COATED ORAL at 08:38

## 2025-05-20 RX ADMIN — INSULIN LISPRO 4 UNITS: 100 INJECTION, SOLUTION INTRAVENOUS; SUBCUTANEOUS at 12:38

## 2025-05-20 RX ADMIN — SPIRONOLACTONE 25 MG: 25 TABLET ORAL at 11:09

## 2025-05-20 RX ADMIN — AMLODIPINE BESYLATE 10 MG: 10 TABLET ORAL at 08:37

## 2025-05-20 RX ADMIN — HYDRALAZINE HYDROCHLORIDE 25 MG: 25 TABLET ORAL at 21:13

## 2025-05-20 RX ADMIN — EMPAGLIFLOZIN 10 MG: 10 TABLET, FILM COATED ORAL at 14:58

## 2025-05-20 RX ADMIN — CARBIDOPA AND LEVODOPA 1 TABLET: 25; 100 TABLET ORAL at 08:37

## 2025-05-20 RX ADMIN — OXCARBAZEPINE 600 MG: 150 TABLET, FILM COATED ORAL at 21:14

## 2025-05-20 RX ADMIN — PRIMIDONE 50 MG: 50 TABLET ORAL at 21:14

## 2025-05-20 RX ADMIN — DOCUSATE SODIUM 100 MG: 100 CAPSULE, LIQUID FILLED ORAL at 08:37

## 2025-05-20 RX ADMIN — DOCUSATE SODIUM 100 MG: 100 CAPSULE, LIQUID FILLED ORAL at 17:47

## 2025-05-20 RX ADMIN — FUROSEMIDE 40 MG: 10 INJECTION, SOLUTION INTRAMUSCULAR; INTRAVENOUS at 17:48

## 2025-05-20 RX ADMIN — ASPIRIN 81 MG: 81 TABLET, COATED ORAL at 08:38

## 2025-05-20 RX ADMIN — CARVEDILOL 25 MG: 12.5 TABLET, FILM COATED ORAL at 15:45

## 2025-05-20 RX ADMIN — INSULIN LISPRO 8 UNITS: 100 INJECTION, SOLUTION INTRAVENOUS; SUBCUTANEOUS at 17:48

## 2025-05-20 RX ADMIN — GABAPENTIN 300 MG: 300 CAPSULE ORAL at 08:37

## 2025-05-20 RX ADMIN — INSULIN GLARGINE 20 UNITS: 100 INJECTION, SOLUTION SUBCUTANEOUS at 21:13

## 2025-05-20 RX ADMIN — GABAPENTIN 600 MG: 300 CAPSULE ORAL at 15:44

## 2025-05-20 NOTE — PLAN OF CARE
Problem: PAIN - ADULT  Goal: Verbalizes/displays adequate comfort level or baseline comfort level  Description: Interventions:  - Encourage patient to monitor pain and request assistance  - Assess pain using appropriate pain scale  - Administer analgesics as ordered based on type and severity of pain and evaluate response  - Implement non-pharmacological measures as appropriate and evaluate response  - Consider cultural and social influences on pain and pain management  - Notify physician/advanced practitioner if interventions unsuccessful or patient reports new pain  - Educate patient/family on pain management process including their role and importance of  reporting pain   - Provide non-pharmacologic/complimentary pain relief interventions  Outcome: Progressing     Problem: INFECTION - ADULT  Goal: Absence or prevention of progression during hospitalization  Description: INTERVENTIONS:  - Assess and monitor for signs and symptoms of infection  - Monitor lab/diagnostic results  - Monitor all insertion sites, i.e. indwelling lines, tubes, and drains  - Monitor endotracheal if appropriate and nasal secretions for changes in amount and color  - [place] appropriate cooling/warming therapies per order  - Administer medications as ordered  - Instruct and encourage patient and family to use good hand hygiene technique  - Identify and instruct in appropriate isolation precautions for identified infection/condition  Outcome: Progressing  Goal: Absence of fever/infection during neutropenic period  Description: INTERVENTIONS:  - Monitor WBC  - Perform strict hand hygiene  - Limit to healthy visitors only  - No plants, dried, fresh or silk flowers with dobson in patient room  Outcome: Progressing

## 2025-05-20 NOTE — CASE MANAGEMENT
Case Management Progress Note    Patient name Alex Baptiste  Location 2 [facility]/2 E 271-01 MRN [id number]  : 1950 Date 2025       LOS (days): 1  Geometric Mean LOS (GMLOS) (days): 2.1  Days to GMLOS:1.1        OBJECTIVE:        Current admission status: Inpatient  Preferred Pharmacy:   [facility] - [address] [address]  Phone: [phone] Fax: [phone]    [id number] [place] [address] [address]  Phone: [phone] Fax: [phone]    [facility]/pharmacy #[id number] - [place] - [address]  [address]  [facility] [address]  Phone: [phone] Fax: [phone]    Primary Care Provider: Roderick Guardado    Primary Insurance: [facility] [employer] [facility]  Secondary Insurance:     PROGRESS NOTE:  Jardiance price-check requested by cardiology.    [facility] ([phone])  Anticipated patient cost:  $0.

## 2025-05-20 NOTE — UTILIZATION REVIEW
Initial Clinical Review    Admission: Date/Time/Statement:   Admission Orders (From admission, onward)       Ordered        05/19/25 1454  INPATIENT ADMISSION  Once                          Orders Placed This Encounter   Procedures    INPATIENT ADMISSION     Standing Status:   Standing     Number of Occurrences:   1     Level of Care:   Med Surg [16]     Estimated length of stay:   More than 2 Midnights     Certification:   I certify that inpatient services are medically necessary for this patient for a duration of greater than two midnights. See H&P and MD Progress Notes for additional information about the patient's course of treatment.     ED Arrival Information       Expected   -    Arrival   5/19/2025 12:01    Acuity   Urgent              Means of arrival   Ambulance    Escorted by   [facility]    Service   Hospitalist    Admission type   Emergency              Arrival complaint   CHEST PAIN             Chief Complaint   Patient presents with    Chest Pain     Chest pain for a few weeks, recently worsening sob and lower extremity swelling       Initial Presentation: 75 y.o. male with a PMH of CAD s/p CABG in 2017, HTN, HLD, Parkinson disease, IDDM w/ neuropathy presented to the ED from home via EMS w/ worsening SOB, LLE edema and wt gain of 6 lbs in 4 days. Also reports chest pain and occasional palpitations. Reports difficulty moving from room to room w/o becoming SOB.  In the ED, CXR showed cardiomegaly and mild pulmonary vascular congestive changes and BNP noted 155.   On exam, heart rhythm irregular, rhonchi, abdominal distension, +3 b/l LE edema and tenderness.  Given 40 mg IV Lasix.    Admit as Inpatient for evaluation and treatment of acute CHF exacerbation.  Plan: Will continue Lasix 40 mg IV twice daily. Cardiology consult. Strict input and output. Carb controlled diet with 1800 mL/day fluid restriction. Telemetry monitor.    Anticipated Length of Stay/Certification Statement: Patient will  be admitted on an inpatient basis with an anticipated length of stay of greater than 2 midnights secondary to CHF exacerbation.     Date: 05/20   Day 2:   Pt reports significant improvement of LE edema. Reports some WINSTON and breathing is not back to baseline yet. continue Lasix 40 mg IV twice daily. Likely transition to PO tomorrow. Price check Jardiance. Started on Aldactone. Strict input and output. Carb controlled diet with 1800 mL/day fluid restriction. Decreased Gabapentin to 300 mg 3 times daily. POCGLU 98 this am. takes Lantus 40 units at bedtime, will decrease dose to 20 units at bedtime. SSI with Accu-Cheks.    Cardiology Consult;Acute on chronic HFpEF   No evidence of ACS. BP elevated.   Plan: Continue IV furosemide. Start Jardiance 10 mg daily and Aldactone. Low-salt diet. Daily weights. Accurate intake output charting. Continue aspirin, statin, carvedilol, amlodipine, Imdur. Cont losartan, hydralazine. Cont to mon vitals.    Date: 05/21  Day 3: Has surpassed a 2nd midnight with active treatments and services.  Pt reports feeling well. Breathing improved. Reports LLE began to have redness, tenderness, and warmth. Will transition to p.o. diuretics 40 mg Lasix to initiate 24-hour p.o. diuretic challenge. Started on GDMT therapy with Aldactone, Jardiance. Strict input and output-net -3500 mL with drop in daily weight. Carb controlled diet with 1800 mL/day fluid restriction. SSI with Accu-Cheks.    Vas duplex ordered-preliminary negative for DVT. Has cellulitic appearance, will start Ancef. Obtain MRSA swab       ED Treatment-Medication Administration from 05/19/2025 1201 to 05/19/2025 1540       None            Scheduled Medications:  amLODIPine, 10 mg, Oral, Daily  aspirin, 81 mg, Oral, Daily  carbidopa-levodopa, 1 tablet, Oral, TID  carvedilol, 25 mg, Oral, BID With Meals  docusate sodium, 100 mg, Oral, BID  donepezil, 10 mg, Oral, HS  DULoxetine, 60 mg, Oral, Daily  enoxaparin, 40 mg, Subcutaneous,  Daily  ergocalciferol, 50,000 Units, Oral, Weekly  furosemide, 40 mg, Intravenous, BID  gabapentin, 300 mg, Oral, TID  hydrALAZINE, 25 mg, Oral, TID  insulin glargine, 20 Units, Subcutaneous, HS  insulin lispro, 1-6 Units, Subcutaneous, HS  insulin lispro, 4-20 Units, Subcutaneous, TID AC  isosorbide mononitrate, 30 mg, Oral, Daily  losartan, 100 mg, Oral, Daily  OXcarbazepine, 600 mg, Oral, HS  polyethylene glycol, 17 g, Oral, Daily  pravastatin, 40 mg, Oral, Daily With Dinner  primidone, 50 mg, Oral, HS  spironolactone, 25 mg, Oral, Daily      Continuous IV Infusions: none     PRN Meds:  acetaminophen, 650 mg, Oral, Q6H PRN  ondansetron, 4 mg, Intravenous, Q6H PRN      ED Triage Vitals   Temperature Pulse Respirations Blood Pressure SpO2 Pain Score   05/19/25 1206 05/19/25 1203 05/19/25 1203 05/19/25 1211 05/19/25 1203 05/19/25 1203   98.4 °F (36.9 °C) 60 18 156/66 98 % No Pain     Weight (last 2 days)       Date/Time Weight    05/20/25 0600 123 (270.2)    05/19/25 1549 127 (279.2)    05/19/25 1206 131 (288.8)    05/19/25 1203 125 (275)            Vital Signs (last 3 days)       Date/Time Temp Pulse Resp BP MAP (mmHg) SpO2 O2 Device Patient Position - Orthostatic VS Pain    05/20/25 0819 -- -- -- -- -- 95 % None (Room air) -- No Pain    05/20/25 06:58:31 98.7 °F (37.1 °C) 66 18 169/79 109 95 % -- -- --    05/20/25 0300 98 °F (36.7 °C) 63 18 162/64 -- 92 % None (Room air) Sitting --    05/20/25 02:41:05 -- -- -- 171/71 104 94 % -- -- --    05/19/25 22:41:29 98.1 °F (36.7 °C) 63 -- 164/77 106 94 % -- -- --    05/19/25 2106 -- -- -- 169/79 -- -- -- -- --    05/19/25 2027 -- -- -- -- -- -- None (Room air) -- No Pain    05/19/25 18:46:10 -- 61 -- 134/60 85 94 % -- -- --    05/19/25 15:51:11 98 °F (36.7 °C) 59 -- 167/75 106 95 % -- -- --    05/19/25 1549 -- -- -- -- -- -- -- -- No Pain    05/19/25 1500 -- 62 20 147/99 118 97 % None (Room air) Lying --    05/19/25 1400 -- 57 21 172/75 108 95 % -- -- --    05/19/25 1330  -- 60 22 180/81 117 97 % -- -- --    05/19/25 1326 -- -- -- -- -- -- None (Room air) -- --    05/19/25 1211 -- -- -- 156/66 95 -- -- -- --    05/19/25 1206 98.4 °F (36.9 °C) -- -- -- -- -- -- -- --    05/19/25 1203 -- 60 18 -- -- 98 % None (Room air) Lying No Pain              Pertinent Labs/Diagnostic Test Results:   Radiology:  XR chest 2 views   Final Interpretation by Anton Dalal MD (05/19 1038)      Cardiomegaly and mild pulmonary vascular congestive change.            Workstation performed: [id number]           Cardiology:  ECG 12 lead   Final Result by Coleman Bernal MD (05/19 1212)   Sinus bradycardia with 1st degree A-V block   Right bundle branch block   Left anterior fascicular block    Bifascicular block    Abnormal ECG   Confirmed by Coleman Bernal ([id number]) on 5/19/2025 12:12:43 PM        GI:  No orders to display           Results from last 7 days   Lab Units 05/20/25  0505 05/19/25  1302   WBC Thousand/uL 7.96 8.55   HEMOGLOBIN g/dL 11.4* 10.6*   HEMATOCRIT % 33.5* 31.2*   PLATELETS Thousands/uL 102* 105*   TOTAL NEUT ABS Thousands/µL  --  6.84         Results from last 7 days   Lab Units 05/20/25  0505 05/19/25  1302   SODIUM mmol/L 137 136   POTASSIUM mmol/L 3.7 4.3   CHLORIDE mmol/L 103 104   CO2 mmol/L 28 27   ANION GAP mmol/L 6 5   BUN mg/dL 15 19   CREATININE mg/dL 0.76 0.82   EGFR ml/min/1.73sq m 89 86   CALCIUM mg/dL 9.2 9.1   MAGNESIUM mg/dL 1.9  --      Results from last 7 days   Lab Units 05/19/25  1302   AST U/L 25   ALT U/L 10   ALK PHOS U/L 85   TOTAL PROTEIN g/dL 6.0*   ALBUMIN g/dL 3.8   TOTAL BILIRUBIN mg/dL 1.48*   BILIRUBIN DIRECT mg/dL 0.31*     Results from last 7 days   Lab Units 05/20/25  0656 05/19/25  2043 05/19/25  1655   POC GLUCOSE mg/dl 98 166* 88     Results from last 7 days   Lab Units 05/20/25  0505 05/19/25  1302   GLUCOSE RANDOM mg/dL 87 131             Results from last 7 days   Lab Units 05/19/25  1808 05/19/25  1609 05/19/25  1302   HS TNI 0HR ng/L  --    --  24   HS TNI 2HR ng/L  --  26  --    HSTNI D2 ng/L  --  2  --    HS TNI 4HR ng/L 27  --   --    HSTNI D4 ng/L 3  --   --              Results from last 7 days   Lab Units 05/20/25  0505   TSH 3RD GENERATON uIU/mL 2.549                     Results from last 7 days   Lab Units 05/19/25  1304   BNP pg/mL 155*                                                                                       Past Medical History:   Diagnosis Date    Arthritis     Hyperlipidemia     Hypertension     Parkinson disease (HCC) 01/01/2025    Poor circulation     Sleep apnea     Type 2 diabetes mellitus (HCC)      Present on Admission:   Coronary artery disease involving native coronary artery   T2DM   Primary hypertension   Polyne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 (HCC)   Severe obesity (BMI >= 40) (HCC)   Parkinson disease (HCC)   Acute on chronic HFpEF   Dyslipidemia      Admitting Diagnosis: Weight gain [R63.5]  Dyspnea on exertion [R06.09]  CHF exacerbation (HCC) [I50.9]  Bilateral lower extremity edema [R60.0]  Venous stasis dermatitis of both lower extremities [I87.2]  Age/Sex: 75 y.o. male    Network Utilization Review Department  ATTENTION: Please call with any questions or concerns to [phone] and carefully listen to the prompts so that you are directed to the right person. All voicemails are confidential.   For Discharge needs, contact Care Management DC Support Team at [phone] opt. 2  Send all requests for admission clinical reviews, approved or denied determinations and any other requests to dedicated fax number below belonging to the campus where the patient is receiving treatment. List of dedicated fax numbers for the Facilities:  FACILITY NAME UR FAX NUMBER   ADMISSION DENIALS (Administrative/Medical Necessity) [phone]   DISCHARGE SUPPORT TEAM (NETWORK) [phone]   PARENT CHILD HEALTH (Maternity/NICU/Pediatrics) [phone]   [facility] [phone]   [facility]  [facility] [phone]   [facility] [phone]   [facility] [phone]   Critical access hospital [phone]   [facility] [phone]   [facility] [phone]   [facility] [phone]   [facility] [phone]   [facility] [phone]   [facility] [phone]   [facility] [phone]

## 2025-05-20 NOTE — ASSESSMENT & PLAN NOTE
Takes gabapentin 600 mg 4 times daily  Will decrease to 300 mg 3 times daily.  (Patient agreeable to same)

## 2025-05-20 NOTE — ASSESSMENT & PLAN NOTE
Lab Results   Component Value Date    HGBA1C 6.6 (H) 11/19/2024       Recent Labs     05/19/25  1655 05/19/25  2043 05/20/25  0656   POCGLU 88 166* 98       Blood Sugar Average: Last 72 hrs:  (P) [id number]  Update hemoglobin A1c  Holding PTA glipizide  Patient takes Lantus 40 units at bedtime, will decrease dose to 20 units at bedtime  SSI with Accu-Cheks  Carb controlled diet  Hypoglycemia protocol

## 2025-05-20 NOTE — CASE MANAGEMENT
Case Management Discharge Planning Note    Patient name Alex Baptiste  Location 2 [place] [address]/2 E 271-01 MRN [id number]  : 1950 Date 2025       Current Admission Date: 2025  Current Admission Diagnosis:Acute on chronic HFpEF   Patient Active Problem List    Diagnosis Date Noted    Parkinson disease (HCC) 2025    Depression 2025    Dyslipidemia 2025    Severe obesity (BMI >= 40) ([facility]) 2025    Acute kidney injury superimposed on chronic kidney disease  (HCC) 03/15/2021    T2DM 03/15/2021    Cystitis 03/15/2021    Abnormal CT of the abdomen 03/15/2021    Abdominal pain 03/15/2021    Primary hypertension 03/15/2021    Acute on chronic HFpEF 03/15/2021    Inflammatory arthritis 10/21/2019    Lumbar radiculopathy     Tremor 10/24/2018    Polyne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 ([facility]) 2018    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  [address]  [facility] [place] [address]  Phone: [phone] Fax: [phone]    Primary Care Provider: Roderick Guardado    Primary Insurance: [employer] [facility]  Secondary Insurance:     DISCHARGE DETAILS:    Other Referral/Resources/Interventions Provided:  Interventions: Prescription Price Check, Outpatient   Referral Comments: Jardiance  price-check requested by cardiology.  [facility] ([phone])  - anticipated patient cost:  $0.   OP CM referral via LV DANIEL line d/t HF.  Programs:: CHF    Per SLIM rounds, patient anticipated for d/c in 48hrs.  AMPAC is 20.  Patient is insured and established with a PCP.  Currently on IV Lasix, will transition to oral prior to d/c.  Patient does have scale at home and weighs self regularly.

## 2025-05-20 NOTE — ASSESSMENT & PLAN NOTE
Put patient back on his home dose of gabapentin 600 mg 3 times daily, patient states he takes it 4 times daily at home but this is causing alert here we will continue it at 600 mg 3 times daily

## 2025-05-20 NOTE — ASSESSMENT & PLAN NOTE
Mildly hypervolemic on exam.  , CXR shows cardiomegaly with mild pulmonary vascular congestion.  Recent TTE reviewed with EF 60%.  Continue IV Lasix 40 mg bid; consider transition to PO tomorrow.  Start Jardiance 10 mg daily (no out-of-pocket cost per case management).  Start Aldactone for optimization of BP as per below.  Chronic lymphedema reportedly at baseline.  Replete electrolytes as needed.  Recommend strict I/O's, daily weights, and sodium restriction.

## 2025-05-20 NOTE — ASSESSMENT & PLAN NOTE
Wt Readings from Last 3 Encounters:   05/20/25 123 kg (270 lb 3.2 oz)   05/07/25 122 kg (270 lb)   03/26/25 118 kg (260 lb)     Patient presents to the ED with chronic lower extremity edema and shortness of breath which have been worsening over the past couple of weeks. has gained 6 pounds in 4 days.      Troponin 24, 155, 26  Chest x-ray demonstrated cardiomegaly and mild pulmonary vascular congestive change.   2/3/2025 echocardiogram demonstrated LVEF 60%,  mild concentric hypertrophy. Systolic function is normal. Wall motion is normal. Diastolic function is mildly abnormal, consistent with grade I (abnormal) relaxation.  Was on torsemide but stopped months ago because he was unable to keep up with going to the bathroom  Cardiology consulted  Will continue Lasix 40 mg IV twice daily  Likely transition to PO tomorrow  Price check Jardiance  Started on Aldactone  Strict input and output  Carb controlled diet with 1800 mL/day fluid restriction

## 2025-05-20 NOTE — PLAN OF CARE
Problem: PAIN - ADULT  Goal: Verbalizes/displays adequate comfort level or baseline comfort level  Description: Interventions:  - Encourage patient to monitor pain and request assistance  - Assess pain using appropriate pain scale  - Administer analgesics as ordered based on type and severity of pain and evaluate response  - Implement non-pharmacological measures as appropriate and evaluate response  - Consider cultural and social influences on pain and pain management  - Notify physician/advanced practitioner if interventions unsuccessful or patient reports new pain  - Educate patient/family on pain management process including their role and importance of  reporting pain   - Provide non-pharmacologic/complimentary pain relief interventions  Outcome: Progressing     Problem: INFECTION - ADULT  Goal: Absence or prevention of progression during hospitalization  Description: INTERVENTIONS:  - Assess and monitor for signs and symptoms of infection  - Monitor lab/diagnostic results  - Monitor all insertion sites, i.e. indwelling lines, tubes, and drains  - Monitor endotracheal if appropriate and nasal secretions for changes in amount and color  - [place] appropriate cooling/warming therapies per order  - Administer medications as ordered  - Instruct and encourage patient and family to use good hand hygiene technique  - Identify and instruct in appropriate isolation precautions for identified infection/condition  Outcome: Progressing  Goal: Absence of fever/infection during neutropenic period  Description: INTERVENTIONS:  - Monitor WBC  - Perform strict hand hygiene  - Limit to healthy visitors only  - No plants, dried, fresh or silk flowers with dobson in patient room  Outcome: Progressing     Problem: SAFETY ADULT  Goal: Patient will remain free of falls  Description: INTERVENTIONS:  - Educate patient/family on patient safety including physical limitations  - Instruct patient to call for assistance with activity   -  Consider consulting OT/PT to assist with strengthening/mobility based on AM PAC & JH-HLM score  - Consult OT/PT to assist with strengthening/mobility   - Keep Call bell within reach  - Keep bed low and locked with side rails adjusted as appropriate  - Keep care items and personal belongings within reach  - Initiate and maintain comfort rounds  - Make Fall Risk Sign visible to staff  - Offer Toileting every  Hours, in advance of need  - Initiate/Maintain alarm  - Obtain necessary fall risk management equipment:   - Apply yellow socks and bracelet for high fall risk patients  - Consider moving patient to room near nurses station  Outcome: Progressing  Goal: Maintain or return to baseline ADL function  Description: INTERVENTIONS:  -  Assess patient's ability to carry out ADLs; assess patient's baseline for ADL function and identify physical deficits which impact ability to perform ADLs (bathing, care of mouth/teeth, toileting, grooming, dressing, etc.)  - Assess/evaluate cause of self-care deficits   - Assess range of motion  - Assess patient's mobility; develop plan if impaired  - Assess patient's need for assistive devices and provide as appropriate  - Encourage maximum independence but intervene and supervise when necessary  - Involve family in performance of ADLs  - Assess for home care needs following discharge   - Consider OT consult to assist with ADL evaluation and planning for discharge  - Provide patient education as appropriate  - Monitor functional capacity and physical performance, use of AM PAC & JH-HLM   - Monitor gait, balance and fatigue with ambulation    Outcome: Progressing  Goal: Maintains/Returns to pre admission functional level  Description: INTERVENTIONS:  - Perform AM-PAC 6 Click Basic Mobility/ Daily Activity assessment daily.  - Set and communicate daily mobility goal to care team and patient/family/caregiver.   - Collaborate with rehabilitation services on mobility goals if consulted  -  Perform Range of Motion  times a day.  - Reposition patient every  hours.  - Dangle patient  times a day  - Stand patient  times a day  - Ambulate patient  times a day  - Out of bed to chair  times a day   - Out of bed for meals times a day  - Out of bed for toileting  - Record patient progress and toleration of activity level   Outcome: Progressing     Problem: DISCHARGE PLANNING  Goal: Discharge to home or other facility with appropriate resources  Description: INTERVENTIONS:  - Identify barriers to discharge w/patient and caregiver  - Arrange for needed discharge resources and transportation as appropriate  - Identify discharge learning needs (meds, wound care, etc.)  - Arrange for interpretive services to assist at discharge as needed  - Refer to Case Management Department for coordinating discharge planning if the patient needs post-hospital services based on physician/advanced practitioner order or complex needs related to functional status, cognitive ability, or social support system  Outcome: Progressing     Problem: Knowledge Deficit  Goal: Patient/family/caregiver demonstrates understanding of disease process, treatment plan, medications, and discharge instructions  Description: Complete learning assessment and assess knowledge base.  Interventions:  - Provide teaching at level of understanding  - Provide teaching via preferred learning methods  Outcome: Progressing     Problem: CARDIOVASCULAR - ADULT  Goal: Maintains optimal cardiac output and hemodynamic stability  Description: INTERVENTIONS:  - Monitor I/O, vital signs and rhythm  - Monitor for S/S and trends of decreased cardiac output  - Administer and titrate ordered vasoactive medications to optimize hemodynamic stability  - Assess quality of pulses, skin color and temperature  - Assess for signs of decreased coronary artery perfusion  - Instruct patient to report change in severity of symptoms  Outcome: Progressing  Goal: Absence of cardiac  dysrhythmias or at baseline rhythm  Description: INTERVENTIONS:  - Continuous cardiac monitoring, vital signs, obtain 12 lead EKG if ordered  - Administer antiarrhythmic and heart rate control medications as ordered  - Monitor electrolytes and administer replacement therapy as ordered  Outcome: Progressing     Problem: RESPIRATORY - ADULT  Goal: Achieves optimal ventilation and oxygenation  Description: INTERVENTIONS:  - Assess for changes in respiratory status  - Assess for changes in mentation and behavior  - Position to facilitate oxygenation and minimize respiratory effort  - Oxygen administered by appropriate delivery if ordered  - Initiate smoking cessation education as indicated  - Encourage broncho-pulmonary hygiene including cough, deep breathe, Incentive Spirometry  - Assess the need for suctioning and aspirate as needed  - Assess and instruct to report SOB or any respiratory difficulty  - Respiratory Therapy support as indicated  Outcome: Progressing     Problem: GENITOURINARY - ADULT  Goal: Maintains or returns to baseline urinary function  Description: INTERVENTIONS:  - Assess urinary function  - Encourage oral fluids to ensure adequate hydration if ordered  - Administer IV fluids as ordered to ensure adequate hydration  - Administer ordered medications as needed  - Offer frequent toileting  - Follow urinary retention protocol if ordered  Outcome: Progressing

## 2025-05-20 NOTE — ASSESSMENT & PLAN NOTE
BP has been elevated, continue to monitor.  Start aldactone as per above.  Continue amlodipine, Coreg, losartan, hydralazine, Imdur, and Lasix.

## 2025-05-20 NOTE — PLAN OF CARE
Problem: PAIN - ADULT  Goal: Verbalizes/displays adequate comfort level or baseline comfort level  Description: Interventions:  - Encourage patient to monitor pain and request assistance  - Assess pain using appropriate pain scale  - Administer analgesics as ordered based on type and severity of pain and evaluate response  - Implement non-pharmacological measures as appropriate and evaluate response  - Consider cultural and social influences on pain and pain management  - Notify physician/advanced practitioner if interventions unsuccessful or patient reports new pain  - Educate patient/family on pain management process including their role and importance of  reporting pain   - Provide non-pharmacologic/complimentary pain relief interventions  Outcome: Progressing     Problem: SAFETY ADULT  Goal: Patient will remain free of falls  Description: INTERVENTIONS:  - Educate patient/family on patient safety including physical limitations  - Instruct patient to call for assistance with activity   - Consider consulting OT/PT to assist with strengthening/mobility based on AM PAC & JH-HLM score  - Consult OT/PT to assist with strengthening/mobility   - Keep Call bell within reach  - Keep bed low and locked with side rails adjusted as appropriate  - Keep care items and personal belongings within reach  - Initiate and maintain comfort rounds  - Make Fall Risk Sign visible to staff  - Offer Toileting every  Hours, in advance of need  - Initiate/Maintain alarm  - Obtain necessary fall risk management equipment:   - Apply yellow socks and bracelet for high fall risk patients  - Consider moving patient to room near nurses station  Outcome: Progressing  Goal: Maintain or return to baseline ADL function  Description: INTERVENTIONS:  -  Assess patient's ability to carry out ADLs; assess patient's baseline for ADL function and identify physical deficits which impact ability to perform ADLs (bathing, care of mouth/teeth, toileting,  grooming, dressing, etc.)  - Assess/evaluate cause of self-care deficits   - Assess range of motion  - Assess patient's mobility; develop plan if impaired  - Assess patient's need for assistive devices and provide as appropriate  - Encourage maximum independence but intervene and supervise when necessary  - Involve family in performance of ADLs  - Assess for home care needs following discharge   - Consider OT consult to assist with ADL evaluation and planning for discharge  - Provide patient education as appropriate  - Monitor functional capacity and physical performance, use of AM PAC & -HLM   - Monitor gait, balance and fatigue with ambulation    Outcome: Progressing  Goal: Maintains/Returns to pre admission functional level  Description: INTERVENTIONS:  - Perform AM-PAC 6 Click Basic Mobility/ Daily Activity assessment daily.  - Set and communicate daily mobility goal to care team and patient/family/caregiver.   - Collaborate with rehabilitation services on mobility goals if consulted  - Perform Range of Motion  times a day.  - Reposition patient every  hours.  - Dangle patient  times a day  - Stand patient  times a day  - Ambulate patient  times a day  - Out of bed to chair  times a day   - Out of bed for meals times a day  - Out of bed for toileting  - Record patient progress and toleration of activity level   Outcome: Progressing     Problem: DISCHARGE PLANNING  Goal: Discharge to home or other facility with appropriate resources  Description: INTERVENTIONS:  - Identify barriers to discharge w/patient and caregiver  - Arrange for needed discharge resources and transportation as appropriate  - Identify discharge learning needs (meds, wound care, etc.)  - Arrange for interpretive services to assist at discharge as needed  - Refer to Case Management Department for coordinating discharge planning if the patient needs post-hospital services based on physician/advanced practitioner order or complex needs related  to functional status, cognitive ability, or social support system  Outcome: Progressing     Problem: Knowledge Deficit  Goal: Patient/family/caregiver demonstrates understanding of disease process, treatment plan, medications, and discharge instructions  Description: Complete learning assessment and assess knowledge base.  Interventions:  - Provide teaching at level of understanding  - Provide teaching via preferred learning methods  Outcome: Progressing     Problem: CARDIOVASCULAR - ADULT  Goal: Maintains optimal cardiac output and hemodynamic stability  Description: INTERVENTIONS:  - Monitor I/O, vital signs and rhythm  - Monitor for S/S and trends of decreased cardiac output  - Administer and titrate ordered vasoactive medications to optimize hemodynamic stability  - Assess quality of pulses, skin color and temperature  - Assess for signs of decreased coronary artery perfusion  - Instruct patient to report change in severity of symptoms  Outcome: Progressing  Goal: Absence of cardiac dysrhythmias or at baseline rhythm  Description: INTERVENTIONS:  - Continuous cardiac monitoring, vital signs, obtain 12 lead EKG if ordered  - Administer antiarrhythmic and heart rate control medications as ordered  - Monitor electrolytes and administer replacement therapy as ordered  Outcome: Progressing     Problem: RESPIRATORY - ADULT  Goal: Achieves optimal ventilation and oxygenation  Description: INTERVENTIONS:  - Assess for changes in respiratory status  - Assess for changes in mentation and behavior  - Position to facilitate oxygenation and minimize respiratory effort  - Oxygen administered by appropriate delivery if ordered  - Initiate smoking cessation education as indicated  - Encourage broncho-pulmonary hygiene including cough, deep breathe, Incentive Spirometry  - Assess the need for suctioning and aspirate as needed  - Assess and instruct to report SOB or any respiratory difficulty  - Respiratory Therapy support as  indicated  Outcome: Progressing     Problem: GENITOURINARY - ADULT  Goal: Maintains or returns to baseline urinary function  Description: INTERVENTIONS:  - Assess urinary function  - Encourage oral fluids to ensure adequate hydration if ordered  - Administer IV fluids as ordered to ensure adequate hydration  - Administer ordered medications as needed  - Offer frequent toileting  - Follow urinary retention protocol if ordered  Outcome: Progressing

## 2025-05-20 NOTE — PROGRESS NOTES
Progress Note - Hospitalist   Name: Alex Baptiste 75 y.o. male I MRN: [id number]  Unit/Bed#: 2 E 271-01 I Date of Admission: 5/19/2025   Date of Service: 5/20/2025 I Hospital Day: 1    Assessment & Plan  Acute exacerbation of CHF (congestive heart failure) (HCC)  Wt Readings from Last 3 Encounters:   05/20/25 123 kg (270 lb 3.2 oz)   05/07/25 122 kg (270 lb)   03/26/25 118 kg (260 lb)     Patient presents to the ED with chronic lower extremity edema and shortness of breath which have been worsening over the past couple of weeks. has gained 6 pounds in 4 days.      Troponin 24, 155, 26  Chest x-ray demonstrated cardiomegaly and mild pulmonary vascular congestive change.   2/3/2025 echocardiogram demonstrated LVEF 60%,  mild concentric hypertrophy. Systolic function is normal. Wall motion is normal. Diastolic function is mildly abnormal, consistent with grade I (abnormal) relaxation.  Was on torsemide but stopped months ago because he was unable to keep up with going to the bathroom  Cardiology consulted  Will continue Lasix 40 mg IV twice daily  Likely transition to PO tomorrow  Price check Jardiance  Started on Aldactone  Strict input and output  Carb controlled diet with 1800 mL/day fluid restriction    Polyne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 (HCC)  Takes gabapentin 600 mg 4 times daily  Will decrease to 300 mg 3 times daily.  (Patient agreeable to same)  Diabetes (HCC)  Lab Results   Component Value Date    HGBA1C 6.6 (H) 11/19/2024       Recent Labs     05/19/25  1655 05/19/25  2043 05/20/25  0656   POCGLU 88 166* 98       Blood Sugar Average: Last 72 hrs:  (P) [id number]  Update hemoglobin A1c  Holding PTA glipizide  Patient takes Lantus 40 units at bedtime, will decrease dose to 20 units at bedtime  SSI with Accu-Cheks  Carb controlled diet  Hypoglycemia protocol  Coronary artery disease involving native coronary artery  Noted history s/p CABG 7/2017  Hypertension  Continue home  medication except for HCTZ 25 mg    Severe obesity (BMI >= 40) ([facility])  Counseled on lifestyle modification  Heart healthy diet  Nutrition consult  Parkinson disease ([facility])  Continue Sinemet and primidone    VTE Pharmacologic Prophylaxis:   Moderate Risk (Score 3-4) - Pharmacological DVT Prophylaxis Ordered: enoxaparin (Lovenox).    Mobility:   Basic Mobility Inpatient Raw Score: 20  JH-HLM Goal: 6: Walk 10 steps or more  JH-HLM Achieved: 6: Walk 10 steps or more  JH-HLM Goal achieved. Continue to encourage appropriate mobility.    Patient Centered Rounds: I performed bedside rounds with nursing staff today.   Discussions with Specialists or Other Care Team Provider: cards notes    Education and Discussions with Family / Patient: Patient declined call to .     Current Length of Stay: 1 day(s)  Current Patient Status: Inpatient   Certification Statement: The patient will continue to require additional inpatient hospital stay due to dieretic   Discharge Plan: Anticipate discharge in 24-48 hrs to home.    Code Status: Level 1 - Full Code    Subjective   Patient is very pleasant out of bed in the chair reports significant improvement of lower extremity edema has chronic discoloration skin appears to be intact denies any chest pain chest tightness or difficulty breathing at the moment does report some dyspnea with exertion and that his breathing is not back to baseline just yet    Objective :  Temp:  [98 °F (36.7 °C)-98.7 °F (37.1 °C)] 98.7 °F (37.1 °C)  HR:  [57-66] 66  BP: (134-180)/(60-99) 169/79  Resp:  [18-22] 18  SpO2:  [92 %-98 %] 95 %  O2 Device: None (Room air)    Body mass index is 38.77 kg/m².     Input and Output Summary (last 24 hours):     Intake/Output Summary (Last 24 hours) at 5/20/2025 0932  Last data filed at 5/20/2025 0819  Gross per 24 hour   Intake 180 ml   Output 1750 ml   Net -1570 ml       Physical Exam  Vitals reviewed.   Constitutional:       General: He is not in acute distress.      Appearance: He is normal weight. He is ill-appearing.     Cardiovascular:      Rate and Rhythm: Normal rate.   Abdominal:      Palpations: Abdomen is soft.     Musculoskeletal:         General: Swelling and tenderness present.     Skin:     General: Skin is warm.      Coloration: Skin is pale.     Neurological:      Mental Status: He is alert. Mental status is at baseline.     Psychiatric:         Mood and Affect: Mood normal.         Thought Content: Thought content normal.         Judgment: Judgment normal.         Lines/Drains:        Lab Results: I have reviewed the following results:   Results from last 7 days   Lab Units 05/20/25  0505 05/19/25  1302   WBC Thousand/uL 7.96 8.55   HEMOGLOBIN g/dL 11.4* 10.6*   HEMATOCRIT % 33.5* 31.2*   PLATELETS Thousands/uL 102* 105*   SEGS PCT %  --  80*   LYMPHO PCT %  --  8*   MONO PCT %  --  11   EOS PCT %  --  0     Results from last 7 days   Lab Units 05/20/25  0505 05/19/25  1302   SODIUM mmol/L 137 136   POTASSIUM mmol/L 3.7 4.3   CHLORIDE mmol/L 103 104   CO2 mmol/L 28 27   BUN mg/dL 15 19   CREATININE mg/dL 0.76 0.82   ANION GAP mmol/L 6 5   CALCIUM mg/dL 9.2 9.1   ALBUMIN g/dL  --  3.8   TOTAL BILIRUBIN mg/dL  --  1.48*   ALK PHOS U/L  --  85   ALT U/L  --  10   AST U/L  --  25   GLUCOSE RANDOM mg/dL 87 131         Results from last 7 days   Lab Units 05/20/25  0656 05/19/25  2043 05/19/25  1655   POC GLUCOSE mg/dl 98 166* 88               Recent Cultures (last 7 days):         Imaging Results Review: No pertinent imaging studies reviewed.  Other Study Results Review: No additional pertinent studies reviewed.    Last 24 Hours Medication List:     Current Facility-Administered Medications:     acetaminophen (TYLENOL) tablet 650 mg, Q6H PRN    amLODIPine (NORVASC) tablet 10 mg, Daily    aspirin (ECOTRIN LOW STRENGTH) EC tablet 81 mg, Daily    carbidopa-levodopa (SINEMET)  mg per tablet 1 tablet, TID    carvedilol (COREG) tablet 25 mg, BID With Meals    docusate  sodium (COLACE) capsule 100 mg, BID    donepezil (ARICEPT) tablet 10 mg, HS    DULoxetine (CYMBALTA) delayed release capsule 60 mg, Daily    enoxaparin (LOVENOX) subcutaneous injection 40 mg, Daily    ergocalciferol (VITAMIN D2) capsule 50,000 Units, Weekly    furosemide (LASIX) injection 40 mg, BID    gabapentin (NEURONTIN) capsule 300 mg, TID    hydrALAZINE (APRESOLINE) tablet 25 mg, TID    insulin glargine (LANTUS) subcutaneous injection 20 Units 0.2 mL, HS    insulin lispro (HumALOG/ADMELOG) 100 units/mL subcutaneous injection 1-6 Units, HS    insulin lispro (HumALOG/ADMELOG) 100 units/mL subcutaneous injection 4-20 Units, TID AC **AND** Fingerstick Glucose (POCT), TID AC    isosorbide mononitrate (IMDUR) 24 hr tablet 30 mg, Daily    losartan (COZAAR) tablet 100 mg, Daily    ondansetron (ZOFRAN) injection 4 mg, Q6H PRN    OXcarbazepine (TRILEPTAL) tablet 600 mg, HS    polyethylene glycol (MIRALAX) packet 17 g, Daily    pravastatin (PRAVACHOL) tablet 40 mg, Daily With Dinner    primidone (MYSOLINE) tablet 50 mg, HS    Administrative Statements   Today, Patient Was Seen By: TG Scott  I have spent a total time of 45 minutes in caring for this patient on the day of the visit/encounter including Diagnostic results, Risks and benefits of tx options, Patient and family education, Importance of tx compliance, Risk factor reductions, Documenting in the medical record, Reviewing/placing orders in the medical record (including tests, medications, and/or procedures), and Obtaining or reviewing history  .    **Please Note: This note may have been constructed using a voice recognition system.**

## 2025-05-21 ENCOUNTER — TELEPHONE (OUTPATIENT)
Dept: CARDIOLOGY CLINIC | Facility: CLINIC | Age: 75
End: 2025-05-21

## 2025-05-21 ENCOUNTER — APPOINTMENT (INPATIENT)
Dept: VASCULAR ULTRASOUND | Facility: HOSPITAL | Age: 75
DRG: 291 | End: 2025-05-21
Payer: COMMERCIAL

## 2025-05-21 PROBLEM — L03.116 CELLULITIS OF LEFT LOWER EXTREMITY: Status: ACTIVE | Noted: 2025-05-21

## 2025-05-21 LAB
ANION GAP SERPL CALCULATED.3IONS-SCNC: 8 MMOL/L (ref 4–13)
BUN SERPL-MCNC: 18 MG/DL (ref 5–25)
CALCIUM SERPL-MCNC: 9.9 MG/DL (ref 8.4–10.2)
CHLORIDE SERPL-SCNC: 102 MMOL/L (ref 96–108)
CO2 SERPL-SCNC: 29 MMOL/L (ref 21–32)
CREAT SERPL-MCNC: 0.9 MG/DL (ref 0.6–1.3)
ERYTHROCYTE [DISTWIDTH] IN BLOOD BY AUTOMATED COUNT: 14.7 % (ref 11.6–15.1)
GFR SERPL CREATININE-BSD FRML MDRD: 83 ML/MIN/1.73SQ M
GLUCOSE SERPL-MCNC: 156 MG/DL (ref 65–140)
GLUCOSE SERPL-MCNC: 172 MG/DL (ref 65–140)
GLUCOSE SERPL-MCNC: 196 MG/DL (ref 65–140)
GLUCOSE SERPL-MCNC: 83 MG/DL (ref 65–140)
GLUCOSE SERPL-MCNC: 83 MG/DL (ref 65–140)
HCT VFR BLD AUTO: 32.1 % (ref 36.5–49.3)
HGB BLD-MCNC: 11 G/DL (ref 12–17)
MAGNESIUM SERPL-MCNC: 1.8 MG/DL (ref 1.9–2.7)
MCH RBC QN AUTO: 33.1 PG (ref 26.8–34.3)
MCHC RBC AUTO-ENTMCNC: 34.3 G/DL (ref 31.4–37.4)
MCV RBC AUTO: 97 FL (ref 82–98)
PLATELET # BLD AUTO: 117 THOUSANDS/UL (ref 149–390)
PMV BLD AUTO: 10 FL (ref 8.9–12.7)
POTASSIUM SERPL-SCNC: 3.6 MMOL/L (ref 3.5–5.3)
RBC # BLD AUTO: 3.32 MILLION/UL (ref 3.88–5.62)
SODIUM SERPL-SCNC: 139 MMOL/L (ref 135–147)
WBC # BLD AUTO: 6.37 THOUSAND/UL (ref 4.31–10.16)

## 2025-05-21 PROCEDURE — 85027 COMPLETE CBC AUTOMATED: CPT | Performed by: NURSE PRACTITIONER

## 2025-05-21 PROCEDURE — 93970 EXTREMITY STUDY: CPT

## 2025-05-21 PROCEDURE — 99232 SBSQ HOSP IP/OBS MODERATE 35: CPT

## 2025-05-21 PROCEDURE — 82948 REAGENT STRIP/BLOOD GLUCOSE: CPT

## 2025-05-21 PROCEDURE — 83735 ASSAY OF MAGNESIUM: CPT | Performed by: NURSE PRACTITIONER

## 2025-05-21 PROCEDURE — 80048 BASIC METABOLIC PNL TOTAL CA: CPT | Performed by: NURSE PRACTITIONER

## 2025-05-21 PROCEDURE — 87081 CULTURE SCREEN ONLY: CPT

## 2025-05-21 PROCEDURE — 93970 EXTREMITY STUDY: CPT | Performed by: INTERNAL MEDICINE

## 2025-05-21 PROCEDURE — 99232 SBSQ HOSP IP/OBS MODERATE 35: CPT | Performed by: INTERNAL MEDICINE

## 2025-05-21 RX ORDER — FUROSEMIDE 40 MG/1
40 TABLET ORAL
Status: DISCONTINUED | OUTPATIENT
Start: 2025-05-21 | End: 2025-05-24 | Stop reason: HOSPADM

## 2025-05-21 RX ORDER — CEFAZOLIN SODIUM 2 G/50ML
2000 SOLUTION INTRAVENOUS EVERY 8 HOURS
Status: DISCONTINUED | OUTPATIENT
Start: 2025-05-21 | End: 2025-05-24 | Stop reason: HOSPADM

## 2025-05-21 RX ADMIN — HYDRALAZINE HYDROCHLORIDE 25 MG: 25 TABLET ORAL at 21:28

## 2025-05-21 RX ADMIN — AMLODIPINE BESYLATE 10 MG: 10 TABLET ORAL at 08:36

## 2025-05-21 RX ADMIN — PRIMIDONE 50 MG: 50 TABLET ORAL at 21:27

## 2025-05-21 RX ADMIN — SPIRONOLACTONE 25 MG: 25 TABLET ORAL at 08:36

## 2025-05-21 RX ADMIN — GABAPENTIN 600 MG: 300 CAPSULE ORAL at 15:48

## 2025-05-21 RX ADMIN — OXCARBAZEPINE 600 MG: 150 TABLET, FILM COATED ORAL at 21:27

## 2025-05-21 RX ADMIN — DOCUSATE SODIUM 100 MG: 100 CAPSULE, LIQUID FILLED ORAL at 08:36

## 2025-05-21 RX ADMIN — FUROSEMIDE 40 MG: 10 INJECTION, SOLUTION INTRAMUSCULAR; INTRAVENOUS at 08:37

## 2025-05-21 RX ADMIN — CARVEDILOL 25 MG: 12.5 TABLET, FILM COATED ORAL at 15:47

## 2025-05-21 RX ADMIN — INSULIN GLARGINE 20 UNITS: 100 INJECTION, SOLUTION SUBCUTANEOUS at 21:26

## 2025-05-21 RX ADMIN — PRAVASTATIN SODIUM 40 MG: 40 TABLET ORAL at 15:48

## 2025-05-21 RX ADMIN — INSULIN LISPRO 4 UNITS: 100 INJECTION, SOLUTION INTRAVENOUS; SUBCUTANEOUS at 13:10

## 2025-05-21 RX ADMIN — CARBIDOPA AND LEVODOPA 1 TABLET: 25; 100 TABLET ORAL at 21:27

## 2025-05-21 RX ADMIN — CEFAZOLIN SODIUM 2000 MG: 2 SOLUTION INTRAVENOUS at 21:31

## 2025-05-21 RX ADMIN — DONEPEZIL HYDROCHLORIDE 10 MG: 5 TABLET ORAL at 21:27

## 2025-05-21 RX ADMIN — CEFAZOLIN SODIUM 2000 MG: 2 SOLUTION INTRAVENOUS at 14:09

## 2025-05-21 RX ADMIN — DOCUSATE SODIUM 100 MG: 100 CAPSULE, LIQUID FILLED ORAL at 17:52

## 2025-05-21 RX ADMIN — GABAPENTIN 600 MG: 300 CAPSULE ORAL at 08:36

## 2025-05-21 RX ADMIN — CARVEDILOL 25 MG: 12.5 TABLET, FILM COATED ORAL at 08:47

## 2025-05-21 RX ADMIN — EMPAGLIFLOZIN 10 MG: 10 TABLET, FILM COATED ORAL at 08:47

## 2025-05-21 RX ADMIN — INSULIN LISPRO 1 UNITS: 100 INJECTION, SOLUTION INTRAVENOUS; SUBCUTANEOUS at 21:26

## 2025-05-21 RX ADMIN — FUROSEMIDE 40 MG: 40 TABLET ORAL at 15:48

## 2025-05-21 RX ADMIN — CARBIDOPA AND LEVODOPA 1 TABLET: 25; 100 TABLET ORAL at 15:48

## 2025-05-21 RX ADMIN — INSULIN LISPRO 4 UNITS: 100 INJECTION, SOLUTION INTRAVENOUS; SUBCUTANEOUS at 17:52

## 2025-05-21 RX ADMIN — POLYETHYLENE GLYCOL 3350 17 G: 17 POWDER, FOR SOLUTION ORAL at 08:37

## 2025-05-21 RX ADMIN — LOSARTAN POTASSIUM 100 MG: 50 TABLET, FILM COATED ORAL at 08:36

## 2025-05-21 RX ADMIN — ISOSORBIDE MONONITRATE 30 MG: 30 TABLET, EXTENDED RELEASE ORAL at 08:36

## 2025-05-21 RX ADMIN — HYDRALAZINE HYDROCHLORIDE 25 MG: 25 TABLET ORAL at 08:36

## 2025-05-21 RX ADMIN — ASPIRIN 81 MG: 81 TABLET, COATED ORAL at 08:36

## 2025-05-21 RX ADMIN — GABAPENTIN 600 MG: 300 CAPSULE ORAL at 21:28

## 2025-05-21 RX ADMIN — ENOXAPARIN SODIUM 40 MG: 40 INJECTION SUBCUTANEOUS at 08:37

## 2025-05-21 RX ADMIN — CARBIDOPA AND LEVODOPA 1 TABLET: 25; 100 TABLET ORAL at 08:36

## 2025-05-21 RX ADMIN — HYDRALAZINE HYDROCHLORIDE 25 MG: 25 TABLET ORAL at 15:47

## 2025-05-21 RX ADMIN — DULOXETINE 60 MG: 60 CAPSULE, DELAYED RELEASE ORAL at 08:36

## 2025-05-21 NOTE — PROGRESS NOTES
"Cardiology Progress Note - Alex Baptiste 75 y.o. male MRN: [id number]    Unit/Bed#: 2 E 271-01 Encounter: [id number]      Assessment & Plan  Acute on chronic HFpEF  Euvolemic on exam.  I/O net -3.6 L, creatinine stable.  Recent TTE reviewed with EF 60%.  Transition to Lasix 40 mg PO bid.  Continue Aldactone and Jardiance (no out-of-pocket cost per case management).  Chronic lymphedema reportedly at baseline, however patient endorses some associated tenderness; LE venous duplex study ordered.  Replete electrolytes as needed.  Recommend strict I/O's, daily weights, and sodium restriction.  CAD s/p PCI and CABG x3 (2017)  Continue ASA, pravastatin, Coreg, amlodipine, and Imdur.  Primary hypertension  Continue amlodipine, Coreg, losartan, hydralazine, Aldactone, Imdur, and Lasix.  Dyslipidemia  Continue pravastatin.  T2DM  Management per primary service.      Cardiology will sign off at this time, please reach out as needed.  We will plan for outpatient follow-up.      Subjective:   Patient seen and examined.  No significant events overnight.  Patient resting in bed comfortably.  Endorses lower extremity tenderness, denies any additional complaints at this time.    Objective:     Vitals: Blood pressure 155/80, pulse 55, temperature 98.4 °F (36.9 °C), resp. rate 16, height 5' 10\" (1.778 m), weight 121 kg (265 lb 12.8 oz), SpO2 95%., Body mass index is 38.14 kg/m².,   Orthostatic Blood Pressures      Flowsheet Row Most Recent Value   Blood Pressure 155/80 filed at 05/21/2025 0802   Patient Position - Orthostatic VS Lying filed at 05/21/2025 0700              Intake/Output Summary (Last 24 hours) at 5/21/2025 1002  Last data filed at 5/21/2025 0900  Gross per 24 hour   Intake 1440 ml   Output 3275 ml   Net -1835 ml         Physical Exam:  GEN: Alert and oriented x 3, in no acute distress.  Well appearing, obese, and well nourished.   HEENT: Sclera anicteric, conjunctivae pink, mucous membranes moist. Oropharynx clear. " "  NECK: Supple, no carotid bruits, no significant JVD. Trachea midline, no thyromegaly.   HEART: Regular rhythm, normal S1 and S2, no murmurs, clicks, gallops or rubs. PMI nondisplaced, no thrills.   LUNGS: Clear to auscultation bilaterally; no wheezes, rales, or rhonchi. No increased work of breathing or signs of respiratory distress.   ABDOMEN: Soft, nontender, nondistended, normoactive bowel sounds.   EXTREMITIES: Skin warm and well perfused, no clubbing or cyanosis, + BL LE lymphedema.  NEURO: No focal findings. Normal speech. Mood and affect normal.   SKIN: Stasis dermatitis, otherwise normal without suspicious lesions on exposed skin.        Medications:    Current Medications[1]     Labs & Results:     Results from last 7 days   Lab Units 05/19/25  1808 05/19/25  1609 05/19/25  1302   HS TNI 0HR ng/L  --   --  24   HS TNI 2HR ng/L  --  26  --    HSTNI D2 ng/L  --  2  --    HS TNI 4HR ng/L 27  --   --    HSTNI D4 ng/L 3  --   --      Results from last 7 days   Lab Units 05/21/25  0509 05/20/25  0505 05/19/25  1302   WBC Thousand/uL 6.37 7.96 8.55   HEMOGLOBIN g/dL 11.0* 11.4* 10.6*   HEMATOCRIT % 32.1* 33.5* 31.2*   PLATELETS Thousands/uL 117* 102* 105*         Results from last 7 days   Lab Units 05/21/25  0509 05/20/25  0505 05/19/25  1302   POTASSIUM mmol/L 3.6 3.7 4.3   CHLORIDE mmol/L 102 103 104   CO2 mmol/L 29 28 27   BUN mg/dL 18 15 19   CREATININE mg/dL 0.90 0.76 0.82   CALCIUM mg/dL 9.9 9.2 9.1   ALK PHOS U/L  --   --  85   ALT U/L  --   --  10   AST U/L  --   --  25         Results from last 7 days   Lab Units 05/21/25  0509 05/20/25  0505   MAGNESIUM mg/dL 1.8* 1.9       Vitals: Blood pressure 155/80, pulse 55, temperature 98.4 °F (36.9 °C), resp. rate 16, height 5' 10\" (1.778 m), weight 121 kg (265 lb 12.8 oz), SpO2 95%., Body mass index is 38.14 kg/m².,   Orthostatic Blood Pressures      Flowsheet Row Most Recent Value   Blood Pressure 155/80 filed at 05/21/2025 0802   Patient Position - " Orthostatic VS Lying filed at 2025 0700            Systolic (24hrs), Av , Min:155 , Max:185     Diastolic (24hrs), Av, Min:70, Max:83        Intake/Output Summary (Last 24 hours) at 2025 1002  Last data filed at 2025 0900  Gross per 24 hour   Intake 1440 ml   Output 3275 ml   Net -1835 ml       Invasive Devices       Peripheral Intravenous Line  Duration             Peripheral IV 25 Left Antecubital 1 day                          BP Readings from Last 3 Encounters:   25 155/80   25 150/78   25 (!) 175/83      Wt Readings from Last 3 Encounters:   25 121 kg (265 lb 12.8 oz)   25 122 kg (270 lb)   25 118 kg (260 lb)                         [1]   Current Facility-Administered Medications:     acetaminophen (TYLENOL) tablet 650 mg, 650 mg, Oral, Q6H PRN, Olayide D Olaniyan, CRNP    amLODIPine (NORVASC) tablet 10 mg, 10 mg, Oral, Daily, Olayide D Olaniyan, CRNP, 10 mg at 25 0836    aspirin (ECOTRIN LOW STRENGTH) EC tablet 81 mg, 81 mg, Oral, Daily, Olayide D Olaniyan, CRNP, 81 mg at 25 0836    carbidopa-levodopa (SINEMET)  mg per tablet 1 tablet, 1 tablet, Oral, TID, Olayide D Olaniyan, CRNP, 1 tablet at 25 0836    carvedilol (COREG) tablet 25 mg, 25 mg, Oral, BID With Meals, Olayide D Olaniyan, CRNP, 25 mg at 25 0847    docusate sodium (COLACE) capsule 100 mg, 100 mg, Oral, BID, Olayide D Olaniyan, CRNP, 100 mg at 25 0836    donepezil (ARICEPT) tablet 10 mg, 10 mg, Oral, HS, Olayide D Olaniyan, CRNP, 10 mg at 25    DULoxetine (CYMBALTA) delayed release capsule 60 mg, 60 mg, Oral, Daily, Olayide D Olaniyan, CRNP, 60 mg at 25 0836    Empagliflozin (JARDIANCE) tablet 10 mg, 10 mg, Oral, Daily, Alok Knight PA-C, 10 mg at 25 0847    enoxaparin (LOVENOX) subcutaneous injection 40 mg, 40 mg, Subcutaneous, Daily, TG [place], 40 mg at 25 0837    ergocalciferol (VITAMIN D2)  capsule 50,000 Units, 50,000 Units, Oral, Weekly, Olayide D Olaniyan, CRNP, 50,000 Units at 05/19/25 2106    furosemide (LASIX) injection 40 mg, 40 mg, Intravenous, BID, Olayide D Olaniyan, CRNP, 40 mg at 05/21/25 0837    gabapentin (NEURONTIN) capsule 600 mg, 600 mg, Oral, TID, Marianne Cabezas, CRNP, 600 mg at 05/21/25 0836    hydrALAZINE (APRESOLINE) tablet 25 mg, 25 mg, Oral, TID, Olayide D Olaniyan, CRNP, 25 mg at 05/21/25 0836    insulin glargine (LANTUS) subcutaneous injection 20 Units 0.2 mL, 20 Units, Subcutaneous, HS, Olayide D Olaniyan, CRNP, 20 Units at 05/20/25 2113    insulin lispro (HumALOG/ADMELOG) 100 units/mL subcutaneous injection 1-6 Units, 1-6 Units, Subcutaneous, HS, Olayide D Olaniyan, CRNP, 1 Units at 05/20/25 2112    insulin lispro (HumALOG/ADMELOG) 100 units/mL subcutaneous injection 4-20 Units, 4-20 Units, Subcutaneous, TID AC, 8 Units at 05/20/25 1748 **AND** Fingerstick Glucose (POCT), , , TID AC, Olayide D Olaniyan, CRNP    isosorbide mononitrate (IMDUR) 24 hr tablet 30 mg, 30 mg, Oral, Daily, Olayide D Olaniyan, CRNP, 30 mg at 05/21/25 0836    losartan (COZAAR) tablet 100 mg, 100 mg, Oral, Daily, Olayide D Olaniyan, CRNP, 100 mg at 05/21/25 0836    ondansetron (ZOFRAN) injection 4 mg, 4 mg, Intravenous, Q6H PRN, Olayide D Olaniyan, CRNP    OXcarbazepine (TRILEPTAL) tablet 600 mg, 600 mg, Oral, HS, Olayide D Olaniyan, CRNP, 600 mg at 05/20/25 2114    polyethylene glycol (MIRALAX) packet 17 g, 17 g, Oral, Daily, Olayide D Olaniyan, BONITANP, 17 g at 05/21/25 0837    pravastatin (PRAVACHOL) tablet 40 mg, 40 mg, Oral, Daily With Dinner, Eddiayide D Olpierre, CRNP, 40 mg at 05/20/25 1544    primidone (MYSOLINE) tablet 50 mg, 50 mg, Oral, HS, Olayide D Olaniyan, CRNP, 50 mg at 05/20/25 2114    spironolactone (ALDACTONE) tablet 25 mg, 25 mg, Oral, Daily, Alok Knight PA-C, 25 mg at 05/21/25 0836

## 2025-05-21 NOTE — PROGRESS NOTES
Progress Note - Hospitalist   Name: Alex Baptiste 75 y.o. male I MRN: [id number]  Unit/Bed#: 2 E 271-01 I Date of Admission: 5/19/2025   Date of Service: 5/21/2025 I Hospital Day: 2    Assessment & Plan  Acute on chronic HFpEF  Wt Readings from Last 3 Encounters:   05/21/25 121 kg (265 lb 12.8 oz)   05/07/25 122 kg (270 lb)   03/26/25 118 kg (260 lb)     Patient presents to the ED with chronic lower extremity edema and shortness of breath which have been worsening over the past couple of weeks. has gained 6 pounds in 4 days.      Troponin 24, 155, 26  Chest x-ray demonstrated cardiomegaly and mild pulmonary vascular congestive change.   2/3/2025 echocardiogram demonstrated LVEF 60%,  mild concentric hypertrophy. Systolic function is normal. Wall motion is normal. Diastolic function is mildly abnormal, consistent with grade I (abnormal) relaxation.  Was on torsemide but stopped months ago because he was unable to keep up with going to the bathroom  Cardiology consulted  Will transition to p.o. diuretics 40 mg Lasix to initiate 24-hour p.o. diuretic challenge  Started on GDMT therapy with Aldactone, Jardiance (no out-of-pocket cost)  Strict input and output-net -3500 mL with drop in daily weight  Carb controlled diet with 1800 mL/day fluid restriction    Polyne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 (HCC)  Put patient back on his home dose of gabapentin 600 mg 3 times daily, patient states he takes it 4 times daily at home but this is causing alert here we will continue it at 600 mg 3 times daily  T2DM  Lab Results   Component Value Date    HGBA1C 6.2 (H) 05/20/2025       Recent Labs     05/20/25  1639 05/20/25  2034 05/21/25  0801 05/21/25  1127   POCGLU 214* 166* 83 196*       Blood Sugar Average: Last 72 hrs:  (P) 148.375  Update hemoglobin A1c  Holding PTA glipizide  Patient takes Lantus 40 units at bedtime, will decrease dose to 20 units at bedtime  SSI with Accu-Cheks  Carb controlled diet  Hypoglycemia  protocol  CAD s/p PCI and CABG x3 (2017)  Noted history s/p CABG 7/2017  Primary hypertension  Continue home medication except for HCTZ 25 mg    Severe obesity (BMI >= 40) (formerly [facility])  Body mass index is 38.14 kg/m².  Recommend incorporating a more whole foods plant-predominant diet along with decreasing consumption of red meats and processed foods  Per AHA guidelines, recommend moderate-vigorous intensity exercise for 30 minutes a day for 5 days a week or a total of 150 min/week  Parkinson disease (formerly [facility])  Continue Sinemet and primidone  Dyslipidemia  Continue statin  Cellulitis of left lower extremity  Patient reports that within the last 24 hours left lower extremity had increased redness, warmth, and tenderness.  Additionally left leg > right leg edema  Vas duplex ordered-preliminary negative for DVT  Has cellulitic appearance, will start Ancef  Obtain MRSA swab    VTE Pharmacologic Prophylaxis:   Moderate Risk (Score 3-4) - Pharmacological DVT Prophylaxis Ordered: enoxaparin (Lovenox).    Mobility:   Basic Mobility Inpatient Raw Score: 20  JH-HLM Goal: 6: Walk 10 steps or more  JH-HLM Achieved: 6: Walk 10 steps or more  JH-HLM Goal achieved. Continue to encourage appropriate mobility.    Patient Centered Rounds: I performed bedside rounds with nursing staff today.   Discussions with Specialists or Other Care Team Provider: CM, cardiology    Education and Discussions with Family / Patient: Patient declined call to .     Current Length of Stay: 2 day(s)  Current Patient Status: Inpatient   Certification Statement: The patient will continue to require additional inpatient hospital stay due to continue to monitor with p.o. diuretics as well as evaluating cellulitic infection  Discharge Plan: Anticipate discharge tomorrow to home.    Code Status: Level 1 - Full Code    Subjective   Patient reports to be feeling well.  He reports his left lower extremity began to have redness, tenderness, and warmth.  He  reports he is peeing frequently and is no longer having any shortness of breath.  He currently denies any chest pain/pressure, palpitations, lightness, nausea, or chills.    Objective :  Temp:  [98.1 °F (36.7 °C)-98.4 °F (36.9 °C)] 98.4 °F (36.9 °C)  HR:  [55-62] 55  BP: (155-185)/(70-83) 155/80  Resp:  [16] 16  SpO2:  [92 %-96 %] 95 %  O2 Device: None (Room air)    Body mass index is 38.14 kg/m².     Input and Output Summary (last 24 hours):     Intake/Output Summary (Last 24 hours) at 5/21/2025 1338  Last data filed at 5/21/2025 0900  Gross per 24 hour   Intake 1200 ml   Output 3025 ml   Net -1825 ml       Physical Exam  Vitals and nursing note reviewed.   Constitutional:       General: He is not in acute distress.     Appearance: He is obese. He is not ill-appearing, toxic-appearing or diaphoretic.   HENT:      Head: Normocephalic.      Nose: Nose normal.      Mouth/Throat:      Mouth: Mucous membranes are moist.      Pharynx: Oropharynx is clear.     Eyes:      General: No scleral icterus.     Conjunctiva/sclera: Conjunctivae normal.      Pupils: Pupils are equal, round, and reactive to light.       Cardiovascular:      Rate and Rhythm: Normal rate and regular rhythm.      Heart sounds: No murmur heard.     No friction rub. No gallop.   Pulmonary:      Effort: Pulmonary effort is normal. No respiratory distress.      Breath sounds: Normal breath sounds. No stridor. No wheezing, rhonchi or rales.   Abdominal:      General: Abdomen is flat.      Palpations: Abdomen is soft.     Musculoskeletal:         General: Normal range of motion.      Cervical back: Normal range of motion and neck supple.      Right lower leg: Edema (+2 pitting) present.      Left lower leg: Edema (+3 pitting) present.   Lymphadenopathy:      Cervical: No cervical adenopathy.     Skin:     General: Skin is warm.      Coloration: Skin is not jaundiced or pale.      Findings: No bruising, erythema or lesion.      Comments: Chronic lymphedema  skin changes on bilateral lower extremities, no apparent open wound, left lower extremity with area of erythema lateral side of the distal knee, warmth, tenderness appreciated, no discharge     Neurological:      General: No focal deficit present.      Mental Status: He is alert and oriented to person, place, and time. Mental status is at baseline.      Cranial Nerves: No cranial nerve deficit.      Motor: No weakness.     Psychiatric:         Mood and Affect: Mood normal.         Behavior: Behavior normal.         Thought Content: Thought content normal.           Lines/Drains:              Lab Results: I have reviewed the following results:   Results from last 7 days   Lab Units 05/21/25  0509 05/20/25  0505 05/19/25  1302   WBC Thousand/uL 6.37   < > 8.55   HEMOGLOBIN g/dL 11.0*   < > 10.6*   HEMATOCRIT % 32.1*   < > 31.2*   PLATELETS Thousands/uL 117*   < > 105*   SEGS PCT %  --   --  80*   LYMPHO PCT %  --   --  8*   MONO PCT %  --   --  11   EOS PCT %  --   --  0    < > = values in this interval not displayed.     Results from last 7 days   Lab Units 05/21/25  0509 05/20/25  0505 05/19/25  1302   SODIUM mmol/L 139   < > 136   POTASSIUM mmol/L 3.6   < > 4.3   CHLORIDE mmol/L 102   < > 104   CO2 mmol/L 29   < > 27   BUN mg/dL 18   < > 19   CREATININE mg/dL 0.90   < > 0.82   ANION GAP mmol/L 8   < > 5   CALCIUM mg/dL 9.9   < > 9.1   ALBUMIN g/dL  --   --  3.8   TOTAL BILIRUBIN mg/dL  --   --  1.48*   ALK PHOS U/L  --   --  85   ALT U/L  --   --  10   AST U/L  --   --  25   GLUCOSE RANDOM mg/dL 83   < > 131    < > = values in this interval not displayed.         Results from last 7 days   Lab Units 05/21/25  1127 05/21/25  0801 05/20/25 2034 05/20/25  1639 05/20/25  1139 05/20/25  0656 05/19/25  2043 05/19/25  1655   POC GLUCOSE mg/dl 196* 83 166* 214* 176* 98 166* 88     Results from last 7 days   Lab Units 05/20/25  0505   HEMOGLOBIN A1C % 6.2*           Recent Cultures (last 7 days):         Imaging Results  Review: No pertinent imaging studies reviewed.  Other Study Results Review: No additional pertinent studies reviewed.    Last 24 Hours Medication List:     Current Facility-Administered Medications:     acetaminophen (TYLENOL) tablet 650 mg, Q6H PRN    amLODIPine (NORVASC) tablet 10 mg, Daily    aspirin (ECOTRIN LOW STRENGTH) EC tablet 81 mg, Daily    carbidopa-levodopa (SINEMET)  mg per tablet 1 tablet, TID    carvedilol (COREG) tablet 25 mg, BID With Meals    ceFAZolin (ANCEF) IVPB (premix in dextrose) 2,000 mg 50 mL, Q8H    docusate sodium (COLACE) capsule 100 mg, BID    donepezil (ARICEPT) tablet 10 mg, HS    DULoxetine (CYMBALTA) delayed release capsule 60 mg, Daily    Empagliflozin (JARDIANCE) tablet 10 mg, Daily    enoxaparin (LOVENOX) subcutaneous injection 40 mg, Daily    ergocalciferol (VITAMIN D2) capsule 50,000 Units, Weekly    furosemide (LASIX) tablet 40 mg, BID (diuretic)    gabapentin (NEURONTIN) capsule 600 mg, TID    hydrALAZINE (APRESOLINE) tablet 25 mg, TID    insulin glargine (LANTUS) subcutaneous injection 20 Units 0.2 mL, HS    insulin lispro (HumALOG/ADMELOG) 100 units/mL subcutaneous injection 1-6 Units, HS    insulin lispro (HumALOG/ADMELOG) 100 units/mL subcutaneous injection 4-20 Units, TID AC **AND** Fingerstick Glucose (POCT), TID AC    isosorbide mononitrate (IMDUR) 24 hr tablet 30 mg, Daily    losartan (COZAAR) tablet 100 mg, Daily    ondansetron (ZOFRAN) injection 4 mg, Q6H PRN    OXcarbazepine (TRILEPTAL) tablet 600 mg, HS    polyethylene glycol (MIRALAX) packet 17 g, Daily    pravastatin (PRAVACHOL) tablet 40 mg, Daily With Dinner    primidone (MYSOLINE) tablet 50 mg, HS    spironolactone (ALDACTONE) tablet 25 mg, Daily    Administrative Statements   Today, Patient Was Seen By: Juliocesar Lewis PA-C  I have spent a total time of 35 minutes in caring for this patient on the day of the visit/encounter including Counseling / Coordination of care, Documenting in the medical  record, Reviewing/placing orders in the medical record (including tests, medications, and/or procedures), Obtaining or reviewing history  , and Communicating with other healthcare professionals .    **Please Note: This note may have been constructed using a voice recognition system.**

## 2025-05-21 NOTE — ASSESSMENT & PLAN NOTE
Euvolemic on exam.  I/O net -3.6 L, creatinine stable.  Recent TTE reviewed with EF 60%.  Transition to Lasix 40 mg PO bid.  Continue Aldactone and Jardiance (no out-of-pocket cost per case management).  Chronic lymphedema reportedly at baseline, however patient endorses some associated tenderness; LE venous duplex study ordered.  Replete electrolytes as needed.  Recommend strict I/O's, daily weights, and sodium restriction.

## 2025-05-21 NOTE — ASSESSMENT & PLAN NOTE
Body mass index is 38.14 kg/m².  Recommend incorporating a more whole foods plant-predominant diet along with decreasing consumption of red meats and processed foods  Per AHA guidelines, recommend moderate-vigorous intensity exercise for 30 minutes a day for 5 days a week or a total of 150 min/week

## 2025-05-21 NOTE — ASSESSMENT & PLAN NOTE
Patient reports that within the last 24 hours left lower extremity had increased redness, warmth, and tenderness.  Additionally left leg > right leg edema  Vas duplex ordered-preliminary negative for DVT  Has cellulitic appearance, will start Ancef  Obtain MRSA swab

## 2025-05-21 NOTE — ASSESSMENT & PLAN NOTE
Wt Readings from Last 3 Encounters:   05/21/25 121 kg (265 lb 12.8 oz)   05/07/25 122 kg (270 lb)   03/26/25 118 kg (260 lb)     Patient presents to the ED with chronic lower extremity edema and shortness of breath which have been worsening over the past couple of weeks. has gained 6 pounds in 4 days.      Troponin 24, 155, 26  Chest x-ray demonstrated cardiomegaly and mild pulmonary vascular congestive change.   2/3/2025 echocardiogram demonstrated LVEF 60%,  mild concentric hypertrophy. Systolic function is normal. Wall motion is normal. Diastolic function is mildly abnormal, consistent with grade I (abnormal) relaxation.  Was on torsemide but stopped months ago because he was unable to keep up with going to the bathroom  Cardiology consulted  Will transition to p.o. diuretics 40 mg Lasix to initiate 24-hour p.o. diuretic challenge  Started on GDMT therapy with Aldactone, Jardiance (no out-of-pocket cost)  Strict input and output-net -3500 mL with drop in daily weight  Carb controlled diet with 1800 mL/day fluid restriction

## 2025-05-21 NOTE — PLAN OF CARE
Problem: PAIN - ADULT  Goal: Verbalizes/displays adequate comfort level or baseline comfort level  Description: Interventions:  - Encourage patient to monitor pain and request assistance  - Assess pain using appropriate pain scale  - Administer analgesics as ordered based on type and severity of pain and evaluate response  - Implement non-pharmacological measures as appropriate and evaluate response  - Consider cultural and social influences on pain and pain management  - Notify physician/advanced practitioner if interventions unsuccessful or patient reports new pain  - Educate patient/family on pain management process including their role and importance of  reporting pain   - Provide non-pharmacologic/complimentary pain relief interventions  Outcome: Progressing     Problem: INFECTION - ADULT  Goal: Absence or prevention of progression during hospitalization  Description: INTERVENTIONS:  - Assess and monitor for signs and symptoms of infection  - Monitor lab/diagnostic results  - Monitor all insertion sites, i.e. indwelling lines, tubes, and drains  - Monitor endotracheal if appropriate and nasal secretions for changes in amount and color  - [place] appropriate cooling/warming therapies per order  - Administer medications as ordered  - Instruct and encourage patient and family to use good hand hygiene technique  - Identify and instruct in appropriate isolation precautions for identified infection/condition  Outcome: Progressing  Goal: Absence of fever/infection during neutropenic period  Description: INTERVENTIONS:  - Monitor WBC  - Perform strict hand hygiene  - Limit to healthy visitors only  - No plants, dried, fresh or silk flowers with dobson in patient room  Outcome: Progressing     Problem: DISCHARGE PLANNING  Goal: Discharge to home or other facility with appropriate resources  Description: INTERVENTIONS:  - Identify barriers to discharge w/patient and caregiver  - Arrange for needed discharge resources  and transportation as appropriate  - Identify discharge learning needs (meds, wound care, etc.)  - Arrange for interpretive services to assist at discharge as needed  - Refer to Case Management Department for coordinating discharge planning if the patient needs post-hospital services based on physician/advanced practitioner order or complex needs related to functional status, cognitive ability, or social support system  Outcome: Progressing

## 2025-05-21 NOTE — TELEPHONE ENCOUNTER
----- Message from Alok Knight PA-C sent at 5/21/2025 11:44 AM EDT -----  Regarding: Hosp f/u CHF  Patient clinical visit in 5 day at the Cardio location: [place] office. .Schedule visit with [facility] [place] Providers: first available provider.Type of Visit: VISIT TYPE: in-person office visit.Additional Details: HFpEFPatient is a hospital follow-up and will need either a 40 or 60-minute appointment.

## 2025-05-22 LAB
GLUCOSE SERPL-MCNC: 127 MG/DL (ref 65–140)
GLUCOSE SERPL-MCNC: 190 MG/DL (ref 65–140)
GLUCOSE SERPL-MCNC: 190 MG/DL (ref 65–140)
GLUCOSE SERPL-MCNC: 206 MG/DL (ref 65–140)
MRSA NOSE QL CULT: NORMAL

## 2025-05-22 PROCEDURE — 82948 REAGENT STRIP/BLOOD GLUCOSE: CPT

## 2025-05-22 PROCEDURE — 99232 SBSQ HOSP IP/OBS MODERATE 35: CPT | Performed by: NURSE PRACTITIONER

## 2025-05-22 RX ADMIN — FUROSEMIDE 40 MG: 40 TABLET ORAL at 16:33

## 2025-05-22 RX ADMIN — DONEPEZIL HYDROCHLORIDE 10 MG: 5 TABLET ORAL at 22:13

## 2025-05-22 RX ADMIN — HYDRALAZINE HYDROCHLORIDE 25 MG: 25 TABLET ORAL at 08:45

## 2025-05-22 RX ADMIN — DULOXETINE 60 MG: 60 CAPSULE, DELAYED RELEASE ORAL at 08:44

## 2025-05-22 RX ADMIN — GABAPENTIN 600 MG: 300 CAPSULE ORAL at 16:32

## 2025-05-22 RX ADMIN — INSULIN LISPRO 4 UNITS: 100 INJECTION, SOLUTION INTRAVENOUS; SUBCUTANEOUS at 16:29

## 2025-05-22 RX ADMIN — DOCUSATE SODIUM 100 MG: 100 CAPSULE, LIQUID FILLED ORAL at 08:45

## 2025-05-22 RX ADMIN — ASPIRIN 81 MG: 81 TABLET, COATED ORAL at 08:43

## 2025-05-22 RX ADMIN — AMLODIPINE BESYLATE 10 MG: 10 TABLET ORAL at 08:43

## 2025-05-22 RX ADMIN — CARBIDOPA AND LEVODOPA 1 TABLET: 25; 100 TABLET ORAL at 22:14

## 2025-05-22 RX ADMIN — PRIMIDONE 50 MG: 50 TABLET ORAL at 22:18

## 2025-05-22 RX ADMIN — CARBIDOPA AND LEVODOPA 1 TABLET: 25; 100 TABLET ORAL at 08:44

## 2025-05-22 RX ADMIN — INSULIN LISPRO 4 UNITS: 100 INJECTION, SOLUTION INTRAVENOUS; SUBCUTANEOUS at 11:41

## 2025-05-22 RX ADMIN — CARVEDILOL 25 MG: 12.5 TABLET, FILM COATED ORAL at 08:00

## 2025-05-22 RX ADMIN — EMPAGLIFLOZIN 10 MG: 10 TABLET, FILM COATED ORAL at 08:52

## 2025-05-22 RX ADMIN — GABAPENTIN 600 MG: 300 CAPSULE ORAL at 22:14

## 2025-05-22 RX ADMIN — POLYETHYLENE GLYCOL 3350 17 G: 17 POWDER, FOR SOLUTION ORAL at 08:43

## 2025-05-22 RX ADMIN — HYDRALAZINE HYDROCHLORIDE 25 MG: 25 TABLET ORAL at 22:13

## 2025-05-22 RX ADMIN — INSULIN LISPRO 2 UNITS: 100 INJECTION, SOLUTION INTRAVENOUS; SUBCUTANEOUS at 22:19

## 2025-05-22 RX ADMIN — INSULIN GLARGINE 20 UNITS: 100 INJECTION, SOLUTION SUBCUTANEOUS at 22:14

## 2025-05-22 RX ADMIN — CEFAZOLIN SODIUM 2000 MG: 2 SOLUTION INTRAVENOUS at 14:30

## 2025-05-22 RX ADMIN — CEFAZOLIN SODIUM 2000 MG: 2 SOLUTION INTRAVENOUS at 22:07

## 2025-05-22 RX ADMIN — CARBIDOPA AND LEVODOPA 1 TABLET: 25; 100 TABLET ORAL at 16:34

## 2025-05-22 RX ADMIN — PRAVASTATIN SODIUM 40 MG: 40 TABLET ORAL at 16:34

## 2025-05-22 RX ADMIN — CEFAZOLIN SODIUM 2000 MG: 2 SOLUTION INTRAVENOUS at 05:23

## 2025-05-22 RX ADMIN — GABAPENTIN 600 MG: 300 CAPSULE ORAL at 08:44

## 2025-05-22 RX ADMIN — ISOSORBIDE MONONITRATE 30 MG: 30 TABLET, EXTENDED RELEASE ORAL at 08:44

## 2025-05-22 RX ADMIN — DOCUSATE SODIUM 100 MG: 100 CAPSULE, LIQUID FILLED ORAL at 18:09

## 2025-05-22 RX ADMIN — ACETAMINOPHEN 650 MG: 325 TABLET ORAL at 16:42

## 2025-05-22 RX ADMIN — CARVEDILOL 25 MG: 12.5 TABLET, FILM COATED ORAL at 16:33

## 2025-05-22 RX ADMIN — HYDRALAZINE HYDROCHLORIDE 25 MG: 25 TABLET ORAL at 16:32

## 2025-05-22 RX ADMIN — OXCARBAZEPINE 600 MG: 150 TABLET, FILM COATED ORAL at 22:17

## 2025-05-22 RX ADMIN — ENOXAPARIN SODIUM 40 MG: 40 INJECTION SUBCUTANEOUS at 08:45

## 2025-05-22 RX ADMIN — FUROSEMIDE 40 MG: 40 TABLET ORAL at 08:45

## 2025-05-22 RX ADMIN — LOSARTAN POTASSIUM 100 MG: 50 TABLET, FILM COATED ORAL at 08:44

## 2025-05-22 RX ADMIN — SPIRONOLACTONE 25 MG: 25 TABLET ORAL at 08:43

## 2025-05-22 NOTE — PLAN OF CARE
Problem: PAIN - ADULT  Goal: Verbalizes/displays adequate comfort level or baseline comfort level  Description: Interventions:  - Encourage patient to monitor pain and request assistance  - Assess pain using appropriate pain scale  - Administer analgesics as ordered based on type and severity of pain and evaluate response  - Implement non-pharmacological measures as appropriate and evaluate response  - Consider cultural and social influences on pain and pain management  - Notify physician/advanced practitioner if interventions unsuccessful or patient reports new pain  - Educate patient/family on pain management process including their role and importance of  reporting pain   - Provide non-pharmacologic/complimentary pain relief interventions  Outcome: Progressing     Problem: INFECTION - ADULT  Goal: Absence or prevention of progression during hospitalization  Description: INTERVENTIONS:  - Assess and monitor for signs and symptoms of infection  - Monitor lab/diagnostic results  - Monitor all insertion sites, i.e. indwelling lines, tubes, and drains  - Monitor endotracheal if appropriate and nasal secretions for changes in amount and color  - Rescue appropriate cooling/warming therapies per order  - Administer medications as ordered  - Instruct and encourage patient and family to use good hand hygiene technique  - Identify and instruct in appropriate isolation precautions for identified infection/condition  Outcome: Progressing  Goal: Absence of fever/infection during neutropenic period  Description: INTERVENTIONS:  - Monitor WBC  - Perform strict hand hygiene  - Limit to healthy visitors only  - No plants, dried, fresh or silk flowers with dobson in patient room  Outcome: Progressing     Problem: SAFETY ADULT  Goal: Patient will remain free of falls  Description: INTERVENTIONS:  - Educate patient/family on patient safety including physical limitations  - Instruct patient to call for assistance with activity   -  Consider consulting OT/PT to assist with strengthening/mobility based on AM PAC & JH-HLM score  - Consult OT/PT to assist with strengthening/mobility   - Keep Call bell within reach  - Keep bed low and locked with side rails adjusted as appropriate  - Keep care items and personal belongings within reach  - Initiate and maintain comfort rounds  - Make Fall Risk Sign visible to staff  - Offer Toileting every    Hours, in advance of need  - Initiate/Maintain   alarm  - Obtain necessary fall risk management equipment:     - Apply yellow socks and bracelet for high fall risk patients  - Consider moving patient to room near nurses station  Outcome: Progressing  Goal: Maintain or return to baseline ADL function  Description: INTERVENTIONS:  -  Assess patient's ability to carry out ADLs; assess patient's baseline for ADL function and identify physical deficits which impact ability to perform ADLs (bathing, care of mouth/teeth, toileting, grooming, dressing, etc.)  - Assess/evaluate cause of self-care deficits   - Assess range of motion  - Assess patient's mobility; develop plan if impaired  - Assess patient's need for assistive devices and provide as appropriate  - Encourage maximum independence but intervene and supervise when necessary  - Involve family in performance of ADLs  - Assess for home care needs following discharge   - Consider OT consult to assist with ADL evaluation and planning for discharge  - Provide patient education as appropriate  - Monitor functional capacity and physical performance, use of AM PAC & JH-HLM   - Monitor gait, balance and fatigue with ambulation    Outcome: Progressing  Goal: Maintains/Returns to pre admission functional level  Description: INTERVENTIONS:  - Perform AM-PAC 6 Click Basic Mobility/ Daily Activity assessment daily.  - Set and communicate daily mobility goal to care team and patient/family/caregiver.   - Collaborate with rehabilitation services on mobility goals if  consulted  - Perform Range of Motion    times a day.  - Reposition patient every    hours.  - Dangle patient    times a day  - Stand patient      times a day  - Ambulate patient    times a day  - Out of bed to chair    times a day   - Out of bed for meals    times a day  - Out of bed for toileting  - Record patient progress and toleration of activity level   Outcome: Progressing     Problem: DISCHARGE PLANNING  Goal: Discharge to home or other facility with appropriate resources  Description: INTERVENTIONS:  - Identify barriers to discharge w/patient and caregiver  - Arrange for needed discharge resources and transportation as appropriate  - Identify discharge learning needs (meds, wound care, etc.)  - Arrange for interpretive services to assist at discharge as needed  - Refer to Case Management Department for coordinating discharge planning if the patient needs post-hospital services based on physician/advanced practitioner order or complex needs related to functional status, cognitive ability, or social support system  Outcome: Progressing     Problem: Knowledge Deficit  Goal: Patient/family/caregiver demonstrates understanding of disease process, treatment plan, medications, and discharge instructions  Description: Complete learning assessment and assess knowledge base.  Interventions:  - Provide teaching at level of understanding  - Provide teaching via preferred learning methods  Outcome: Progressing     Problem: CARDIOVASCULAR - ADULT  Goal: Maintains optimal cardiac output and hemodynamic stability  Description: INTERVENTIONS:  - Monitor I/O, vital signs and rhythm  - Monitor for S/S and trends of decreased cardiac output  - Administer and titrate ordered vasoactive medications to optimize hemodynamic stability  - Assess quality of pulses, skin color and temperature  - Assess for signs of decreased coronary artery perfusion  - Instruct patient to report change in severity of symptoms  Outcome:  Progressing  Goal: Absence of cardiac dysrhythmias or at baseline rhythm  Description: INTERVENTIONS:  - Continuous cardiac monitoring, vital signs, obtain 12 lead EKG if ordered  - Administer antiarrhythmic and heart rate control medications as ordered  - Monitor electrolytes and administer replacement therapy as ordered  Outcome: Progressing     Problem: RESPIRATORY - ADULT  Goal: Achieves optimal ventilation and oxygenation  Description: INTERVENTIONS:  - Assess for changes in respiratory status  - Assess for changes in mentation and behavior  - Position to facilitate oxygenation and minimize respiratory effort  - Oxygen administered by appropriate delivery if ordered  - Initiate smoking cessation education as indicated  - Encourage broncho-pulmonary hygiene including cough, deep breathe, Incentive Spirometry  - Assess the need for suctioning and aspirate as needed  - Assess and instruct to report SOB or any respiratory difficulty  - Respiratory Therapy support as indicated  Outcome: Progressing     Problem: GENITOURINARY - ADULT  Goal: Maintains or returns to baseline urinary function  Description: INTERVENTIONS:  - Assess urinary function  - Encourage oral fluids to ensure adequate hydration if ordered  - Administer IV fluids as ordered to ensure adequate hydration  - Administer ordered medications as needed  - Offer frequent toileting  - Follow urinary retention protocol if ordered  Outcome: Progressing

## 2025-05-22 NOTE — ASSESSMENT & PLAN NOTE
Lab Results   Component Value Date    HGBA1C 6.2 (H) 05/20/2025       Recent Labs     05/21/25  1555 05/21/25  2046 05/22/25  0746 05/22/25  1137   POCGLU 156* 172* 127 206*       Blood Sugar Average: Last 72 hrs:  (P) 154  Update hemoglobin A1c  Holding PTA glipizide  Patient takes Lantus 40 units at bedtime, will decrease dose to 20 units at bedtime  SSI with Accu-Cheks  Carb controlled diet  Hypoglycemia protocol

## 2025-05-22 NOTE — ASSESSMENT & PLAN NOTE
Body mass index is 37.94 kg/m².  Recommend incorporating a more whole foods plant-predominant diet along with decreasing consumption of red meats and processed foods  Per AHA guidelines, recommend moderate-vigorous intensity exercise for 30 minutes a day for 5 days a week or a total of 150 min/week

## 2025-05-22 NOTE — ASSESSMENT & PLAN NOTE
Wt Readings from Last 3 Encounters:   05/22/25 120 kg (264 lb 6.4 oz)   05/07/25 122 kg (270 lb)   03/26/25 118 kg (260 lb)     Patient presents to the ED with chronic lower extremity edema and shortness of breath which have been worsening over the past couple of weeks. has gained 6 pounds in 4 days.      Troponin 24, 155, 26  Chest x-ray demonstrated cardiomegaly and mild pulmonary vascular congestive change.   2/3/2025 echocardiogram demonstrated LVEF 60%,  mild concentric hypertrophy. Systolic function is normal. Wall motion is normal. Diastolic function is mildly abnormal, consistent with grade I (abnormal) relaxation.  Was on torsemide but stopped months ago because he was unable to keep up with going to the bathroom  Cardiology consulted  Will transition to p.o. diuretics 40 mg Lasix to initiate 24-hour p.o. diuretic challenge  Started on GDMT therapy with Aldactone, Jardiance (no out-of-pocket cost)  Strict input and output-net -3500 mL with drop in daily weight  Carb controlled diet with 1800 mL/day fluid restriction

## 2025-05-22 NOTE — ASSESSMENT & PLAN NOTE
Patient reports that within the last 24 hours left lower extremity had increased redness, warmth, and tenderness.  Additionally left leg > right leg edema  Vas duplex ordered-negative for DVT  Has cellulitic appearance, continue Ancef  Obtain MRSA swab

## 2025-05-22 NOTE — PROGRESS NOTES
Progress Note - Hospitalist   Name: Alex Baptiste 75 y.o. male I MRN: [id number]  Unit/Bed#: 2 E 271-01 I Date of Admission: 5/19/2025   Date of Service: 5/22/2025 I Hospital Day: 3    Assessment & Plan  Acute on chronic HFpEF  Wt Readings from Last 3 Encounters:   05/22/25 120 kg (264 lb 6.4 oz)   05/07/25 122 kg (270 lb)   03/26/25 118 kg (260 lb)     Patient presents to the ED with chronic lower extremity edema and shortness of breath which have been worsening over the past couple of weeks. has gained 6 pounds in 4 days.      Troponin 24, 155, 26  Chest x-ray demonstrated cardiomegaly and mild pulmonary vascular congestive change.   2/3/2025 echocardiogram demonstrated LVEF 60%,  mild concentric hypertrophy. Systolic function is normal. Wall motion is normal. Diastolic function is mildly abnormal, consistent with grade I (abnormal) relaxation.  Was on torsemide but stopped months ago because he was unable to keep up with going to the bathroom  Cardiology consulted  Will transition to p.o. diuretics 40 mg Lasix to initiate 24-hour p.o. diuretic challenge  Started on GDMT therapy with Aldactone, Jardiance (no out-of-pocket cost)  Strict input and output-net -3500 mL with drop in daily weight  Carb controlled diet with 1800 mL/day fluid restriction    Polyne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 (HCC)  Put patient back on his home dose of gabapentin 600 mg 3 times daily, patient states he takes it 4 times daily at home but this is causing alert here we will continue it at 600 mg 3 times daily  T2DM  Lab Results   Component Value Date    HGBA1C 6.2 (H) 05/20/2025       Recent Labs     05/21/25  1555 05/21/25  2046 05/22/25  0746 05/22/25  1137   POCGLU 156* 172* 127 206*       Blood Sugar Average: Last 72 hrs:  (P) 154  Update hemoglobin A1c  Holding PTA glipizide  Patient takes Lantus 40 units at bedtime, will decrease dose to 20 units at bedtime  SSI with Accu-Cheks  Carb controlled diet  Hypoglycemia  protocol  CAD s/p PCI and CABG x3 (2017)  Noted history s/p CABG 7/2017  Primary hypertension  Continue home medication except for HCTZ 25 mg    Severe obesity (BMI >= 40) ([facility])  Body mass index is 37.94 kg/m².  Recommend incorporating a more whole foods plant-predominant diet along with decreasing consumption of red meats and processed foods  Per AHA guidelines, recommend moderate-vigorous intensity exercise for 30 minutes a day for 5 days a week or a total of 150 min/week  Parkinson disease ([facility])  Continue Sinemet and primidone  Dyslipidemia  Continue statin  Cellulitis of left lower extremity  Patient reports that within the last 24 hours left lower extremity had increased redness, warmth, and tenderness.  Additionally left leg > right leg edema  Vas duplex ordered-negative for DVT  Has cellulitic appearance, continue Ancef  Obtain MRSA swab    VTE Pharmacologic Prophylaxis:   Moderate Risk (Score 3-4) - Pharmacological DVT Prophylaxis Ordered: enoxaparin (Lovenox).    Mobility:   Basic Mobility Inpatient Raw Score: 20  JH-HLM Goal: 6: Walk 10 steps or more  JH-HLM Achieved: 6: Walk 10 steps or more  JH-HLM Goal achieved. Continue to encourage appropriate mobility.    Patient Centered Rounds: I performed bedside rounds with nursing staff today.   Discussions with Specialists or Other Care Team Provider: Case management    Education and Discussions with Family / Patient: Patient declined call to .     Current Length of Stay: 3 day(s)  Current Patient Status: Inpatient   Certification Statement: The patient will continue to require additional inpatient hospital stay due to continue to monitor with p.o. diuretics as well as evaluating cellulitic infection  Discharge Plan: Anticipate discharge tomorrow to home.    Code Status: Level 1 - Full Code    Subjective   patient is very pleasant out of bed in the chair reports that the swelling has improved but he has significant left leg discomfort with redness  and heat negative for DVT he understands this did explain that this looks like it is likely cellulitis he verbalizes understanding will continue antibiotics reports that his breathing is back to baseline    Objective :  Temp:  [97.9 °F (36.6 °C)-98 °F (36.7 °C)] 98 °F (36.7 °C)  HR:  [51-67] 51  BP: (113-177)/(59-92) 166/90  Resp:  [18] 18  SpO2:  [94 %-95 %] 95 %  O2 Device: None (Room air)    Body mass index is 37.94 kg/m².     Input and Output Summary (last 24 hours):     Intake/Output Summary (Last 24 hours) at 5/22/2025 1429  Last data filed at 5/22/2025 1100  Gross per 24 hour   Intake 480 ml   Output 2200 ml   Net -1720 ml       Physical Exam  Vitals reviewed.   Constitutional:       General: He is not in acute distress.     Appearance: He is obese. He is ill-appearing.     Cardiovascular:      Rate and Rhythm: Normal rate.   Pulmonary:      Effort: No respiratory distress.     Musculoskeletal:         General: Swelling and tenderness present.     Skin:     General: Skin is warm and dry.      Coloration: Skin is pale.      Findings: Erythema present.     Neurological:      General: No focal deficit present.      Mental Status: He is alert. Mental status is at baseline.           Lines/Drains:        Lab Results: I have reviewed the following results:   Results from last 7 days   Lab Units 05/21/25  0509 05/20/25  0505 05/19/25  1302   WBC Thousand/uL 6.37   < > 8.55   HEMOGLOBIN g/dL 11.0*   < > 10.6*   HEMATOCRIT % 32.1*   < > 31.2*   PLATELETS Thousands/uL 117*   < > 105*   SEGS PCT %  --   --  80*   LYMPHO PCT %  --   --  8*   MONO PCT %  --   --  11   EOS PCT %  --   --  0    < > = values in this interval not displayed.     Results from last 7 days   Lab Units 05/21/25  0509 05/20/25  0505 05/19/25  1302   SODIUM mmol/L 139   < > 136   POTASSIUM mmol/L 3.6   < > 4.3   CHLORIDE mmol/L 102   < > 104   CO2 mmol/L 29   < > 27   BUN mg/dL 18   < > 19   CREATININE mg/dL 0.90   < > 0.82   ANION GAP mmol/L 8   <  > 5   CALCIUM mg/dL 9.9   < > 9.1   ALBUMIN g/dL  --   --  3.8   TOTAL BILIRUBIN mg/dL  --   --  1.48*   ALK PHOS U/L  --   --  85   ALT U/L  --   --  10   AST U/L  --   --  25   GLUCOSE RANDOM mg/dL 83   < > 131    < > = values in this interval not displayed.         Results from last 7 days   Lab Units 05/22/25  1137 05/22/25  0746 05/21/25  2046 05/21/25  1555 05/21/25  1127 05/21/25  0801 05/20/25  2034 05/20/25  1639 05/20/25  1139 05/20/25  0656 05/19/25 2043 05/19/25  1655   POC GLUCOSE mg/dl 206* 127 172* 156* 196* 83 166* 214* 176* 98 166* 88     Results from last 7 days   Lab Units 05/20/25  0505   HEMOGLOBIN A1C % 6.2*           Recent Cultures (last 7 days):         Imaging Results Review: No pertinent imaging studies reviewed.  Other Study Results Review: No additional pertinent studies reviewed.    Last 24 Hours Medication List:     Current Facility-Administered Medications:     acetaminophen (TYLENOL) tablet 650 mg, Q6H PRN    amLODIPine (NORVASC) tablet 10 mg, Daily    aspirin (ECOTRIN LOW STRENGTH) EC tablet 81 mg, Daily    carbidopa-levodopa (SINEMET)  mg per tablet 1 tablet, TID    carvedilol (COREG) tablet 25 mg, BID With Meals    ceFAZolin (ANCEF) IVPB (premix in dextrose) 2,000 mg 50 mL, Q8H, Last Rate: 2,000 mg (05/22/25 0523)    docusate sodium (COLACE) capsule 100 mg, BID    donepezil (ARICEPT) tablet 10 mg, HS    DULoxetine (CYMBALTA) delayed release capsule 60 mg, Daily    Empagliflozin (JARDIANCE) tablet 10 mg, Daily    enoxaparin (LOVENOX) subcutaneous injection 40 mg, Daily    ergocalciferol (VITAMIN D2) capsule 50,000 Units, Weekly    furosemide (LASIX) tablet 40 mg, BID (diuretic)    gabapentin (NEURONTIN) capsule 600 mg, TID    hydrALAZINE (APRESOLINE) tablet 25 mg, TID    insulin glargine (LANTUS) subcutaneous injection 20 Units 0.2 mL, HS    insulin lispro (HumALOG/ADMELOG) 100 units/mL subcutaneous injection 1-6 Units, HS    insulin lispro (HumALOG/ADMELOG) 100 units/mL  subcutaneous injection 4-20 Units, TID AC **AND** Fingerstick Glucose (POCT), TID AC    isosorbide mononitrate (IMDUR) 24 hr tablet 30 mg, Daily    losartan (COZAAR) tablet 100 mg, Daily    ondansetron (ZOFRAN) injection 4 mg, Q6H PRN    OXcarbazepine (TRILEPTAL) tablet 600 mg, HS    polyethylene glycol (MIRALAX) packet 17 g, Daily    pravastatin (PRAVACHOL) tablet 40 mg, Daily With Dinner    primidone (MYSOLINE) tablet 50 mg, HS    spironolactone (ALDACTONE) tablet 25 mg, Daily    Administrative Statements   Today, Patient Was Seen By: TG Scott  I have spent a total time of 35 minutes in caring for this patient on the day of the visit/encounter including Counseling / Coordination of care, Documenting in the medical record, Reviewing/placing orders in the medical record (including tests, medications, and/or procedures), Obtaining or reviewing history  , and Communicating with other healthcare professionals .    **Please Note: This note may have been constructed using a voice recognition system.**

## 2025-05-22 NOTE — PLAN OF CARE
Problem: PAIN - ADULT  Goal: Verbalizes/displays adequate comfort level or baseline comfort level  Description: Interventions:  - Encourage patient to monitor pain and request assistance  - Assess pain using appropriate pain scale  - Administer analgesics as ordered based on type and severity of pain and evaluate response  - Implement non-pharmacological measures as appropriate and evaluate response  - Consider cultural and social influences on pain and pain management  - Notify physician/advanced practitioner if interventions unsuccessful or patient reports new pain  - Educate patient/family on pain management process including their role and importance of  reporting pain   - Provide non-pharmacologic/complimentary pain relief interventions  Outcome: Progressing     Problem: INFECTION - ADULT  Goal: Absence or prevention of progression during hospitalization  Description: INTERVENTIONS:  - Assess and monitor for signs and symptoms of infection  - Monitor lab/diagnostic results  - Monitor all insertion sites, i.e. indwelling lines, tubes, and drains  - Monitor endotracheal if appropriate and nasal secretions for changes in amount and color  - [place] appropriate cooling/warming therapies per order  - Administer medications as ordered  - Instruct and encourage patient and family to use good hand hygiene technique  - Identify and instruct in appropriate isolation precautions for identified infection/condition  Outcome: Progressing  Goal: Absence of fever/infection during neutropenic period  Description: INTERVENTIONS:  - Monitor WBC  - Perform strict hand hygiene  - Limit to healthy visitors only  - No plants, dried, fresh or silk flowers with dobson in patient room  Outcome: Progressing     Problem: SAFETY ADULT  Goal: Patient will remain free of falls  Description: INTERVENTIONS:  - Educate patient/family on patient safety including physical limitations  - Instruct patient to call for assistance with activity   -  Consider consulting OT/PT to assist with strengthening/mobility based on AM PAC & JH-HLM score  - Consult OT/PT to assist with strengthening/mobility   - Keep Call bell within reach  - Keep bed low and locked with side rails adjusted as appropriate  - Keep care items and personal belongings within reach  - Initiate and maintain comfort rounds  - Make Fall Risk Sign visible to staff  - Offer Toileting every  Hours, in advance of need  - Initiate/Maintain alarm  - Obtain necessary fall risk management equipment:   - Apply yellow socks and bracelet for high fall risk patients  - Consider moving patient to room near nurses station  Outcome: Progressing  Goal: Maintain or return to baseline ADL function  Description: INTERVENTIONS:  -  Assess patient's ability to carry out ADLs; assess patient's baseline for ADL function and identify physical deficits which impact ability to perform ADLs (bathing, care of mouth/teeth, toileting, grooming, dressing, etc.)  - Assess/evaluate cause of self-care deficits   - Assess range of motion  - Assess patient's mobility; develop plan if impaired  - Assess patient's need for assistive devices and provide as appropriate  - Encourage maximum independence but intervene and supervise when necessary  - Involve family in performance of ADLs  - Assess for home care needs following discharge   - Consider OT consult to assist with ADL evaluation and planning for discharge  - Provide patient education as appropriate  - Monitor functional capacity and physical performance, use of AM PAC & JH-HLM   - Monitor gait, balance and fatigue with ambulation    Outcome: Progressing  Goal: Maintains/Returns to pre admission functional level  Description: INTERVENTIONS:  - Perform AM-PAC 6 Click Basic Mobility/ Daily Activity assessment daily.  - Set and communicate daily mobility goal to care team and patient/family/caregiver.   - Collaborate with rehabilitation services on mobility goals if consulted  -  Perform Range of Motion  times a day.  - Reposition patient every  hours.  - Dangle patient  times a day  - Stand patient  times a day  - Ambulate patient  times a day  - Out of bed to chair  times a day   - Out of bed for meals times a day  - Out of bed for toileting  - Record patient progress and toleration of activity level   Outcome: Progressing     Problem: DISCHARGE PLANNING  Goal: Discharge to home or other facility with appropriate resources  Description: INTERVENTIONS:  - Identify barriers to discharge w/patient and caregiver  - Arrange for needed discharge resources and transportation as appropriate  - Identify discharge learning needs (meds, wound care, etc.)  - Arrange for interpretive services to assist at discharge as needed  - Refer to Case Management Department for coordinating discharge planning if the patient needs post-hospital services based on physician/advanced practitioner order or complex needs related to functional status, cognitive ability, or social support system  Outcome: Progressing     Problem: Knowledge Deficit  Goal: Patient/family/caregiver demonstrates understanding of disease process, treatment plan, medications, and discharge instructions  Description: Complete learning assessment and assess knowledge base.  Interventions:  - Provide teaching at level of understanding  - Provide teaching via preferred learning methods  Outcome: Progressing     Problem: CARDIOVASCULAR - ADULT  Goal: Maintains optimal cardiac output and hemodynamic stability  Description: INTERVENTIONS:  - Monitor I/O, vital signs and rhythm  - Monitor for S/S and trends of decreased cardiac output  - Administer and titrate ordered vasoactive medications to optimize hemodynamic stability  - Assess quality of pulses, skin color and temperature  - Assess for signs of decreased coronary artery perfusion  - Instruct patient to report change in severity of symptoms  Outcome: Progressing  Goal: Absence of cardiac  dysrhythmias or at baseline rhythm  Description: INTERVENTIONS:  - Continuous cardiac monitoring, vital signs, obtain 12 lead EKG if ordered  - Administer antiarrhythmic and heart rate control medications as ordered  - Monitor electrolytes and administer replacement therapy as ordered  Outcome: Progressing     Problem: RESPIRATORY - ADULT  Goal: Achieves optimal ventilation and oxygenation  Description: INTERVENTIONS:  - Assess for changes in respiratory status  - Assess for changes in mentation and behavior  - Position to facilitate oxygenation and minimize respiratory effort  - Oxygen administered by appropriate delivery if ordered  - Initiate smoking cessation education as indicated  - Encourage broncho-pulmonary hygiene including cough, deep breathe, Incentive Spirometry  - Assess the need for suctioning and aspirate as needed  - Assess and instruct to report SOB or any respiratory difficulty  - Respiratory Therapy support as indicated  Outcome: Progressing     Problem: GENITOURINARY - ADULT  Goal: Maintains or returns to baseline urinary function  Description: INTERVENTIONS:  - Assess urinary function  - Encourage oral fluids to ensure adequate hydration if ordered  - Administer IV fluids as ordered to ensure adequate hydration  - Administer ordered medications as needed  - Offer frequent toileting  - Follow urinary retention protocol if ordered  Outcome: Progressing

## 2025-05-23 LAB
ANION GAP SERPL CALCULATED.3IONS-SCNC: 6 MMOL/L (ref 4–13)
BUN SERPL-MCNC: 18 MG/DL (ref 5–25)
CALCIUM SERPL-MCNC: 9.7 MG/DL (ref 8.4–10.2)
CHLORIDE SERPL-SCNC: 99 MMOL/L (ref 96–108)
CO2 SERPL-SCNC: 30 MMOL/L (ref 21–32)
CREAT SERPL-MCNC: 0.82 MG/DL (ref 0.6–1.3)
ERYTHROCYTE [DISTWIDTH] IN BLOOD BY AUTOMATED COUNT: 14.8 % (ref 11.6–15.1)
GFR SERPL CREATININE-BSD FRML MDRD: 86 ML/MIN/1.73SQ M
GLUCOSE SERPL-MCNC: 104 MG/DL (ref 65–140)
GLUCOSE SERPL-MCNC: 156 MG/DL (ref 65–140)
GLUCOSE SERPL-MCNC: 171 MG/DL (ref 65–140)
GLUCOSE SERPL-MCNC: 216 MG/DL (ref 65–140)
GLUCOSE SERPL-MCNC: 96 MG/DL (ref 65–140)
HCT VFR BLD AUTO: 31.7 % (ref 36.5–49.3)
HGB BLD-MCNC: 10.9 G/DL (ref 12–17)
MAGNESIUM SERPL-MCNC: 2 MG/DL (ref 1.9–2.7)
MCH RBC QN AUTO: 32.7 PG (ref 26.8–34.3)
MCHC RBC AUTO-ENTMCNC: 34.4 G/DL (ref 31.4–37.4)
MCV RBC AUTO: 95 FL (ref 82–98)
PLATELET # BLD AUTO: 128 THOUSANDS/UL (ref 149–390)
PMV BLD AUTO: 10.4 FL (ref 8.9–12.7)
POTASSIUM SERPL-SCNC: 3.8 MMOL/L (ref 3.5–5.3)
RBC # BLD AUTO: 3.33 MILLION/UL (ref 3.88–5.62)
SODIUM SERPL-SCNC: 135 MMOL/L (ref 135–147)
WBC # BLD AUTO: 4.79 THOUSAND/UL (ref 4.31–10.16)

## 2025-05-23 PROCEDURE — 83735 ASSAY OF MAGNESIUM: CPT | Performed by: NURSE PRACTITIONER

## 2025-05-23 PROCEDURE — 80048 BASIC METABOLIC PNL TOTAL CA: CPT | Performed by: NURSE PRACTITIONER

## 2025-05-23 PROCEDURE — 85027 COMPLETE CBC AUTOMATED: CPT | Performed by: NURSE PRACTITIONER

## 2025-05-23 PROCEDURE — 99232 SBSQ HOSP IP/OBS MODERATE 35: CPT | Performed by: NURSE PRACTITIONER

## 2025-05-23 PROCEDURE — 82948 REAGENT STRIP/BLOOD GLUCOSE: CPT

## 2025-05-23 RX ADMIN — SPIRONOLACTONE 25 MG: 25 TABLET ORAL at 08:55

## 2025-05-23 RX ADMIN — DULOXETINE 60 MG: 60 CAPSULE, DELAYED RELEASE ORAL at 08:56

## 2025-05-23 RX ADMIN — GABAPENTIN 600 MG: 300 CAPSULE ORAL at 21:35

## 2025-05-23 RX ADMIN — HYDRALAZINE HYDROCHLORIDE 25 MG: 25 TABLET ORAL at 08:56

## 2025-05-23 RX ADMIN — ACETAMINOPHEN 650 MG: 325 TABLET ORAL at 16:02

## 2025-05-23 RX ADMIN — AMLODIPINE BESYLATE 10 MG: 10 TABLET ORAL at 08:55

## 2025-05-23 RX ADMIN — INSULIN GLARGINE 20 UNITS: 100 INJECTION, SOLUTION SUBCUTANEOUS at 21:40

## 2025-05-23 RX ADMIN — ENOXAPARIN SODIUM 40 MG: 40 INJECTION SUBCUTANEOUS at 08:57

## 2025-05-23 RX ADMIN — FUROSEMIDE 40 MG: 40 TABLET ORAL at 08:56

## 2025-05-23 RX ADMIN — ASPIRIN 81 MG: 81 TABLET, COATED ORAL at 08:56

## 2025-05-23 RX ADMIN — CARVEDILOL 25 MG: 12.5 TABLET, FILM COATED ORAL at 16:02

## 2025-05-23 RX ADMIN — FUROSEMIDE 40 MG: 40 TABLET ORAL at 16:02

## 2025-05-23 RX ADMIN — CEFAZOLIN SODIUM 2000 MG: 2 SOLUTION INTRAVENOUS at 21:31

## 2025-05-23 RX ADMIN — OXCARBAZEPINE 600 MG: 150 TABLET, FILM COATED ORAL at 21:35

## 2025-05-23 RX ADMIN — PRIMIDONE 50 MG: 50 TABLET ORAL at 21:35

## 2025-05-23 RX ADMIN — CARBIDOPA AND LEVODOPA 1 TABLET: 25; 100 TABLET ORAL at 08:56

## 2025-05-23 RX ADMIN — DOCUSATE SODIUM 100 MG: 100 CAPSULE, LIQUID FILLED ORAL at 08:56

## 2025-05-23 RX ADMIN — CARBIDOPA AND LEVODOPA 1 TABLET: 25; 100 TABLET ORAL at 21:35

## 2025-05-23 RX ADMIN — CARVEDILOL 25 MG: 12.5 TABLET, FILM COATED ORAL at 08:54

## 2025-05-23 RX ADMIN — DOCUSATE SODIUM 100 MG: 100 CAPSULE, LIQUID FILLED ORAL at 17:36

## 2025-05-23 RX ADMIN — DONEPEZIL HYDROCHLORIDE 10 MG: 5 TABLET ORAL at 21:35

## 2025-05-23 RX ADMIN — INSULIN LISPRO 4 UNITS: 100 INJECTION, SOLUTION INTRAVENOUS; SUBCUTANEOUS at 16:51

## 2025-05-23 RX ADMIN — HYDRALAZINE HYDROCHLORIDE 25 MG: 25 TABLET ORAL at 16:02

## 2025-05-23 RX ADMIN — CEFAZOLIN SODIUM 2000 MG: 2 SOLUTION INTRAVENOUS at 13:43

## 2025-05-23 RX ADMIN — LOSARTAN POTASSIUM 100 MG: 50 TABLET, FILM COATED ORAL at 08:54

## 2025-05-23 RX ADMIN — GABAPENTIN 600 MG: 300 CAPSULE ORAL at 16:01

## 2025-05-23 RX ADMIN — CEFAZOLIN SODIUM 2000 MG: 2 SOLUTION INTRAVENOUS at 05:21

## 2025-05-23 RX ADMIN — CARBIDOPA AND LEVODOPA 1 TABLET: 25; 100 TABLET ORAL at 16:01

## 2025-05-23 RX ADMIN — EMPAGLIFLOZIN 10 MG: 10 TABLET, FILM COATED ORAL at 08:55

## 2025-05-23 RX ADMIN — HYDRALAZINE HYDROCHLORIDE 25 MG: 25 TABLET ORAL at 21:35

## 2025-05-23 RX ADMIN — ISOSORBIDE MONONITRATE 30 MG: 30 TABLET, EXTENDED RELEASE ORAL at 08:56

## 2025-05-23 RX ADMIN — INSULIN LISPRO 1 UNITS: 100 INJECTION, SOLUTION INTRAVENOUS; SUBCUTANEOUS at 21:37

## 2025-05-23 RX ADMIN — GABAPENTIN 600 MG: 300 CAPSULE ORAL at 08:55

## 2025-05-23 RX ADMIN — PRAVASTATIN SODIUM 40 MG: 40 TABLET ORAL at 16:01

## 2025-05-23 RX ADMIN — POLYETHYLENE GLYCOL 3350 17 G: 17 POWDER, FOR SOLUTION ORAL at 08:57

## 2025-05-23 RX ADMIN — INSULIN LISPRO 8 UNITS: 100 INJECTION, SOLUTION INTRAVENOUS; SUBCUTANEOUS at 11:52

## 2025-05-23 NOTE — ASSESSMENT & PLAN NOTE
Patient reports that within the last 24 hours left lower extremity had increased redness, warmth, and tenderness.  Additionally left leg > right leg edema  Vas duplex ordered-negative for DVT  Ancef was started on 5/21  MRSA swab negative  Continue leg elevation  Recommended total day course of antibiotics 7 days

## 2025-05-23 NOTE — ASSESSMENT & PLAN NOTE
Wt Readings from Last 3 Encounters:   05/23/25 120 kg (264 lb 6.4 oz)   05/07/25 122 kg (270 lb)   03/26/25 118 kg (260 lb)     Patient presents to the ED with chronic lower extremity edema and shortness of breath which have been worsening over the past couple of weeks. has gained 6 pounds in 4 days.      Troponin 24, 155, 26  Chest x-ray demonstrated cardiomegaly and mild pulmonary vascular congestive change.   2/3/2025 echocardiogram demonstrated LVEF 60%,  mild concentric hypertrophy. Systolic function is normal. Wall motion is normal. Diastolic function is mildly abnormal, consistent with grade I (abnormal) relaxation.  Was on torsemide but stopped months ago because he was unable to keep up with going to the bathroom  Cardiology consulted  Will transition to p.o. diuretics 40 mg Lasix to initiate 24-hour p.o. diuretic challenge  Started on GDMT therapy with Aldactone, Jardiance (no out-of-pocket cost)  Strict input and output-net -3500 mL with drop in daily weight  Carb controlled diet with 1800 mL/day fluid restriction

## 2025-05-23 NOTE — PLAN OF CARE
Problem: PAIN - ADULT  Goal: Verbalizes/displays adequate comfort level or baseline comfort level  Description: Interventions:  - Encourage patient to monitor pain and request assistance  - Assess pain using appropriate pain scale  - Administer analgesics as ordered based on type and severity of pain and evaluate response  - Implement non-pharmacological measures as appropriate and evaluate response  - Consider cultural and social influences on pain and pain management  - Notify physician/advanced practitioner if interventions unsuccessful or patient reports new pain  - Educate patient/family on pain management process including their role and importance of  reporting pain   - Provide non-pharmacologic/complimentary pain relief interventions  5/23/2025 0644 by Lachelle Montiel LPN  Outcome: Progressing  5/23/2025 0108 by Lachelle Montiel LPN  Outcome: Progressing     Problem: INFECTION - ADULT  Goal: Absence or prevention of progression during hospitalization  Description: INTERVENTIONS:  - Assess and monitor for signs and symptoms of infection  - Monitor lab/diagnostic results  - Monitor all insertion sites, i.e. indwelling lines, tubes, and drains  - Monitor endotracheal if appropriate and nasal secretions for changes in amount and color  - [place] appropriate cooling/warming therapies per order  - Administer medications as ordered  - Instruct and encourage patient and family to use good hand hygiene technique  - Identify and instruct in appropriate isolation precautions for identified infection/condition  5/23/2025 0644 by Lachelle Montiel LPN  Outcome: Progressing  5/23/2025 0108 by Lachelle Montiel LPN  Outcome: Progressing  Goal: Absence of fever/infection during neutropenic period  Description: INTERVENTIONS:  - Monitor WBC  - Perform strict hand hygiene  - Limit to healthy visitors only  - No plants, dried, fresh or silk flowers with dobson in patient room  5/23/2025 0644 by Lachelle Montiel LPN  Outcome:  Progressing  5/23/2025 0108 by Lachelle Montiel LPN  Outcome: Progressing     Problem: SAFETY ADULT  Goal: Patient will remain free of falls  Description: INTERVENTIONS:  - Educate patient/family on patient safety including physical limitations  - Instruct patient to call for assistance with activity   - Consider consulting OT/PT to assist with strengthening/mobility based on AM PAC & JH-HLM score  - Consult OT/PT to assist with strengthening/mobility   - Keep Call bell within reach  - Keep bed low and locked with side rails adjusted as appropriate  - Keep care items and personal belongings within reach  - Initiate and maintain comfort rounds  5/23/2025 0644 by Lachelle Montiel LPN  Outcome: Progressing  5/23/2025 0108 by Lachelle Montiel LPN  Outcome: Progressing  Goal: Maintain or return to baseline ADL function  Description: INTERVENTIONS:  -  Assess patient's ability to carry out ADLs; assess patient's baseline for ADL function and identify physical deficits which impact ability to perform ADLs (bathing, care of mouth/teeth, toileting, grooming, dressing, etc.)  - Assess/evaluate cause of self-care deficits   - Assess range of motion  - Assess patient's mobility; develop plan if impaired  - Assess patient's need for assistive devices and provide as appropriate  - Encourage maximum independence but intervene and supervise when necessary  - Involve family in performance of ADLs  - Assess for home care needs following discharge   - Consider OT consult to assist with ADL evaluation and planning for discharge  - Provide patient education as appropriate  - Monitor functional capacity and physical performance, use of AM PAC & JH-HLM   - Monitor gait, balance and fatigue with ambulation    5/23/2025 0644 by Lachelle Montiel LPN  Outcome: Progressing  5/23/2025 0108 by Lachelle Montiel LPN  Outcome: Progressing  Goal: Maintains/Returns to pre admission functional level  Description: INTERVENTIONS:  - Perform AM-PAC 6 Click  Basic Mobility/ Daily Activity assessment daily.  - Set and communicate daily mobility goal to care team and patient/family/caregiver.   - Collaborate with rehabilitation services on mobility goals if consulted  - Perform Range of Motion 3 times a day.  - Reposition patient every 3 hours.  - Dangle patient 3 times a day  - Stand patient 3 times a day  - Ambulate patient 3 times a day  - Out of bed to chair 3 times a day   - Out of bed for meals 3 times a day  - Out of bed for toileting  - Record patient progress and toleration of activity level   5/23/2025 0644 by Lachelle Montiel LPN  Outcome: Progressing  5/23/2025 0108 by Lachelle Montiel LPN  Outcome: Progressing     Problem: DISCHARGE PLANNING  Goal: Discharge to home or other facility with appropriate resources  Description: INTERVENTIONS:  - Identify barriers to discharge w/patient and caregiver  - Arrange for needed discharge resources and transportation as appropriate  - Identify discharge learning needs (meds, wound care, etc.)  - Arrange for interpretive services to assist at discharge as needed  - Refer to Case Management Department for coordinating discharge planning if the patient needs post-hospital services based on physician/advanced practitioner order or complex needs related to functional status, cognitive ability, or social support system  5/23/2025 0644 by Lachelle Montiel LPN  Outcome: Progressing  5/23/2025 0108 by Lachelle Montiel LPN  Outcome: Progressing     Problem: Knowledge Deficit  Goal: Patient/family/caregiver demonstrates understanding of disease process, treatment plan, medications, and discharge instructions  Description: Complete learning assessment and assess knowledge base.  Interventions:  - Provide teaching at level of understanding  - Provide teaching via preferred learning methods  5/23/2025 0644 by Lachelle Montiel LPN  Outcome: Progressing  5/23/2025 0108 by Lachelle Montiel LPN  Outcome: Progressing     Problem: CARDIOVASCULAR  - ADULT  Goal: Maintains optimal cardiac output and hemodynamic stability  Description: INTERVENTIONS:  - Monitor I/O, vital signs and rhythm  - Monitor for S/S and trends of decreased cardiac output  - Administer and titrate ordered vasoactive medications to optimize hemodynamic stability  - Assess quality of pulses, skin color and temperature  - Assess for signs of decreased coronary artery perfusion  - Instruct patient to report change in severity of symptoms  5/23/2025 0644 by Lachelle Montiel LPN  Outcome: Progressing  5/23/2025 0108 by Lachelle Montiel LPN  Outcome: Progressing  Goal: Absence of cardiac dysrhythmias or at baseline rhythm  Description: INTERVENTIONS:  - Continuous cardiac monitoring, vital signs, obtain 12 lead EKG if ordered  - Administer antiarrhythmic and heart rate control medications as ordered  - Monitor electrolytes and administer replacement therapy as ordered  5/23/2025 0644 by Lachelle Montiel LPN  Outcome: Progressing  5/23/2025 0108 by Lachelle Montiel LPN  Outcome: Progressing     Problem: RESPIRATORY - ADULT  Goal: Achieves optimal ventilation and oxygenation  Description: INTERVENTIONS:  - Assess for changes in respiratory status  - Assess for changes in mentation and behavior  - Position to facilitate oxygenation and minimize respiratory effort  - Oxygen administered by appropriate delivery if ordered  - Initiate smoking cessation education as indicated  - Encourage broncho-pulmonary hygiene including cough, deep breathe, Incentive Spirometry  - Assess the need for suctioning and aspirate as needed  - Assess and instruct to report SOB or any respiratory difficulty  - Respiratory Therapy support as indicated  5/23/2025 0644 by Lachelle Montiel LPN  Outcome: Progressing  5/23/2025 0108 by Lachelle Montiel LPN  Outcome: Progressing     Problem: GENITOURINARY - ADULT  Goal: Maintains or returns to baseline urinary function  Description: INTERVENTIONS:  - Assess urinary function  -  Encourage oral fluids to ensure adequate hydration if ordered  - Administer IV fluids as ordered to ensure adequate hydration  - Administer ordered medications as needed  - Offer frequent toileting  - Follow urinary retention protocol if ordered  5/23/2025 0644 by Lachelle Montiel LPN  Outcome: Progressing  5/23/2025 0108 by Lachelle Montiel LPN  Outcome: Progressing

## 2025-05-23 NOTE — WOUND OSTOMY CARE
Consult Note - Wound   Alex Baptiste 75 y.o. male MRN: [id number]  Unit/Bed#: 2 E 271-01 Encounter: [id number]        Assessment :   Patient admitted to [facility] due to acute on chronic HFpEF. History of HLD, HTN, parkinson's disease, sleep apnea, diabetes. Wound care nurse consulted for leg wounds. Patient is agreeable to assessment, alert and oriented x4, continent of bowel and bladder, is OOB to chair, independently stands for assessment. Recommend patient follow-up with out-patient wound center- referral placed.     1. Bilateral sacrum and buttock- Skin is dry, intact, blanchable pink and red skin.     2. Bilateral elbows, hips, and heels- Skin is dry, intact, blanchable.     3. Right anterior distal lower extremity- Wound is oval in shape, partial thickness, 100% pink tissue, with moderate amount of serosanguineous drainage noted. Fauzia-wound is macerated intact skin, with hemosiderin staining.     4. Left anterior distal lower extremity- Wound is oval in shape, partial thickness, 100% pink tissue, with small amount of serosanguineous drainage noted. Fauzia-wound is dry, intact, with hemosiderin staining.     Educated patient on importance of frequent offloading of pressure via turning, repositioning, and weight-shifting. Verbalized understanding of plan of care.    No induration, fluctuance, odor, warmth, redness, or purulence noted to the above noted wounds. New dressings applied. Patient tolerated well, denies pain to the wounds. See flow sheets for more detailed assessment findings. Will follow along.    Skin care plans:  1-Hydraguard/Silicone Cream to bilateral sacrum, buttock, and heels BID and PRN  2-Elevate heels to offload pressure.  3-Ehob cushion in chair when out of bed.  4-Moisturize skin daily with skin nourishing cream.  5-B/L lower extremity wounds- Cleanse with Vashe wound cleanser, pat dry. Apply Melgisorb over wound bed and cover with Silicone foam dressing. Change every other day and as needed  for soilage/displacement of dressing.       Wound 05/23/25 Pretibial Distal;Right (Active)   Wound Image   05/23/25 0756   Wound Description Pink 05/23/25 0756   Non-staged Wound Description Partial thickness 05/23/25 0756   Wound Length (cm) 2 cm 05/23/25 0756   Wound Width (cm) 1.5 cm 05/23/25 0756   Wound Depth (cm) 0.1 cm 05/23/25 0756   Wound Surface Area (cm^2) 2.36 cm^2 05/23/25 0756   Wound Volume (cm^3) 0.157 cm^3 05/23/25 0756   Calculated Wound Volume (cm^3) 0.3 cm^3 05/23/25 0756   Drainage Amount Moderate 05/23/25 0756   Drainage Description Serosanguineous 05/23/25 0756   Fauzia-wound Assessment Intact;Maceration;Yellow-brown 05/23/25 0756   Treatments Cleansed;Irrigation with NSS;Site care 05/23/25 0756   Dressing Calcium Alginate;Foam, Silicon (eg. Allevyn, etc) 05/23/25 0756   Wound packed? No 05/23/25 0756   Dressing Changed New 05/23/25 0756   Patient Tolerance Tolerated well 05/23/25 0756   Dressing Status Clean;Dry;Intact 05/23/25 0756       Wound 05/23/25 Pretibial Distal;Left (Active)   Wound Image   05/23/25 0757   Wound Description Pink 05/23/25 0757   Non-staged Wound Description Partial thickness 05/23/25 0757   Wound Length (cm) 0.5 cm 05/23/25 0757   Wound Width (cm) 0.7 cm 05/23/25 0757   Wound Depth (cm) 0.1 cm 05/23/25 0757   Wound Surface Area (cm^2) 0.27 cm^2 05/23/25 0757   Wound Volume (cm^3) 0.018 cm^3 05/23/25 0757   Calculated Wound Volume (cm^3) 0.04 cm^3 05/23/25 0757   Drainage Amount Small 05/23/25 0757   Drainage Description Serosanguineous 05/23/25 0757   Fauzia-wound Assessment Dry;Intact;Yellow-brown 05/23/25 0757   Treatments Cleansed;Irrigation with NSS;Site care 05/23/25 0757   Dressing Calcium Alginate;Foam, Silicon (eg. Allevyn, etc) 05/23/25 0757   Wound packed? No 05/23/25 0757   Dressing Changed Changed 05/23/25 0757   Patient Tolerance Tolerated well 05/23/25 0757   Dressing Status Clean;Dry;Intact 05/23/25 0757       Contact through [facility] Secure Chat with any  questions  Wound Care will continue to follow while inpatient    Kathe DINHN RN CWON  Wound and Ostomy care

## 2025-05-23 NOTE — ASSESSMENT & PLAN NOTE
Wt Readings from Last 3 Encounters:   05/24/25 120 kg (264 lb 3.2 oz)   05/07/25 122 kg (270 lb)   03/26/25 118 kg (260 lb)     Patient presents to the ED with chronic lower extremity edema and shortness of breath which have been worsening over the past couple of weeks. has gained 6 pounds in 4 days.      Troponin 24, 155, 26  Chest x-ray demonstrated cardiomegaly and mild pulmonary vascular congestive change.   2/3/2025 echocardiogram demonstrated LVEF 60%,  mild concentric hypertrophy. Systolic function is normal. Wall motion is normal. Diastolic function is mildly abnormal, consistent with grade I (abnormal) relaxation.  Was on torsemide but stopped months ago because he was unable to keep up with going to the bathroom  Cardiology consulted  Continue p.o. 40 mg Lasix on discharge  Started on GDMT therapy with Aldactone, Jardiance (no out-of-pocket cost)  Strict input and output-net -3500 mL with drop in daily weight  Carb controlled diet with 1800 mL/day fluid restriction

## 2025-05-23 NOTE — DISCHARGE INSTR - OTHER ORDERS
Skin care plans:  1-Hydraguard/Silicone Cream to bilateral sacrum, buttock, and heels 2 times a day  2-Elevate heels to offload pressure.  3-[place] cushion in chair when out of bed.  4-Moisturize skin daily with skin nourishing cream.  5-B/L lower extremity wounds- Cleanse with Normal Saline Solution, pat dry. Apply Melgisorb over wound bed and then cover with Silicone foam dressing or large band-aid. Change every other day and as needed for soilage/displacement of dressing.

## 2025-05-23 NOTE — ASSESSMENT & PLAN NOTE
Ordered gabapentin 600 mg 3 times daily, patient states he takes it 4 times daily at home but this is causing alert here we will continue it at 600 mg 3 times daily

## 2025-05-23 NOTE — PLAN OF CARE
Problem: PAIN - ADULT  Goal: Verbalizes/displays adequate comfort level or baseline comfort level  Description: Interventions:  - Encourage patient to monitor pain and request assistance  - Assess pain using appropriate pain scale  - Administer analgesics as ordered based on type and severity of pain and evaluate response  - Implement non-pharmacological measures as appropriate and evaluate response  - Consider cultural and social influences on pain and pain management  - Notify physician/advanced practitioner if interventions unsuccessful or patient reports new pain  - Educate patient/family on pain management process including their role and importance of  reporting pain   - Provide non-pharmacologic/complimentary pain relief interventions  Outcome: Progressing     Problem: INFECTION - ADULT  Goal: Absence or prevention of progression during hospitalization  Description: INTERVENTIONS:  - Assess and monitor for signs and symptoms of infection  - Monitor lab/diagnostic results  - Monitor all insertion sites, i.e. indwelling lines, tubes, and drains  - Monitor endotracheal if appropriate and nasal secretions for changes in amount and color  - [place] appropriate cooling/warming therapies per order  - Administer medications as ordered  - Instruct and encourage patient and family to use good hand hygiene technique  - Identify and instruct in appropriate isolation precautions for identified infection/condition  Outcome: Progressing  Goal: Absence of fever/infection during neutropenic period  Description: INTERVENTIONS:  - Monitor WBC  - Perform strict hand hygiene  - Limit to healthy visitors only  - No plants, dried, fresh or silk flowers with dobson in patient room  Outcome: Progressing     Problem: SAFETY ADULT  Goal: Maintain or return to baseline ADL function  Description: INTERVENTIONS:  -  Assess patient's ability to carry out ADLs; assess patient's baseline for ADL function and identify physical deficits  which impact ability to perform ADLs (bathing, care of mouth/teeth, toileting, grooming, dressing, etc.)  - Assess/evaluate cause of self-care deficits   - Assess range of motion  - Assess patient's mobility; develop plan if impaired  - Assess patient's need for assistive devices and provide as appropriate  - Encourage maximum independence but intervene and supervise when necessary  - Involve family in performance of ADLs  - Assess for home care needs following discharge   - Consider OT consult to assist with ADL evaluation and planning for discharge  - Provide patient education as appropriate  - Monitor functional capacity and physical performance, use of AM PAC & JH-HLM   - Monitor gait, balance and fatigue with ambulation    Outcome: Progressing     Problem: DISCHARGE PLANNING  Goal: Discharge to home or other facility with appropriate resources  Description: INTERVENTIONS:  - Identify barriers to discharge w/patient and caregiver  - Arrange for needed discharge resources and transportation as appropriate  - Identify discharge learning needs (meds, wound care, etc.)  - Arrange for interpretive services to assist at discharge as needed  - Refer to Case Management Department for coordinating discharge planning if the patient needs post-hospital services based on physician/advanced practitioner order or complex needs related to functional status, cognitive ability, or social support system  Outcome: Progressing     Problem: Knowledge Deficit  Goal: Patient/family/caregiver demonstrates understanding of disease process, treatment plan, medications, and discharge instructions  Description: Complete learning assessment and assess knowledge base.  Interventions:  - Provide teaching at level of understanding  - Provide teaching via preferred learning methods  Outcome: Progressing

## 2025-05-23 NOTE — PROGRESS NOTES
Progress Note - Hospitalist   Name: Alex Baptiste 75 y.o. male I MRN: [id number]  Unit/Bed#: 2 E 271-01 I Date of Admission: 5/19/2025   Date of Service: 5/23/2025 I Hospital Day: 4    Assessment & Plan  Acute on chronic HFpEF  Wt Readings from Last 3 Encounters:   05/23/25 120 kg (264 lb 6.4 oz)   05/07/25 122 kg (270 lb)   03/26/25 118 kg (260 lb)     Patient presents to the ED with chronic lower extremity edema and shortness of breath which have been worsening over the past couple of weeks. has gained 6 pounds in 4 days.      Troponin 24, 155, 26  Chest x-ray demonstrated cardiomegaly and mild pulmonary vascular congestive change.   2/3/2025 echocardiogram demonstrated LVEF 60%,  mild concentric hypertrophy. Systolic function is normal. Wall motion is normal. Diastolic function is mildly abnormal, consistent with grade I (abnormal) relaxation.  Was on torsemide but stopped months ago because he was unable to keep up with going to the bathroom  Cardiology consulted  Will transition to p.o. diuretics 40 mg Lasix to initiate 24-hour p.o. diuretic challenge  Started on GDMT therapy with Aldactone, Jardiance (no out-of-pocket cost)  Strict input and output-net -3500 mL with drop in daily weight  Carb controlled diet with 1800 mL/day fluid restriction    Polyne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 (HCC)  Ordered gabapentin 600 mg 3 times daily, patient states he takes it 4 times daily at home but this is causing alert here we will continue it at 600 mg 3 times daily  T2DM  Lab Results   Component Value Date    HGBA1C 6.2 (H) 05/20/2025       Recent Labs     05/22/25  1559 05/22/25  2044 05/23/25  0729 05/23/25  1131   POCGLU 190* 190* 104 216*       Blood Sugar Average: Last 72 hrs:  (P) 163.2889504956269703  Update hemoglobin A1c  Holding PTA glipizide  Patient takes Lantus 40 units at bedtime, will decrease dose to 20 units at bedtime  SSI with Accu-Cheks  Carb controlled diet  Hypoglycemia protocol  CKD  stage II ruled out  CAD s/p PCI and CABG x3 (2017)  Noted history s/p CABG 7/2017  Primary hypertension  Continue home medication except for HCTZ 25 mg    Severe obesity (BMI >= 40) ([facility])  Body mass index is 37.94 kg/m².  Recommend incorporating a more whole foods plant-predominant diet along with decreasing consumption of red meats and processed foods  Per AHA guidelines, recommend moderate-vigorous intensity exercise for 30 minutes a day for 5 days a week or a total of 150 min/week  Parkinson disease ([facility])  Continue Sinemet and primidone  Cellulitis of left lower extremity  Patient reports that within the last 24 hours left lower extremity had increased redness, warmth, and tenderness.  Additionally left leg > right leg edema  Vas duplex ordered-negative for DVT  Ancef was started on 5/21  MRSA swab negative  Continue leg elevation  Recommended total day course of antibiotics 7 days    VTE Pharmacologic Prophylaxis:   Moderate Risk (Score 3-4) - Pharmacological DVT Prophylaxis Ordered: enoxaparin (Lovenox).    Mobility:   Basic Mobility Inpatient Raw Score: 20  JH-HLM Goal: 6: Walk 10 steps or more  JH-HLM Achieved: 6: Walk 10 steps or more  JH-HLM Goal achieved. Continue to encourage appropriate mobility.    Patient Centered Rounds: I performed bedside rounds with nursing staff today.   Discussions with Specialists or Other Care Team Provider: Case management    Education and Discussions with Family / Patient: Patient declined call to .     Current Length of Stay: 4 day(s)  Current Patient Status: Inpatient   Certification Statement: The patient will continue to require additional inpatient hospital stay due to IV antibiotics  Discharge Plan: Anticipate discharge tomorrow to home.    Code Status: Level 1 - Full Code    Subjective   Left lower extremity swelling and redness is approximately 15% better today than yesterday pain has decreased breathing still remains at baseline right lower extremity  swelling is at baseline overall improving with IV antibiotics but given patient's advanced peripheral vascular disease will keep for an additional 24 hours for IV antibiotics    Objective :  Temp:  [97.3 °F (36.3 °C)-98 °F (36.7 °C)] 97.9 °F (36.6 °C)  HR:  [45-61] 54  BP: (150-188)/(61-78) 176/73  Resp:  [18] 18  SpO2:  [91 %-98 %] 92 %  O2 Device: None (Room air)    Body mass index is 37.94 kg/m².     Input and Output Summary (last 24 hours):     Intake/Output Summary (Last 24 hours) at 5/23/2025 1400  Last data filed at 5/23/2025 0700  Gross per 24 hour   Intake 704 ml   Output 925 ml   Net -221 ml       Physical Exam  Vitals reviewed.   Constitutional:       General: He is not in acute distress.     Appearance: He is obese. He is ill-appearing.     Cardiovascular:      Rate and Rhythm: Normal rate.   Pulmonary:      Effort: No respiratory distress.     Musculoskeletal:         General: Swelling and tenderness present.     Skin:     General: Skin is warm and dry.      Coloration: Skin is pale.      Findings: Erythema present.     Neurological:      General: No focal deficit present.      Mental Status: He is alert. Mental status is at baseline.           Lines/Drains:        Lab Results: I have reviewed the following results:   Results from last 7 days   Lab Units 05/23/25 0526 05/20/25 0505 05/19/25  1302   WBC Thousand/uL 4.79   < > 8.55   HEMOGLOBIN g/dL 10.9*   < > 10.6*   HEMATOCRIT % 31.7*   < > 31.2*   PLATELETS Thousands/uL 128*   < > 105*   SEGS PCT %  --   --  80*   LYMPHO PCT %  --   --  8*   MONO PCT %  --   --  11   EOS PCT %  --   --  0    < > = values in this interval not displayed.     Results from last 7 days   Lab Units 05/23/25 0526 05/20/25  0505 05/19/25  1302   SODIUM mmol/L 135   < > 136   POTASSIUM mmol/L 3.8   < > 4.3   CHLORIDE mmol/L 99   < > 104   CO2 mmol/L 30   < > 27   BUN mg/dL 18   < > 19   CREATININE mg/dL 0.82   < > 0.82   ANION GAP mmol/L 6   < > 5   CALCIUM mg/dL 9.7   < >  9.1   ALBUMIN g/dL  --   --  3.8   TOTAL BILIRUBIN mg/dL  --   --  1.48*   ALK PHOS U/L  --   --  85   ALT U/L  --   --  10   AST U/L  --   --  25   GLUCOSE RANDOM mg/dL 96   < > 131    < > = values in this interval not displayed.         Results from last 7 days   Lab Units 05/23/25  1131 05/23/25  0729 05/22/25  2044 05/22/25  1559 05/22/25  1137 05/22/25  0746 05/21/25  2046 05/21/25  1555 05/21/25  1127 05/21/25  0801 05/20/25  2034 05/20/25  1639   POC GLUCOSE mg/dl 216* 104 190* 190* 206* 127 172* 156* 196* 83 166* 214*     Results from last 7 days   Lab Units 05/20/25  0505   HEMOGLOBIN A1C % 6.2*           Recent Cultures (last 7 days):         Imaging Results Review: No pertinent imaging studies reviewed.  Other Study Results Review: No additional pertinent studies reviewed.    Last 24 Hours Medication List:     Current Facility-Administered Medications:     acetaminophen (TYLENOL) tablet 650 mg, Q6H PRN    amLODIPine (NORVASC) tablet 10 mg, Daily    aspirin (ECOTRIN LOW STRENGTH) EC tablet 81 mg, Daily    carbidopa-levodopa (SINEMET)  mg per tablet 1 tablet, TID    carvedilol (COREG) tablet 25 mg, BID With Meals    ceFAZolin (ANCEF) IVPB (premix in dextrose) 2,000 mg 50 mL, Q8H, Last Rate: 2,000 mg (05/23/25 1343)    docusate sodium (COLACE) capsule 100 mg, BID    donepezil (ARICEPT) tablet 10 mg, HS    DULoxetine (CYMBALTA) delayed release capsule 60 mg, Daily    Empagliflozin (JARDIANCE) tablet 10 mg, Daily    enoxaparin (LOVENOX) subcutaneous injection 40 mg, Daily    ergocalciferol (VITAMIN D2) capsule 50,000 Units, Weekly    furosemide (LASIX) tablet 40 mg, BID (diuretic)    gabapentin (NEURONTIN) capsule 600 mg, TID    hydrALAZINE (APRESOLINE) tablet 25 mg, TID    insulin glargine (LANTUS) subcutaneous injection 20 Units 0.2 mL, HS    insulin lispro (HumALOG/ADMELOG) 100 units/mL subcutaneous injection 1-6 Units, HS    insulin lispro (HumALOG/ADMELOG) 100 units/mL subcutaneous injection 4-20  Units, TID AC **AND** Fingerstick Glucose (POCT), TID AC    isosorbide mononitrate (IMDUR) 24 hr tablet 30 mg, Daily    losartan (COZAAR) tablet 100 mg, Daily    ondansetron (ZOFRAN) injection 4 mg, Q6H PRN    OXcarbazepine (TRILEPTAL) tablet 600 mg, HS    polyethylene glycol (MIRALAX) packet 17 g, Daily    pravastatin (PRAVACHOL) tablet 40 mg, Daily With Dinner    primidone (MYSOLINE) tablet 50 mg, HS    spironolactone (ALDACTONE) tablet 25 mg, Daily    Administrative Statements   Today, Patient Was Seen By: TG Scott  I have spent a total time of 35 minutes in caring for this patient on the day of the visit/encounter including Counseling / Coordination of care, Documenting in the medical record, Reviewing/placing orders in the medical record (including tests, medications, and/or procedures), Obtaining or reviewing history  , and Communicating with other healthcare professionals .    **Please Note: This note may have been constructed using a voice recognition system.**

## 2025-05-23 NOTE — ASSESSMENT & PLAN NOTE
Lab Results   Component Value Date    HGBA1C 6.2 (H) 05/20/2025       Recent Labs     05/22/25  1559 05/22/25  2044 05/23/25  0729 05/23/25  1131   POCGLU 190* 190* 104 216*       Blood Sugar Average: Last 72 hrs:  (P) 163.1891486997708746  Update hemoglobin A1c  Holding PTA glipizide  Patient takes Lantus 40 units at bedtime, will decrease dose to 20 units at bedtime  SSI with Accu-Cheks  Carb controlled diet  Hypoglycemia protocol  CKD stage II ruled out

## 2025-05-24 VITALS
RESPIRATION RATE: 18 BRPM | DIASTOLIC BLOOD PRESSURE: 71 MMHG | BODY MASS INDEX: 37.82 KG/M2 | SYSTOLIC BLOOD PRESSURE: 168 MMHG | OXYGEN SATURATION: 94 % | WEIGHT: 264.2 LBS | TEMPERATURE: 97.7 F | HEIGHT: 70 IN | HEART RATE: 51 BPM

## 2025-05-24 LAB
ANION GAP SERPL CALCULATED.3IONS-SCNC: 6 MMOL/L (ref 4–13)
BUN SERPL-MCNC: 18 MG/DL (ref 5–25)
CALCIUM SERPL-MCNC: 9.9 MG/DL (ref 8.4–10.2)
CHLORIDE SERPL-SCNC: 101 MMOL/L (ref 96–108)
CO2 SERPL-SCNC: 27 MMOL/L (ref 21–32)
CREAT SERPL-MCNC: 0.86 MG/DL (ref 0.6–1.3)
ERYTHROCYTE [DISTWIDTH] IN BLOOD BY AUTOMATED COUNT: 14.6 % (ref 11.6–15.1)
GFR SERPL CREATININE-BSD FRML MDRD: 84 ML/MIN/1.73SQ M
GLUCOSE SERPL-MCNC: 107 MG/DL (ref 65–140)
GLUCOSE SERPL-MCNC: 111 MG/DL (ref 65–140)
GLUCOSE SERPL-MCNC: 209 MG/DL (ref 65–140)
HCT VFR BLD AUTO: 32.8 % (ref 36.5–49.3)
HGB BLD-MCNC: 11.5 G/DL (ref 12–17)
MAGNESIUM SERPL-MCNC: 2.2 MG/DL (ref 1.9–2.7)
MCH RBC QN AUTO: 33.5 PG (ref 26.8–34.3)
MCHC RBC AUTO-ENTMCNC: 35.1 G/DL (ref 31.4–37.4)
MCV RBC AUTO: 96 FL (ref 82–98)
PLATELET # BLD AUTO: 136 THOUSANDS/UL (ref 149–390)
PMV BLD AUTO: 10 FL (ref 8.9–12.7)
POTASSIUM SERPL-SCNC: 5 MMOL/L (ref 3.5–5.3)
RBC # BLD AUTO: 3.43 MILLION/UL (ref 3.88–5.62)
SODIUM SERPL-SCNC: 134 MMOL/L (ref 135–147)
WBC # BLD AUTO: 4.74 THOUSAND/UL (ref 4.31–10.16)

## 2025-05-24 PROCEDURE — 99239 HOSP IP/OBS DSCHRG MGMT >30: CPT | Performed by: NURSE PRACTITIONER

## 2025-05-24 PROCEDURE — 82948 REAGENT STRIP/BLOOD GLUCOSE: CPT

## 2025-05-24 PROCEDURE — 85027 COMPLETE CBC AUTOMATED: CPT | Performed by: NURSE PRACTITIONER

## 2025-05-24 PROCEDURE — 80048 BASIC METABOLIC PNL TOTAL CA: CPT | Performed by: NURSE PRACTITIONER

## 2025-05-24 PROCEDURE — 83735 ASSAY OF MAGNESIUM: CPT | Performed by: NURSE PRACTITIONER

## 2025-05-24 RX ORDER — CEPHALEXIN 500 MG/1
500 CAPSULE ORAL EVERY 6 HOURS SCHEDULED
Qty: 20 CAPSULE | Refills: 0 | Status: SHIPPED | OUTPATIENT
Start: 2025-05-24 | End: 2025-05-29

## 2025-05-24 RX ORDER — FUROSEMIDE 40 MG/1
40 TABLET ORAL 2 TIMES DAILY
Qty: 60 TABLET | Refills: 0 | Status: SHIPPED | OUTPATIENT
Start: 2025-05-24

## 2025-05-24 RX ORDER — SPIRONOLACTONE 25 MG/1
25 TABLET ORAL DAILY
Qty: 30 TABLET | Refills: 0 | Status: SHIPPED | OUTPATIENT
Start: 2025-05-25

## 2025-05-24 RX ADMIN — CEFAZOLIN SODIUM 2000 MG: 2 SOLUTION INTRAVENOUS at 13:06

## 2025-05-24 RX ADMIN — INSULIN LISPRO 4 UNITS: 100 INJECTION, SOLUTION INTRAVENOUS; SUBCUTANEOUS at 12:04

## 2025-05-24 RX ADMIN — DOCUSATE SODIUM 100 MG: 100 CAPSULE, LIQUID FILLED ORAL at 08:03

## 2025-05-24 RX ADMIN — FUROSEMIDE 40 MG: 40 TABLET ORAL at 08:05

## 2025-05-24 RX ADMIN — LOSARTAN POTASSIUM 100 MG: 50 TABLET, FILM COATED ORAL at 08:04

## 2025-05-24 RX ADMIN — CARBIDOPA AND LEVODOPA 1 TABLET: 25; 100 TABLET ORAL at 08:04

## 2025-05-24 RX ADMIN — HYDRALAZINE HYDROCHLORIDE 25 MG: 25 TABLET ORAL at 08:03

## 2025-05-24 RX ADMIN — MAGNESIUM HYDROXIDE 30 ML: 400 SUSPENSION ORAL at 13:05

## 2025-05-24 RX ADMIN — POLYETHYLENE GLYCOL 3350 17 G: 17 POWDER, FOR SOLUTION ORAL at 08:05

## 2025-05-24 RX ADMIN — ISOSORBIDE MONONITRATE 30 MG: 30 TABLET, EXTENDED RELEASE ORAL at 08:02

## 2025-05-24 RX ADMIN — CARVEDILOL 25 MG: 12.5 TABLET, FILM COATED ORAL at 08:04

## 2025-05-24 RX ADMIN — ASPIRIN 81 MG: 81 TABLET, COATED ORAL at 08:03

## 2025-05-24 RX ADMIN — CEFAZOLIN SODIUM 2000 MG: 2 SOLUTION INTRAVENOUS at 05:52

## 2025-05-24 RX ADMIN — ENOXAPARIN SODIUM 40 MG: 40 INJECTION SUBCUTANEOUS at 08:04

## 2025-05-24 RX ADMIN — SPIRONOLACTONE 25 MG: 25 TABLET ORAL at 08:05

## 2025-05-24 RX ADMIN — GABAPENTIN 600 MG: 300 CAPSULE ORAL at 08:05

## 2025-05-24 RX ADMIN — EMPAGLIFLOZIN 10 MG: 10 TABLET, FILM COATED ORAL at 08:08

## 2025-05-24 RX ADMIN — DULOXETINE 60 MG: 60 CAPSULE, DELAYED RELEASE ORAL at 08:02

## 2025-05-24 RX ADMIN — AMLODIPINE BESYLATE 10 MG: 10 TABLET ORAL at 08:04

## 2025-05-24 NOTE — DISCHARGE SUMMARY
Discharge Summary - Hospitalist   Name: Alex Baptiste 75 y.o. male I MRN: [id number]  Unit/Bed#: 2 E 271-01 I Date of Admission: 5/19/2025   Date of Service: 5/24/2025 I Hospital Day: 5     Assessment & Plan  Acute on chronic HFpEF  Wt Readings from Last 3 Encounters:   05/24/25 120 kg (264 lb 3.2 oz)   05/07/25 122 kg (270 lb)   03/26/25 118 kg (260 lb)     Patient presents to the ED with chronic lower extremity edema and shortness of breath which have been worsening over the past couple of weeks. has gained 6 pounds in 4 days.      Troponin 24, 155, 26  Chest x-ray demonstrated cardiomegaly and mild pulmonary vascular congestive change.   2/3/2025 echocardiogram demonstrated LVEF 60%,  mild concentric hypertrophy. Systolic function is normal. Wall motion is normal. Diastolic function is mildly abnormal, consistent with grade I (abnormal) relaxation.  Was on torsemide but stopped months ago because he was unable to keep up with going to the bathroom  Cardiology consulted  Continue p.o. 40 mg Lasix on discharge  Started on GDMT therapy with Aldactone, Jardiance (no out-of-pocket cost)  Strict input and output-net -3500 mL with drop in daily weight  Carb controlled diet with 1800 mL/day fluid restriction    Polyne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 (HCC)  Ordered gabapentin 600 mg 3 times daily, patient states he takes it 4 times daily at home but this is causing alert here we will continue it at 600 mg 3 times daily  T2DM  Lab Results   Component Value Date    HGBA1C 6.2 (H) 05/20/2025       Recent Labs     05/23/25  1611 05/23/25  2125 05/24/25  0718 05/24/25  1100   POCGLU 171* 156* 111 209*       Blood Sugar Average: Last 72 hrs:  (P) 163.9439143221583048  Update hemoglobin A1c  Holding PTA glipizide  Patient takes Lantus 40 units at bedtime, will decrease dose to 20 units at bedtime  SSI with Accu-Cheks  Carb controlled diet  Hypoglycemia protocol  CKD stage II ruled out  CAD s/p PCI and CABG x3  (2017)  Noted history s/p CABG 7/2017  Primary hypertension  Continue home medication except for HCTZ 25 mg    Severe obesity (BMI >= 40) ([facility])  Body mass index is 37.91 kg/m².  Recommend incorporating a more whole foods plant-predominant diet along with decreasing consumption of red meats and processed foods  Per AHA guidelines, recommend moderate-vigorous intensity exercise for 30 minutes a day for 5 days a week or a total of 150 min/week  Parkinson disease (HCC)  Continue Sinemet and primidone  Cellulitis of left lower extremity  Patient reports that within the last 24 hours left lower extremity had increased redness, warmth, and tenderness.  Additionally left leg > right leg edema  Vas duplex ordered-negative for DVT  Ancef was started on 5/21  MRSA swab negative  Continue leg elevation  Recommended total day course of antibiotics 7 days    Discharging Physician / Practitioner: TG Scott  PCP: Roderick Guardado  Admission Date:   Admission Orders (From admission, onward)       Ordered        05/19/25 1454  INPATIENT ADMISSION  Once                          Discharge Date: 05/24/25    Medical Problems       Resolved Problems  Date Reviewed: 3/26/2025   None         Consultations During Hospital Stay:  IP CONSULT TO NUTRITION SERVICES  IP CONSULT TO CARDIOLOGY    Procedures Performed:   XR chest 2 views  Result Date: 5/19/2025  Cardiomegaly and mild pulmonary vascular congestive change. Workstation performed: [id number]         Significant Findings / Test Results:   above    Incidental Findings:   no     Test Results Pending at Discharge (will require follow up):   non     Outpatient Tests Requested:  no    Complications:  none    Past Medical History[1]    Reason for Admission: Chest Pain (Chest pain for a few weeks, recently worsening sob and lower extremity swelling)       Hospital Course:     Alex Baptiste is a 75 y.o. male patient with past medical history of  has a past medical history of  "Arthritis, Hyperlipidemia, Hypertension, Parkinson disease (HCC), Poor circulation, Sleep apnea, and Type 2 diabetes mellitus (HCC). who originally presented to the hospital on 5/19/2025 due to Chest Pain (Chest pain for a few weeks, recently worsening sob and lower extremity swelling) patient presented to the hospital with chest pain was found to have a heart failure exacerbation had medications adjusted was evaluated by cardiology started on Jardiance Lasix Aldactone stable for discharge from cardiac standpoint incidentally patient developed some cellulitis while he was here and required some IV antibiotics.  His redness swelling is improved he is negative for DVT his breathing is back to baseline      Please see above list of diagnoses and related plan for additional information.     Condition at Discharge: stable     Discharge Day Visit / Exam:     Subjective: Denies any chest pain chest tightness shortness of breath difficulty breathing reports some soreness in his left leg but overall significantly improved  Vitals: Blood Pressure: 168/71 (05/24/25 0803)  Pulse: (!) 51 (05/24/25 0721)  Temperature: 97.7 °F (36.5 °C) (05/24/25 0721)  Temp Source: Oral (05/23/25 1500)  Respirations: 18 (05/23/25 2300)  Height: 5' 10\" (177.8 cm) (05/19/25 1549)  Weight - Scale: 120 kg (264 lb 3.2 oz) (05/24/25 0600)  SpO2: 94 % (05/24/25 0721)  Exam:   Physical Exam  Vitals reviewed.   Constitutional:       General: He is not in acute distress.     Appearance: He is obese. He is ill-appearing.     Cardiovascular:      Rate and Rhythm: Normal rate.   Pulmonary:      Effort: Pulmonary effort is normal. No respiratory distress.     Musculoskeletal:         General: Swelling present.     Skin:     Findings: Erythema present.     Neurological:      General: No focal deficit present.      Mental Status: He is alert. Mental status is at baseline.     Psychiatric:         Mood and Affect: Mood normal.         Thought Content: Thought " content normal.       Discussion with Family: Denied    Discharge instructions/Information to patient and family:   See after visit summary for information provided to patient and family.      Provisions for Follow-Up Care:  See after visit summary for information related to follow-up care and any pertinent home health orders.      Disposition:     Home    Planned Readmission: no     Discharge Statement:  I spent 45 minutes discharging the patient. This time was spent on the day of discharge. I had direct contact with the patient on the day of discharge. Greater than 50% of the total time was spent examining patient, answering all patient questions, arranging and discussing plan of care with patient as well as directly providing post-discharge instructions.  Additional time then spent on discharge activities.    Discharge Medications:  See after visit summary for reconciled discharge medications provided to patient and family.      ** Please Note: This note has been constructed using a voice recognition system **         [1]   Past Medical History:  Diagnosis Date    Arthritis     Hyperlipidemia     Hypertension     Parkinson disease (HCC) 01/01/2025    Poor circulation     Sleep apnea     Type 2 diabetes mellitus (HCC)

## 2025-05-24 NOTE — PLAN OF CARE
Problem: PAIN - ADULT  Goal: Verbalizes/displays adequate comfort level or baseline comfort level  Description: Interventions:  - Encourage patient to monitor pain and request assistance  - Assess pain using appropriate pain scale  - Administer analgesics as ordered based on type and severity of pain and evaluate response  - Implement non-pharmacological measures as appropriate and evaluate response  - Consider cultural and social influences on pain and pain management  - Notify physician/advanced practitioner if interventions unsuccessful or patient reports new pain  - Educate patient/family on pain management process including their role and importance of  reporting pain   - Provide non-pharmacologic/complimentary pain relief interventions  5/24/2025 0606 by Nickolas Lyn RN  Outcome: Progressing  5/24/2025 0244 by Nickolas Lyn RN  Outcome: Progressing

## 2025-05-24 NOTE — ASSESSMENT & PLAN NOTE
Body mass index is 37.91 kg/m².  Recommend incorporating a more whole foods plant-predominant diet along with decreasing consumption of red meats and processed foods  Per AHA guidelines, recommend moderate-vigorous intensity exercise for 30 minutes a day for 5 days a week or a total of 150 min/week

## 2025-05-24 NOTE — ASSESSMENT & PLAN NOTE
Lab Results   Component Value Date    HGBA1C 6.2 (H) 05/20/2025       Recent Labs     05/23/25  1611 05/23/25  2125 05/24/25  0718 05/24/25  1100   POCGLU 171* 156* 111 209*       Blood Sugar Average: Last 72 hrs:  (P) 163.3146282523552611  Update hemoglobin A1c  Holding PTA glipizide  Patient takes Lantus 40 units at bedtime, will decrease dose to 20 units at bedtime  SSI with Accu-Cheks  Carb controlled diet  Hypoglycemia protocol  CKD stage II ruled out

## 2025-05-28 ENCOUNTER — TELEPHONE (OUTPATIENT)
Dept: CARDIOLOGY CLINIC | Facility: CLINIC | Age: 75
End: 2025-05-28

## 2025-05-28 NOTE — TELEPHONE ENCOUNTER
Patient discharged from [facility] 5/24/25.  Called patient to follow up.  States feeling pretty good.    Symptoms:  -leg swelling [x]  -abdominal bloating, distension, pants fitting tighter []                       -loss of appetite, feeling full sooner than usual []  -shortness of breath/WINSTON, activity intolerance[]  -difficulty lying flat, waking up a night feeling breathless, using more pillows, sleeping in a recliner []  -palpitations []  -chest pain/pressure []  -cough []  -unusual fatigue []    Comments:    Reports leg swelling is decreased.      Weight:   -weighs self daily [x]  -dry/target weight______   -Discharge weight__264 lb  -today's weight __259 lb_  -understands what to do for rapid weight gain, ie 3lbs in 24 hours or 5 lbs in one week[x]     Comments:    Discussed weighing self daily in the morning after emptying bladder but before eating or drinking anything.  Discussed keeping log of weights and monitoring for weight gain 3 lbs in 1 day or 5 lbs in 1 week.        Diet:   -consuming any high sodium foods (canned soups, lunch meats, fast food/take out, snack food, prepared foods, sport drinks) yes[]no[x]  - fluid consumed per day-           oz   -2g (2000 mg) Sodium, 2L (2000 ml, around 60-64 oz) fluid restriction] reinforced [x]        Comments:    Patient reports he follow low sodium diet and is aware of fluid restriction.     Medications:  -med list reviewed [x]   -missed doses or taking a different dose than prescribed?  yes[]no[x]  -still urinates well on current diuretic ? yes[x]no[]  -using O2 as directed? yes[]no[]     Comments:    Confirmed patient picked up prescriptions from pharmacy.     Home vital signs: (if available)  BP ____  HR____  Pulse ox____      Other possible triggers ?:  Recent viral symptoms/infection []  NSAID use []  Steroids []  Anemia []  COPD/hypoxia []  Not using CPAP/BiPAP []     Comments:          Self-management/education and teach back:  Primary learner:  Patient  Following low sodium diet: Yes  Following fluid restriction: Yes  Hospital discharge weight: 264 lb  Weighing & Recording daily: Discussed weighing self daily in the morning after emptying bladder but before eating or drinking anything.  Discussed keeping log of weights and monitoring for weight gain 3 lbs in 1 day or 5 lbs in 1 week.  1st home weight       Weight today: 259 lb  Monitoring symptoms: Yes  Any current symptoms: Reports leg swelling is decreased.   Knows when to call provider: Discussed monitoring for symptoms such as weight gain, dyspnea on exertion, orthopnea, bloating, edema, cough.  Medication reviewed and taking all as prescribed: Yes  Knows name of diuretic: Yes  Escalation plan: HF education reviewed/reinforced including low sodium diet, 64 oz fluid restriction, activity, symptoms of decompensation and when and who to call. Advised can call cardiology phone [phone] 24/7     Care Coordination:  Aware of cardiology follow up appointment: Currently scheduled for 7/18 with [facility].  Offered 10 am today with Dr. Bernal but it was too short notice for patient to be able to take appointment  Aware of PCP follow up appointment: Advised to follow up with PCP  Transportation: self  Social Support: family  Insurance/financial concerns: no  Home health care: no  Health literacy: good  Engagement: good  Personal Goal:  Additional comments:

## 2025-06-17 ENCOUNTER — OFFICE VISIT (OUTPATIENT)
Dept: CARDIOLOGY CLINIC | Facility: CLINIC | Age: 75
End: 2025-06-17
Payer: COMMERCIAL

## 2025-06-17 VITALS
SYSTOLIC BLOOD PRESSURE: 146 MMHG | OXYGEN SATURATION: 95 % | DIASTOLIC BLOOD PRESSURE: 52 MMHG | HEIGHT: 70 IN | BODY MASS INDEX: 37.22 KG/M2 | WEIGHT: 260 LBS | HEART RATE: 61 BPM | RESPIRATION RATE: 16 BRPM

## 2025-06-17 DIAGNOSIS — I25.10 CAD, MULTIPLE VESSEL: ICD-10-CM

## 2025-06-17 DIAGNOSIS — I50.33 ACUTE ON CHRONIC HEART FAILURE WITH PRESERVED EJECTION FRACTION (HFPEF) (HCC): ICD-10-CM

## 2025-06-17 DIAGNOSIS — I25.10 CORONARY ARTERY DISEASE INVOLVING NATIVE HEART WITHOUT ANGINA PECTORIS, UNSPECIFIED VESSEL OR LESION TYPE: Primary | ICD-10-CM

## 2025-06-17 PROCEDURE — 99214 OFFICE O/P EST MOD 30 MIN: CPT | Performed by: STUDENT IN AN ORGANIZED HEALTH CARE EDUCATION/TRAINING PROGRAM

## 2025-06-17 NOTE — ASSESSMENT & PLAN NOTE
Problem: At Risk for Falls  Goal: Patient does not fall  Outcome: Monitoring/Evaluating progress  Goal: Patient takes action to control fall-related risks  Outcome: Monitoring/Evaluating progress     Problem: At Risk for Injury Due to Fall  Goal: Patient does not fall  Outcome: Monitoring/Evaluating progress  Goal: Takes action to control condition specific risks  Outcome: Monitoring/Evaluating progress  Goal: Verbalizes understanding of fall-related injury personal risks  Description: Document education using the patient education activity  Outcome: Monitoring/Evaluating progress     Problem: Mental Status, Alterations (Non-Delirium)  Goal: Mental Status is maintained/improved from status at baseline  Outcome: Monitoring/Evaluating progress     Problem: Potential for injury, Restraints  Goal: # Remains free from injury  Outcome: Monitoring/Evaluating progress  Goal: Verbalizes criteria for restraint discontinuation  Description: Document on Patient Education Activity  Outcome: Monitoring/Evaluating progress      Wt Readings from Last 3 Encounters:   06/17/25 118 kg (260 lb)   05/24/25 120 kg (264 lb 3.2 oz)   05/07/25 122 kg (270 lb)     Patient with heart failure exacerbation in setting of cellulitis and diet noncompliance, patient does have chronic lower extremity edema which is currently at his baseline.  Continue with current doses of beta-blocker, SGLT2 inhibitor, ARB, and spironolactone.  Blood pressure slightly above goal at today's visit, reports that his numbers at home are much better.  Will have the patient check his BPs at home along with his weights and report the numbers back to me in 2 weeks.  If blood pressure still elevated would likely uptitrate spironolactone.  Would ideally like to take the patient off hydralazine if his blood pressures are well-controlled.  Otherwise continue with current dose of diuretic which is Lasix 40 mg twice a day

## 2025-06-17 NOTE — ASSESSMENT & PLAN NOTE
Patient is currently on aspirin, beta-blocker, ARB.  No signs or symptoms of ischemic chest pain.  Okay continue with current medications.

## 2025-06-17 NOTE — PROGRESS NOTES
[facility] Cardiology  Follow up note  Alex Baptiste 75 y.o. male MRN: [id number]        1. Coronary artery disease involving native heart without angina pectoris, unspecified vessel or lesion type  -     Lipid panel; Future; Expected date: Collect anytime  2. CAD s/p PCI and CABG x3 (2017)  3. Acute on chronic HFpEF      Assessment & Plan  Coronary artery disease involving native heart without angina pectoris, unspecified vessel or lesion type    CAD s/p PCI and CABG x3 (2017)  Patient is currently on aspirin, beta-blocker, ARB.  No signs or symptoms of ischemic chest pain.  Okay continue with current medications.  Acute on chronic HFpEF  Wt Readings from Last 3 Encounters:   06/17/25 118 kg (260 lb)   05/24/25 120 kg (264 lb 3.2 oz)   05/07/25 122 kg (270 lb)     Patient with heart failure exacerbation in setting of cellulitis and diet noncompliance, patient does have chronic lower extremity edema which is currently at his baseline.  Continue with current doses of beta-blocker, SGLT2 inhibitor, ARB, and spironolactone.  Blood pressure slightly above goal at today's visit, reports that his numbers at home are much better.  Will have the patient check his BPs at home along with his weights and report the numbers back to me in 2 weeks.  If blood pressure still elevated would likely uptitrate spironolactone.  Would ideally like to take the patient off hydralazine if his blood pressures are well-controlled.  Otherwise continue with current dose of diuretic which is Lasix 40 mg twice a day          HPI:   Alex Baptiste is a 75 y.o. year old male   heart failure with preserved ejection fraction, CAD status post PCI/CABG times 3/20/2017, hypertension, hyperlipidemia, insulin-dependent diabetes with neuropathy, Parkinson disease with mild cognitive impairment     We obtained an echocardiogram which showed an LVEF of 60%, grade 1 diastolic dysfunction, normal RV size and function, mild left atrial dilatation,  aortic sclerosis without stenosis.     Recent HF exacerbation I/s/o of cellulitis. He started on furosemide (torsemide was stopped due to recurrent bathroom visits), Aldactone, and jardiance.     BP at home 130s/70s at home       Currently denies any fever, chills, fatigue, new visual changes, lightheadedness, syncope, chest pain, palpitations, shortness of breath at rest or with exertion, orthopnea, PND, nausea, vomiting, diarrhea, dark or bright red blood in stools, worsening lower extremity swelling, leg claudication.       Echocardiogram:  TTE 5/2022: EF 55%, G2dd, RV function was mildly reduced, mild MR, TR  TTE 2/3/25: EF of 60%, concentric hypertrophy, grade 1 diastolic dysfunction, LAE, aortic valve sclerosis     Stress tests:  Pharmacologic MPI 5/2022: anterior infarct without evidence of ischemia.     Catheterization:  CAD s/p remote PCI and CABG x3 in 2017 at [facility].    Lab Results   Component Value Date    LDLCALC 95 07/12/2024         Review of Systems    Past Medical History[1]  Social History     Substance and Sexual Activity   Alcohol Use Yes    Comment: 5 drinks daily     Social History     Substance and Sexual Activity   Drug Use No     Tobacco Use History[2]    Allergies:  Allergies[3]    Medications:   Current Medications[4]      Vitals:    06/17/25 0919   BP: 146/52   Pulse: 61   Resp: 16   SpO2: 95%     Weight (last 2 days)       Date/Time Weight    06/17/25 0919 118 (260)          Physical Exam  Constitutional:       Appearance: Normal appearance.     Cardiovascular:      Rate and Rhythm: Normal rate.      Heart sounds: No murmur heard.  Pulmonary:      Effort: Pulmonary effort is normal.      Breath sounds: Normal breath sounds.     Musculoskeletal:      Right lower leg: Edema present.      Left lower leg: Edema present.      Comments: Chronic venostasis      Neurological:      Mental Status: He is alert.         Laboratory Studies:    Laboratory studies personally reviewed    Cardiac  "testing:     See above         Cathryn Cedillo MD    Portions of the record may have been created with voice recognition software.  Occasional wrong word or \"sound a like\" substitutions may have occurred due to the inherent limitations of voice recognition software.  Read the chart carefully and recognize, using context, where substitutions have occurred.          [1]   Past Medical History:  Diagnosis Date    Arthritis     Hyperlipidemia     Hypertension     Parkinson disease (HCC) 01/01/2025    Poor circulation     Sleep apnea     Type 2 diabetes mellitus (HCC)    [2]   Social History  Tobacco Use   Smoking Status Never    Passive exposure: Past   Smokeless Tobacco Never   [3]   Allergies  Allergen Reactions    Penicillins      Other reaction(s): Other, Unknown   [4]   Current Outpatient Medications:     amLODIPine (NORVASC) 10 mg tablet, Take 1 tablet (10 mg total) by mouth daily, Disp: 90 tablet, Rfl: 3    aspirin 81 MG tablet, Take 1 tablet by mouth in the morning., Disp: , Rfl:     Blood Glucose Monitoring Suppl (ACURA BLOOD GLUCOSE METER) w/Device KIT, , Disp: , Rfl:     carbidopa-levodopa (Sinemet)  mg per tablet, Take 1 tablet by mouth 3 (three) times a day Take 1/2 tablet three times a day for a week, then take 1 tablet three times a day thereafter., Disp: 90 tablet, Rfl: 5    carvedilol (COREG) 25 mg tablet, Take 1 tablet (25 mg total) by mouth 2 (two) times a day with meals, Disp: 60 tablet, Rfl: 1    donepezil (ARICEPT) 10 mg tablet, TAKE 1 TABLET NIGHTLY, Disp: 90 tablet, Rfl: 3    DULoxetine (CYMBALTA) 60 mg delayed release capsule, Take 1 capsule (60 mg total) by mouth daily TAKE 1 CAPSULE DAILY, Disp: 90 capsule, Rfl: 1    Empagliflozin (Jardiance) 10 MG TABS tablet, Take 1 tablet (10 mg total) by mouth every morning, Disp: 30 tablet, Rfl: 1    furosemide (LASIX) 40 mg tablet, Take 1 tablet (40 mg total) by mouth 2 (two) times a day, Disp: 60 tablet, Rfl: 0    gabapentin (NEURONTIN) 600 MG " tablet, Take 1 tablet (600 mg total) by mouth 4 (four) times a day, Disp: 360 tablet, Rfl: 1    glipiZIDE (GLUCOTROL) 10 mg tablet, Take 10 mg by mouth in the morning and 10 mg in the evening., Disp: , Rfl:     hydrALAZINE (APRESOLINE) 25 mg tablet, Take 1 tablet (25 mg total) by mouth 3 (three) times a day, Disp: 270 tablet, Rfl: 3    hydroxychloroquine (PLAQUENIL) 200 mg tablet, TAKE 2 TABLETS DAILY, Disp: 180 tablet, Rfl: 1    insulin glargine (LANTUS SOLOSTAR) 100 units/mL injection pen, Inject 40 Units under the skin daily at bedtime, Disp: , Rfl:     irbesartan (AVAPRO) 300 mg tablet, Take 1 tablet (300 mg total) by mouth daily, Disp: 90 tablet, Rfl: 3    isosorbide mononitrate (IMDUR) 30 mg 24 hr tablet, Take 1 tablet (30 mg total) by mouth daily, Disp: 90 tablet, Rfl: 3    LORazepam (Ativan) 0.5 mg tablet, Take 1 tablet (0.5 mg total) by mouth once as needed for anxiety (for MRI) for up to 1 dose, Disp: 2 tablet, Rfl: 0    OneTouch Ultra test strip, , Disp: , Rfl:     OXcarbazepine (TRILEPTAL) 600 mg tablet, Take 1 tablet (600 mg total) by mouth daily at bedtime, Disp: 90 tablet, Rfl: 1    Ozempic, 0.25 or 0.5 MG/DOSE, 2 MG/3ML injection pen, inject 0.25 milligrams subcutaneously weekly, Disp: , Rfl:     primidone (MYSOLINE) 50 mg tablet, Take 1 tablet (50 mg total) by mouth daily at bedtime, Disp: 30 tablet, Rfl: 5    rosuvastatin (CRESTOR) 5 mg tablet, Take 1 tablet by mouth in the morning, Disp: , Rfl:     spironolactone (ALDACTONE) 25 mg tablet, Take 1 tablet (25 mg total) by mouth daily, Disp: 30 tablet, Rfl: 0    TRUEPLUS PEN NEEDLES 32G X 4 MM MISC, , Disp: , Rfl:     ergocalciferol (VITAMIN D2) 50,000 units, Take 50,000 Units by mouth once a week (Patient not taking: Reported on 6/17/2025), Disp: , Rfl:

## 2025-06-17 NOTE — PATIENT INSTRUCTIONS
1) Follow up in 3-6 months  2) Check blood pressures at home 3x over the next 2 weeks, call office with numbers

## 2025-07-09 ENCOUNTER — PROCEDURE VISIT (OUTPATIENT)
Dept: NEUROLOGY | Facility: CLINIC | Age: 75
End: 2025-07-09
Payer: COMMERCIAL

## 2025-07-09 ENCOUNTER — TELEPHONE (OUTPATIENT)
Dept: NEUROLOGY | Facility: CLINIC | Age: 75
End: 2025-07-09

## 2025-07-09 VITALS
BODY MASS INDEX: 37.94 KG/M2 | SYSTOLIC BLOOD PRESSURE: 118 MMHG | DIASTOLIC BLOOD PRESSURE: 76 MMHG | WEIGHT: 264.4 LBS | OXYGEN SATURATION: 96 % | HEART RATE: 69 BPM | TEMPERATURE: 96.5 F

## 2025-07-09 DIAGNOSIS — R25.1 TREMOR: Primary | ICD-10-CM

## 2025-07-09 PROCEDURE — 11105 PUNCH BX SKIN EA SEP/ADDL: CPT | Performed by: PSYCHIATRY & NEUROLOGY

## 2025-07-09 PROCEDURE — 11104 PUNCH BX SKIN SINGLE LESION: CPT | Performed by: PSYCHIATRY & NEUROLOGY

## 2025-07-09 NOTE — PROGRESS NOTES
"Biopsy    Date/Time: 7/9/2025 3:26 PM    Performed by: Maribell Johns MD  Authorized by: Maribell Johns MD    Universal Protocol:  Consent: Verbal consent obtained. Written consent obtained  Consent given by: patient  Time out: Immediately prior to procedure a \"time out\" was called to verify the correct patient, procedure, equipment, support staff and site/side marked as required.  Timeout called at: 7/9/2025 3:26 PM.  Patient understanding: patient states understanding of the procedure being performed  Patient consent: the patient's understanding of the procedure matches consent given  Procedure consent: procedure consent matches procedure scheduled  Relevant documents: relevant documents present and verified  Test results: test results available and properly labeled  Site marked: the operative site was marked  Patient identity confirmed: verbally with patient and provided demographic data    Procedure Details - Lesion Biopsy:     Body area:  Head/neck    Biopsy method: punch biopsy      Biopsy tissue type: skin     The provided skin biopsy tool was utilized to obtain the first skin biopsy specimen from the right side of the neck.  The specimen was immersed in the fixative provided.  Local pressure was applied.  A pressure bandage was applied.  The patient tolerated the procedure well.  Biopsy    Date/Time: 7/9/2025 3:26 PM    Performed by: Maribell Johns MD  Authorized by: Maribell Johns MD    Universal Protocol:  Consent: Verbal consent obtained. Written consent obtained  Risks and benefits: risks, benefits and alternatives were discussed  Consent given by: patient  Time out: Immediately prior to procedure a \"time out\" was called to verify the correct patient, procedure, equipment, support staff and site/side marked as required.  Timeout called at: 7/9/2025 3:26 PM.  Patient understanding: patient states understanding of the procedure being performed  Patient consent: the patient's understanding of the procedure " "matches consent given  Procedure consent: procedure consent matches procedure scheduled  Relevant documents: relevant documents present and verified  Test results: test results available and properly labeled  Site marked: the operative site was marked  Patient identity confirmed: verbally with patient and provided demographic data    Procedure Details - Lesion Biopsy:     Body area:  Lower extremity    Lower extremity location:  R upper leg    Biopsy method: punch biopsy      Biopsy tissue type: skin     The provided biopsy tool was used to obtain the skin punch biopsy 10 cm proximal from the right patella.  The specimen was immersed in provided fixative.  Local compression was applied.  Pressure bandage was applied.  Patient tolerated procedure well.  Biopsy    Date/Time: 7/9/2025 3:26 PM    Performed by: Maribell Johns MD  Authorized by: Maribell Johns MD    Universal Protocol:  Consent: Verbal consent obtained. Written consent obtained  Consent given by: patient  Time out: Immediately prior to procedure a \"time out\" was called to verify the correct patient, procedure, equipment, support staff and site/side marked as required.  Timeout called at: 7/9/2025 3:26 PM.  Patient understanding: patient states understanding of the procedure being performed  Patient consent: the patient's understanding of the procedure matches consent given  Procedure consent: procedure consent matches procedure scheduled  Relevant documents: relevant documents present and verified  Test results: test results available and properly labeled  Site marked: the operative site was marked  Patient identity confirmed: verbally with patient and provided demographic data    Procedure Details - Lesion Biopsy:     Body area:  Lower extremity    Lower extremity location:  R lower leg    Biopsy method: punch biopsy      Biopsy tissue type: skin     The choice for the third biopsy site was due to significant edema and venous stasis changes of the right " lower leg.  The provided biopsy tool was utilized to obtain the skin biopsy specimen approximately 10 cm distal to the right patella.  The specimen was placed in fixative.  Local pressure was applied.  The patient did have clear drainage.  Local pressure was applied for several minutes followed by pressure bandage.  Patient and his son were advised to obtain larger Band-Aids for when he changes the bandages.  Advised to let me know if prolonged leaking of fluid.    The patient was provided with written postbiopsy instructions.  We also discussed at length the need to make sure there is not prolonged leaking from any of the sites, but in particular the third site where there was fluid drainage from the edema.  Advised to let me know if any signs or symptoms of infection or prolonged bleeding.  He has a follow-up appointment with Dr. Padilla in September and we will update her with the results when we receive them.

## 2025-07-09 NOTE — PATIENT INSTRUCTIONS
Patient Instructions for Biopsy Site Care  Leave your wound dressings in place for the rest of the day of the biopsy and keep them dry.  Refrain from doing extremely strenuous activity for the rest of the day of your biopsy (such as running or heavy lifting).  Change band-aids daily starting the day after the biopsy until there are no open wounds. This can take anywhere from 1 or 2 days up to 2 weeks (5-6 days is average for daily band-aid changes).   The wounds may or may not form a scab as they heal; either is fine.  Showers are fine starting the day after the biopsy. Leave the band-aids in place while you shower and change them after you dry off.  During the time period of daily band-aid changes, do not soak in a bath or swim.  If you need to clean the wounds, you can use hydrogen peroxide. If the wounds are fine (no signs of infection), a daily band-aid change is all you need.  The local anesthetic used for the biopsy will usually last for 1 to 2 hours after the procedure. After it wears off, you may have some mild localized soreness and tenderness at the biopsy sites over the next day or two. You may find regular Tylenol is helpful for the discomfort.  Once the biopsy sites heal, they may look slightly red or darker than the rest of the skin. This discoloration will gradually fade and blend with your normal skin color. This fading process may take anywhere from a few months up to a year.  Problems during the healing period are very rare. It is normal for the biopsy sites to bleed a little or drain pink fluid for a day or two after the procedure. They should not bleed excessively (i.e., through the band-aid) after that time. They should never drain pus. If you experience problems with significant bleeding, redness, infection or other problems, call your doctor's office.

## 2025-07-09 NOTE — TELEPHONE ENCOUNTER
Pt present today for skin biopsy, 3 sites biopsied by DR Johns, pt tolerated procedure without complications . Specimen placed in fed ex drop box downstairs. Ref # [id number]

## 2025-07-20 DIAGNOSIS — I50.33 ACUTE ON CHRONIC HEART FAILURE WITH PRESERVED EJECTION FRACTION (HFPEF) (HCC): ICD-10-CM

## 2025-07-22 ENCOUNTER — NURSE TRIAGE (OUTPATIENT)
Age: 75
End: 2025-07-22

## 2025-07-22 RX ORDER — EMPAGLIFLOZIN 10 MG/1
10 TABLET, FILM COATED ORAL EVERY MORNING
Qty: 30 TABLET | Refills: 1 | Status: SHIPPED | OUTPATIENT
Start: 2025-07-22

## 2025-07-22 NOTE — TELEPHONE ENCOUNTER
REASON FOR CONVERSATION: Frequent urination    SYMPTOMS: ongoing symptoms of frequency , and urgency;  some incontinence;  worse since hospital;  denies burning or hematuria;      OTHER HEALTH INFORMATION: states he no longer takes diuretics;    PROTOCOL DISPOSITION: Next Available Appointment with Provider    CARE ADVICE PROVIDED: Hydration and avoidance of bladder irritants.      PRACTICE FOLLOW-UP: would like earliest follow-up visit available with Diana GARCIA     Current appt 10/10/25;      Reason for Disposition   The patient has been BPH diagnosed, but no medications; and has new or increased symptoms    Answer Assessment - Initial Assessment Questions  1. When did your urinary frequency begin? Can you describe if you are voiding normal or small amounts of urine? Do you have any other urinary symptoms such as incontinence, nocturia (urinating frequently at night), weak urine stream or dysuria (discomfort, pain such as burning, itching, or stinging?)  Incontinence, frequency, and urgency worsening while he was in the hospital.   In hospital for leg edema for 6 days;  worsening since discharge  2. Have you seen any blood in your urine?   Clear yellow urine;    3. Do you have a fever of 101 or higher?   no  4. Have you taken any cold, allergy medications or taking a diuretic?  Finished diuretic couple weeks ago  5. Have you had a recent urologic surgery or procedure?  no  6 .Are you diabetic?  yes  7. Have you ever been diagnosed with BPH, benign prostatic hypertrophy? If yes, are you taking medication for it? (Tamsulosin, finasteride or other similar medications?)      8. Do you have a history of recurrent UTI (urinary tract infections) with self-start therapy? If yes, to start antibiotics after submitting your urine for testing.    Protocols used: Urology-Urinary Frequency-ADULT-[place]

## 2025-07-22 NOTE — TELEPHONE ENCOUNTER
"Patient called to say that he is urinating g a lot and \"wetting himself\".      Attempt was made to transfer pt to [facility] but pt disconnected call while on hold.    Please call back [phone]  "

## 2025-07-24 NOTE — TELEPHONE ENCOUNTER
I called and spoke with patient and asked him if he was taking the trospium. Patient stated that since he was admitted he has not taken it.

## 2025-07-25 DIAGNOSIS — R39.15 URGENCY OF URINATION: Primary | ICD-10-CM

## 2025-07-25 DIAGNOSIS — N39.41 URGE URINARY INCONTINENCE: ICD-10-CM

## 2025-07-25 RX ORDER — TROSPIUM CHLORIDE 20 MG/1
20 TABLET, FILM COATED ORAL 2 TIMES DAILY
Qty: 180 TABLET | Refills: 1 | Status: SHIPPED | OUTPATIENT
Start: 2025-07-25

## 2025-07-29 ENCOUNTER — TELEPHONE (OUTPATIENT)
Dept: NEUROLOGY | Facility: CLINIC | Age: 75
End: 2025-07-29

## 2025-07-29 DIAGNOSIS — R25.1 TREMOR: ICD-10-CM

## 2025-07-29 PROBLEM — G20.A1 PARKINSON DISEASE (HCC): Status: RESOLVED | Noted: 2025-05-19 | Resolved: 2025-07-29

## 2025-07-29 RX ORDER — PRIMIDONE 50 MG/1
50 TABLET ORAL EVERY 12 HOURS SCHEDULED
Qty: 180 TABLET | Refills: 1 | Status: SHIPPED | OUTPATIENT
Start: 2025-07-29 | End: 2026-01-25

## 2025-08-05 DIAGNOSIS — E11.42 DIABETIC POLYNEUROPATHY ASSOCIATED WITH TYPE 2 DIABETES MELLITUS (HCC): ICD-10-CM

## 2025-08-05 RX ORDER — OXCARBAZEPINE 600 MG/1
600 TABLET, FILM COATED ORAL
Qty: 90 TABLET | Refills: 3 | Status: SHIPPED | OUTPATIENT
Start: 2025-08-05

## 2025-08-15 PROBLEM — R26.2 AMBULATORY DYSFUNCTION: Status: ACTIVE | Noted: 2025-08-15

## 2025-08-15 PROBLEM — E55.9 VITAMIN D DEFICIENCY: Status: ACTIVE | Noted: 2018-12-14

## 2025-08-15 PROBLEM — R39.15 URGENCY OF URINATION: Status: ACTIVE | Noted: 2025-08-15

## 2025-08-15 PROBLEM — N40.1 BPH WITH LOWER URINARY TRACT SYMPTOMS WITHOUT URINARY OBSTRUCTION: Status: ACTIVE | Noted: 2025-08-15

## 2025-08-15 PROBLEM — I50.22 CHRONIC HFREF (HEART FAILURE WITH REDUCED EJECTION FRACTION) (HCC): Status: ACTIVE | Noted: 2025-08-15

## 2025-08-15 PROBLEM — F10.920 ALCOHOLIC INTOXICATION WITHOUT COMPLICATION (HCC): Status: ACTIVE | Noted: 2025-08-15

## 2025-08-15 PROBLEM — E53.8 B12 DEFICIENCY: Status: ACTIVE | Noted: 2019-06-19
